# Patient Record
Sex: MALE | Race: WHITE | NOT HISPANIC OR LATINO | Employment: FULL TIME | URBAN - METROPOLITAN AREA
[De-identification: names, ages, dates, MRNs, and addresses within clinical notes are randomized per-mention and may not be internally consistent; named-entity substitution may affect disease eponyms.]

---

## 2017-01-06 ENCOUNTER — LAB VISIT (OUTPATIENT)
Dept: LAB | Facility: HOSPITAL | Age: 67
End: 2017-01-06
Attending: INTERNAL MEDICINE
Payer: COMMERCIAL

## 2017-01-06 ENCOUNTER — HOSPITAL ENCOUNTER (OUTPATIENT)
Dept: RADIOLOGY | Facility: HOSPITAL | Age: 67
Discharge: HOME | End: 2017-01-06
Attending: NURSE PRACTITIONER
Payer: COMMERCIAL

## 2017-01-06 DIAGNOSIS — C78.01 SECONDARY ADENOCARCINOMA OF LUNG, RIGHT: ICD-10-CM

## 2017-01-06 DIAGNOSIS — C20 RECTAL CANCER: ICD-10-CM

## 2017-01-06 LAB
ALBUMIN SERPL BCP-MCNC: 3.4 G/DL
ALP SERPL-CCNC: 235 U/L
ALT SERPL W/O P-5'-P-CCNC: 15 U/L
ANION GAP SERPL CALC-SCNC: 10 MMOL/L
AST SERPL-CCNC: 25 U/L
BILIRUB SERPL-MCNC: 0.5 MG/DL
BUN SERPL-MCNC: 10 MG/DL
CALCIUM SERPL-MCNC: 9.1 MG/DL
CHLORIDE SERPL-SCNC: 106 MMOL/L
CO2 SERPL-SCNC: 25 MMOL/L
CREAT SERPL-MCNC: 1.1 MG/DL
ERYTHROCYTE [DISTWIDTH] IN BLOOD BY AUTOMATED COUNT: 18.3 %
EST. GFR  (AFRICAN AMERICAN): >60 ML/MIN/1.73 M^2
EST. GFR  (NON AFRICAN AMERICAN): >60 ML/MIN/1.73 M^2
GLUCOSE SERPL-MCNC: 160 MG/DL
HCT VFR BLD AUTO: 35 %
HGB BLD-MCNC: 11 G/DL
MCH RBC QN AUTO: 29.7 PG
MCHC RBC AUTO-ENTMCNC: 31.4 %
MCV RBC AUTO: 95 FL
NEUTROPHILS # BLD AUTO: 13.8 K/UL
PLATELET # BLD AUTO: 122 K/UL
PMV BLD AUTO: 9.8 FL
POTASSIUM SERPL-SCNC: 3.8 MMOL/L
PROT SERPL-MCNC: 6.7 G/DL
RBC # BLD AUTO: 3.7 M/UL
SODIUM SERPL-SCNC: 141 MMOL/L
WBC # BLD AUTO: 18.14 K/UL

## 2017-01-06 PROCEDURE — 25500020 PHARM REV CODE 255: Performed by: NURSE PRACTITIONER

## 2017-01-06 PROCEDURE — 74177 CT ABD & PELVIS W/CONTRAST: CPT | Mod: 26,,, | Performed by: RADIOLOGY

## 2017-01-06 PROCEDURE — 71260 CT THORAX DX C+: CPT | Mod: 26,,, | Performed by: RADIOLOGY

## 2017-01-06 PROCEDURE — 71260 CT THORAX DX C+: CPT | Mod: TC

## 2017-01-06 PROCEDURE — 74177 CT ABD & PELVIS W/CONTRAST: CPT | Mod: TC

## 2017-01-06 PROCEDURE — 80053 COMPREHEN METABOLIC PANEL: CPT

## 2017-01-06 PROCEDURE — 85027 COMPLETE CBC AUTOMATED: CPT

## 2017-01-06 PROCEDURE — 36415 COLL VENOUS BLD VENIPUNCTURE: CPT

## 2017-01-06 RX ADMIN — IOHEXOL 15 ML: 350 INJECTION, SOLUTION INTRAVENOUS at 03:01

## 2017-01-06 RX ADMIN — IOHEXOL 100 ML: 350 INJECTION, SOLUTION INTRAVENOUS at 05:01

## 2017-01-06 RX ADMIN — IOHEXOL 15 ML: 350 INJECTION, SOLUTION INTRAVENOUS at 02:01

## 2017-01-09 ENCOUNTER — OFFICE VISIT (OUTPATIENT)
Dept: HEMATOLOGY/ONCOLOGY | Facility: CLINIC | Age: 67
End: 2017-01-09
Payer: COMMERCIAL

## 2017-01-09 VITALS
OXYGEN SATURATION: 96 % | HEIGHT: 66 IN | HEART RATE: 82 BPM | BODY MASS INDEX: 34.33 KG/M2 | DIASTOLIC BLOOD PRESSURE: 73 MMHG | WEIGHT: 213.63 LBS | RESPIRATION RATE: 19 BRPM | SYSTOLIC BLOOD PRESSURE: 111 MMHG | TEMPERATURE: 98 F

## 2017-01-09 DIAGNOSIS — Z51.12 ENCOUNTER FOR ANTINEOPLASTIC CHEMOTHERAPY AND IMMUNOTHERAPY: ICD-10-CM

## 2017-01-09 DIAGNOSIS — C78.01 SECONDARY ADENOCARCINOMA OF LUNG, RIGHT: ICD-10-CM

## 2017-01-09 DIAGNOSIS — T45.1X5A CHEMOTHERAPY INDUCED NEUTROPENIA: ICD-10-CM

## 2017-01-09 DIAGNOSIS — D70.1 CHEMOTHERAPY INDUCED NEUTROPENIA: ICD-10-CM

## 2017-01-09 DIAGNOSIS — E11.9 TYPE 2 DIABETES MELLITUS WITHOUT COMPLICATION, WITHOUT LONG-TERM CURRENT USE OF INSULIN: ICD-10-CM

## 2017-01-09 DIAGNOSIS — Z51.11 ENCOUNTER FOR ANTINEOPLASTIC CHEMOTHERAPY AND IMMUNOTHERAPY: ICD-10-CM

## 2017-01-09 DIAGNOSIS — C20 RECTAL CANCER: Primary | ICD-10-CM

## 2017-01-09 PROCEDURE — 99999 PR PBB SHADOW E&M-EST. PATIENT-LVL IV: CPT | Mod: PBBFAC,,, | Performed by: INTERNAL MEDICINE

## 2017-01-09 PROCEDURE — 3044F HG A1C LEVEL LT 7.0%: CPT | Mod: S$GLB,,, | Performed by: INTERNAL MEDICINE

## 2017-01-09 PROCEDURE — 4010F ACE/ARB THERAPY RXD/TAKEN: CPT | Mod: S$GLB,,, | Performed by: INTERNAL MEDICINE

## 2017-01-09 PROCEDURE — 1160F RVW MEDS BY RX/DR IN RCRD: CPT | Mod: S$GLB,,, | Performed by: INTERNAL MEDICINE

## 2017-01-09 PROCEDURE — 2022F DILAT RTA XM EVC RTNOPTHY: CPT | Mod: S$GLB,,, | Performed by: INTERNAL MEDICINE

## 2017-01-09 PROCEDURE — 1159F MED LIST DOCD IN RCRD: CPT | Mod: S$GLB,,, | Performed by: INTERNAL MEDICINE

## 2017-01-09 PROCEDURE — 99215 OFFICE O/P EST HI 40 MIN: CPT | Mod: S$GLB,,, | Performed by: INTERNAL MEDICINE

## 2017-01-09 PROCEDURE — 1125F AMNT PAIN NOTED PAIN PRSNT: CPT | Mod: S$GLB,,, | Performed by: INTERNAL MEDICINE

## 2017-01-09 PROCEDURE — 3074F SYST BP LT 130 MM HG: CPT | Mod: S$GLB,,, | Performed by: INTERNAL MEDICINE

## 2017-01-09 PROCEDURE — 1157F ADVNC CARE PLAN IN RCRD: CPT | Mod: S$GLB,,, | Performed by: INTERNAL MEDICINE

## 2017-01-09 PROCEDURE — 3078F DIAST BP <80 MM HG: CPT | Mod: S$GLB,,, | Performed by: INTERNAL MEDICINE

## 2017-01-09 RX ORDER — FLUOROURACIL 50 MG/ML
400 INJECTION, SOLUTION INTRAVENOUS
Status: CANCELLED | OUTPATIENT
Start: 2017-01-11

## 2017-01-09 RX ORDER — SODIUM CHLORIDE 0.9 % (FLUSH) 0.9 %
10 SYRINGE (ML) INJECTION
Status: CANCELLED | OUTPATIENT
Start: 2017-01-13

## 2017-01-09 RX ORDER — ATROPINE SULFATE 0.4 MG/ML
0.4 INJECTION, SOLUTION ENDOTRACHEAL; INTRAMEDULLARY; INTRAMUSCULAR; INTRAVENOUS; SUBCUTANEOUS DAILY PRN
Status: CANCELLED | OUTPATIENT
Start: 2017-01-11

## 2017-01-09 RX ORDER — ACETAMINOPHEN 500 MG
1 TABLET ORAL DAILY
COMMUNITY

## 2017-01-09 RX ORDER — HEPARIN 100 UNIT/ML
500 SYRINGE INTRAVENOUS
Status: CANCELLED | OUTPATIENT
Start: 2017-01-13

## 2017-01-09 NOTE — Clinical Note
Schedule CBC,CMP, urine protein/creatinine ratio and see me in 2 weeks and for FOLFIRI and Cyramza. DC pump on day 3. Neulasta on day 3  Schedule labs on a Monday, see me on a Tuesday PM and chemo on Wednesday

## 2017-01-09 NOTE — PROGRESS NOTES
"Subjective:       Patient ID: Karthik Medrano is a 66 y.o. male.    Chief Complaint: Rectal Cancer and 4/10 abdominal pain  Oncologic History:  66 year old male with history of rectal cancer diagnosed 1/11/10 on colonoscopy (moderately differentiated adenocarcinoma) and based on imaging, was T3NxM0. Patient underwent Neoadjuvant chemort with infusional 5FU. This was completed 3/19/10 with a total of 50.4Gy given to the pelvis. He went to surgery 5/13/10. T3N0M0. Post operative he received no adjuvant treatment and has been followed regularly. He went to his PCP 6/24/13 who ordered a CXR which found pulmonary nodules. On 6/27/13, CT chest confirmed these findings. CEA drawn when initially diagnosed was 2.6ng/ml and 6/27/13 was 30.8 ng/ml. . Pulmonary biopsy was performed 7/18/13 and this confirmed metastatic adenocarcinoma. 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 Avastin was added for second cycle.    Patient completed 12 cycles of FOLFOX avastin. He did not receive Avastin for cycle 3, 4, or 5 (due to uncontrolled blood pressure).    He was on maintenance infusional 5FU and Avastin. Repeat scans 4/14/14 showed numerous bilateral pulmonary soft tissue nodules, the majority of which demonstrate interval enlargement when compared to the prior exam. CEA up 18.2 from 13.    He was restarted on FOLFIRI and AVastin and received 6 months of chemo and then was switched to maintenance chemotherapy with 5FU and Avastin. His CT scans from early May 2015 revealed progression in lung lesions.  He is now on FOLFIRI and Cyramza.  CT from 4/19/16 reveals "Multiple stable metastatic pulmonary nodules in this patient with history of rectal cancer.  Stable postoperative changes are present from prior partial colectomy.Interval increase in right-sided hydronephrosis despite appropriate positioning of a double-J ureteral sent.  There is associated enlargement and decreased perfusion to the right kidney " "consistent with obstruction. These findings are concerning for a nonfunctioning ureteral stent. Cholelithiasis."  CT from 6/13/16 reveal stable disease and hydronephrosis has resolved.  CT from 8/5/16 reveals "Stable pulmonary metastatic disease.Increased prominence of periureteral fat stranding/inflammatory change. Stable mild right hydronephrosis."                 Interval History: Mr. Medrano returns for follow up and for FOLFIRI and Cyramza.    HPIHis Ct scans reveal stable disease.    He denies any nausea, vomiting, diarrhea, constipation, abdominal pain, weight loss or loss of appetite, chest pain, shortness of breath, leg swelling, fatigue, pain, headache, dizziness, or mood changes. His ECOG PS is zero. He is accompanied by his wife.        Review of Systems   Constitutional: Negative for appetite change, fatigue and unexpected weight change.   HENT: Negative for mouth sores.    Eyes: Negative for visual disturbance.   Respiratory: Negative for cough and shortness of breath.    Cardiovascular: Negative for chest pain.   Gastrointestinal: Negative for abdominal pain and diarrhea.   Genitourinary: Negative for frequency.   Musculoskeletal: Negative for back pain.   Skin: Negative for rash.   Neurological: Negative for headaches.   Hematological: Negative for adenopathy.   Psychiatric/Behavioral: The patient is not nervous/anxious.    All other systems reviewed and are negative.      PMFSH: all information reviewed and updated as relevant to today's visit  Objective:      Physical Exam   Constitutional: He is oriented to person, place, and time. He appears well-developed and well-nourished.   HENT:   Mouth/Throat: No oropharyngeal exudate.   Cardiovascular: Normal rate and normal heart sounds.    Pulmonary/Chest: Effort normal and breath sounds normal. He has no wheezes.   Abdominal: Soft. Bowel sounds are normal. There is no tenderness.   Musculoskeletal: He exhibits no edema or tenderness.   Lymphadenopathy:    "  He has no cervical adenopathy.   Neurological: He is alert and oriented to person, place, and time. Coordination normal.   Skin: Skin is warm and dry. No rash noted.   Psychiatric: He has a normal mood and affect. Judgment and thought content normal.   Vitals reviewed.      LABS:  WBC   Date Value Ref Range Status   01/06/2017 18.14 (H) 3.90 - 12.70 K/uL Final     Hemoglobin   Date Value Ref Range Status   01/06/2017 11.0 (L) 14.0 - 18.0 g/dL Final     Hematocrit   Date Value Ref Range Status   01/06/2017 35.0 (L) 40.0 - 54.0 % Final     Platelets   Date Value Ref Range Status   01/06/2017 122 (L) 150 - 350 K/uL Final     Gran #   Date Value Ref Range Status   01/06/2017 13.8 (H) 1.8 - 7.7 K/uL Final     Comment:     The ANC is based on a white cell differential from an   automated cell counter. It has not been microscopically   reviewed for the presence of abnormal cells. Clinical   correlation is required.       Gran%   Date Value Ref Range Status   03/06/2016 66.8 38.0 - 73.0 % Final       Chemistry        Component Value Date/Time     01/06/2017 1414    K 3.8 01/06/2017 1414     01/06/2017 1414    CO2 25 01/06/2017 1414    BUN 10 01/06/2017 1414    CREATININE 1.1 01/06/2017 1414     (H) 01/06/2017 1414        Component Value Date/Time    CALCIUM 9.1 01/06/2017 1414    ALKPHOS 235 (H) 01/06/2017 1414    AST 25 01/06/2017 1414    ALT 15 01/06/2017 1414    BILITOT 0.5 01/06/2017 1414          Assessment:       1. Rectal cancer    2. Secondary adenocarcinoma of lung, right    3. Encounter for antineoplastic chemotherapy and immunotherapy    4. Chemotherapy induced neutropenia    5. Type 2 diabetes mellitus without complication, without long-term current use of insulin        Plan:        1,2,3,4. He is doing well. His CT scans reveal stable disease. He will proceed with FOLFIRI and Cyramza and will return in 2 weeks for next cycle. Neulasta on day 3  5. Stable on meds.    Above care plan was  discussed with patient and accompanying wife and all questions were addressed to their satisfaction

## 2017-01-11 ENCOUNTER — INFUSION (OUTPATIENT)
Dept: INFUSION THERAPY | Facility: HOSPITAL | Age: 67
End: 2017-01-11
Attending: INTERNAL MEDICINE
Payer: COMMERCIAL

## 2017-01-11 VITALS
SYSTOLIC BLOOD PRESSURE: 130 MMHG | TEMPERATURE: 98 F | HEART RATE: 68 BPM | RESPIRATION RATE: 18 BRPM | DIASTOLIC BLOOD PRESSURE: 62 MMHG

## 2017-01-11 DIAGNOSIS — D70.1 CHEMOTHERAPY INDUCED NEUTROPENIA: Primary | ICD-10-CM

## 2017-01-11 DIAGNOSIS — Z51.12 ENCOUNTER FOR ANTINEOPLASTIC CHEMOTHERAPY AND IMMUNOTHERAPY: ICD-10-CM

## 2017-01-11 DIAGNOSIS — T45.1X5A CHEMOTHERAPY INDUCED NEUTROPENIA: Primary | ICD-10-CM

## 2017-01-11 DIAGNOSIS — Z51.11 ENCOUNTER FOR ANTINEOPLASTIC CHEMOTHERAPY AND IMMUNOTHERAPY: ICD-10-CM

## 2017-01-11 PROCEDURE — 96375 TX/PRO/DX INJ NEW DRUG ADDON: CPT

## 2017-01-11 PROCEDURE — 96416 CHEMO PROLONG INFUSE W/PUMP: CPT

## 2017-01-11 PROCEDURE — 96415 CHEMO IV INFUSION ADDL HR: CPT

## 2017-01-11 PROCEDURE — 96413 CHEMO IV INFUSION 1 HR: CPT

## 2017-01-11 PROCEDURE — 96411 CHEMO IV PUSH ADDL DRUG: CPT

## 2017-01-11 PROCEDURE — 96417 CHEMO IV INFUS EACH ADDL SEQ: CPT

## 2017-01-11 PROCEDURE — 96368 THER/DIAG CONCURRENT INF: CPT

## 2017-01-11 PROCEDURE — 25000003 PHARM REV CODE 250: Performed by: INTERNAL MEDICINE

## 2017-01-11 PROCEDURE — 96367 TX/PROPH/DG ADDL SEQ IV INF: CPT

## 2017-01-11 PROCEDURE — 63600175 PHARM REV CODE 636 W HCPCS: Performed by: INTERNAL MEDICINE

## 2017-01-11 RX ORDER — ATROPINE SULFATE 0.4 MG/ML
0.4 INJECTION, SOLUTION ENDOTRACHEAL; INTRAMEDULLARY; INTRAMUSCULAR; INTRAVENOUS; SUBCUTANEOUS DAILY PRN
Status: DISCONTINUED | OUTPATIENT
Start: 2017-01-11 | End: 2017-01-11 | Stop reason: HOSPADM

## 2017-01-11 RX ORDER — FLUOROURACIL 50 MG/ML
400 INJECTION, SOLUTION INTRAVENOUS
Status: COMPLETED | OUTPATIENT
Start: 2017-01-11 | End: 2017-01-11

## 2017-01-11 RX ADMIN — SODIUM CHLORIDE: 9 INJECTION, SOLUTION INTRAVENOUS at 04:01

## 2017-01-11 RX ADMIN — SODIUM CHLORIDE 775 MG: 9 INJECTION, SOLUTION INTRAVENOUS at 03:01

## 2017-01-11 RX ADMIN — SODIUM CHLORIDE: 9 INJECTION, SOLUTION INTRAVENOUS at 02:01

## 2017-01-11 RX ADMIN — FLUOROURACIL 5090 MG: 50 INJECTION, SOLUTION INTRAVENOUS at 06:01

## 2017-01-11 RX ADMIN — FLUOROURACIL 850 MG: 50 INJECTION, SOLUTION INTRAVENOUS at 06:01

## 2017-01-11 RX ADMIN — LEUCOVORIN CALCIUM 42.4 MG: 350 INJECTION, POWDER, LYOPHILIZED, FOR SOLUTION INTRAMUSCULAR; INTRAVENOUS at 04:01

## 2017-01-11 RX ADMIN — DEXTROSE 382 MG: 5 SOLUTION INTRAVENOUS at 04:01

## 2017-01-11 RX ADMIN — ATROPINE SULFATE 0.4 MG: 0.4 INJECTION, SOLUTION INTRAMUSCULAR; INTRAVENOUS; SUBCUTANEOUS at 04:01

## 2017-01-11 RX ADMIN — DEXAMETHASONE SODIUM PHOSPHATE 0.25 MG: 4 INJECTION, SOLUTION INTRAMUSCULAR; INTRAVENOUS at 03:01

## 2017-01-12 NOTE — PLAN OF CARE
Problem: Patient Care Overview  Goal: Plan of Care Review  Outcome: Ongoing (interventions implemented as appropriate)  Patient tolerated treatment and connected to 5fu home chemo infusion pump. . Patient instructed to call MD with any problems and to return to clinic Friday at 1600 for pump D/C . AVS given and Patient discharged home.

## 2017-01-13 ENCOUNTER — INFUSION (OUTPATIENT)
Dept: INFUSION THERAPY | Facility: HOSPITAL | Age: 67
End: 2017-01-13
Attending: INTERNAL MEDICINE
Payer: COMMERCIAL

## 2017-01-13 DIAGNOSIS — Z51.12 ENCOUNTER FOR ANTINEOPLASTIC CHEMOTHERAPY AND IMMUNOTHERAPY: ICD-10-CM

## 2017-01-13 DIAGNOSIS — Z51.11 ENCOUNTER FOR ANTINEOPLASTIC CHEMOTHERAPY AND IMMUNOTHERAPY: ICD-10-CM

## 2017-01-13 DIAGNOSIS — D70.1 CHEMOTHERAPY INDUCED NEUTROPENIA: Primary | ICD-10-CM

## 2017-01-13 DIAGNOSIS — T45.1X5A CHEMOTHERAPY INDUCED NEUTROPENIA: Primary | ICD-10-CM

## 2017-01-13 PROCEDURE — 25000003 PHARM REV CODE 250: Performed by: INTERNAL MEDICINE

## 2017-01-13 PROCEDURE — 96377 APPLICATON ON-BODY INJECTOR: CPT

## 2017-01-13 PROCEDURE — 96360 HYDRATION IV INFUSION INIT: CPT

## 2017-01-13 PROCEDURE — 63600175 PHARM REV CODE 636 W HCPCS: Performed by: INTERNAL MEDICINE

## 2017-01-13 RX ORDER — SODIUM CHLORIDE 0.9 % (FLUSH) 0.9 %
10 SYRINGE (ML) INJECTION
Status: DISCONTINUED | OUTPATIENT
Start: 2017-01-13 | End: 2017-01-13 | Stop reason: HOSPADM

## 2017-01-13 RX ORDER — HEPARIN 100 UNIT/ML
500 SYRINGE INTRAVENOUS
Status: DISCONTINUED | OUTPATIENT
Start: 2017-01-13 | End: 2017-01-13 | Stop reason: HOSPADM

## 2017-01-13 RX ADMIN — PEGFILGRASTIM 6 MG: KIT SUBCUTANEOUS at 04:01

## 2017-01-13 RX ADMIN — HEPARIN 500 UNITS: 100 SYRINGE at 05:01

## 2017-01-13 RX ADMIN — SODIUM CHLORIDE 1000 ML: 0.9 INJECTION, SOLUTION INTRAVENOUS at 04:01

## 2017-01-13 RX ADMIN — SODIUM CHLORIDE, PRESERVATIVE FREE 10 ML: 5 INJECTION INTRAVENOUS at 05:01

## 2017-01-13 NOTE — PLAN OF CARE
Problem: Patient Care Overview  Goal: Plan of Care Review  Outcome: Ongoing (interventions implemented as appropriate)  Pt Here for D/c pump, V/S stable, pump infusion completed and disconnected from Patient. 1 liter Ns infused, neulasta obi in place. Line flushed per protocol, pt instructed to call MD with any issues or concerns that may arise.

## 2017-01-23 ENCOUNTER — LAB VISIT (OUTPATIENT)
Dept: LAB | Facility: HOSPITAL | Age: 67
End: 2017-01-23
Payer: COMMERCIAL

## 2017-01-23 DIAGNOSIS — C20 RECTAL CANCER: ICD-10-CM

## 2017-01-23 LAB
ALBUMIN SERPL BCP-MCNC: 3.2 G/DL
ALP SERPL-CCNC: 210 U/L
ALT SERPL W/O P-5'-P-CCNC: 18 U/L
ANION GAP SERPL CALC-SCNC: 7 MMOL/L
AST SERPL-CCNC: 31 U/L
BILIRUB SERPL-MCNC: 0.4 MG/DL
BUN SERPL-MCNC: 12 MG/DL
CALCIUM SERPL-MCNC: 9 MG/DL
CHLORIDE SERPL-SCNC: 106 MMOL/L
CO2 SERPL-SCNC: 26 MMOL/L
CREAT SERPL-MCNC: 1 MG/DL
ERYTHROCYTE [DISTWIDTH] IN BLOOD BY AUTOMATED COUNT: 18.6 %
EST. GFR  (AFRICAN AMERICAN): >60 ML/MIN/1.73 M^2
EST. GFR  (NON AFRICAN AMERICAN): >60 ML/MIN/1.73 M^2
GLUCOSE SERPL-MCNC: 119 MG/DL
HCT VFR BLD AUTO: 36.2 %
HGB BLD-MCNC: 11.4 G/DL
MCH RBC QN AUTO: 29.8 PG
MCHC RBC AUTO-ENTMCNC: 31.5 %
MCV RBC AUTO: 95 FL
NEUTROPHILS # BLD AUTO: 8.6 K/UL
PLATELET # BLD AUTO: 103 K/UL
PMV BLD AUTO: 10 FL
POTASSIUM SERPL-SCNC: 4.2 MMOL/L
PROT SERPL-MCNC: 6.6 G/DL
RBC # BLD AUTO: 3.83 M/UL
SODIUM SERPL-SCNC: 139 MMOL/L
WBC # BLD AUTO: 10.95 K/UL

## 2017-01-23 PROCEDURE — 36415 COLL VENOUS BLD VENIPUNCTURE: CPT

## 2017-01-23 PROCEDURE — 80053 COMPREHEN METABOLIC PANEL: CPT

## 2017-01-23 PROCEDURE — 85027 COMPLETE CBC AUTOMATED: CPT

## 2017-01-24 ENCOUNTER — OFFICE VISIT (OUTPATIENT)
Dept: HEMATOLOGY/ONCOLOGY | Facility: CLINIC | Age: 67
End: 2017-01-24
Payer: COMMERCIAL

## 2017-01-24 VITALS
RESPIRATION RATE: 21 BRPM | DIASTOLIC BLOOD PRESSURE: 77 MMHG | OXYGEN SATURATION: 96 % | HEART RATE: 82 BPM | TEMPERATURE: 99 F | HEIGHT: 66 IN | BODY MASS INDEX: 34.75 KG/M2 | SYSTOLIC BLOOD PRESSURE: 137 MMHG | WEIGHT: 216.25 LBS

## 2017-01-24 DIAGNOSIS — T45.1X5A CHEMOTHERAPY INDUCED NEUTROPENIA: ICD-10-CM

## 2017-01-24 DIAGNOSIS — Z51.12 ENCOUNTER FOR ANTINEOPLASTIC CHEMOTHERAPY AND IMMUNOTHERAPY: ICD-10-CM

## 2017-01-24 DIAGNOSIS — E11.9 TYPE 2 DIABETES MELLITUS WITHOUT COMPLICATION, WITHOUT LONG-TERM CURRENT USE OF INSULIN: ICD-10-CM

## 2017-01-24 DIAGNOSIS — I10 ESSENTIAL HYPERTENSION: ICD-10-CM

## 2017-01-24 DIAGNOSIS — Z51.11 ENCOUNTER FOR ANTINEOPLASTIC CHEMOTHERAPY AND IMMUNOTHERAPY: ICD-10-CM

## 2017-01-24 DIAGNOSIS — C20 RECTAL CANCER: Primary | ICD-10-CM

## 2017-01-24 DIAGNOSIS — D70.1 CHEMOTHERAPY INDUCED NEUTROPENIA: ICD-10-CM

## 2017-01-24 DIAGNOSIS — C78.01 SECONDARY ADENOCARCINOMA OF LUNG, RIGHT: ICD-10-CM

## 2017-01-24 PROCEDURE — 99215 OFFICE O/P EST HI 40 MIN: CPT | Mod: S$GLB,,, | Performed by: INTERNAL MEDICINE

## 2017-01-24 PROCEDURE — 1126F AMNT PAIN NOTED NONE PRSNT: CPT | Mod: S$GLB,,, | Performed by: INTERNAL MEDICINE

## 2017-01-24 PROCEDURE — 1157F ADVNC CARE PLAN IN RCRD: CPT | Mod: S$GLB,,, | Performed by: INTERNAL MEDICINE

## 2017-01-24 PROCEDURE — 1160F RVW MEDS BY RX/DR IN RCRD: CPT | Mod: S$GLB,,, | Performed by: INTERNAL MEDICINE

## 2017-01-24 PROCEDURE — 4010F ACE/ARB THERAPY RXD/TAKEN: CPT | Mod: S$GLB,,, | Performed by: INTERNAL MEDICINE

## 2017-01-24 PROCEDURE — 1159F MED LIST DOCD IN RCRD: CPT | Mod: S$GLB,,, | Performed by: INTERNAL MEDICINE

## 2017-01-24 PROCEDURE — 99999 PR PBB SHADOW E&M-EST. PATIENT-LVL IV: CPT | Mod: PBBFAC,,, | Performed by: INTERNAL MEDICINE

## 2017-01-24 PROCEDURE — 3044F HG A1C LEVEL LT 7.0%: CPT | Mod: S$GLB,,, | Performed by: INTERNAL MEDICINE

## 2017-01-24 PROCEDURE — 2022F DILAT RTA XM EVC RTNOPTHY: CPT | Mod: S$GLB,,, | Performed by: INTERNAL MEDICINE

## 2017-01-24 PROCEDURE — 3078F DIAST BP <80 MM HG: CPT | Mod: S$GLB,,, | Performed by: INTERNAL MEDICINE

## 2017-01-24 PROCEDURE — 3075F SYST BP GE 130 - 139MM HG: CPT | Mod: S$GLB,,, | Performed by: INTERNAL MEDICINE

## 2017-01-24 RX ORDER — ATROPINE SULFATE 0.4 MG/ML
0.4 INJECTION, SOLUTION ENDOTRACHEAL; INTRAMEDULLARY; INTRAMUSCULAR; INTRAVENOUS; SUBCUTANEOUS DAILY PRN
Status: CANCELLED | OUTPATIENT
Start: 2017-01-27

## 2017-01-24 RX ORDER — FLUOROURACIL 50 MG/ML
400 INJECTION, SOLUTION INTRAVENOUS
Status: CANCELLED | OUTPATIENT
Start: 2017-01-27

## 2017-01-24 RX ORDER — SODIUM CHLORIDE 0.9 % (FLUSH) 0.9 %
10 SYRINGE (ML) INJECTION
Status: CANCELLED | OUTPATIENT
Start: 2017-01-29

## 2017-01-24 RX ORDER — HEPARIN 100 UNIT/ML
500 SYRINGE INTRAVENOUS
Status: CANCELLED | OUTPATIENT
Start: 2017-01-29

## 2017-01-24 NOTE — PROGRESS NOTES
"PATIENT: Karthik Medrano  MRN: [id number]  DATE: 1/24/2017    Subjective:     Chief complaint:  Chief Complaint   Patient presents with    Rectal Cancer    mouth sores--started last wednesday       Oncologic History:  66 year old male with history of rectal cancer diagnosed 1/11/10 on colonoscopy (moderately differentiated adenocarcinoma) and based on imaging, was T3NxM0. Patient underwent Neoadjuvant chemort with infusional 5FU. This was completed 3/19/10 with a total of 50.4Gy given to the pelvis. He went to surgery 5/13/10. T3N0M0. Post operative he received no adjuvant treatment and has been followed regularly. He went to his PCP 6/24/13 who ordered a CXR which found pulmonary nodules. On 6/27/13, CT chest confirmed these findings. CEA drawn when initially diagnosed was 2.6ng/ml and 6/27/13 was 30.8 ng/ml. . Pulmonary biopsy was performed 7/18/13 and this confirmed metastatic adenocarcinoma. 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 Avastin was added for second cycle.    Patient completed 12 cycles of FOLFOX avastin. He did not receive Avastin for cycle 3, 4, or 5 (due to uncontrolled blood pressure).    He was on maintenance infusional 5FU and Avastin. Repeat scans 4/14/14 showed numerous bilateral pulmonary soft tissue nodules, the majority of which demonstrate interval enlargement when compared to the prior exam. CEA up 18.2 from 13.    He was restarted on FOLFIRI and AVastin and received 6 months of chemo and then was switched to maintenance chemotherapy with 5FU and Avastin. His CT scans from early May 2015 revealed progression in lung lesions.  He is now on FOLFIRI and Cyramza.  CT from 4/19/16 reveals "Multiple stable metastatic pulmonary nodules in this patient with history of rectal cancer.  Stable postoperative changes are present from prior partial colectomy.Interval increase in right-sided hydronephrosis despite appropriate positioning of a double-J ureteral sent.  There " "is associated enlargement and decreased perfusion to the right kidney consistent with obstruction. These findings are concerning for a nonfunctioning ureteral stent. Cholelithiasis."  CT from 6/13/16 reveal stable disease and hydronephrosis has resolved.  CT from 8/5/16 reveals "Stable pulmonary metastatic disease.Increased prominence of periureteral fat stranding/inflammatory change. Stable mild right hydronephrosis."  CT from 1/6/17 "1. Stable appearance of pulmonary metastatic disease in this patient with history of colon cancer. Pulmonary index lesions are stable in size compared to prior examination. No evidence of new metastatic disease. 2. Right-sided double-J ureteral stent with stable appearing inflammatory change about the right renal collecting system and right ureter. 3. Additional stable incidental findings as discussed above. "      Interval History: Mr. Medrano returns for follow up and for FOLFIRI and Cyramza. He feels good today. He developed mouth sores after last chemo but all resolved. He denies any nausea, vomiting, diarrhea, constipation, abdominal pain, weight loss or loss of appetite, chest pain, shortness of breath, leg swelling, fatigue, pain, headache, dizziness, or mood changes.  He is accompanied by his wife.     ECOG Performance Status:   ECOG SCORE    0 - Fully active-able to carry on all pre-disease performance without restriction          PMFSH: all information reviewed and updated as relevant to today's visit    Review of Systems:   Review of Systems   Constitutional: Negative for activity change, appetite change, fatigue and fever.   HENT: Negative for mouth sores, nosebleeds and sore throat.    Eyes: Negative for visual disturbance.   Respiratory: Negative for cough and shortness of breath.    Cardiovascular: Negative for chest pain, palpitations and leg swelling.   Gastrointestinal: Negative for abdominal pain, constipation, diarrhea, nausea and vomiting.   Genitourinary: Negative " "for difficulty urinating and frequency.   Musculoskeletal: Negative for arthralgias and back pain.   Skin: Negative for rash.   Neurological: Negative for dizziness, numbness and headaches.   Hematological: Negative for adenopathy. Does not bruise/bleed easily.   Psychiatric/Behavioral: Negative for confusion and sleep disturbance. The patient is not nervous/anxious.    All other systems reviewed and are negative.        Objective:      Vitals:   Vitals:    01/24/17 1420   BP: 137/77   Pulse: 82   Resp: (!) 21   Temp: 98.7 °F (37.1 °C)   TempSrc: Oral   SpO2: 96%   Weight: 98.1 kg (216 lb 4.3 oz)   Height: 5' 6" (1.676 m)     BMI: Body mass index is 34.91 kg/(m^2).      Physical Exam:   Physical Exam   Constitutional: He is oriented to person, place, and time. He appears well-developed and well-nourished. No distress.   HENT:   Right Ear: External ear normal.   Left Ear: External ear normal.   Mouth/Throat: No oropharyngeal exudate.   Eyes: Conjunctivae and lids are normal. Pupils are equal, round, and reactive to light. No scleral icterus.   Neck: Trachea normal and normal range of motion. Neck supple. No thyromegaly present.   Cardiovascular: Normal rate, regular rhythm, normal heart sounds and normal pulses.    Pulmonary/Chest: Effort normal and breath sounds normal.   Abdominal: Soft. Normal appearance and bowel sounds are normal. He exhibits no distension and no mass. There is no hepatosplenomegaly or splenomegaly. There is no tenderness.   Musculoskeletal: Normal range of motion. He exhibits edema (trace ankle).   Lymphadenopathy:        Head (right side): No submental and no submandibular adenopathy present.        Head (left side): No submental and no submandibular adenopathy present.     He has no cervical adenopathy.     He has no axillary adenopathy.        Right: No supraclavicular adenopathy present.        Left: No supraclavicular adenopathy present.   Neurological: He is alert and oriented to person, " place, and time. He has normal reflexes. No sensory deficit.   Skin: Skin is warm, dry and intact. No bruising and no rash noted. Nails show no clubbing.   Psychiatric: He has a normal mood and affect. His speech is normal and behavior is normal. Cognition and memory are normal.   Vitals reviewed.        Laboratory Data:  WBC   Date Value Ref Range Status   01/23/2017 10.95 3.90 - 12.70 K/uL Final     Hemoglobin   Date Value Ref Range Status   01/23/2017 11.4 (L) 14.0 - 18.0 g/dL Final     Hematocrit   Date Value Ref Range Status   01/23/2017 36.2 (L) 40.0 - 54.0 % Final     Platelets   Date Value Ref Range Status   01/23/2017 103 (L) 150 - 350 K/uL Final     Gran #   Date Value Ref Range Status   01/23/2017 8.6 (H) 1.8 - 7.7 K/uL Final     Comment:     The ANC is based on a white cell differential from an   automated cell counter. It has not been microscopically   reviewed for the presence of abnormal cells. Clinical   correlation is required.       Gran%   Date Value Ref Range Status   03/06/2016 66.8 38.0 - 73.0 % Final       Chemistry        Component Value Date/Time     01/23/2017 1607    K 4.2 01/23/2017 1607     01/23/2017 1607    CO2 26 01/23/2017 1607    BUN 12 01/23/2017 1607    CREATININE 1.0 01/23/2017 1607     (H) 01/23/2017 1607        Component Value Date/Time    CALCIUM 9.0 01/23/2017 1607    ALKPHOS 210 (H) 01/23/2017 1607    AST 31 01/23/2017 1607    ALT 18 01/23/2017 1607    BILITOT 0.4 01/23/2017 1607              Assessment/Plan:     1. Rectal cancer    2. Secondary adenocarcinoma of lung, right    3. Encounter for antineoplastic chemotherapy and immunotherapy    4. Chemotherapy induced neutropenia    5. Type 2 diabetes mellitus without complication, without long-term current use of insulin    6. Essential hypertension        Plan:  1.2.3.4. Patient is clinically stable. He will proceed with FOLFIRI and Cyramza. RTC  in 2 weeks to see Dr. Abernathy with CBC, CMP urine  protein/creatinine ratio and for next cycle FOLFIRI and Cyramza. D/c pump day 3, Neulasta OBI on day 3. Schedule labs on a Monday, see MD on a Tuesday PM and chemo on Wednesday  5. Stable on meds.  6. Stable, continue meds.     Med and Orders:  Orders Placed This Encounter    CBC Oncology    Comprehensive metabolic panel    Protein / creatinine ratio, urine       Follow Up:  Return in about 2 weeks (around 2/7/2017).      More than 25 mins were spent during this encounter, greater than 50% was spent in direct counseling and/or coordination of care.   Patient was also seen and examined by Dr. Abernathy who agrees with above plan. Patient is in agreement with the proposed treatment plan. All questions were answered to the patient's satisfaction. Pt knows to call clinic if anything is needed before the next clinic visit.    PRADEEP Dwyer  Hematology and Medical Oncology    STAFF NOTE:  I have reviewed the notes, assessments, and/or procedures performed by the nurse practitioner, as above.  I have personally interviewed the patient at the beside, and rounded with the nurse practitioner. I formulated the plan of care.  I concur with her documentation of Karthik Medrano.

## 2017-01-24 NOTE — Clinical Note
RTC  in 2 weeks to see Dr. Abernathy with CBC, CMP urine protein/creatinine ratio and for next cycle FOLFIRI and Cyramza. D/c pump day 3, Neulasta OBI on day 3. Schedule labs on a Monday, see MD on a Tuesday PM and chemo on Wednesday

## 2017-01-25 ENCOUNTER — INFUSION (OUTPATIENT)
Dept: INFUSION THERAPY | Facility: HOSPITAL | Age: 67
End: 2017-01-25
Attending: INTERNAL MEDICINE
Payer: COMMERCIAL

## 2017-01-25 DIAGNOSIS — Z51.12 ENCOUNTER FOR ANTINEOPLASTIC CHEMOTHERAPY AND IMMUNOTHERAPY: ICD-10-CM

## 2017-01-25 DIAGNOSIS — Z51.11 ENCOUNTER FOR ANTINEOPLASTIC CHEMOTHERAPY AND IMMUNOTHERAPY: ICD-10-CM

## 2017-01-25 DIAGNOSIS — D70.1 CHEMOTHERAPY INDUCED NEUTROPENIA: Primary | ICD-10-CM

## 2017-01-25 DIAGNOSIS — T45.1X5A CHEMOTHERAPY INDUCED NEUTROPENIA: Primary | ICD-10-CM

## 2017-01-25 PROCEDURE — 96417 CHEMO IV INFUS EACH ADDL SEQ: CPT

## 2017-01-25 PROCEDURE — 96413 CHEMO IV INFUSION 1 HR: CPT

## 2017-01-25 PROCEDURE — 96411 CHEMO IV PUSH ADDL DRUG: CPT

## 2017-01-25 PROCEDURE — 25000003 PHARM REV CODE 250: Performed by: INTERNAL MEDICINE

## 2017-01-25 PROCEDURE — 96415 CHEMO IV INFUSION ADDL HR: CPT

## 2017-01-25 PROCEDURE — 63600175 PHARM REV CODE 636 W HCPCS: Performed by: INTERNAL MEDICINE

## 2017-01-25 PROCEDURE — 96375 TX/PRO/DX INJ NEW DRUG ADDON: CPT

## 2017-01-25 PROCEDURE — 96368 THER/DIAG CONCURRENT INF: CPT

## 2017-01-25 PROCEDURE — 96416 CHEMO PROLONG INFUSE W/PUMP: CPT

## 2017-01-25 PROCEDURE — 96367 TX/PROPH/DG ADDL SEQ IV INF: CPT

## 2017-01-25 RX ORDER — FLUOROURACIL 50 MG/ML
400 INJECTION, SOLUTION INTRAVENOUS
Status: COMPLETED | OUTPATIENT
Start: 2017-01-25 | End: 2017-01-25

## 2017-01-25 RX ORDER — ATROPINE SULFATE 0.4 MG/ML
0.4 INJECTION, SOLUTION ENDOTRACHEAL; INTRAMEDULLARY; INTRAMUSCULAR; INTRAVENOUS; SUBCUTANEOUS DAILY PRN
Status: DISCONTINUED | OUTPATIENT
Start: 2017-01-25 | End: 2017-01-25 | Stop reason: HOSPADM

## 2017-01-25 RX ADMIN — SODIUM CHLORIDE: 0.9 INJECTION, SOLUTION INTRAVENOUS at 02:01

## 2017-01-25 RX ADMIN — FLUOROURACIL 5090 MG: 50 INJECTION, SOLUTION INTRAVENOUS at 06:01

## 2017-01-25 RX ADMIN — PALONOSETRON HYDROCHLORIDE 0.25 MG: 0.25 INJECTION INTRAVENOUS at 02:01

## 2017-01-25 RX ADMIN — SODIUM CHLORIDE: 9 INJECTION, SOLUTION INTRAVENOUS at 02:01

## 2017-01-25 RX ADMIN — LEUCOVORIN CALCIUM 42.4 MG: 350 INJECTION, POWDER, LYOPHILIZED, FOR SOLUTION INTRAMUSCULAR; INTRAVENOUS at 04:01

## 2017-01-25 RX ADMIN — ATROPINE SULFATE 0.4 MG: 0.4 INJECTION, SOLUTION INTRAMUSCULAR; INTRAVENOUS; SUBCUTANEOUS at 04:01

## 2017-01-25 RX ADMIN — DEXTROSE 382 MG: 5 SOLUTION INTRAVENOUS at 04:01

## 2017-01-25 RX ADMIN — FLUOROURACIL 850 MG: 50 INJECTION, SOLUTION INTRAVENOUS at 06:01

## 2017-01-25 RX ADMIN — SODIUM CHLORIDE 775 MG: 9 INJECTION, SOLUTION INTRAVENOUS at 03:01

## 2017-01-25 NOTE — MR AVS SNAPSHOT
Patient Information     Patient Name Sex Karthik Lopez Male 1950      Visit Information        Provider Department Dept Phone Center    2017 2:30 PM [facility], CHEMO [facility] Chemotherapy Infusion [phone] Ru Suárez      Patient Instructions     None      Your Current Medications Are     amlodipine (NORVASC) 5 MG tablet    ascorbic acid (VITAMIN C) 1000 MG tablet    atorvastatin (LIPITOR) 10 MG tablet    cholecalciferol, vitamin D3, (VITAMIN D3) 2,000 unit Cap    co-enzyme Q-10 30 mg capsule    diphenoxylate-atropine 2.5-0.025 mg (LOMOTIL) 2.5-0.025 mg per tablet    ferrous gluconate (FERGON) 324 MG tablet    furosemide (LASIX) 20 MG tablet    gabapentin (NEURONTIN) 300 MG capsule    GARLIC EXTRACT ORAL    glipiZIDE (GLUCOTROL) 5 MG tablet    lisinopril (PRINIVIL,ZESTRIL) 40 MG tablet    metoprolol succinate (TOPROL-XL) 100 MG 24 hr tablet    multivitamin with iron-mineral TbSR    NYSTATIN (DUKE'S SOLUTION)    ondansetron (ZOFRAN) 8 MG tablet    oxycodone-acetaminophen (PERCOCET) 5-325 mg per tablet    paregoric 2 mg/5 mL Liqd    tamsulosin (FLOMAX) 0.4 mg Cp24      Facility-Administered Medications     atropine injection 0.4 mg    fluorouracil 2,400 mg/m2 = 5,090 mg in sodium chloride 0.9% 240 mL chemo infusion    fluorouracil injection 850 mg    irinotecan (CAMPTOSAR) 180 mg/m2 = 382 mg in dextrose 5 % 500 mL chemo infusion    leucovorin calcium 20 mg/m2 = 42.4 mg in dextrose 5 % 100 mL infusion    palonosetron 0.25mg/dexamethasone 20mg IVPB    ramucirumab (CYRAMZA) 775 mg in sodium chloride 0.9% 250 mL chemo infusion    sodium chloride 0.9% 100 mL flush bag    sodium chloride 0.9% 250 mL flush bag      Appointments for Next Year     2017  5:00 PM INFUSION 060 MIN (60 min.) [facility], CHEMO    Arrive at check-in approximately 15 minutes before your scheduled appointment time. Bring all outside medical records and imaging, along with a list of your current medications  and insurance card.    [facility], 5th Floor      Allergies     No Known Allergies      Medications You Received from 01/24/2017 1445 to 01/25/2017 1445        Date/Time Order Dose Route Action     01/25/2017 1433 palonosetron 0.25mg/dexamethasone 20mg IVPB 0.25 mg Intravenous New Bag     01/25/2017 1430 sodium chloride 0.9% 100 mL flush bag   Intravenous New Bag     01/25/2017 1430 sodium chloride 0.9% 250 mL flush bag   Intravenous New Bag      Current Discharge Medication List     Cannot display discharge medications since this is not an admission.

## 2017-01-26 NOTE — PLAN OF CARE
Problem: Patient Care Overview  Goal: Plan of Care Review  Outcome: Ongoing (interventions implemented as appropriate)  Pt. Tolerated treatment without any complications. V/S stable. Placed on ambulatory CAD pump. Port site infusing without difficulty and secured. Pt. Reminded check pump at least twice a day to make sure it was running properly. Infusion should complete Friday around 4 pm. No questions or concerns at this time.

## 2017-01-27 ENCOUNTER — INFUSION (OUTPATIENT)
Dept: INFUSION THERAPY | Facility: HOSPITAL | Age: 67
End: 2017-01-27
Attending: INTERNAL MEDICINE
Payer: COMMERCIAL

## 2017-01-27 VITALS
SYSTOLIC BLOOD PRESSURE: 139 MMHG | TEMPERATURE: 98 F | DIASTOLIC BLOOD PRESSURE: 66 MMHG | RESPIRATION RATE: 20 BRPM | HEART RATE: 68 BPM

## 2017-01-27 DIAGNOSIS — D70.1 CHEMOTHERAPY INDUCED NEUTROPENIA: Primary | ICD-10-CM

## 2017-01-27 DIAGNOSIS — Z51.12 ENCOUNTER FOR ANTINEOPLASTIC CHEMOTHERAPY AND IMMUNOTHERAPY: ICD-10-CM

## 2017-01-27 DIAGNOSIS — T45.1X5A CHEMOTHERAPY INDUCED NEUTROPENIA: Primary | ICD-10-CM

## 2017-01-27 DIAGNOSIS — Z51.11 ENCOUNTER FOR ANTINEOPLASTIC CHEMOTHERAPY AND IMMUNOTHERAPY: ICD-10-CM

## 2017-01-27 PROCEDURE — 25000003 PHARM REV CODE 250: Performed by: INTERNAL MEDICINE

## 2017-01-27 PROCEDURE — 96372 THER/PROPH/DIAG INJ SC/IM: CPT

## 2017-01-27 PROCEDURE — 96360 HYDRATION IV INFUSION INIT: CPT

## 2017-01-27 PROCEDURE — 63600175 PHARM REV CODE 636 W HCPCS: Performed by: INTERNAL MEDICINE

## 2017-01-27 RX ORDER — HEPARIN 100 UNIT/ML
500 SYRINGE INTRAVENOUS
Status: DISCONTINUED | OUTPATIENT
Start: 2017-01-27 | End: 2017-01-27 | Stop reason: HOSPADM

## 2017-01-27 RX ORDER — SODIUM CHLORIDE 0.9 % (FLUSH) 0.9 %
10 SYRINGE (ML) INJECTION
Status: DISCONTINUED | OUTPATIENT
Start: 2017-01-27 | End: 2017-01-27 | Stop reason: HOSPADM

## 2017-01-27 RX ADMIN — HEPARIN 500 UNITS: 100 SYRINGE at 06:01

## 2017-01-27 RX ADMIN — SODIUM CHLORIDE, PRESERVATIVE FREE 10 ML: 5 INJECTION INTRAVENOUS at 06:01

## 2017-01-27 RX ADMIN — PEGFILGRASTIM 6 MG: KIT SUBCUTANEOUS at 05:01

## 2017-01-27 RX ADMIN — SODIUM CHLORIDE 1000 ML: 0.9 INJECTION, SOLUTION INTRAVENOUS at 05:01

## 2017-01-27 NOTE — MR AVS SNAPSHOT
Patient Information     Patient Name Sex Karthik Lopez Male 1950      Visit Information        Provider Department Dept Phone Center    2017 5:00 PM [facility], CHEMO [facility] Chemotherapy Infusion [phone] Ru Suárez      Patient Instructions     None      Your Current Medications Are     amlodipine (NORVASC) 5 MG tablet    ascorbic acid (VITAMIN C) 1000 MG tablet    atorvastatin (LIPITOR) 10 MG tablet    cholecalciferol, vitamin D3, (VITAMIN D3) 2,000 unit Cap    co-enzyme Q-10 30 mg capsule    diphenoxylate-atropine 2.5-0.025 mg (LOMOTIL) 2.5-0.025 mg per tablet    ferrous gluconate (FERGON) 324 MG tablet    furosemide (LASIX) 20 MG tablet    gabapentin (NEURONTIN) 300 MG capsule    GARLIC EXTRACT ORAL    glipiZIDE (GLUCOTROL) 5 MG tablet    lisinopril (PRINIVIL,ZESTRIL) 40 MG tablet    metoprolol succinate (TOPROL-XL) 100 MG 24 hr tablet    multivitamin with iron-mineral TbSR    NYSTATIN (DUKE'S SOLUTION)    ondansetron (ZOFRAN) 8 MG tablet    oxycodone-acetaminophen (PERCOCET) 5-325 mg per tablet    paregoric 2 mg/5 mL Liqd    tamsulosin (FLOMAX) 0.4 mg Cp24      Facility-Administered Medications     heparin, porcine (PF) 100 unit/mL injection flush 500 Units    pegfilgrastim (NEULASTA (ON BODY INJECTOR)) injection 6 mg    sodium chloride 0.9% bolus 1,000 mL    sodium chloride 0.9% flush 10 mL      Appointments for Next Year     2017  7:10 AM NON FASTING LAB (10 min.) [facility] LAB, [facility] CANCER BLDG    Arrive at check-in approximately 15 minutes before your scheduled appointment time. Bring all outside medical records and imaging, along with a list of your current medications and insurance card.    [facility] 3rd Floor    2017  7:20 AM URINE (15 min.) [facility] SPECIMEN, [facility] CANCER BLDG    Arrive at check-in approximately 15 minutes before your scheduled appointment time. Bring all outside medical records and imaging, along with a  list of your current medications and insurance card.    2/7/2017  2:15 PM ESTABLISHED PATIENT SHORT (15 min.) [place] - Hematology Oncology Lisa Abernathy MD    Arrive at check-in approximately 15 minutes before your scheduled appointment time. Bring all outside medical records and imaging, along with a list of your current medications and insurance card.    [facility] - 3rd Floor    2/8/2017  2:30 PM INFUSION 210 MIN (210 min.) [facility], CHEMO    Arrive at check-in approximately 15 minutes before your scheduled appointment time. Bring all outside medical records and imaging, along with a list of your current medications and insurance card.    [facility], 5th Floor    2/10/2017  5:30 PM INFUSION 060 MIN (60 min.) [facility], CHEMO    Arrive at check-in approximately 15 minutes before your scheduled appointment time. Bring all outside medical records and imaging, along with a list of your current medications and insurance card.    [facility], 5th Floor         Default Flowsheet Data (last 24 hours)      Amb Complex Vitals Gabriel        01/27/17 1708                Measurements    /66        Temp 98 °F (36.7 °C)        Pulse 68        Resp 20                Allergies     No Known Allergies      Medications You Received from 01/26/2017 1740 to 01/27/2017 1740        Date/Time Order Dose Route Action     01/27/2017 1703 pegfilgrastim (NEULASTA (ON BODY INJECTOR)) injection 6 mg 6 mg Subcutaneous Given     01/27/2017 1700 sodium chloride 0.9% bolus 1,000 mL 1,000 mL Intravenous New Bag      Current Discharge Medication List     Cannot display discharge medications since this is not an admission.

## 2017-01-27 NOTE — NURSING
Patient arrived for pump d/c. Tolerated 1 L of ivfs. OBI placed on right upper arm. Verbalized understanding to call MD for any questions/concerns. Port flushed, heparin locked, and deaccessed. Discharged home.

## 2017-02-01 ENCOUNTER — PATIENT MESSAGE (OUTPATIENT)
Dept: HEMATOLOGY/ONCOLOGY | Facility: CLINIC | Age: 67
End: 2017-02-01

## 2017-02-06 ENCOUNTER — LAB VISIT (OUTPATIENT)
Dept: LAB | Facility: HOSPITAL | Age: 67
End: 2017-02-06
Attending: INTERNAL MEDICINE
Payer: COMMERCIAL

## 2017-02-06 DIAGNOSIS — C20 RECTAL CANCER: ICD-10-CM

## 2017-02-06 LAB
ALBUMIN SERPL BCP-MCNC: 3.1 G/DL
ALP SERPL-CCNC: 191 U/L
ALT SERPL W/O P-5'-P-CCNC: 14 U/L
ANION GAP SERPL CALC-SCNC: 10 MMOL/L
AST SERPL-CCNC: 24 U/L
BILIRUB SERPL-MCNC: 0.4 MG/DL
BUN SERPL-MCNC: 14 MG/DL
CALCIUM SERPL-MCNC: 9 MG/DL
CHLORIDE SERPL-SCNC: 105 MMOL/L
CO2 SERPL-SCNC: 24 MMOL/L
CREAT SERPL-MCNC: 1.2 MG/DL
ERYTHROCYTE [DISTWIDTH] IN BLOOD BY AUTOMATED COUNT: 18.9 %
EST. GFR  (AFRICAN AMERICAN): >60 ML/MIN/1.73 M^2
EST. GFR  (NON AFRICAN AMERICAN): >60 ML/MIN/1.73 M^2
GLUCOSE SERPL-MCNC: 81 MG/DL
HCT VFR BLD AUTO: 37.1 %
HGB BLD-MCNC: 11.6 G/DL
MCH RBC QN AUTO: 29.6 PG
MCHC RBC AUTO-ENTMCNC: 31.3 %
MCV RBC AUTO: 95 FL
NEUTROPHILS # BLD AUTO: 8.8 K/UL
PLATELET # BLD AUTO: 116 K/UL
PMV BLD AUTO: 10.1 FL
POTASSIUM SERPL-SCNC: 4 MMOL/L
PROT SERPL-MCNC: 6.7 G/DL
RBC # BLD AUTO: 3.92 M/UL
SODIUM SERPL-SCNC: 139 MMOL/L
WBC # BLD AUTO: 11.36 K/UL

## 2017-02-06 PROCEDURE — 85027 COMPLETE CBC AUTOMATED: CPT

## 2017-02-06 PROCEDURE — 36415 COLL VENOUS BLD VENIPUNCTURE: CPT

## 2017-02-06 PROCEDURE — 80053 COMPREHEN METABOLIC PANEL: CPT

## 2017-02-07 ENCOUNTER — OFFICE VISIT (OUTPATIENT)
Dept: HEMATOLOGY/ONCOLOGY | Facility: CLINIC | Age: 67
End: 2017-02-07
Payer: COMMERCIAL

## 2017-02-07 VITALS
WEIGHT: 217.38 LBS | DIASTOLIC BLOOD PRESSURE: 78 MMHG | OXYGEN SATURATION: 98 % | RESPIRATION RATE: 19 BRPM | SYSTOLIC BLOOD PRESSURE: 140 MMHG | HEIGHT: 66 IN | HEART RATE: 75 BPM | TEMPERATURE: 98 F | BODY MASS INDEX: 34.93 KG/M2

## 2017-02-07 DIAGNOSIS — Z51.11 ENCOUNTER FOR ANTINEOPLASTIC CHEMOTHERAPY AND IMMUNOTHERAPY: ICD-10-CM

## 2017-02-07 DIAGNOSIS — D70.1 CHEMOTHERAPY INDUCED NEUTROPENIA: ICD-10-CM

## 2017-02-07 DIAGNOSIS — T45.1X5A CHEMOTHERAPY INDUCED NEUTROPENIA: ICD-10-CM

## 2017-02-07 DIAGNOSIS — I10 ESSENTIAL HYPERTENSION: ICD-10-CM

## 2017-02-07 DIAGNOSIS — C20 RECTAL CANCER: Primary | ICD-10-CM

## 2017-02-07 DIAGNOSIS — C78.01 SECONDARY ADENOCARCINOMA OF LUNG, RIGHT: ICD-10-CM

## 2017-02-07 DIAGNOSIS — Z51.12 ENCOUNTER FOR ANTINEOPLASTIC CHEMOTHERAPY AND IMMUNOTHERAPY: ICD-10-CM

## 2017-02-07 DIAGNOSIS — E11.9 TYPE 2 DIABETES MELLITUS WITHOUT COMPLICATION, WITHOUT LONG-TERM CURRENT USE OF INSULIN: ICD-10-CM

## 2017-02-07 PROCEDURE — 1157F ADVNC CARE PLAN IN RCRD: CPT | Mod: S$GLB,,, | Performed by: INTERNAL MEDICINE

## 2017-02-07 PROCEDURE — 3044F HG A1C LEVEL LT 7.0%: CPT | Mod: S$GLB,,, | Performed by: INTERNAL MEDICINE

## 2017-02-07 PROCEDURE — 3078F DIAST BP <80 MM HG: CPT | Mod: S$GLB,,, | Performed by: INTERNAL MEDICINE

## 2017-02-07 PROCEDURE — 1160F RVW MEDS BY RX/DR IN RCRD: CPT | Mod: S$GLB,,, | Performed by: INTERNAL MEDICINE

## 2017-02-07 PROCEDURE — 1159F MED LIST DOCD IN RCRD: CPT | Mod: S$GLB,,, | Performed by: INTERNAL MEDICINE

## 2017-02-07 PROCEDURE — 3077F SYST BP >= 140 MM HG: CPT | Mod: S$GLB,,, | Performed by: INTERNAL MEDICINE

## 2017-02-07 PROCEDURE — 1126F AMNT PAIN NOTED NONE PRSNT: CPT | Mod: S$GLB,,, | Performed by: INTERNAL MEDICINE

## 2017-02-07 PROCEDURE — 4010F ACE/ARB THERAPY RXD/TAKEN: CPT | Mod: S$GLB,,, | Performed by: INTERNAL MEDICINE

## 2017-02-07 PROCEDURE — 99999 PR PBB SHADOW E&M-EST. PATIENT-LVL IV: CPT | Mod: PBBFAC,,, | Performed by: INTERNAL MEDICINE

## 2017-02-07 PROCEDURE — 2022F DILAT RTA XM EVC RTNOPTHY: CPT | Mod: S$GLB,,, | Performed by: INTERNAL MEDICINE

## 2017-02-07 PROCEDURE — 99215 OFFICE O/P EST HI 40 MIN: CPT | Mod: S$GLB,,, | Performed by: INTERNAL MEDICINE

## 2017-02-07 RX ORDER — ATROPINE SULFATE 0.4 MG/ML
0.4 INJECTION, SOLUTION ENDOTRACHEAL; INTRAMEDULLARY; INTRAMUSCULAR; INTRAVENOUS; SUBCUTANEOUS DAILY PRN
Status: CANCELLED | OUTPATIENT
Start: 2017-02-15

## 2017-02-07 RX ORDER — FLUOROURACIL 50 MG/ML
400 INJECTION, SOLUTION INTRAVENOUS
Status: CANCELLED | OUTPATIENT
Start: 2017-02-15

## 2017-02-07 NOTE — Clinical Note
No chemo tomorrow. RTC 1 week (2/15/17) with urine protein/creatinine ratio and FOLFIRI and Cyramza.

## 2017-02-07 NOTE — PROGRESS NOTES
"PATIENT: Karthik Medrano  MRN: [id number]  DATE: 2/7/2017    Subjective:     Chief complaint:  Chief Complaint   Patient presents with    Rectal Cancer    4-5 BMs per day       Oncologic History:  66 year old male with history of rectal cancer diagnosed 1/11/10 on colonoscopy (moderately differentiated adenocarcinoma) and based on imaging, was T3NxM0. Patient underwent Neoadjuvant chemort with infusional 5FU. This was completed 3/19/10 with a total of 50.4Gy given to the pelvis. He went to surgery 5/13/10. T3N0M0. Post operative he received no adjuvant treatment and has been followed regularly. He went to his PCP 6/24/13 who ordered a CXR which found pulmonary nodules. On 6/27/13, CT chest confirmed these findings. CEA drawn when initially diagnosed was 2.6ng/ml and 6/27/13 was 30.8 ng/ml. . Pulmonary biopsy was performed 7/18/13 and this confirmed metastatic adenocarcinoma. 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 Avastin was added for second cycle.    Patient completed 12 cycles of FOLFOX avastin. He did not receive Avastin for cycle 3, 4, or 5 (due to uncontrolled blood pressure).    He was on maintenance infusional 5FU and Avastin. Repeat scans 4/14/14 showed numerous bilateral pulmonary soft tissue nodules, the majority of which demonstrate interval enlargement when compared to the prior exam. CEA up 18.2 from 13.    He was restarted on FOLFIRI and AVastin and received 6 months of chemo and then was switched to maintenance chemotherapy with 5FU and Avastin. His CT scans from early May 2015 revealed progression in lung lesions.  He is now on FOLFIRI and Cyramza.  CT from 4/19/16 reveals "Multiple stable metastatic pulmonary nodules in this patient with history of rectal cancer.  Stable postoperative changes are present from prior partial colectomy.Interval increase in right-sided hydronephrosis despite appropriate positioning of a double-J ureteral sent.  There is associated " "enlargement and decreased perfusion to the right kidney consistent with obstruction. These findings are concerning for a nonfunctioning ureteral stent. Cholelithiasis."  CT from 6/13/16 reveal stable disease and hydronephrosis has resolved.  CT from 8/5/16 reveals "Stable pulmonary metastatic disease.Increased prominence of periureteral fat stranding/inflammatory change. Stable mild right hydronephrosis."  CT from 1/6/17 "1. Stable appearance of pulmonary metastatic disease in this patient with history of colon cancer. Pulmonary index lesions are stable in size compared to prior examination. No evidence of new metastatic disease. 2. Right-sided double-J ureteral stent with stable appearing inflammatory change about the right renal collecting system and right ureter. 3. Additional stable incidental findings as discussed above. "      Interval History: Mr. Medrano returns for follow up and for FOLFIRI and Cyramza. He feels good today but continues to be weak.  He developed mouth sores after last chemo but all resolved. He denies any nausea, vomiting, diarrhea, constipation, abdominal pain, weight loss or loss of appetite, chest pain, shortness of breath, leg swelling, fatigue, pain, headache, dizziness, or mood changes. He is accompanied by his wife. He wants one week break from chemo because he has a project due.     ECOG Performance Status:   ECOG SCORE    0 - Fully active-able to carry on all pre-disease performance without restriction          PMFSH: all information reviewed and updated as relevant to today's visit    Review of Systems:   Review of Systems   Constitutional: Negative for activity change, appetite change, fatigue and fever.   HENT: Negative for mouth sores, nosebleeds and sore throat.    Eyes: Negative for visual disturbance.   Respiratory: Negative for cough and shortness of breath.    Cardiovascular: Negative for chest pain, palpitations and leg swelling.   Gastrointestinal: Negative for abdominal " "pain, constipation, diarrhea, nausea and vomiting.   Genitourinary: Negative for difficulty urinating and frequency.   Musculoskeletal: Negative for arthralgias and back pain.   Skin: Negative for rash.   Neurological: Negative for dizziness, numbness and headaches.   Hematological: Negative for adenopathy. Does not bruise/bleed easily.   Psychiatric/Behavioral: Negative for confusion and sleep disturbance. The patient is not nervous/anxious.    All other systems reviewed and are negative.        Objective:      Vitals:   Vitals:    02/07/17 1416   BP: (!) 140/78   Pulse: 75   Resp: 19   Temp: 98.2 °F (36.8 °C)   TempSrc: Oral   SpO2: 98%   Weight: 98.6 kg (217 lb 6 oz)   Height: 5' 6" (1.676 m)     BMI: Body mass index is 35.09 kg/(m^2).      Physical Exam:   Physical Exam   Constitutional: He is oriented to person, place, and time. He appears well-developed and well-nourished. No distress.   HENT:   Right Ear: External ear normal.   Left Ear: External ear normal.   Mouth/Throat: No oropharyngeal exudate.   Eyes: Conjunctivae and lids are normal. Pupils are equal, round, and reactive to light. No scleral icterus.   Neck: Trachea normal and normal range of motion. Neck supple. No thyromegaly present.   Cardiovascular: Normal rate, regular rhythm, normal heart sounds and normal pulses.    Trace ankle edema     Pulmonary/Chest: Effort normal and breath sounds normal.   Abdominal: Soft. Normal appearance and bowel sounds are normal. He exhibits no distension and no mass. There is no hepatosplenomegaly or splenomegaly. There is no tenderness.   Musculoskeletal: Normal range of motion.   Lymphadenopathy:        Head (right side): No submental and no submandibular adenopathy present.        Head (left side): No submental and no submandibular adenopathy present.     He has no cervical adenopathy.     He has no axillary adenopathy.        Right: No supraclavicular adenopathy present.        Left: No supraclavicular " adenopathy present.   Neurological: He is alert and oriented to person, place, and time. He has normal reflexes. No sensory deficit.   Skin: Skin is warm, dry and intact. No bruising and no rash noted. Nails show no clubbing.   Psychiatric: He has a normal mood and affect. His speech is normal and behavior is normal. Cognition and memory are normal.   Vitals reviewed.        Laboratory Data:  WBC   Date Value Ref Range Status   02/06/2017 11.36 3.90 - 12.70 K/uL Final     Hemoglobin   Date Value Ref Range Status   02/06/2017 11.6 (L) 14.0 - 18.0 g/dL Final     Hematocrit   Date Value Ref Range Status   02/06/2017 37.1 (L) 40.0 - 54.0 % Final     Platelets   Date Value Ref Range Status   02/06/2017 116 (L) 150 - 350 K/uL Final     Gran #   Date Value Ref Range Status   02/06/2017 8.8 (H) 1.8 - 7.7 K/uL Final     Comment:     The ANC is based on a white cell differential from an   automated cell counter. It has not been microscopically   reviewed for the presence of abnormal cells. Clinical   correlation is required.       Gran%   Date Value Ref Range Status   03/06/2016 66.8 38.0 - 73.0 % Final       Chemistry        Component Value Date/Time     02/06/2017 1621    K 4.0 02/06/2017 1621     02/06/2017 1621    CO2 24 02/06/2017 1621    BUN 14 02/06/2017 1621    CREATININE 1.2 02/06/2017 1621    GLU 81 02/06/2017 1621        Component Value Date/Time    CALCIUM 9.0 02/06/2017 1621    ALKPHOS 191 (H) 02/06/2017 1621    AST 24 02/06/2017 1621    ALT 14 02/06/2017 1621    BILITOT 0.4 02/06/2017 1621              Assessment/Plan:     1. Rectal cancer    2. Secondary adenocarcinoma of lung, right    3. Encounter for antineoplastic chemotherapy and immunotherapy    4. Chemotherapy induced neutropenia    5. Type 2 diabetes mellitus without complication, without long-term current use of insulin    6. Essential hypertension        Plan:    1.2.3.4. Patient is clinically stable. He is requesting chemo break for 1  week. RTC 1 week (2/15/17) with urine protein/creatinine ratio and FOLFIRI and Cyramza. RTC on 2/24/17 to see Dr. Abernathy with CBC, CMP urine protein/creatinine ratio. RTC on 3/1/17 for  FOLFIRI and Cyramza. D/c pump day 3, Neulasta OBI on day 3.  5. Stable on meds.  6. Stable, continue meds.     Med and Orders:  Orders Placed This Encounter    CBC Oncology    Comprehensive metabolic panel    Protein / creatinine ratio, urine         More than 25 mins were spent during this encounter, greater than 50% was spent in direct counseling and/or coordination of care.   Patient was also seen and examined by Dr. Abernathy who agrees with above plan. Patient is in agreement with the proposed treatment plan. All questions were answered to the patient's satisfaction. Pt knows to call clinic if anything is needed before the next clinic visit.    GAY Dwyer-SHANON  Hematology and Medical Oncology      STAFF NOTE:  I have reviewed the notes, assessments, and/or procedures performed by the nurse practitioner, as above.  I have personally interviewed the patient at the beside, and rounded with the nurse practitioner. I formulated the plan of care.  I concur with her documentation of Karthik Medrano.

## 2017-02-07 NOTE — Clinical Note
RTC on 2/24/17 to see Dr. Abernathy with CBC, CMP urine protein/creatinine ratio. RTC on 3/1/17 for  FOLFIRI and Cyramza. D/c pump day 3, Neulasta OBI on day 3.

## 2017-02-09 ENCOUNTER — PATIENT MESSAGE (OUTPATIENT)
Dept: HEMATOLOGY/ONCOLOGY | Facility: CLINIC | Age: 67
End: 2017-02-09

## 2017-02-13 ENCOUNTER — TELEPHONE (OUTPATIENT)
Dept: HEMATOLOGY/ONCOLOGY | Facility: CLINIC | Age: 67
End: 2017-02-13

## 2017-02-13 NOTE — TELEPHONE ENCOUNTER
----- Message from Alie Rod sent at 2/13/2017  4:43 PM CST -----  Contact: Jolene Medrano (Spouse)  Jolene Schreiber is requesting to speak with you in regards to dropping off a urine specimen for her     Pt contact number [phone]  Thanks

## 2017-02-14 ENCOUNTER — LAB VISIT (OUTPATIENT)
Dept: LAB | Facility: HOSPITAL | Age: 67
End: 2017-02-14
Payer: COMMERCIAL

## 2017-02-14 DIAGNOSIS — C20 RECTAL CANCER: ICD-10-CM

## 2017-02-14 LAB
CREAT UR-MCNC: 145 MG/DL
PROT UR-MCNC: 98 MG/DL
PROT/CREAT RATIO, UR: 0.68

## 2017-02-14 PROCEDURE — 84156 ASSAY OF PROTEIN URINE: CPT

## 2017-02-15 ENCOUNTER — INFUSION (OUTPATIENT)
Dept: INFUSION THERAPY | Facility: HOSPITAL | Age: 67
End: 2017-02-15
Attending: INTERNAL MEDICINE
Payer: COMMERCIAL

## 2017-02-15 VITALS
DIASTOLIC BLOOD PRESSURE: 70 MMHG | TEMPERATURE: 98 F | SYSTOLIC BLOOD PRESSURE: 142 MMHG | HEART RATE: 86 BPM | RESPIRATION RATE: 18 BRPM

## 2017-02-15 DIAGNOSIS — Z51.12 ENCOUNTER FOR ANTINEOPLASTIC CHEMOTHERAPY AND IMMUNOTHERAPY: ICD-10-CM

## 2017-02-15 DIAGNOSIS — T45.1X5A CHEMOTHERAPY INDUCED NEUTROPENIA: Primary | ICD-10-CM

## 2017-02-15 DIAGNOSIS — Z51.11 ENCOUNTER FOR ANTINEOPLASTIC CHEMOTHERAPY AND IMMUNOTHERAPY: ICD-10-CM

## 2017-02-15 DIAGNOSIS — D70.1 CHEMOTHERAPY INDUCED NEUTROPENIA: Primary | ICD-10-CM

## 2017-02-15 PROCEDURE — 96375 TX/PRO/DX INJ NEW DRUG ADDON: CPT

## 2017-02-15 PROCEDURE — 96367 TX/PROPH/DG ADDL SEQ IV INF: CPT

## 2017-02-15 PROCEDURE — 96415 CHEMO IV INFUSION ADDL HR: CPT

## 2017-02-15 PROCEDURE — 96417 CHEMO IV INFUS EACH ADDL SEQ: CPT

## 2017-02-15 PROCEDURE — 25000003 PHARM REV CODE 250: Performed by: INTERNAL MEDICINE

## 2017-02-15 PROCEDURE — 96411 CHEMO IV PUSH ADDL DRUG: CPT

## 2017-02-15 PROCEDURE — 63600175 PHARM REV CODE 636 W HCPCS: Performed by: INTERNAL MEDICINE

## 2017-02-15 PROCEDURE — 96416 CHEMO PROLONG INFUSE W/PUMP: CPT

## 2017-02-15 PROCEDURE — 96413 CHEMO IV INFUSION 1 HR: CPT

## 2017-02-15 RX ORDER — ATROPINE SULFATE 0.4 MG/ML
0.4 INJECTION, SOLUTION ENDOTRACHEAL; INTRAMEDULLARY; INTRAMUSCULAR; INTRAVENOUS; SUBCUTANEOUS DAILY PRN
Status: DISCONTINUED | OUTPATIENT
Start: 2017-02-15 | End: 2017-02-15 | Stop reason: HOSPADM

## 2017-02-15 RX ORDER — FLUOROURACIL 50 MG/ML
400 INJECTION, SOLUTION INTRAVENOUS
Status: COMPLETED | OUTPATIENT
Start: 2017-02-15 | End: 2017-02-15

## 2017-02-15 RX ADMIN — SODIUM CHLORIDE: 0.9 INJECTION, SOLUTION INTRAVENOUS at 02:02

## 2017-02-15 RX ADMIN — FLUOROURACIL 5090 MG: 50 INJECTION, SOLUTION INTRAVENOUS at 07:02

## 2017-02-15 RX ADMIN — LEUCOVORIN CALCIUM 42.4 MG: 350 INJECTION, POWDER, LYOPHILIZED, FOR SOLUTION INTRAMUSCULAR; INTRAVENOUS at 05:02

## 2017-02-15 RX ADMIN — ATROPINE SULFATE 0.4 MG: 0.4 INJECTION, SOLUTION INTRAMUSCULAR; INTRAVENOUS; SUBCUTANEOUS at 05:02

## 2017-02-15 RX ADMIN — DEXAMETHASONE SODIUM PHOSPHATE 0.25 MG: 4 INJECTION, SOLUTION INTRAMUSCULAR; INTRAVENOUS at 03:02

## 2017-02-15 RX ADMIN — IRINOTECAN HYDROCHLORIDE 382 MG: 20 INJECTION, SOLUTION INTRAVENOUS at 05:02

## 2017-02-15 RX ADMIN — SODIUM CHLORIDE 775 MG: 9 INJECTION, SOLUTION INTRAVENOUS at 04:02

## 2017-02-15 RX ADMIN — FLUOROURACIL 850 MG: 50 INJECTION, SOLUTION INTRAVENOUS at 07:02

## 2017-02-16 NOTE — PLAN OF CARE
Problem: Patient Care Overview  Goal: Plan of Care Review  Outcome: Outcome(s) achieved Date Met:  02/15/17  Pt. Tolerated treatment without any complications. V/S stable. Placed on ambulatory CAD pump. Port site infusing without difficulty and secured. Pt. Reminded check pump at least twice a day to make sure it was running properly. No questions or concerns at this time.

## 2017-02-17 ENCOUNTER — INFUSION (OUTPATIENT)
Dept: INFUSION THERAPY | Facility: HOSPITAL | Age: 67
End: 2017-02-17
Attending: INTERNAL MEDICINE
Payer: COMMERCIAL

## 2017-02-17 VITALS
DIASTOLIC BLOOD PRESSURE: 62 MMHG | SYSTOLIC BLOOD PRESSURE: 131 MMHG | HEART RATE: 70 BPM | RESPIRATION RATE: 18 BRPM | TEMPERATURE: 98 F

## 2017-02-17 DIAGNOSIS — T45.1X5A CHEMOTHERAPY INDUCED NEUTROPENIA: Primary | ICD-10-CM

## 2017-02-17 DIAGNOSIS — Z51.12 ENCOUNTER FOR ANTINEOPLASTIC CHEMOTHERAPY AND IMMUNOTHERAPY: ICD-10-CM

## 2017-02-17 DIAGNOSIS — D70.1 CHEMOTHERAPY INDUCED NEUTROPENIA: Primary | ICD-10-CM

## 2017-02-17 DIAGNOSIS — Z51.11 ENCOUNTER FOR ANTINEOPLASTIC CHEMOTHERAPY AND IMMUNOTHERAPY: ICD-10-CM

## 2017-02-17 PROCEDURE — 96377 APPLICATON ON-BODY INJECTOR: CPT

## 2017-02-17 PROCEDURE — 25000003 PHARM REV CODE 250: Performed by: INTERNAL MEDICINE

## 2017-02-17 PROCEDURE — 63600175 PHARM REV CODE 636 W HCPCS: Performed by: INTERNAL MEDICINE

## 2017-02-17 PROCEDURE — 96360 HYDRATION IV INFUSION INIT: CPT

## 2017-02-17 RX ORDER — SODIUM CHLORIDE 0.9 % (FLUSH) 0.9 %
10 SYRINGE (ML) INJECTION
Status: DISCONTINUED | OUTPATIENT
Start: 2017-02-17 | End: 2017-02-17 | Stop reason: HOSPADM

## 2017-02-17 RX ORDER — HEPARIN 100 UNIT/ML
500 SYRINGE INTRAVENOUS
Status: DISCONTINUED | OUTPATIENT
Start: 2017-02-17 | End: 2017-02-17 | Stop reason: HOSPADM

## 2017-02-17 RX ADMIN — HEPARIN 500 UNITS: 100 SYRINGE at 06:02

## 2017-02-17 RX ADMIN — SODIUM CHLORIDE 1000 ML: 0.9 INJECTION, SOLUTION INTRAVENOUS at 05:02

## 2017-02-17 RX ADMIN — PEGFILGRASTIM 6 MG: KIT SUBCUTANEOUS at 05:02

## 2017-02-17 RX ADMIN — SODIUM CHLORIDE, PRESERVATIVE FREE 10 ML: 5 INJECTION INTRAVENOUS at 06:02

## 2017-02-18 NOTE — PLAN OF CARE
Problem: Chemotherapy Effects (Adult)  Goal: Signs and Symptoms of Listed Potential Problems Will be Absent, Minimized or Managed (Chemotherapy Effects)  Signs and symptoms of listed potential problems will be absent, minimized or managed by discharge/transition of care (reference Chemotherapy Effects (Adult) CPG).   Patient tolerated treatment without complaints. Port flushed, heparin instilled and de-accessed. Encouraged fluid intake. AVS provided to patient, future appointments reviewed. Discharged without s/s of adverse reaction. Instructed to call provider with any questions or concerns.

## 2017-02-24 ENCOUNTER — LAB VISIT (OUTPATIENT)
Dept: LAB | Facility: HOSPITAL | Age: 67
End: 2017-02-24
Attending: INTERNAL MEDICINE
Payer: COMMERCIAL

## 2017-02-24 ENCOUNTER — OFFICE VISIT (OUTPATIENT)
Dept: HEMATOLOGY/ONCOLOGY | Facility: CLINIC | Age: 67
End: 2017-02-24
Payer: COMMERCIAL

## 2017-02-24 VITALS
HEIGHT: 66 IN | SYSTOLIC BLOOD PRESSURE: 118 MMHG | HEART RATE: 77 BPM | WEIGHT: 224.19 LBS | DIASTOLIC BLOOD PRESSURE: 68 MMHG | TEMPERATURE: 98 F | RESPIRATION RATE: 20 BRPM | OXYGEN SATURATION: 97 % | BODY MASS INDEX: 36.03 KG/M2

## 2017-02-24 DIAGNOSIS — C20 RECTAL CANCER: Primary | ICD-10-CM

## 2017-02-24 DIAGNOSIS — T45.1X5A CHEMOTHERAPY INDUCED NEUTROPENIA: ICD-10-CM

## 2017-02-24 DIAGNOSIS — C78.01 SECONDARY ADENOCARCINOMA OF LUNG, RIGHT: ICD-10-CM

## 2017-02-24 DIAGNOSIS — Z51.12 ENCOUNTER FOR ANTINEOPLASTIC CHEMOTHERAPY AND IMMUNOTHERAPY: ICD-10-CM

## 2017-02-24 DIAGNOSIS — Z51.11 ENCOUNTER FOR ANTINEOPLASTIC CHEMOTHERAPY AND IMMUNOTHERAPY: ICD-10-CM

## 2017-02-24 DIAGNOSIS — C20 RECTAL CANCER: ICD-10-CM

## 2017-02-24 DIAGNOSIS — D70.1 CHEMOTHERAPY INDUCED NEUTROPENIA: ICD-10-CM

## 2017-02-24 LAB
ALBUMIN SERPL BCP-MCNC: 3.2 G/DL
ALP SERPL-CCNC: 263 U/L
ALT SERPL W/O P-5'-P-CCNC: 16 U/L
ANION GAP SERPL CALC-SCNC: 12 MMOL/L
AST SERPL-CCNC: 24 U/L
BILIRUB SERPL-MCNC: 0.4 MG/DL
BUN SERPL-MCNC: 12 MG/DL
CALCIUM SERPL-MCNC: 9 MG/DL
CHLORIDE SERPL-SCNC: 106 MMOL/L
CO2 SERPL-SCNC: 22 MMOL/L
CREAT SERPL-MCNC: 0.9 MG/DL
ERYTHROCYTE [DISTWIDTH] IN BLOOD BY AUTOMATED COUNT: 18.7 %
EST. GFR  (AFRICAN AMERICAN): >60 ML/MIN/1.73 M^2
EST. GFR  (NON AFRICAN AMERICAN): >60 ML/MIN/1.73 M^2
GLUCOSE SERPL-MCNC: 105 MG/DL
HCT VFR BLD AUTO: 34.1 %
HGB BLD-MCNC: 11 G/DL
MCH RBC QN AUTO: 29.6 PG
MCHC RBC AUTO-ENTMCNC: 32.3 %
MCV RBC AUTO: 92 FL
NEUTROPHILS # BLD AUTO: 14.7 K/UL
PLATELET # BLD AUTO: 108 K/UL
PMV BLD AUTO: 9.6 FL
POTASSIUM SERPL-SCNC: 3.8 MMOL/L
PROT SERPL-MCNC: 6.5 G/DL
RBC # BLD AUTO: 3.71 M/UL
SODIUM SERPL-SCNC: 140 MMOL/L
WBC # BLD AUTO: 18.49 K/UL

## 2017-02-24 PROCEDURE — 85027 COMPLETE CBC AUTOMATED: CPT

## 2017-02-24 PROCEDURE — 36415 COLL VENOUS BLD VENIPUNCTURE: CPT

## 2017-02-24 PROCEDURE — 1159F MED LIST DOCD IN RCRD: CPT | Mod: S$GLB,,, | Performed by: INTERNAL MEDICINE

## 2017-02-24 PROCEDURE — 1160F RVW MEDS BY RX/DR IN RCRD: CPT | Mod: S$GLB,,, | Performed by: INTERNAL MEDICINE

## 2017-02-24 PROCEDURE — 3078F DIAST BP <80 MM HG: CPT | Mod: S$GLB,,, | Performed by: INTERNAL MEDICINE

## 2017-02-24 PROCEDURE — 3074F SYST BP LT 130 MM HG: CPT | Mod: S$GLB,,, | Performed by: INTERNAL MEDICINE

## 2017-02-24 PROCEDURE — 99999 PR PBB SHADOW E&M-EST. PATIENT-LVL V: CPT | Mod: PBBFAC,,, | Performed by: INTERNAL MEDICINE

## 2017-02-24 PROCEDURE — 99215 OFFICE O/P EST HI 40 MIN: CPT | Mod: S$GLB,,, | Performed by: INTERNAL MEDICINE

## 2017-02-24 PROCEDURE — 1157F ADVNC CARE PLAN IN RCRD: CPT | Mod: S$GLB,,, | Performed by: INTERNAL MEDICINE

## 2017-02-24 PROCEDURE — 1126F AMNT PAIN NOTED NONE PRSNT: CPT | Mod: S$GLB,,, | Performed by: INTERNAL MEDICINE

## 2017-02-24 PROCEDURE — 80053 COMPREHEN METABOLIC PANEL: CPT

## 2017-02-24 RX ORDER — FLUOROURACIL 50 MG/ML
400 INJECTION, SOLUTION INTRAVENOUS
Status: CANCELLED | OUTPATIENT
Start: 2017-03-08

## 2017-02-24 RX ORDER — HEPARIN 100 UNIT/ML
500 SYRINGE INTRAVENOUS
Status: CANCELLED | OUTPATIENT
Start: 2017-03-10

## 2017-02-24 RX ORDER — SODIUM CHLORIDE 0.9 % (FLUSH) 0.9 %
10 SYRINGE (ML) INJECTION
Status: CANCELLED | OUTPATIENT
Start: 2017-03-10

## 2017-02-24 RX ORDER — ATROPINE SULFATE 0.4 MG/ML
0.4 INJECTION, SOLUTION ENDOTRACHEAL; INTRAMEDULLARY; INTRAMUSCULAR; INTRAVENOUS; SUBCUTANEOUS DAILY PRN
Status: CANCELLED | OUTPATIENT
Start: 2017-03-08

## 2017-02-24 NOTE — PROGRESS NOTES
"Subjective:       Patient ID: Karthik Medrano is a 66 y.o. male.    Chief Complaint: Rectal Cancer and 8-9 BMs /day  Oncologic History:  66 year old male with history of rectal cancer diagnosed 1/11/10 on colonoscopy (moderately differentiated adenocarcinoma) and based on imaging, was T3NxM0. Patient underwent Neoadjuvant chemort with infusional 5FU. This was completed 3/19/10 with a total of 50.4Gy given to the pelvis. He went to surgery 5/13/10. T3N0M0. Post operative he received no adjuvant treatment and has been followed regularly. He went to his PCP 6/24/13 who ordered a CXR which found pulmonary nodules. On 6/27/13, CT chest confirmed these findings. CEA drawn when initially diagnosed was 2.6ng/ml and 6/27/13 was 30.8 ng/ml. . Pulmonary biopsy was performed 7/18/13 and this confirmed metastatic adenocarcinoma. 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 Avastin was added for second cycle.    Patient completed 12 cycles of FOLFOX avastin. He did not receive Avastin for cycle 3, 4, or 5 (due to uncontrolled blood pressure).    He was on maintenance infusional 5FU and Avastin. Repeat scans 4/14/14 showed numerous bilateral pulmonary soft tissue nodules, the majority of which demonstrate interval enlargement when compared to the prior exam. CEA up 18.2 from 13.    He was restarted on FOLFIRI and AVastin and received 6 months of chemo and then was switched to maintenance chemotherapy with 5FU and Avastin. His CT scans from early May 2015 revealed progression in lung lesions.  He is now on FOLFIRI and Cyramza.  CT from 4/19/16 reveals "Multiple stable metastatic pulmonary nodules in this patient with history of rectal cancer.  Stable postoperative changes are present from prior partial colectomy.Interval increase in right-sided hydronephrosis despite appropriate positioning of a double-J ureteral sent.  There is associated enlargement and decreased perfusion to the right kidney " "consistent with obstruction. These findings are concerning for a nonfunctioning ureteral stent. Cholelithiasis."  CT from 6/13/16 reveal stable disease and hydronephrosis has resolved.  CT from 8/5/16 reveals "Stable pulmonary metastatic disease.Increased prominence of periureteral fat stranding/inflammatory change. Stable mild right hydronephrosis."  CT from 1/6/17 "1. Stable appearance of pulmonary metastatic disease in this patient with history of colon cancer. Pulmonary index lesions are stable in size compared to prior examination. No evidence of new metastatic disease. 2. Right-sided double-J ureteral stent with stable appearing inflammatory change about the right renal collecting system and right ureter. 3. Additional stable incidental findings as discussed above. "      HPI Mr. Medrano returns for follow up and for FOLFIRI and Cyramza.  He notes increased frequency 4-5/day.  Appetite is good. He has gained 7 lbs  He denies any nausea, vomiting, diarrhea, constipation, abdominal pain, weight loss or loss of appetite, chest pain, shortness of breath, leg swelling, fatigue, pain, headache, dizziness, or mood changes. His ECOG PS is zero. He is accompanied by his wife.          Review of Systems   Constitutional: Negative for appetite change, fatigue and unexpected weight change.   HENT: Negative for mouth sores.    Eyes: Negative for visual disturbance.   Respiratory: Negative for cough and shortness of breath.    Cardiovascular: Negative for chest pain.   Gastrointestinal: Negative for abdominal pain and diarrhea.   Genitourinary: Negative for frequency.   Musculoskeletal: Negative for back pain.   Skin: Negative for rash.   Neurological: Negative for headaches.   Hematological: Negative for adenopathy.   Psychiatric/Behavioral: The patient is not nervous/anxious.    All other systems reviewed and are negative.      Objective:      Physical Exam   Constitutional: He is oriented to person, place, and time. He " appears well-developed and well-nourished.   HENT:   Mouth/Throat: No oropharyngeal exudate.   Cardiovascular: Normal rate and normal heart sounds.    Pulmonary/Chest: Effort normal and breath sounds normal. He has no wheezes.   Abdominal: Soft. Bowel sounds are normal. There is no tenderness.   Musculoskeletal: He exhibits no edema or tenderness.   Lymphadenopathy:     He has no cervical adenopathy.   Neurological: He is alert and oriented to person, place, and time. Coordination normal.   Skin: Skin is warm and dry. No rash noted.   Psychiatric: He has a normal mood and affect. Judgment and thought content normal.   Vitals reviewed.      LABS:  WBC   Date Value Ref Range Status   02/06/2017 11.36 3.90 - 12.70 K/uL Final     Hemoglobin   Date Value Ref Range Status   02/06/2017 11.6 (L) 14.0 - 18.0 g/dL Final     Hematocrit   Date Value Ref Range Status   02/06/2017 37.1 (L) 40.0 - 54.0 % Final     Platelets   Date Value Ref Range Status   02/06/2017 116 (L) 150 - 350 K/uL Final     Gran #   Date Value Ref Range Status   02/06/2017 8.8 (H) 1.8 - 7.7 K/uL Final     Comment:     The ANC is based on a white cell differential from an   automated cell counter. It has not been microscopically   reviewed for the presence of abnormal cells. Clinical   correlation is required.         Chemistry        Component Value Date/Time     02/06/2017 1621    K 4.0 02/06/2017 1621     02/06/2017 1621    CO2 24 02/06/2017 1621    BUN 14 02/06/2017 1621    CREATININE 1.2 02/06/2017 1621    GLU 81 02/06/2017 1621        Component Value Date/Time    CALCIUM 9.0 02/06/2017 1621    ALKPHOS 191 (H) 02/06/2017 1621    AST 24 02/06/2017 1621    ALT 14 02/06/2017 1621    BILITOT 0.4 02/06/2017 1621          Assessment:       1. Rectal cancer    2. Secondary adenocarcinoma of lung, right    3. Encounter for antineoplastic chemotherapy and immunotherapy    4. Chemotherapy induced neutropenia        Plan:        1,2,3,4. He is doing  well clinically and will proceed with chemo FOLFIRI and Cyramza next week, pending normal CMP.    He will return in 2 weeks for next cycle of chemo with restaging CT scans prior    Neulasta on day 3.    Above care plan was discussed with patient and accompanying wife and all questions were addressed to their satisfaction

## 2017-02-24 NOTE — Clinical Note
Schedule CBC, CMP, CEA, Urine protein/creatinine ratio, CT chest, abdomen/pelvis and see me in 2 weeks and for FOLFIRI and Cyramza. DC pump on day 3. Neulasta on day 3. Labs and CT scans on 3/10/17 late afternoon Schedule to see me late afternoon (around 3 PM) on 3/14/17 Chemo in the afternoon around 2:30 on the 3/15/17

## 2017-03-01 ENCOUNTER — INFUSION (OUTPATIENT)
Dept: INFUSION THERAPY | Facility: HOSPITAL | Age: 67
End: 2017-03-01
Attending: INTERNAL MEDICINE
Payer: COMMERCIAL

## 2017-03-01 VITALS
HEART RATE: 79 BPM | SYSTOLIC BLOOD PRESSURE: 163 MMHG | TEMPERATURE: 99 F | RESPIRATION RATE: 18 BRPM | DIASTOLIC BLOOD PRESSURE: 79 MMHG

## 2017-03-01 DIAGNOSIS — T45.1X5A CHEMOTHERAPY INDUCED NEUTROPENIA: Primary | ICD-10-CM

## 2017-03-01 DIAGNOSIS — Z51.12 ENCOUNTER FOR ANTINEOPLASTIC CHEMOTHERAPY AND IMMUNOTHERAPY: ICD-10-CM

## 2017-03-01 DIAGNOSIS — Z51.11 ENCOUNTER FOR ANTINEOPLASTIC CHEMOTHERAPY AND IMMUNOTHERAPY: ICD-10-CM

## 2017-03-01 DIAGNOSIS — D70.1 CHEMOTHERAPY INDUCED NEUTROPENIA: Primary | ICD-10-CM

## 2017-03-01 PROCEDURE — 96417 CHEMO IV INFUS EACH ADDL SEQ: CPT

## 2017-03-01 PROCEDURE — 96413 CHEMO IV INFUSION 1 HR: CPT

## 2017-03-01 PROCEDURE — 96375 TX/PRO/DX INJ NEW DRUG ADDON: CPT

## 2017-03-01 PROCEDURE — 96367 TX/PROPH/DG ADDL SEQ IV INF: CPT

## 2017-03-01 PROCEDURE — 96368 THER/DIAG CONCURRENT INF: CPT

## 2017-03-01 PROCEDURE — 25000003 PHARM REV CODE 250: Performed by: INTERNAL MEDICINE

## 2017-03-01 PROCEDURE — 96411 CHEMO IV PUSH ADDL DRUG: CPT

## 2017-03-01 PROCEDURE — 63600175 PHARM REV CODE 636 W HCPCS: Performed by: INTERNAL MEDICINE

## 2017-03-01 PROCEDURE — 96416 CHEMO PROLONG INFUSE W/PUMP: CPT

## 2017-03-01 PROCEDURE — 96415 CHEMO IV INFUSION ADDL HR: CPT

## 2017-03-01 RX ORDER — ATROPINE SULFATE 0.4 MG/ML
0.4 INJECTION, SOLUTION ENDOTRACHEAL; INTRAMEDULLARY; INTRAMUSCULAR; INTRAVENOUS; SUBCUTANEOUS DAILY PRN
Status: DISCONTINUED | OUTPATIENT
Start: 2017-03-01 | End: 2017-03-01 | Stop reason: HOSPADM

## 2017-03-01 RX ORDER — FLUOROURACIL 50 MG/ML
400 INJECTION, SOLUTION INTRAVENOUS
Status: COMPLETED | OUTPATIENT
Start: 2017-03-01 | End: 2017-03-01

## 2017-03-01 RX ADMIN — ATROPINE SULFATE 0.4 MG: 0.4 INJECTION, SOLUTION INTRAMUSCULAR; INTRAVENOUS; SUBCUTANEOUS at 04:03

## 2017-03-01 RX ADMIN — SODIUM CHLORIDE: 0.9 INJECTION, SOLUTION INTRAVENOUS at 03:03

## 2017-03-01 RX ADMIN — SODIUM CHLORIDE 814 MG: 9 INJECTION, SOLUTION INTRAVENOUS at 03:03

## 2017-03-01 RX ADMIN — FLUOROURACIL 870 MG: 50 INJECTION, SOLUTION INTRAVENOUS at 06:03

## 2017-03-01 RX ADMIN — DEXTROSE 392 MG: 5 SOLUTION INTRAVENOUS at 04:03

## 2017-03-01 RX ADMIN — LEUCOVORIN CALCIUM 43.6 MG: 350 INJECTION, POWDER, LYOPHILIZED, FOR SOLUTION INTRAMUSCULAR; INTRAVENOUS at 04:03

## 2017-03-01 RX ADMIN — FLUOROURACIL 5230 MG: 50 INJECTION, SOLUTION INTRAVENOUS at 06:03

## 2017-03-01 RX ADMIN — PALONOSETRON HYDROCHLORIDE 0.25 MG: 0.25 INJECTION INTRAVENOUS at 03:03

## 2017-03-01 RX ADMIN — SODIUM CHLORIDE: 9 INJECTION, SOLUTION INTRAVENOUS at 04:03

## 2017-03-01 NOTE — PLAN OF CARE
Problem: Patient Care Overview (Adult)  Goal: Individualization & Mutuality  Outcome: Ongoing (interventions implemented as appropriate)  1848-Labs , hx, and medications reviewed, patient seen by MD previously and orders signed and ready. Assessment completed. Discussed plan of care with patient. Patient in agreement. Chair reclined and warm blanket and snack offered.

## 2017-03-02 NOTE — PLAN OF CARE
Problem: Patient Care Overview (Adult)  Goal: Plan of Care Review  Outcome: Ongoing (interventions implemented as appropriate)  1835- Portable pump infusing on discharge all clamps open and connections secured. No kinks in tubing noted. Settings double checked by ISAÍAS Mark RN. Educated patient to be sure pump should say run and volume infused number should increase daily as reservoir volume decreases. Instructed to call with any issues with pump. Phone numbers given. AVS given.  Instructed to call provider for any questions or concerns. Patient to return at 430 on Friday.

## 2017-03-03 ENCOUNTER — TELEPHONE (OUTPATIENT)
Dept: INFUSION THERAPY | Facility: HOSPITAL | Age: 67
End: 2017-03-03

## 2017-03-03 ENCOUNTER — INFUSION (OUTPATIENT)
Dept: INFUSION THERAPY | Facility: HOSPITAL | Age: 67
End: 2017-03-03
Attending: INTERNAL MEDICINE
Payer: COMMERCIAL

## 2017-03-03 VITALS
RESPIRATION RATE: 18 BRPM | SYSTOLIC BLOOD PRESSURE: 149 MMHG | TEMPERATURE: 98 F | DIASTOLIC BLOOD PRESSURE: 77 MMHG | HEART RATE: 79 BPM

## 2017-03-03 DIAGNOSIS — T45.1X5A CHEMOTHERAPY INDUCED NEUTROPENIA: Primary | ICD-10-CM

## 2017-03-03 DIAGNOSIS — D70.1 CHEMOTHERAPY INDUCED NEUTROPENIA: Primary | ICD-10-CM

## 2017-03-03 DIAGNOSIS — Z51.11 ENCOUNTER FOR ANTINEOPLASTIC CHEMOTHERAPY AND IMMUNOTHERAPY: ICD-10-CM

## 2017-03-03 DIAGNOSIS — Z51.12 ENCOUNTER FOR ANTINEOPLASTIC CHEMOTHERAPY AND IMMUNOTHERAPY: ICD-10-CM

## 2017-03-03 PROCEDURE — 96360 HYDRATION IV INFUSION INIT: CPT

## 2017-03-03 PROCEDURE — 96377 APPLICATON ON-BODY INJECTOR: CPT

## 2017-03-03 PROCEDURE — 25000003 PHARM REV CODE 250: Performed by: INTERNAL MEDICINE

## 2017-03-03 PROCEDURE — 63600175 PHARM REV CODE 636 W HCPCS: Performed by: INTERNAL MEDICINE

## 2017-03-03 RX ORDER — SODIUM CHLORIDE 0.9 % (FLUSH) 0.9 %
10 SYRINGE (ML) INJECTION
Status: DISCONTINUED | OUTPATIENT
Start: 2017-03-03 | End: 2017-03-03 | Stop reason: HOSPADM

## 2017-03-03 RX ORDER — HEPARIN 100 UNIT/ML
500 SYRINGE INTRAVENOUS
Status: DISCONTINUED | OUTPATIENT
Start: 2017-03-03 | End: 2017-03-03 | Stop reason: HOSPADM

## 2017-03-03 RX ADMIN — PEGFILGRASTIM 6 MG: KIT SUBCUTANEOUS at 05:03

## 2017-03-03 RX ADMIN — SODIUM CHLORIDE, PRESERVATIVE FREE 10 ML: 5 INJECTION INTRAVENOUS at 06:03

## 2017-03-03 RX ADMIN — SODIUM CHLORIDE, PRESERVATIVE FREE 500 UNITS: 5 INJECTION INTRAVENOUS at 06:03

## 2017-03-03 RX ADMIN — SODIUM CHLORIDE 1000 ML: 0.9 INJECTION, SOLUTION INTRAVENOUS at 05:03

## 2017-03-03 NOTE — MR AVS SNAPSHOT
Patient Information     Patient Name Sex Karthik Lopez Male 1950      Visit Information        Provider Department Dept Phone Center    3/3/2017 5:00 PM NOM, CHEMO [facility] Chemotherapy Infusion [phone] Ru Suárez      Patient Instructions     None      Your Current Medications Are     amlodipine (NORVASC) 5 MG tablet    ascorbic acid (VITAMIN C) 1000 MG tablet    atorvastatin (LIPITOR) 10 MG tablet    cholecalciferol, vitamin D3, (VITAMIN D3) 2,000 unit Cap    co-enzyme Q-10 30 mg capsule    diphenoxylate-atropine 2.5-0.025 mg (LOMOTIL) 2.5-0.025 mg per tablet    ferrous gluconate (FERGON) 324 MG tablet    furosemide (LASIX) 20 MG tablet    gabapentin (NEURONTIN) 300 MG capsule    GARLIC EXTRACT ORAL    glipiZIDE (GLUCOTROL) 5 MG tablet    lisinopril (PRINIVIL,ZESTRIL) 40 MG tablet    metoprolol succinate (TOPROL-XL) 100 MG 24 hr tablet    multivitamin with iron-mineral TbSR    NYSTATIN (DUKE'S SOLUTION)    ondansetron (ZOFRAN) 8 MG tablet    oxycodone-acetaminophen (PERCOCET) 5-325 mg per tablet    paregoric 2 mg/5 mL Liqd    tamsulosin (FLOMAX) 0.4 mg Cp24      Facility-Administered Medications     heparin, porcine (PF) 100 unit/mL injection flush 500 Units    pegfilgrastim (NEULASTA (ON BODY INJECTOR)) injection 6 mg    sodium chloride 0.9% bolus 1,000 mL    sodium chloride 0.9% flush 10 mL      Appointments for Next Year     3/10/2017  2:00 PM NON FASTING LAB (5 min.) [facility]    Arrive at check-in approximately 15 minutes before your scheduled appointment time. Bring all outside medical records and imaging, along with a list of your current medications and insurance card.    2nd Floor    3/10/2017  4:30 PM CT ABD PEL W CONTRAST (15 min.) [facility] CT1 64- LIMIT 450 LBS    You must  fast four (4) hours prior to your appointment. Arrive 2 hours early for your appointment to drink the exam prep.    2nd floor     3/14/2017  3:15 PM ESTABLISHED PATIENT SHORT (15 min.) [facility] Hematology Oncology Lisa Abernathy MD    Arrive at check-in approximately 15 minutes before your scheduled appointment time. Bring all outside medical records and imaging, along with a list of your current medications and insurance card.    [facility] - 3rd Floor    3/15/2017  2:30 PM INFUSION 210 MIN (210 min.) [facility], CHEMO    Arrive at check-in approximately 15 minutes before your scheduled appointment time. Bring all outside medical records and imaging, along with a list of your current medications and insurance card.    [facility], 5th Floor    3/17/2017  5:30 PM INFUSION 060 MIN (60 min.) [facility], CHEMO    Arrive at check-in approximately 15 minutes before your scheduled appointment time. Bring all outside medical records and imaging, along with a list of your current medications and insurance card.    [facility], 5th Floor         Default Flowsheet Data (last 24 hours)      Amb Complex Vitals Gabriel        03/03/17 1733                Measurements    BP (!)  149/77        Temp 98.1 °F (36.7 °C)        Pulse 79        Resp 18                Allergies     No Known Allergies      Medications You Received from 03/02/2017 1833 to 03/03/2017 1833        Date/Time Order Dose Route Action     03/03/2017 1830 heparin, porcine (PF) 100 unit/mL injection flush 500 Units 500 Units Intravenous Given     03/03/2017 1734 pegfilgrastim (NEULASTA (ON BODY INJECTOR)) injection 6 mg 6 mg Subcutaneous Given     03/03/2017 1720 sodium chloride 0.9% bolus 1,000 mL 1,000 mL Intravenous New Bag     03/03/2017 1830 sodium chloride 0.9% flush 10 mL 10 mL Intravenous Given      Current Discharge Medication List     Cannot display discharge medications since this is not an admission.

## 2017-03-04 NOTE — PLAN OF CARE
Problem: Patient Care Overview (Adult)  Goal: Individualization & Mutuality  Outcome: Ongoing (interventions implemented as appropriate)  1720- Patient returns for pump dc. Labs , hx, and medications reviewed. Assessment completed and no new issues identified. Discussed plan of care with patient. Patient in agreement. Chair reclined and warm blanket and snack offered.

## 2017-03-04 NOTE — PLAN OF CARE
Problem: Patient Care Overview (Adult)  Goal: Plan of Care Review  Outcome: Ongoing (interventions implemented as appropriate)  1831-Patient tolerated treatment well. Discharged without complaints or S/S of adverse event. AVS given.  Instructed to call provider for any questions or concerns. Patient verbalized understanding and use of neulasta on pro device applied to back of R arm.

## 2017-03-10 ENCOUNTER — HOSPITAL ENCOUNTER (OUTPATIENT)
Dept: RADIOLOGY | Facility: HOSPITAL | Age: 67
Discharge: HOME | End: 2017-03-10
Attending: INTERNAL MEDICINE
Payer: COMMERCIAL

## 2017-03-10 ENCOUNTER — PATIENT MESSAGE (OUTPATIENT)
Dept: HEMATOLOGY/ONCOLOGY | Facility: CLINIC | Age: 67
End: 2017-03-10

## 2017-03-10 DIAGNOSIS — C20 RECTAL CANCER: ICD-10-CM

## 2017-03-10 PROCEDURE — 71260 CT THORAX DX C+: CPT | Mod: TC

## 2017-03-10 PROCEDURE — 71260 CT THORAX DX C+: CPT | Mod: 26,,, | Performed by: RADIOLOGY

## 2017-03-10 PROCEDURE — 74177 CT ABD & PELVIS W/CONTRAST: CPT | Mod: 26,,, | Performed by: RADIOLOGY

## 2017-03-10 PROCEDURE — 74177 CT ABD & PELVIS W/CONTRAST: CPT | Mod: TC

## 2017-03-10 PROCEDURE — 25500020 PHARM REV CODE 255: Performed by: INTERNAL MEDICINE

## 2017-03-10 RX ADMIN — IOHEXOL 15 ML: 350 INJECTION, SOLUTION INTRAVENOUS at 03:03

## 2017-03-10 RX ADMIN — IOHEXOL 100 ML: 350 INJECTION, SOLUTION INTRAVENOUS at 04:03

## 2017-03-10 RX ADMIN — IOHEXOL 15 ML: 350 INJECTION, SOLUTION INTRAVENOUS at 02:03

## 2017-03-14 ENCOUNTER — OFFICE VISIT (OUTPATIENT)
Dept: HEMATOLOGY/ONCOLOGY | Facility: CLINIC | Age: 67
End: 2017-03-14
Payer: COMMERCIAL

## 2017-03-14 VITALS
SYSTOLIC BLOOD PRESSURE: 122 MMHG | WEIGHT: 222.88 LBS | HEIGHT: 66 IN | OXYGEN SATURATION: 97 % | TEMPERATURE: 98 F | HEART RATE: 83 BPM | BODY MASS INDEX: 35.82 KG/M2 | RESPIRATION RATE: 19 BRPM | DIASTOLIC BLOOD PRESSURE: 71 MMHG

## 2017-03-14 DIAGNOSIS — T45.1X5A CHEMOTHERAPY-INDUCED THROMBOCYTOPENIA: ICD-10-CM

## 2017-03-14 DIAGNOSIS — C78.01 SECONDARY ADENOCARCINOMA OF LUNG, RIGHT: ICD-10-CM

## 2017-03-14 DIAGNOSIS — C20 RECTAL CANCER: Primary | ICD-10-CM

## 2017-03-14 DIAGNOSIS — T45.1X5A CHEMOTHERAPY INDUCED NEUTROPENIA: ICD-10-CM

## 2017-03-14 DIAGNOSIS — T45.1X5A ANTINEOPLASTIC CHEMOTHERAPY INDUCED ANEMIA: ICD-10-CM

## 2017-03-14 DIAGNOSIS — Z51.12 ENCOUNTER FOR ANTINEOPLASTIC CHEMOTHERAPY AND IMMUNOTHERAPY: ICD-10-CM

## 2017-03-14 DIAGNOSIS — D69.59 CHEMOTHERAPY-INDUCED THROMBOCYTOPENIA: ICD-10-CM

## 2017-03-14 DIAGNOSIS — D70.1 CHEMOTHERAPY INDUCED NEUTROPENIA: ICD-10-CM

## 2017-03-14 DIAGNOSIS — N13.30 HYDRONEPHROSIS, UNSPECIFIED HYDRONEPHROSIS TYPE: ICD-10-CM

## 2017-03-14 DIAGNOSIS — E11.9 TYPE 2 DIABETES MELLITUS WITHOUT COMPLICATION, WITHOUT LONG-TERM CURRENT USE OF INSULIN: ICD-10-CM

## 2017-03-14 DIAGNOSIS — D64.81 ANTINEOPLASTIC CHEMOTHERAPY INDUCED ANEMIA: ICD-10-CM

## 2017-03-14 DIAGNOSIS — Z51.11 ENCOUNTER FOR ANTINEOPLASTIC CHEMOTHERAPY AND IMMUNOTHERAPY: ICD-10-CM

## 2017-03-14 PROCEDURE — 3046F HEMOGLOBIN A1C LEVEL >9.0%: CPT | Mod: S$GLB,,, | Performed by: INTERNAL MEDICINE

## 2017-03-14 PROCEDURE — 2022F DILAT RTA XM EVC RTNOPTHY: CPT | Mod: S$GLB,,, | Performed by: INTERNAL MEDICINE

## 2017-03-14 PROCEDURE — 1157F ADVNC CARE PLAN IN RCRD: CPT | Mod: S$GLB,,, | Performed by: INTERNAL MEDICINE

## 2017-03-14 PROCEDURE — 99999 PR PBB SHADOW E&M-EST. PATIENT-LVL IV: CPT | Mod: PBBFAC,,, | Performed by: INTERNAL MEDICINE

## 2017-03-14 PROCEDURE — 99215 OFFICE O/P EST HI 40 MIN: CPT | Mod: S$GLB,,, | Performed by: INTERNAL MEDICINE

## 2017-03-14 PROCEDURE — 1160F RVW MEDS BY RX/DR IN RCRD: CPT | Mod: S$GLB,,, | Performed by: INTERNAL MEDICINE

## 2017-03-14 PROCEDURE — 3074F SYST BP LT 130 MM HG: CPT | Mod: S$GLB,,, | Performed by: INTERNAL MEDICINE

## 2017-03-14 PROCEDURE — 1125F AMNT PAIN NOTED PAIN PRSNT: CPT | Mod: S$GLB,,, | Performed by: INTERNAL MEDICINE

## 2017-03-14 PROCEDURE — 3078F DIAST BP <80 MM HG: CPT | Mod: S$GLB,,, | Performed by: INTERNAL MEDICINE

## 2017-03-14 PROCEDURE — 1159F MED LIST DOCD IN RCRD: CPT | Mod: S$GLB,,, | Performed by: INTERNAL MEDICINE

## 2017-03-14 PROCEDURE — 4010F ACE/ARB THERAPY RXD/TAKEN: CPT | Mod: S$GLB,,, | Performed by: INTERNAL MEDICINE

## 2017-03-14 RX ORDER — SODIUM CHLORIDE 0.9 % (FLUSH) 0.9 %
10 SYRINGE (ML) INJECTION
Status: CANCELLED | OUTPATIENT
Start: 2017-03-17

## 2017-03-14 RX ORDER — FLUOROURACIL 50 MG/ML
400 INJECTION, SOLUTION INTRAVENOUS
Status: CANCELLED | OUTPATIENT
Start: 2017-03-15

## 2017-03-14 RX ORDER — ATROPINE SULFATE 0.4 MG/ML
0.4 INJECTION, SOLUTION ENDOTRACHEAL; INTRAMEDULLARY; INTRAMUSCULAR; INTRAVENOUS; SUBCUTANEOUS DAILY PRN
Status: CANCELLED | OUTPATIENT
Start: 2017-03-15

## 2017-03-14 RX ORDER — HEPARIN 100 UNIT/ML
500 SYRINGE INTRAVENOUS
Status: CANCELLED | OUTPATIENT
Start: 2017-03-17

## 2017-03-14 NOTE — Clinical Note
Schedule CBC,CMP, urine protein/creatine ratio and see me in 2 weeks and for FOLFIRI and Cyramza. DC pump on day 3. neulasta on day 3

## 2017-03-14 NOTE — PROGRESS NOTES
"Subjective:       Patient ID: Karthik Medrano is a 66 y.o. male.    Chief Complaint: No chief complaint on file.  Oncologic History:  66 year old male with history of rectal cancer diagnosed 1/11/10 on colonoscopy (moderately differentiated adenocarcinoma) and based on imaging, was T3NxM0. Patient underwent Neoadjuvant chemort with infusional 5FU. This was completed 3/19/10 with a total of 50.4Gy given to the pelvis. He went to surgery 5/13/10. T3N0M0. Post operative he received no adjuvant treatment and has been followed regularly. He went to his PCP 6/24/13 who ordered a CXR which found pulmonary nodules. On 6/27/13, CT chest confirmed these findings. CEA drawn when initially diagnosed was 2.6ng/ml and 6/27/13 was 30.8 ng/ml. . Pulmonary biopsy was performed 7/18/13 and this confirmed metastatic adenocarcinoma. 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 Avastin was added for second cycle.    Patient completed 12 cycles of FOLFOX avastin. He did not receive Avastin for cycle 3, 4, or 5 (due to uncontrolled blood pressure).    He was on maintenance infusional 5FU and Avastin. Repeat scans 4/14/14 showed numerous bilateral pulmonary soft tissue nodules, the majority of which demonstrate interval enlargement when compared to the prior exam. CEA up 18.2 from 13.    He was restarted on FOLFIRI and AVastin and received 6 months of chemo and then was switched to maintenance chemotherapy with 5FU and Avastin. His CT scans from early May 2015 revealed progression in lung lesions.  He is now on FOLFIRI and Cyramza.  CT from 4/19/16 reveals "Multiple stable metastatic pulmonary nodules in this patient with history of rectal cancer.  Stable postoperative changes are present from prior partial colectomy.Interval increase in right-sided hydronephrosis despite appropriate positioning of a double-J ureteral sent.  There is associated enlargement and decreased perfusion to the right kidney consistent " "with obstruction. These findings are concerning for a nonfunctioning ureteral stent. Cholelithiasis."  CT from 6/13/16 reveal stable disease and hydronephrosis has resolved.  CT from 8/5/16 reveals "Stable pulmonary metastatic disease.Increased prominence of periureteral fat stranding/inflammatory change. Stable mild right hydronephrosis."  CT from 1/6/17 "1. Stable appearance of pulmonary metastatic disease in this patient with history of colon cancer. Pulmonary index lesions are stable in size compared to prior examination. No evidence of new metastatic disease. 2. Right-sided double-J ureteral stent with stable appearing inflammatory change about the right renal collecting system and right ureter. 3. Additional stable incidental findings as discussed above. "        HPI Mr. Medrano returns for follow up and for FOLFIRI and Cyramza.  His CT scans reveal "In this patient with a history of rectal cancer, index lesions in the right and left lung are stable in size as detailed above. No new pulmonary nodules or masses are identified. There is no evidence of abnormal soft tissue opacity in the rectosigmoid region to suggest residual/recurrent disease. Interval increase in the degree of right hydronephrosis with a stable double-J right ureteral stent in place. Interval development of perihepatic ascites. Stable fat and bowel containing ventral hernia"  Review of Systems   Constitutional: Positive for unexpected weight change. Negative for appetite change.   Eyes: Positive for visual disturbance.   Respiratory: Negative for cough and shortness of breath.    Cardiovascular: Negative for chest pain.   Gastrointestinal: Positive for diarrhea. Negative for abdominal pain.   Genitourinary: Negative for frequency.   Musculoskeletal: Positive for back pain.   Skin: Negative for rash.   Neurological: Negative for headaches.   Hematological: Negative for adenopathy.   Psychiatric/Behavioral: The patient is not nervous/anxious.      "   Objective:      Physical Exam   Constitutional: He is oriented to person, place, and time. He appears well-developed and well-nourished.   HENT:   Mouth/Throat: No oropharyngeal exudate.   Cardiovascular: Normal rate and normal heart sounds.    Pulmonary/Chest: Effort normal and breath sounds normal. He has no wheezes.   Abdominal: Soft. Bowel sounds are normal. There is no tenderness.   Musculoskeletal: He exhibits no edema or tenderness.   Lymphadenopathy:     He has no cervical adenopathy.   Neurological: He is alert and oriented to person, place, and time. Coordination normal.   Skin: Skin is warm and dry. No rash noted.   Psychiatric: He has a normal mood and affect. Judgment and thought content normal.   Vitals reviewed.        LABS:  WBC   Date Value Ref Range Status   03/10/2017 13.18 (H) 3.90 - 12.70 K/uL Final     Hemoglobin   Date Value Ref Range Status   03/10/2017 10.9 (L) 14.0 - 18.0 g/dL Final     Hematocrit   Date Value Ref Range Status   03/10/2017 34.3 (L) 40.0 - 54.0 % Final     Platelets   Date Value Ref Range Status   03/10/2017 94 (L) 150 - 350 K/uL Final     Gran #   Date Value Ref Range Status   03/10/2017 10.4 (H) 1.8 - 7.7 K/uL Final     Comment:     The ANC is based on a white cell differential from an   automated cell counter. It has not been microscopically   reviewed for the presence of abnormal cells. Clinical   correlation is required.         Chemistry        Component Value Date/Time     03/10/2017 1408    K 4.2 03/10/2017 1408     03/10/2017 1408    CO2 29 03/10/2017 1408    BUN 9 03/10/2017 1408    CREATININE 0.9 03/10/2017 1408     (H) 03/10/2017 1408        Component Value Date/Time    CALCIUM 8.9 03/10/2017 1408    ALKPHOS 250 (H) 03/10/2017 1408    AST 23 03/10/2017 1408    ALT 15 03/10/2017 1408    BILITOT 0.5 03/10/2017 1408          Assessment:       1. Rectal cancer    2. Secondary adenocarcinoma of lung, right    3. Encounter for antineoplastic  chemotherapy and immunotherapy    4. Chemotherapy induced neutropenia    5. Antineoplastic chemotherapy induced anemia    6. Chemotherapy-induced thrombocytopenia    7. Hydronephrosis, unspecified hydronephrosis type    8. Type 2 diabetes mellitus without complication, without long-term current use of insulin        Plan:        1,2,3,4,5,6. His CT scans are stable so he will proceed with FOLFIRI and Cyramza as scheduled and will return in 2 weeks for next cycle. Neulasta on day 3.  Platelets slightly low but adequate to proceed with chemo  7. He is seeing Dr. Johnson, Urology this week to discuss stent exchange.  8. Stable and he is compliant with meds.    Above care plan was discussed with patient and accompanying wife and all questions were addressed to their satisfaction

## 2017-03-15 ENCOUNTER — INFUSION (OUTPATIENT)
Dept: INFUSION THERAPY | Facility: HOSPITAL | Age: 67
End: 2017-03-15
Attending: INTERNAL MEDICINE
Payer: COMMERCIAL

## 2017-03-15 VITALS
DIASTOLIC BLOOD PRESSURE: 61 MMHG | RESPIRATION RATE: 19 BRPM | TEMPERATURE: 98 F | SYSTOLIC BLOOD PRESSURE: 135 MMHG | HEART RATE: 79 BPM

## 2017-03-15 DIAGNOSIS — Z51.12 ENCOUNTER FOR ANTINEOPLASTIC CHEMOTHERAPY AND IMMUNOTHERAPY: ICD-10-CM

## 2017-03-15 DIAGNOSIS — D70.1 CHEMOTHERAPY INDUCED NEUTROPENIA: Primary | ICD-10-CM

## 2017-03-15 DIAGNOSIS — T45.1X5A CHEMOTHERAPY INDUCED NEUTROPENIA: Primary | ICD-10-CM

## 2017-03-15 DIAGNOSIS — Z51.11 ENCOUNTER FOR ANTINEOPLASTIC CHEMOTHERAPY AND IMMUNOTHERAPY: ICD-10-CM

## 2017-03-15 PROCEDURE — 96417 CHEMO IV INFUS EACH ADDL SEQ: CPT

## 2017-03-15 PROCEDURE — 96368 THER/DIAG CONCURRENT INF: CPT

## 2017-03-15 PROCEDURE — 63600175 PHARM REV CODE 636 W HCPCS: Performed by: INTERNAL MEDICINE

## 2017-03-15 PROCEDURE — 25000003 PHARM REV CODE 250: Performed by: INTERNAL MEDICINE

## 2017-03-15 PROCEDURE — 96413 CHEMO IV INFUSION 1 HR: CPT

## 2017-03-15 PROCEDURE — 96375 TX/PRO/DX INJ NEW DRUG ADDON: CPT

## 2017-03-15 PROCEDURE — 96416 CHEMO PROLONG INFUSE W/PUMP: CPT

## 2017-03-15 PROCEDURE — 96367 TX/PROPH/DG ADDL SEQ IV INF: CPT

## 2017-03-15 PROCEDURE — 96411 CHEMO IV PUSH ADDL DRUG: CPT

## 2017-03-15 RX ORDER — FLUOROURACIL 50 MG/ML
400 INJECTION, SOLUTION INTRAVENOUS
Status: COMPLETED | OUTPATIENT
Start: 2017-03-15 | End: 2017-03-15

## 2017-03-15 RX ORDER — ATROPINE SULFATE 0.4 MG/ML
0.4 INJECTION, SOLUTION ENDOTRACHEAL; INTRAMEDULLARY; INTRAMUSCULAR; INTRAVENOUS; SUBCUTANEOUS DAILY PRN
Status: DISCONTINUED | OUTPATIENT
Start: 2017-03-15 | End: 2017-03-15 | Stop reason: HOSPADM

## 2017-03-15 RX ADMIN — FLUOROURACIL 5230 MG: 50 INJECTION, SOLUTION INTRAVENOUS at 06:03

## 2017-03-15 RX ADMIN — FLUOROURACIL 870 MG: 50 INJECTION, SOLUTION INTRAVENOUS at 06:03

## 2017-03-15 RX ADMIN — SODIUM CHLORIDE 814 MG: 9 INJECTION, SOLUTION INTRAVENOUS at 03:03

## 2017-03-15 RX ADMIN — LEUCOVORIN CALCIUM 43.6 MG: 350 INJECTION, POWDER, LYOPHILIZED, FOR SOLUTION INTRAMUSCULAR; INTRAVENOUS at 04:03

## 2017-03-15 RX ADMIN — ATROPINE SULFATE 0.4 MG: 0.4 INJECTION, SOLUTION INTRAMUSCULAR; INTRAVENOUS; SUBCUTANEOUS at 04:03

## 2017-03-15 RX ADMIN — SODIUM CHLORIDE: 9 INJECTION, SOLUTION INTRAVENOUS at 03:03

## 2017-03-15 RX ADMIN — DEXAMETHASONE SODIUM PHOSPHATE 0.25 MG: 4 INJECTION, SOLUTION INTRAMUSCULAR; INTRAVENOUS at 02:03

## 2017-03-15 RX ADMIN — DEXTROSE 392 MG: 5 SOLUTION INTRAVENOUS at 04:03

## 2017-03-15 RX ADMIN — SODIUM CHLORIDE: 0.9 INJECTION, SOLUTION INTRAVENOUS at 02:03

## 2017-03-15 NOTE — PLAN OF CARE
Problem: Patient Care Overview (Adult)  Goal: Plan of Care Review  Outcome: Ongoing (interventions implemented as appropriate)  Pt arrived, v/s stable, port accessed without difficulty, Pt voices, no nausea/ vomiting, neuropathy, diarrhea or shortness of breath. Premeds given as ordered, Cyramza/Folfiri infused without any adverse reactions, pt education on looking out for fevers 100.4 or greater or any signs of infection.pt tolerated treatment/procedure well, placed on chemo 5fu pump for 46 hours, will return for pump d/c. Instructed to call MD if problems occur.

## 2017-03-15 NOTE — PLAN OF CARE
Problem: Patient Care Overview (Adult)  Goal: Plan of Care Review  Outcome: Ongoing (interventions implemented as appropriate)  Pt. Tolerated treatment well. Discharged without complaints or signs of adverse effects. To return in 46 hrs for pump D/C.

## 2017-03-17 ENCOUNTER — INFUSION (OUTPATIENT)
Dept: INFUSION THERAPY | Facility: HOSPITAL | Age: 67
End: 2017-03-17
Attending: INTERNAL MEDICINE
Payer: COMMERCIAL

## 2017-03-17 ENCOUNTER — PATIENT MESSAGE (OUTPATIENT)
Dept: HEMATOLOGY/ONCOLOGY | Facility: CLINIC | Age: 67
End: 2017-03-17

## 2017-03-17 VITALS
HEART RATE: 72 BPM | RESPIRATION RATE: 20 BRPM | TEMPERATURE: 98 F | SYSTOLIC BLOOD PRESSURE: 133 MMHG | DIASTOLIC BLOOD PRESSURE: 64 MMHG

## 2017-03-17 DIAGNOSIS — Z51.12 ENCOUNTER FOR ANTINEOPLASTIC CHEMOTHERAPY AND IMMUNOTHERAPY: ICD-10-CM

## 2017-03-17 DIAGNOSIS — Z51.11 ENCOUNTER FOR ANTINEOPLASTIC CHEMOTHERAPY AND IMMUNOTHERAPY: ICD-10-CM

## 2017-03-17 DIAGNOSIS — D70.1 CHEMOTHERAPY INDUCED NEUTROPENIA: Primary | ICD-10-CM

## 2017-03-17 DIAGNOSIS — T45.1X5A CHEMOTHERAPY INDUCED NEUTROPENIA: Primary | ICD-10-CM

## 2017-03-17 PROCEDURE — 25000003 PHARM REV CODE 250: Performed by: INTERNAL MEDICINE

## 2017-03-17 PROCEDURE — 96377 APPLICATON ON-BODY INJECTOR: CPT

## 2017-03-17 PROCEDURE — 63600175 PHARM REV CODE 636 W HCPCS: Performed by: INTERNAL MEDICINE

## 2017-03-17 PROCEDURE — 96360 HYDRATION IV INFUSION INIT: CPT

## 2017-03-17 RX ORDER — SODIUM CHLORIDE 0.9 % (FLUSH) 0.9 %
10 SYRINGE (ML) INJECTION
Status: DISCONTINUED | OUTPATIENT
Start: 2017-03-17 | End: 2017-03-17 | Stop reason: HOSPADM

## 2017-03-17 RX ORDER — HEPARIN 100 UNIT/ML
500 SYRINGE INTRAVENOUS
Status: DISCONTINUED | OUTPATIENT
Start: 2017-03-17 | End: 2017-03-17 | Stop reason: HOSPADM

## 2017-03-17 RX ADMIN — PEGFILGRASTIM 6 MG: KIT SUBCUTANEOUS at 04:03

## 2017-03-17 RX ADMIN — SODIUM CHLORIDE, PRESERVATIVE FREE 10 ML: 5 INJECTION INTRAVENOUS at 05:03

## 2017-03-17 RX ADMIN — SODIUM CHLORIDE 1000 ML: 0.9 INJECTION, SOLUTION INTRAVENOUS at 04:03

## 2017-03-17 RX ADMIN — HEPARIN 500 UNITS: 100 SYRINGE at 05:03

## 2017-03-17 NOTE — MR AVS SNAPSHOT
Patient Information     Patient Name Sex Karthik Lopez Male 1950      Visit Information        Provider Department Dept Phone Center    3/17/2017 5:30 PM [facility], CHEMO [facility] Chemotherapy Infusion [phone] Ru Suárez      Patient Instructions     None      Your Current Medications Are     amlodipine (NORVASC) 5 MG tablet    ascorbic acid (VITAMIN C) 1000 MG tablet    atorvastatin (LIPITOR) 10 MG tablet    cholecalciferol, vitamin D3, (VITAMIN D3) 2,000 unit Cap    co-enzyme Q-10 30 mg capsule    diphenoxylate-atropine 2.5-0.025 mg (LOMOTIL) 2.5-0.025 mg per tablet    ferrous gluconate (FERGON) 324 MG tablet    furosemide (LASIX) 20 MG tablet    gabapentin (NEURONTIN) 300 MG capsule    GARLIC EXTRACT ORAL    glipiZIDE (GLUCOTROL) 5 MG tablet    lisinopril (PRINIVIL,ZESTRIL) 40 MG tablet    metoprolol succinate (TOPROL-XL) 100 MG 24 hr tablet    multivitamin with iron-mineral TbSR    NYSTATIN ([facility]KE'S SOLUTION)    ondansetron (ZOFRAN) 8 MG tablet    oxycodone-acetaminophen (PERCOCET) 5-325 mg per tablet    paregoric 2 mg/5 mL Liqd    tamsulosin (FLOMAX) 0.4 mg Cp24      Facility-Administered Medications     heparin, porcine (PF) 100 unit/mL injection flush 500 Units    pegfilgrastim (NEULASTA (ON BODY INJECTOR)) injection 6 mg    sodium chloride 0.9% bolus 1,000 mL    sodium chloride 0.9% flush 10 mL      Appointments for Next Year     3/27/2017  4:00 PM NON FASTING LAB (10 min.) [facility] LAB, [facility] CANCER BLDG    Arrive at check-in approximately 15 minutes before your scheduled appointment time. Bring all outside medical records and imaging, along with a list of your current medications and insurance card.    [facility] 3rd Floor    3/27/2017  4:10 PM URINE (15 min.) [facility] SPECIMEN, [facility] CANCER BLDG    Arrive at check-in approximately 15 minutes before your scheduled appointment time. Bring all outside medical records and imaging, along with a  list of your current medications and insurance card.    3/28/2017  2:30 PM ESTABLISHED PATIENT (30 min.) [facility] Hematology Oncology Nevaeh Stephens NP    Arrive at check-in approximately 15 minutes before your scheduled appointment time. Bring all outside medical records and imaging, along with a list of your current medications and insurance card.    [facility] - 3rd Floor    3/29/2017  2:30 PM INFUSION 210 MIN (210 min.) [facility], CHEMO    Arrive at check-in approximately 15 minutes before your scheduled appointment time. Bring all outside medical records and imaging, along with a list of your current medications and insurance card.    [facility], 5th Floor    3/31/2017  5:30 PM INFUSION 060 MIN (60 min.) [facility], CHEMO    Arrive at check-in approximately 15 minutes before your scheduled appointment time. Bring all outside medical records and imaging, along with a list of your current medications and insurance card.    [facility], 5th Floor         Default Flowsheet Data (last 24 hours)      Amb Complex Vitals Gabriel        03/17/17 1653                Measurements    /64        Temp 98.1 °F (36.7 °C)        Pulse 72        Resp 20        Pain Assessment    Pain Score Zero                Allergies     No Known Allergies      Medications You Received from 03/16/2017 1734 to 03/17/2017 1734        Date/Time Order Dose Route Action     03/17/2017 1658 pegfilgrastim (NEULASTA (ON BODY INJECTOR)) injection 6 mg 6 mg Subcutaneous Given     03/17/2017 1649 sodium chloride 0.9% bolus 1,000 mL 1,000 mL Intravenous New Bag      Current Discharge Medication List     Cannot display discharge medications since this is not an admission.

## 2017-03-17 NOTE — PLAN OF CARE
Problem: Patient Care Overview (Adult)  Goal: Plan of Care Review  Outcome: Ongoing (interventions implemented as appropriate)  Tolerated IVF's . Pump D/C'd.  Instructed to notify MD with any concerns or problems.  AVS given to pt.  Pt verbalized understanding.

## 2017-03-25 DIAGNOSIS — I10 ESSENTIAL HYPERTENSION: ICD-10-CM

## 2017-03-26 RX ORDER — AMLODIPINE BESYLATE 5 MG/1
TABLET ORAL
Qty: 180 TABLET | Refills: 0 | Status: SHIPPED | OUTPATIENT
Start: 2017-03-26 | End: 2017-03-27 | Stop reason: SDUPTHER

## 2017-03-27 ENCOUNTER — LAB VISIT (OUTPATIENT)
Dept: LAB | Facility: HOSPITAL | Age: 67
End: 2017-03-27
Attending: INTERNAL MEDICINE
Payer: COMMERCIAL

## 2017-03-27 DIAGNOSIS — I10 ESSENTIAL HYPERTENSION: ICD-10-CM

## 2017-03-27 DIAGNOSIS — C20 RECTAL CANCER: ICD-10-CM

## 2017-03-27 PROCEDURE — 82570 ASSAY OF URINE CREATININE: CPT

## 2017-03-27 RX ORDER — AMLODIPINE BESYLATE 5 MG/1
5 TABLET ORAL 2 TIMES DAILY
Qty: 180 TABLET | Refills: 0 | Status: SHIPPED | OUTPATIENT
Start: 2017-03-27 | End: 2017-06-28 | Stop reason: SDUPTHER

## 2017-03-28 ENCOUNTER — OFFICE VISIT (OUTPATIENT)
Dept: HEMATOLOGY/ONCOLOGY | Facility: CLINIC | Age: 67
End: 2017-03-28
Payer: COMMERCIAL

## 2017-03-28 VITALS
TEMPERATURE: 99 F | RESPIRATION RATE: 19 BRPM | SYSTOLIC BLOOD PRESSURE: 148 MMHG | HEART RATE: 78 BPM | WEIGHT: 217.38 LBS | BODY MASS INDEX: 34.93 KG/M2 | OXYGEN SATURATION: 98 % | DIASTOLIC BLOOD PRESSURE: 67 MMHG | HEIGHT: 66 IN

## 2017-03-28 DIAGNOSIS — N13.30 HYDRONEPHROSIS, UNSPECIFIED HYDRONEPHROSIS TYPE: ICD-10-CM

## 2017-03-28 DIAGNOSIS — R80.9 PROTEINURIA, UNSPECIFIED TYPE: ICD-10-CM

## 2017-03-28 DIAGNOSIS — E11.9 TYPE 2 DIABETES MELLITUS WITHOUT COMPLICATION, WITHOUT LONG-TERM CURRENT USE OF INSULIN: ICD-10-CM

## 2017-03-28 DIAGNOSIS — N40.0 PROSTATE HYPERPLASIA WITHOUT URINARY OBSTRUCTION: ICD-10-CM

## 2017-03-28 DIAGNOSIS — C20 RECTAL CANCER: Primary | ICD-10-CM

## 2017-03-28 DIAGNOSIS — Z51.11 ENCOUNTER FOR ANTINEOPLASTIC CHEMOTHERAPY: ICD-10-CM

## 2017-03-28 DIAGNOSIS — T45.1X5A CHEMOTHERAPY INDUCED NEUTROPENIA: ICD-10-CM

## 2017-03-28 DIAGNOSIS — T45.1X5A ANTINEOPLASTIC CHEMOTHERAPY INDUCED ANEMIA: ICD-10-CM

## 2017-03-28 DIAGNOSIS — C78.01 SECONDARY ADENOCARCINOMA OF LUNG, RIGHT: ICD-10-CM

## 2017-03-28 DIAGNOSIS — E55.9 VITAMIN D DEFICIENCY: ICD-10-CM

## 2017-03-28 DIAGNOSIS — D70.1 CHEMOTHERAPY INDUCED NEUTROPENIA: ICD-10-CM

## 2017-03-28 DIAGNOSIS — D64.81 ANTINEOPLASTIC CHEMOTHERAPY INDUCED ANEMIA: ICD-10-CM

## 2017-03-28 LAB
CREAT UR-MCNC: 157 MG/DL
PROT UR-MCNC: 104 MG/DL
PROT/CREAT RATIO, UR: 0.66

## 2017-03-28 PROCEDURE — 3077F SYST BP >= 140 MM HG: CPT | Mod: S$GLB,,, | Performed by: NURSE PRACTITIONER

## 2017-03-28 PROCEDURE — 99999 PR PBB SHADOW E&M-EST. PATIENT-LVL V: CPT | Mod: PBBFAC,,, | Performed by: NURSE PRACTITIONER

## 2017-03-28 PROCEDURE — 1126F AMNT PAIN NOTED NONE PRSNT: CPT | Mod: S$GLB,,, | Performed by: NURSE PRACTITIONER

## 2017-03-28 PROCEDURE — 3078F DIAST BP <80 MM HG: CPT | Mod: S$GLB,,, | Performed by: NURSE PRACTITIONER

## 2017-03-28 PROCEDURE — 99215 OFFICE O/P EST HI 40 MIN: CPT | Mod: S$GLB,,, | Performed by: NURSE PRACTITIONER

## 2017-03-28 PROCEDURE — 2022F DILAT RTA XM EVC RTNOPTHY: CPT | Mod: S$GLB,,, | Performed by: NURSE PRACTITIONER

## 2017-03-28 PROCEDURE — 1159F MED LIST DOCD IN RCRD: CPT | Mod: S$GLB,,, | Performed by: NURSE PRACTITIONER

## 2017-03-28 PROCEDURE — 4010F ACE/ARB THERAPY RXD/TAKEN: CPT | Mod: S$GLB,,, | Performed by: NURSE PRACTITIONER

## 2017-03-28 PROCEDURE — 1157F ADVNC CARE PLAN IN RCRD: CPT | Mod: S$GLB,,, | Performed by: NURSE PRACTITIONER

## 2017-03-28 PROCEDURE — 3046F HEMOGLOBIN A1C LEVEL >9.0%: CPT | Mod: S$GLB,,, | Performed by: NURSE PRACTITIONER

## 2017-03-28 PROCEDURE — 1160F RVW MEDS BY RX/DR IN RCRD: CPT | Mod: S$GLB,,, | Performed by: NURSE PRACTITIONER

## 2017-03-28 RX ORDER — HEPARIN 100 UNIT/ML
500 SYRINGE INTRAVENOUS
Status: CANCELLED | OUTPATIENT
Start: 2017-04-02

## 2017-03-28 RX ORDER — TAMSULOSIN HYDROCHLORIDE 0.4 MG/1
1 CAPSULE ORAL DAILY
Qty: 90 CAPSULE | Refills: 6 | Status: SHIPPED | OUTPATIENT
Start: 2017-03-28 | End: 2017-09-01 | Stop reason: SDUPTHER

## 2017-03-28 RX ORDER — SODIUM CHLORIDE 0.9 % (FLUSH) 0.9 %
10 SYRINGE (ML) INJECTION
Status: CANCELLED | OUTPATIENT
Start: 2017-04-02

## 2017-03-28 RX ORDER — ATROPINE SULFATE 0.4 MG/ML
0.4 INJECTION, SOLUTION ENDOTRACHEAL; INTRAMEDULLARY; INTRAMUSCULAR; INTRAVENOUS; SUBCUTANEOUS DAILY PRN
Status: CANCELLED | OUTPATIENT
Start: 2017-03-31

## 2017-03-28 RX ORDER — FLUOROURACIL 50 MG/ML
400 INJECTION, SOLUTION INTRAVENOUS
Status: CANCELLED | OUTPATIENT
Start: 2017-03-31

## 2017-03-28 NOTE — PROGRESS NOTES
"PATIENT: Karthik Medrano  MRN: [id number]  DATE: 3/28/2017    Subjective:     Chief complaint:  Chief Complaint   Patient presents with    Rectal Cancer    4-10 BMs per day       Oncologic History:  66 year old male with history of rectal cancer diagnosed 1/11/10 on colonoscopy (moderately differentiated adenocarcinoma) and based on imaging, was T3NxM0. Patient underwent Neoadjuvant chemort with infusional 5FU. This was completed 3/19/10 with a total of 50.4Gy given to the pelvis. He went to surgery 5/13/10. T3N0M0. Post operative he received no adjuvant treatment and has been followed regularly. He went to his PCP 6/24/13 who ordered a CXR which found pulmonary nodules. On 6/27/13, CT chest confirmed these findings. CEA drawn when initially diagnosed was 2.6ng/ml and 6/27/13 was 30.8 ng/ml. . Pulmonary biopsy was performed 7/18/13 and this confirmed metastatic adenocarcinoma. 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 Avastin was added for second cycle.    Patient completed 12 cycles of FOLFOX avastin. He did not receive Avastin for cycle 3, 4, or 5 (due to uncontrolled blood pressure).    He was on maintenance infusional 5FU and Avastin. Repeat scans 4/14/14 showed numerous bilateral pulmonary soft tissue nodules, the majority of which demonstrate interval enlargement when compared to the prior exam. CEA up 18.2 from 13.    He was restarted on FOLFIRI and AVastin and received 6 months of chemo and then was switched to maintenance chemotherapy with 5FU and Avastin. His CT scans from early May 2015 revealed progression in lung lesions.  He is now on FOLFIRI and Cyramza.  CT from 4/19/16 reveals "Multiple stable metastatic pulmonary nodules in this patient with history of rectal cancer.  Stable postoperative changes are present from prior partial colectomy.Interval increase in right-sided hydronephrosis despite appropriate positioning of a double-J ureteral sent.  There is associated " "enlargement and decreased perfusion to the right kidney consistent with obstruction. These findings are concerning for a nonfunctioning ureteral stent. Cholelithiasis."  CT from 6/13/16 reveal stable disease and hydronephrosis has resolved.  CT from 8/5/16 reveals "Stable pulmonary metastatic disease.Increased prominence of periureteral fat stranding/inflammatory change. Stable mild right hydronephrosis."  CT from 1/6/17 "1. Stable appearance of pulmonary metastatic disease in this patient with history of colon cancer. Pulmonary index lesions are stable in size compared to prior examination. No evidence of new metastatic disease. 2. Right-sided double-J ureteral stent with stable appearing inflammatory change about the right renal collecting system and right ureter. 3. Additional stable incidental findings as discussed above. "      CT scans reveal "In this patient with a history of rectal cancer, index lesions in the right and left lung are stable in size as detailed above. No new pulmonary nodules or masses are identified. There is no evidence of abnormal soft tissue opacity in the rectosigmoid region to suggest residual/recurrent disease. Interval increase in the degree of right hydronephrosis with a stable double-J right ureteral stent in place. Interval development of perihepatic ascites. Stable fat and bowel containing ventral hernia"    Interval History: Mr. Medrano returns for follow up  and for FOLFIRI and Cyramza. He continues to have diarrhea mainly after treatment. It is no worse than usual. He feels its related to the amount of vit d he is taking and plans to reduce dose. He has occasional ankle swelling. He is having his renal stent replaced next week on 4/5/17. He denies any nausea, vomiting,  constipation, abdominal pain, weight loss or loss of appetite, chest pain, shortness of breath,  fatigue, pain, headache, dizziness, or mood changes. He is accompanied by his wife. He needs a refill on [facility] " "solution and Flomax      ECOG Performance Status:   ECOG SCORE    0 - Fully active-able to carry on all pre-disease performance without restriction          PMFSH: all information reviewed and updated as relevant to today's visit    Review of Systems:   Review of Systems   Constitutional: Negative for activity change, appetite change, fatigue and fever.   HENT: Negative for mouth sores, nosebleeds and sore throat.    Eyes: Negative for visual disturbance.   Respiratory: Negative for cough and shortness of breath.    Cardiovascular: Positive for leg swelling (ankles). Negative for chest pain and palpitations.   Gastrointestinal: Positive for diarrhea. Negative for abdominal pain, constipation, nausea and vomiting.   Genitourinary: Negative for difficulty urinating and frequency.   Musculoskeletal: Negative for arthralgias and back pain.   Skin: Negative for rash.   Neurological: Negative for dizziness, numbness and headaches.   Hematological: Negative for adenopathy. Does not bruise/bleed easily.   Psychiatric/Behavioral: Negative for confusion and sleep disturbance. The patient is not nervous/anxious.    All other systems reviewed and are negative.        Objective:      Vitals:   Vitals:    03/28/17 1432   BP: (!) 148/67   Pulse: 78   Resp: 19   Temp: 99 °F (37.2 °C)   TempSrc: Oral   SpO2: 98%   Weight: 98.6 kg (217 lb 6 oz)   Height: 5' 6" (1.676 m)     BMI: Body mass index is 35.09 kg/(m^2).      Physical Exam:   Physical Exam   Constitutional: He is oriented to person, place, and time. He appears well-developed and well-nourished. No distress.   HENT:   Right Ear: External ear normal.   Left Ear: External ear normal.   Mouth/Throat: Oropharynx is clear and moist. No oropharyngeal exudate.   Eyes: Conjunctivae and lids are normal. Pupils are equal, round, and reactive to light. No scleral icterus.   Neck: Trachea normal and normal range of motion. Neck supple. No thyromegaly present.   Cardiovascular: Normal " rate, regular rhythm, normal heart sounds and normal pulses.    Pulmonary/Chest: Effort normal and breath sounds normal.   Abdominal: Soft. Normal appearance and bowel sounds are normal. He exhibits no distension and no mass. There is no hepatosplenomegaly or splenomegaly. There is no tenderness.   Musculoskeletal: Normal range of motion. He exhibits edema (trace of edema to bilateral ankles).   Lymphadenopathy:        Head (right side): No submental and no submandibular adenopathy present.        Head (left side): No submental and no submandibular adenopathy present.     He has no cervical adenopathy.     He has no axillary adenopathy.        Right: No supraclavicular adenopathy present.        Left: No supraclavicular adenopathy present.   Neurological: He is alert and oriented to person, place, and time. He has normal reflexes. No sensory deficit.   Skin: Skin is warm, dry and intact. No bruising and no rash noted. Nails show no clubbing.   Psychiatric: He has a normal mood and affect. His speech is normal and behavior is normal. Cognition and memory are normal.   Vitals reviewed.        Laboratory Data:  WBC   Date Value Ref Range Status   03/27/2017 12.07 3.90 - 12.70 K/uL Final     Hemoglobin   Date Value Ref Range Status   03/27/2017 11.8 (L) 14.0 - 18.0 g/dL Final     Hematocrit   Date Value Ref Range Status   03/27/2017 38.0 (L) 40.0 - 54.0 % Final     Platelets   Date Value Ref Range Status   03/27/2017 114 (L) 150 - 350 K/uL Final     Gran #   Date Value Ref Range Status   03/27/2017 9.2 (H) 1.8 - 7.7 K/uL Final     Comment:     The ANC is based on a white cell differential from an   automated cell counter. It has not been microscopically   reviewed for the presence of abnormal cells. Clinical   correlation is required.         Chemistry        Component Value Date/Time     03/27/2017 1635    K 4.0 03/27/2017 1635     03/27/2017 1635    CO2 27 03/27/2017 1635    BUN 11 03/27/2017 1635     CREATININE 1.0 03/27/2017 1635    GLU 94 03/27/2017 1635        Component Value Date/Time    CALCIUM 9.1 03/27/2017 1635    ALKPHOS 189 (H) 03/27/2017 1635    AST 23 03/27/2017 1635    ALT 18 03/27/2017 1635    BILITOT 0.5 03/27/2017 1635              Assessment/Plan:     1. Rectal cancer    2. Secondary adenocarcinoma of lung, right    3. Encounter for antineoplastic chemotherapy    4. Chemotherapy induced neutropenia    5. Antineoplastic chemotherapy induced anemia    6. Proteinuria, unspecified type    7. Prostate hyperplasia without urinary obstruction    8. Hydronephrosis, unspecified hydronephrosis type    9. Type 2 diabetes mellitus without complication, without long-term current use of insulin    10. Vitamin D deficiency        Plan:    1,2,3,4,5,6. Labs reviewed. Urine protein creatinine ratio 0.66.  Plan to proceed with FOLFIRI and Cyramza as scheduled and will RTC to see Dr. Abernathy in 2 weeks for CBC, CMP,  urine protein/creatinine ratio, vitamin D and next cycle of FOLFIRI and Cyramza. D/c pump/IVF day 3, Neulasta on day 3.  Platelets slightly low but adequate to proceed with chemo  7. Refill Flomax  8. Keep f/u with  Dr. Johnson, Urology. Patient scheduled for renal stent exchange next week on 4/5/17.  9.Stable and he is compliant with meds.   10. Check vit d level. Patient on oral supplement    Med and Orders:  Orders Placed This Encounter    CBC Oncology    Comprehensive metabolic panel    Protein / creatinine ratio, urine    Vitamin D    NYSTATIN (DUKE'S SOLUTION)    tamsulosin (FLOMAX) 0.4 mg Cp24       Follow Up:  Return in about 2 weeks (around 4/11/2017).        More than 25 mins were spent during this encounter, greater than 50% was spent in direct counseling and/or coordination of care.   Discussed case with Dr. Abernathy who agrees with above plan. Patient is in agreement with the proposed treatment plan. All questions were answered to the patient's satisfaction. Pt knows to call clinic if  anything is needed before the next clinic visit.      PRADEEP Dwyer  Hematology and Medical Oncology

## 2017-03-28 NOTE — Clinical Note
RTC to see Dr. Abernathy in 2 weeks for CBC, CMP,  urine protein/creatinine ratio, and next cycle of FOLFIRI and Cyramza. D/c pump/IVF day 3, Neulasta on day 3.

## 2017-03-28 NOTE — LETTER
March 28, 2017      Lisa Abernathy MD  [address]  [facility] [address]           [facility] Hematology Oncology  [address]  [facility] [address]  Phone: [phone]          Patient: Karthik Medrano   MR Number: [id number]   YOB: 1950   Date of Visit: 3/28/2017       Dear Dr. Lisa Abernathy:    Thank you for referring Karthik Medrano to me for evaluation. Attached you will find relevant portions of my assessment and plan of care.    If you have questions, please do not hesitate to call me. I look forward to following Karthik Medrano along with you.    Sincerely,    Nevaeh Dc, NP    Enclosure  CC:  No Recipients    If you would like to receive this communication electronically, please contact [email or web address] or [phone] to request more information on [employer] access.    For providers and/or their staff who would like to refer a patient to [facility], please contact us through our one-stop-shop provider referral line, [facility], at [phone].    If you feel you have received this communication in error or would no longer like to receive these types of communications, please e-mail [email or web address]

## 2017-03-29 ENCOUNTER — INFUSION (OUTPATIENT)
Dept: INFUSION THERAPY | Facility: HOSPITAL | Age: 67
End: 2017-03-29
Attending: INTERNAL MEDICINE
Payer: COMMERCIAL

## 2017-03-29 ENCOUNTER — PATIENT MESSAGE (OUTPATIENT)
Dept: HEMATOLOGY/ONCOLOGY | Facility: CLINIC | Age: 67
End: 2017-03-29

## 2017-03-29 VITALS
RESPIRATION RATE: 16 BRPM | DIASTOLIC BLOOD PRESSURE: 60 MMHG | SYSTOLIC BLOOD PRESSURE: 118 MMHG | HEART RATE: 71 BPM | TEMPERATURE: 98 F

## 2017-03-29 DIAGNOSIS — Z51.12 ENCOUNTER FOR ANTINEOPLASTIC CHEMOTHERAPY AND IMMUNOTHERAPY: ICD-10-CM

## 2017-03-29 DIAGNOSIS — Z51.11 ENCOUNTER FOR ANTINEOPLASTIC CHEMOTHERAPY AND IMMUNOTHERAPY: ICD-10-CM

## 2017-03-29 DIAGNOSIS — D70.1 CHEMOTHERAPY INDUCED NEUTROPENIA: Primary | ICD-10-CM

## 2017-03-29 DIAGNOSIS — T45.1X5A CHEMOTHERAPY INDUCED NEUTROPENIA: Primary | ICD-10-CM

## 2017-03-29 PROCEDURE — 25000003 PHARM REV CODE 250: Performed by: INTERNAL MEDICINE

## 2017-03-29 PROCEDURE — 96415 CHEMO IV INFUSION ADDL HR: CPT

## 2017-03-29 PROCEDURE — 63600175 PHARM REV CODE 636 W HCPCS: Performed by: INTERNAL MEDICINE

## 2017-03-29 PROCEDURE — 96417 CHEMO IV INFUS EACH ADDL SEQ: CPT

## 2017-03-29 PROCEDURE — 96413 CHEMO IV INFUSION 1 HR: CPT

## 2017-03-29 PROCEDURE — 96375 TX/PRO/DX INJ NEW DRUG ADDON: CPT

## 2017-03-29 PROCEDURE — 96367 TX/PROPH/DG ADDL SEQ IV INF: CPT

## 2017-03-29 PROCEDURE — 96416 CHEMO PROLONG INFUSE W/PUMP: CPT

## 2017-03-29 PROCEDURE — 96368 THER/DIAG CONCURRENT INF: CPT

## 2017-03-29 RX ORDER — ATROPINE SULFATE 0.4 MG/ML
0.4 INJECTION, SOLUTION ENDOTRACHEAL; INTRAMEDULLARY; INTRAMUSCULAR; INTRAVENOUS; SUBCUTANEOUS DAILY PRN
Status: DISCONTINUED | OUTPATIENT
Start: 2017-03-29 | End: 2017-03-29 | Stop reason: HOSPADM

## 2017-03-29 RX ORDER — FLUOROURACIL 50 MG/ML
400 INJECTION, SOLUTION INTRAVENOUS
Status: COMPLETED | OUTPATIENT
Start: 2017-03-29 | End: 2017-03-29

## 2017-03-29 RX ADMIN — SODIUM CHLORIDE: 0.9 INJECTION, SOLUTION INTRAVENOUS at 03:03

## 2017-03-29 RX ADMIN — FLUOROURACIL 870 MG: 50 INJECTION, SOLUTION INTRAVENOUS at 06:03

## 2017-03-29 RX ADMIN — SODIUM CHLORIDE 814 MG: 9 INJECTION, SOLUTION INTRAVENOUS at 04:03

## 2017-03-29 RX ADMIN — ATROPINE SULFATE 0.4 MG: 0.4 INJECTION, SOLUTION INTRAMUSCULAR; INTRAVENOUS; SUBCUTANEOUS at 05:03

## 2017-03-29 RX ADMIN — SODIUM CHLORIDE: 9 INJECTION, SOLUTION INTRAVENOUS at 03:03

## 2017-03-29 RX ADMIN — IRINOTECAN HYDROCHLORIDE 392 MG: 100 INJECTION, SOLUTION INTRAVENOUS at 05:03

## 2017-03-29 RX ADMIN — FLUOROURACIL 5230 MG: 50 INJECTION, SOLUTION INTRAVENOUS at 06:03

## 2017-03-29 RX ADMIN — DEXAMETHASONE SODIUM PHOSPHATE 0.25 MG: 4 INJECTION, SOLUTION INTRAMUSCULAR; INTRAVENOUS at 03:03

## 2017-03-29 RX ADMIN — LEUCOVORIN CALCIUM 45 MG: 350 INJECTION, POWDER, LYOPHILIZED, FOR SOLUTION INTRAMUSCULAR; INTRAVENOUS at 05:03

## 2017-03-30 ENCOUNTER — PATIENT MESSAGE (OUTPATIENT)
Dept: HEMATOLOGY/ONCOLOGY | Facility: CLINIC | Age: 67
End: 2017-03-30

## 2017-03-30 NOTE — PLAN OF CARE
Problem: Patient Care Overview (Adult)  Goal: Plan of Care Review  Outcome: Ongoing (interventions implemented as appropriate)  Patient received folfiri and cyramza  without any issues. Notified to call MD with any further concerns .  No apparent distress noted.

## 2017-03-31 ENCOUNTER — INFUSION (OUTPATIENT)
Dept: INFUSION THERAPY | Facility: HOSPITAL | Age: 67
End: 2017-03-31
Attending: INTERNAL MEDICINE
Payer: COMMERCIAL

## 2017-03-31 VITALS
TEMPERATURE: 98 F | HEART RATE: 80 BPM | DIASTOLIC BLOOD PRESSURE: 67 MMHG | SYSTOLIC BLOOD PRESSURE: 150 MMHG | RESPIRATION RATE: 18 BRPM

## 2017-03-31 DIAGNOSIS — D70.1 CHEMOTHERAPY INDUCED NEUTROPENIA: Primary | ICD-10-CM

## 2017-03-31 DIAGNOSIS — Z51.12 ENCOUNTER FOR ANTINEOPLASTIC CHEMOTHERAPY AND IMMUNOTHERAPY: ICD-10-CM

## 2017-03-31 DIAGNOSIS — T45.1X5A CHEMOTHERAPY INDUCED NEUTROPENIA: Primary | ICD-10-CM

## 2017-03-31 DIAGNOSIS — Z51.11 ENCOUNTER FOR ANTINEOPLASTIC CHEMOTHERAPY AND IMMUNOTHERAPY: ICD-10-CM

## 2017-03-31 PROCEDURE — 25000003 PHARM REV CODE 250: Performed by: INTERNAL MEDICINE

## 2017-03-31 PROCEDURE — 63600175 PHARM REV CODE 636 W HCPCS: Performed by: INTERNAL MEDICINE

## 2017-03-31 PROCEDURE — 96377 APPLICATON ON-BODY INJECTOR: CPT

## 2017-03-31 PROCEDURE — 96360 HYDRATION IV INFUSION INIT: CPT

## 2017-03-31 RX ORDER — SODIUM CHLORIDE 0.9 % (FLUSH) 0.9 %
10 SYRINGE (ML) INJECTION
Status: DISCONTINUED | OUTPATIENT
Start: 2017-03-31 | End: 2017-03-31 | Stop reason: HOSPADM

## 2017-03-31 RX ORDER — HEPARIN 100 UNIT/ML
500 SYRINGE INTRAVENOUS
Status: DISCONTINUED | OUTPATIENT
Start: 2017-03-31 | End: 2017-03-31 | Stop reason: HOSPADM

## 2017-03-31 RX ADMIN — SODIUM CHLORIDE, PRESERVATIVE FREE 500 UNITS: 5 INJECTION INTRAVENOUS at 06:03

## 2017-03-31 RX ADMIN — SODIUM CHLORIDE, PRESERVATIVE FREE 10 ML: 5 INJECTION INTRAVENOUS at 06:03

## 2017-03-31 RX ADMIN — SODIUM CHLORIDE 1000 ML: 0.9 INJECTION, SOLUTION INTRAVENOUS at 05:03

## 2017-03-31 RX ADMIN — PEGFILGRASTIM 6 MG: KIT SUBCUTANEOUS at 05:03

## 2017-03-31 NOTE — MR AVS SNAPSHOT
Patient Information     Patient Name Sex Karthik Lopez Male 1950      Visit Information        Provider Department Dept Phone Center    3/31/2017 5:30 PM [facility], CHEMO [facility] Chemotherapy Infusion [phone] Ru Suárez      Patient Instructions     None      Your Current Medications Are     amlodipine (NORVASC) 5 MG tablet    ascorbic acid (VITAMIN C) 1000 MG tablet    atorvastatin (LIPITOR) 10 MG tablet    cholecalciferol, vitamin D3, (VITAMIN D3) 2,000 unit Cap    co-enzyme Q-10 30 mg capsule    diphenoxylate-atropine 2.5-0.025 mg (LOMOTIL) 2.5-0.025 mg per tablet    ferrous gluconate (FERGON) 324 MG tablet    furosemide (LASIX) 20 MG tablet    gabapentin (NEURONTIN) 300 MG capsule    GARLIC EXTRACT ORAL    glipiZIDE (GLUCOTROL) 5 MG tablet    lisinopril (PRINIVIL,ZESTRIL) 40 MG tablet    metoprolol succinate (TOPROL-XL) 100 MG 24 hr tablet    multivitamin with iron-mineral TbSR    NYSTATIN (DUKE'S SOLUTION)    ondansetron (ZOFRAN) 8 MG tablet    oxycodone-acetaminophen (PERCOCET) 5-325 mg per tablet    paregoric 2 mg/5 mL Liqd    tamsulosin (FLOMAX) 0.4 mg Cp24      Facility-Administered Medications     heparin, porcine (PF) 100 unit/mL injection flush 500 Units    pegfilgrastim (NEULASTA (ON BODY INJECTOR)) injection 6 mg    sodium chloride 0.9% bolus 1,000 mL    sodium chloride 0.9% flush 10 mL      Appointments for Next Year     2017  4:00 PM NON FASTING LAB (10 min.) [facility] LAB, [facility] CANCER BLDG    Arrive at check-in approximately 15 minutes before your scheduled appointment time. Bring all outside medical records and imaging, along with a list of your current medications and insurance card.    [facility] 3rd Floor    2017  4:10 PM URINE (15 min.) [facility] SPECIMEN, [facility] CANCER BLDG    Arrive at check-in approximately 15 minutes before your scheduled appointment time. Bring all outside medical records and imaging, along with a  list of your current medications and insurance card.    4/10/2017  2:30 PM ESTABLISHED PATIENT (30 min.) [facility] Hematology Oncology Nevaeh Dc NP    Arrive at check-in approximately 15 minutes before your scheduled appointment time. Bring all outside medical records and imaging, along with a list of your current medications and insurance card.    [facility] - 3rd Floor    4/11/2017  2:30 PM INFUSION 210 MIN (210 min.) [facility], CHEMO    Arrive at check-in approximately 15 minutes before your scheduled appointment time. Bring all outside medical records and imaging, along with a list of your current medications and insurance card.    [facility], 5th Floor    4/13/2017  5:30 PM INFUSION 060 MIN (60 min.) [facility], CHEMO    Arrive at check-in approximately 15 minutes before your scheduled appointment time. Bring all outside medical records and imaging, along with a list of your current medications and insurance card.    [facility], 5th Floor         Default Flowsheet Data (last 24 hours)      Amb Complex Vitals Gabriel        03/31/17 1700                Measurements    BP (!)  150/67        Temp 98 °F (36.7 °C)        Pulse 80        Resp 18        Pain Assessment    Pain Score Zero                Allergies     No Known Allergies      Medications You Received from 03/30/2017 1800 to 03/31/2017 1800        Date/Time Order Dose Route Action     03/31/2017 1721 pegfilgrastim (NEULASTA (ON BODY INJECTOR)) injection 6 mg 6 mg Subcutaneous Given     03/31/2017 1715 sodium chloride 0.9% bolus 1,000 mL 1,000 mL Intravenous New Bag      Current Discharge Medication List     Cannot display discharge medications since this is not an admission.

## 2017-03-31 NOTE — PLAN OF CARE
Problem: Patient Care Overview (Adult)  Goal: Plan of Care Review  Outcome: Ongoing (interventions implemented as appropriate)  Patient tolerated 1L IVF without complaints. Port flushed, heparin instilled and de-accessed. OBI applied to RUE, per patient request. Encouraged fluid intake. AVS provided to patient, future appointments reviewed. Discharged without s/s of adverse reaction. Instructed to call provider with any questions or concerns.

## 2017-04-07 ENCOUNTER — LAB VISIT (OUTPATIENT)
Dept: LAB | Facility: HOSPITAL | Age: 67
End: 2017-04-07
Attending: INTERNAL MEDICINE
Payer: COMMERCIAL

## 2017-04-07 DIAGNOSIS — C20 RECTAL CANCER: ICD-10-CM

## 2017-04-07 DIAGNOSIS — E55.9 VITAMIN D DEFICIENCY: ICD-10-CM

## 2017-04-07 LAB
25(OH)D3+25(OH)D2 SERPL-MCNC: 30 NG/ML
ALBUMIN SERPL BCP-MCNC: 3.3 G/DL
ALP SERPL-CCNC: 225 U/L
ALT SERPL W/O P-5'-P-CCNC: 13 U/L
ANION GAP SERPL CALC-SCNC: 12 MMOL/L
AST SERPL-CCNC: 23 U/L
BILIRUB SERPL-MCNC: 0.4 MG/DL
BUN SERPL-MCNC: 10 MG/DL
CALCIUM SERPL-MCNC: 8.9 MG/DL
CHLORIDE SERPL-SCNC: 103 MMOL/L
CO2 SERPL-SCNC: 26 MMOL/L
CREAT SERPL-MCNC: 1 MG/DL
CREAT UR-MCNC: 198 MG/DL
ERYTHROCYTE [DISTWIDTH] IN BLOOD BY AUTOMATED COUNT: 19 %
EST. GFR  (AFRICAN AMERICAN): >60 ML/MIN/1.73 M^2
EST. GFR  (NON AFRICAN AMERICAN): >60 ML/MIN/1.73 M^2
GLUCOSE SERPL-MCNC: 77 MG/DL
HCT VFR BLD AUTO: 36.6 %
HGB BLD-MCNC: 12.1 G/DL
MCH RBC QN AUTO: 30 PG
MCHC RBC AUTO-ENTMCNC: 33.1 %
MCV RBC AUTO: 91 FL
NEUTROPHILS # BLD AUTO: 11.7 K/UL
PLATELET # BLD AUTO: 113 K/UL
PMV BLD AUTO: 9.9 FL
POTASSIUM SERPL-SCNC: 3.6 MMOL/L
PROT SERPL-MCNC: 6.8 G/DL
PROT UR-MCNC: 299 MG/DL
PROT/CREAT RATIO, UR: 1.51
RBC # BLD AUTO: 4.03 M/UL
SODIUM SERPL-SCNC: 141 MMOL/L
WBC # BLD AUTO: 15.35 K/UL

## 2017-04-07 PROCEDURE — 82570 ASSAY OF URINE CREATININE: CPT

## 2017-04-07 PROCEDURE — 80053 COMPREHEN METABOLIC PANEL: CPT

## 2017-04-07 PROCEDURE — 85027 COMPLETE CBC AUTOMATED: CPT

## 2017-04-07 PROCEDURE — 82306 VITAMIN D 25 HYDROXY: CPT

## 2017-04-07 PROCEDURE — 36415 COLL VENOUS BLD VENIPUNCTURE: CPT

## 2017-04-10 ENCOUNTER — OFFICE VISIT (OUTPATIENT)
Dept: HEMATOLOGY/ONCOLOGY | Facility: CLINIC | Age: 67
End: 2017-04-10
Payer: COMMERCIAL

## 2017-04-10 VITALS
HEART RATE: 89 BPM | SYSTOLIC BLOOD PRESSURE: 132 MMHG | WEIGHT: 218.25 LBS | TEMPERATURE: 98 F | RESPIRATION RATE: 19 BRPM | BODY MASS INDEX: 35.08 KG/M2 | OXYGEN SATURATION: 97 % | DIASTOLIC BLOOD PRESSURE: 70 MMHG | HEIGHT: 66 IN

## 2017-04-10 DIAGNOSIS — C78.01 SECONDARY ADENOCARCINOMA OF LUNG, RIGHT: ICD-10-CM

## 2017-04-10 DIAGNOSIS — D70.1 CHEMOTHERAPY INDUCED NEUTROPENIA: ICD-10-CM

## 2017-04-10 DIAGNOSIS — E55.9 VITAMIN D DEFICIENCY: ICD-10-CM

## 2017-04-10 DIAGNOSIS — T45.1X5A CHEMOTHERAPY INDUCED NEUTROPENIA: ICD-10-CM

## 2017-04-10 DIAGNOSIS — E11.9 TYPE 2 DIABETES MELLITUS WITHOUT COMPLICATION, WITHOUT LONG-TERM CURRENT USE OF INSULIN: ICD-10-CM

## 2017-04-10 DIAGNOSIS — N13.30 HYDRONEPHROSIS, UNSPECIFIED HYDRONEPHROSIS TYPE: ICD-10-CM

## 2017-04-10 DIAGNOSIS — Z51.11 ENCOUNTER FOR ANTINEOPLASTIC CHEMOTHERAPY: ICD-10-CM

## 2017-04-10 DIAGNOSIS — R80.9 PROTEINURIA, UNSPECIFIED TYPE: ICD-10-CM

## 2017-04-10 DIAGNOSIS — C20 RECTAL CANCER: Primary | ICD-10-CM

## 2017-04-10 DIAGNOSIS — T45.1X5A CHEMOTHERAPY INDUCED DIARRHEA: ICD-10-CM

## 2017-04-10 DIAGNOSIS — K52.1 CHEMOTHERAPY INDUCED DIARRHEA: ICD-10-CM

## 2017-04-10 PROCEDURE — 1160F RVW MEDS BY RX/DR IN RCRD: CPT | Mod: S$GLB,,, | Performed by: NURSE PRACTITIONER

## 2017-04-10 PROCEDURE — 3046F HEMOGLOBIN A1C LEVEL >9.0%: CPT | Mod: S$GLB,,, | Performed by: NURSE PRACTITIONER

## 2017-04-10 PROCEDURE — 1157F ADVNC CARE PLAN IN RCRD: CPT | Mod: S$GLB,,, | Performed by: NURSE PRACTITIONER

## 2017-04-10 PROCEDURE — 99215 OFFICE O/P EST HI 40 MIN: CPT | Mod: S$GLB,,, | Performed by: NURSE PRACTITIONER

## 2017-04-10 PROCEDURE — 3075F SYST BP GE 130 - 139MM HG: CPT | Mod: S$GLB,,, | Performed by: NURSE PRACTITIONER

## 2017-04-10 PROCEDURE — 1159F MED LIST DOCD IN RCRD: CPT | Mod: S$GLB,,, | Performed by: NURSE PRACTITIONER

## 2017-04-10 PROCEDURE — 3078F DIAST BP <80 MM HG: CPT | Mod: S$GLB,,, | Performed by: NURSE PRACTITIONER

## 2017-04-10 PROCEDURE — 4010F ACE/ARB THERAPY RXD/TAKEN: CPT | Mod: S$GLB,,, | Performed by: NURSE PRACTITIONER

## 2017-04-10 PROCEDURE — 99999 PR PBB SHADOW E&M-EST. PATIENT-LVL V: CPT | Mod: PBBFAC,,, | Performed by: NURSE PRACTITIONER

## 2017-04-10 PROCEDURE — 1126F AMNT PAIN NOTED NONE PRSNT: CPT | Mod: S$GLB,,, | Performed by: NURSE PRACTITIONER

## 2017-04-10 RX ORDER — ATROPINE SULFATE 0.4 MG/ML
0.4 INJECTION, SOLUTION ENDOTRACHEAL; INTRAMEDULLARY; INTRAMUSCULAR; INTRAVENOUS; SUBCUTANEOUS DAILY PRN
Status: CANCELLED | OUTPATIENT
Start: 2017-04-17

## 2017-04-10 RX ORDER — HEPARIN 100 UNIT/ML
500 SYRINGE INTRAVENOUS
Status: CANCELLED | OUTPATIENT
Start: 2017-04-19

## 2017-04-10 RX ORDER — FLUOROURACIL 50 MG/ML
400 INJECTION, SOLUTION INTRAVENOUS
Status: CANCELLED | OUTPATIENT
Start: 2017-04-17

## 2017-04-10 RX ORDER — SODIUM CHLORIDE 0.9 % (FLUSH) 0.9 %
10 SYRINGE (ML) INJECTION
Status: CANCELLED | OUTPATIENT
Start: 2017-04-19

## 2017-04-10 NOTE — PROGRESS NOTES
"PATIENT: Karthik Medrano  MRN: [id number]  DATE: 4/10/2017    Subjective:     Chief complaint:  Chief Complaint   Patient presents with    Rectal Cancer    approx 1-2 loose stools per day       Oncologic History:  66 year old male with history of rectal cancer diagnosed 1/11/10 on colonoscopy (moderately differentiated adenocarcinoma) and based on imaging, was T3NxM0. Patient underwent Neoadjuvant chemort with infusional 5FU. This was completed 3/19/10 with a total of 50.4Gy given to the pelvis. He went to surgery 5/13/10. T3N0M0. Post operative he received no adjuvant treatment and has been followed regularly. He went to his PCP 6/24/13 who ordered a CXR which found pulmonary nodules. On 6/27/13, CT chest confirmed these findings. CEA drawn when initially diagnosed was 2.6ng/ml and 6/27/13 was 30.8 ng/ml. . Pulmonary biopsy was performed 7/18/13 and this confirmed metastatic adenocarcinoma. 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 Avastin was added for second cycle.    Patient completed 12 cycles of FOLFOX avastin. He did not receive Avastin for cycle 3, 4, or 5 (due to uncontrolled blood pressure).    He was on maintenance infusional 5FU and Avastin. Repeat scans 4/14/14 showed numerous bilateral pulmonary soft tissue nodules, the majority of which demonstrate interval enlargement when compared to the prior exam. CEA up 18.2 from 13.    He was restarted on FOLFIRI and AVastin and received 6 months of chemo and then was switched to maintenance chemotherapy with 5FU and Avastin. His CT scans from early May 2015 revealed progression in lung lesions.  He is now on FOLFIRI and Cyramza.  CT from 4/19/16 reveals "Multiple stable metastatic pulmonary nodules in this patient with history of rectal cancer.  Stable postoperative changes are present from prior partial colectomy.Interval increase in right-sided hydronephrosis despite appropriate positioning of a double-J ureteral sent.  There is " "associated enlargement and decreased perfusion to the right kidney consistent with obstruction. These findings are concerning for a nonfunctioning ureteral stent. Cholelithiasis."  CT from 6/13/16 reveal stable disease and hydronephrosis has resolved.  CT from 8/5/16 reveals "Stable pulmonary metastatic disease.Increased prominence of periureteral fat stranding/inflammatory change. Stable mild right hydronephrosis."  CT from 1/6/17 "1. Stable appearance of pulmonary metastatic disease in this patient with history of colon cancer. Pulmonary index lesions are stable in size compared to prior examination. No evidence of new metastatic disease. 2. Right-sided double-J ureteral stent with stable appearing inflammatory change about the right renal collecting system and right ureter. 3. Additional stable incidental findings as discussed above. "      CT scans reveal "In this patient with a history of rectal cancer, index lesions in the right and left lung are stable in size as detailed above. No new pulmonary nodules or masses are identified. There is no evidence of abnormal soft tissue opacity in the rectosigmoid region to suggest residual/recurrent disease. Interval increase in the degree of right hydronephrosis with a stable double-J right ureteral stent in place. Interval development of perihepatic ascites. Stable fat and bowel containing ventral hernia"     Interval History: Mr. Medrano returns for follow up and for FOLFIRI and Cyramza.  He continues to have diarrhea mainly after treatment. It is no worse than usual. He reports 1-2 loose stools a day and he takes lomotil.  He has occasional ankle swelling. He had his renal stent replaced on 4/5/17. He is urinating without difficulty. He denies any nausea, vomiting, constipation, abdominal pain, weight loss or loss of appetite, chest pain, shortness of breath, fatigue, pain, headache, dizziness, or mood changes. He is accompanied by his wife.         ECOG Performance " "Status:   ECOG SCORE    0 - Fully active-able to carry on all pre-disease performance without restriction          PMFSH: all information reviewed and updated as relevant to today's visit    Review of Systems:   Review of Systems   Constitutional: Negative for activity change, appetite change, fatigue and fever.   HENT: Negative for mouth sores, nosebleeds and sore throat.    Eyes: Negative for visual disturbance.   Respiratory: Negative for cough and shortness of breath.    Cardiovascular: Negative for chest pain, palpitations and leg swelling.   Gastrointestinal: Positive for diarrhea. Negative for abdominal pain, constipation, nausea and vomiting.   Genitourinary: Negative for difficulty urinating and frequency.   Musculoskeletal: Negative for arthralgias and back pain.   Skin: Negative for rash.   Neurological: Negative for dizziness, numbness and headaches.   Hematological: Negative for adenopathy. Does not bruise/bleed easily.   Psychiatric/Behavioral: Negative for confusion and sleep disturbance. The patient is not nervous/anxious.    All other systems reviewed and are negative.        Objective:      Vitals:   Vitals:    04/10/17 1433   BP: 132/70   Pulse: 89   Resp: 19   Temp: 98.1 °F (36.7 °C)   TempSrc: Oral   SpO2: 97%   Weight: 99 kg (218 lb 4.1 oz)   Height: 5' 6" (1.676 m)     BMI: Body mass index is 35.23 kg/(m^2).      Physical Exam:   Physical Exam   Constitutional: He is oriented to person, place, and time. He appears well-developed and well-nourished. No distress.   HENT:   Right Ear: External ear normal.   Left Ear: External ear normal.   Mouth/Throat: No oropharyngeal exudate.   Eyes: Conjunctivae and lids are normal. Pupils are equal, round, and reactive to light. No scleral icterus.   Neck: Trachea normal and normal range of motion. Neck supple. No thyromegaly present.   Cardiovascular: Normal rate, regular rhythm, normal heart sounds and normal pulses.    Pulmonary/Chest: Effort normal and " breath sounds normal.   Abdominal: Soft. Normal appearance and bowel sounds are normal. He exhibits no distension and no mass. There is no hepatosplenomegaly or splenomegaly. There is no tenderness.   Musculoskeletal: Normal range of motion. He exhibits edema (trace ankle edema).   Lymphadenopathy:        Head (right side): No submental and no submandibular adenopathy present.        Head (left side): No submental and no submandibular adenopathy present.     He has no cervical adenopathy.     He has no axillary adenopathy.        Right: No supraclavicular adenopathy present.        Left: No supraclavicular adenopathy present.   Neurological: He is alert and oriented to person, place, and time. He has normal reflexes. No sensory deficit.   Skin: Skin is warm, dry and intact. No bruising and no rash noted. Nails show no clubbing.   Psychiatric: He has a normal mood and affect. His speech is normal and behavior is normal. Cognition and memory are normal.   Vitals reviewed.        Laboratory Data:  WBC   Date Value Ref Range Status   04/07/2017 15.35 (H) 3.90 - 12.70 K/uL Final     Hemoglobin   Date Value Ref Range Status   04/07/2017 12.1 (L) 14.0 - 18.0 g/dL Final     Hematocrit   Date Value Ref Range Status   04/07/2017 36.6 (L) 40.0 - 54.0 % Final     Platelets   Date Value Ref Range Status   04/07/2017 113 (L) 150 - 350 K/uL Final     Gran #   Date Value Ref Range Status   04/07/2017 11.7 (H) 1.8 - 7.7 K/uL Final     Comment:     The ANC is based on a white cell differential from an   automated cell counter. It has not been microscopically   reviewed for the presence of abnormal cells. Clinical   correlation is required.         Chemistry        Component Value Date/Time     04/07/2017 1620    K 3.6 04/07/2017 1620     04/07/2017 1620    CO2 26 04/07/2017 1620    BUN 10 04/07/2017 1620    CREATININE 1.0 04/07/2017 1620    GLU 77 04/07/2017 1620        Component Value Date/Time    CALCIUM 8.9 04/07/2017  1620    ALKPHOS 225 (H) 04/07/2017 1620    AST 23 04/07/2017 1620    ALT 13 04/07/2017 1620    BILITOT 0.4 04/07/2017 1620        Vit D 30  Urine protein/creatinine ratio 1.51      Assessment/Plan:     1. Rectal cancer    2. Secondary adenocarcinoma of lung, right    3. Encounter for antineoplastic chemotherapy    4. Chemotherapy induced neutropenia    5. Proteinuria, unspecified type    6. Hydronephrosis, unspecified hydronephrosis type    7. Type 2 diabetes mellitus without complication, without long-term current use of insulin    8. Chemotherapy induced diarrhea    9. Vitamin D deficiency        Plan:    1,2,3,4,5,. Labs reviewed. Urine protein creatinine ratio 1.51. Increase hydration.  Platelets slightly low but adequate to proceed with chemo.   Plan to proceed with FOLFIRI and Cyramza as scheduled. Patient requesting next chemo to be in 3 weeks due to spouse being out of town. RTC to do labs on 5/1/17 - CBC, CMP, urine protein/creatinine ratio,  see Dr. Abernathy on 5/2/17,  and next cycle of chemo on 5/3/17 with  FOLFIRI and Cyramza. D/c pump/IVF day 3, Neulasta OBI on day 3.  6. Keep f/u with  Dr. Johnson, Urology. Patient underwent renal stent exchange  on 4/5/17.  7. Stable and he is compliant with meds.   8. Stable, continue Lomotil prn  9. Vit D level normal today. Continue oral supplement     Med and Orders:  Orders Placed This Encounter    CBC Oncology    Comprehensive metabolic panel    Protein / creatinine ratio, urine       Follow Up:  Return in about 3 weeks (around 5/2/2017).      More than 30 mins were spent during this encounter, greater than 50% was spent in direct counseling and/or coordination of care.   Patient was also seen and examined by Dr. Abernathy who agrees with above plan. Patient is in agreement with the proposed treatment plan. All questions were answered to the patient's satisfaction. Pt knows to call clinic if anything is needed before the next clinic visit.      Nevaeh Dc,  FNP-C  Hematology and Medical Oncology

## 2017-04-10 NOTE — LETTER
April 10, 2017      Lisa Abernathy MD  [address]  [facility] [address]           [facility] Hematology Oncology  [address]  [facility] [address]  Phone: [phone]          Patient: Karthik Medrano   MR Number: [id number]   YOB: 1950   Date of Visit: 4/10/2017       Dear Dr. Lisa Abernathy:    Thank you for referring Karthik Medrano to me for evaluation. Attached you will find relevant portions of my assessment and plan of care.    If you have questions, please do not hesitate to call me. I look forward to following Karthik Medrano along with you.    Sincerely,    Nevaeh Dc, NP    Enclosure  CC:  No Recipients    If you would like to receive this communication electronically, please contact [email or web address] or [phone] to request more information on [facility] [employer] access.    For providers and/or their staff who would like to refer a patient to [facility], please contact us through our one-stop-shop provider referral line, [facility], at [phone].    If you feel you have received this communication in error or would no longer like to receive these types of communications, please e-mail [email or web address]

## 2017-04-11 ENCOUNTER — INFUSION (OUTPATIENT)
Dept: INFUSION THERAPY | Facility: HOSPITAL | Age: 67
End: 2017-04-11
Attending: INTERNAL MEDICINE
Payer: COMMERCIAL

## 2017-04-11 VITALS
RESPIRATION RATE: 18 BRPM | DIASTOLIC BLOOD PRESSURE: 58 MMHG | TEMPERATURE: 99 F | SYSTOLIC BLOOD PRESSURE: 122 MMHG | HEART RATE: 87 BPM

## 2017-04-11 DIAGNOSIS — T45.1X5A CHEMOTHERAPY INDUCED NEUTROPENIA: Primary | ICD-10-CM

## 2017-04-11 DIAGNOSIS — Z51.11 ENCOUNTER FOR ANTINEOPLASTIC CHEMOTHERAPY AND IMMUNOTHERAPY: ICD-10-CM

## 2017-04-11 DIAGNOSIS — D70.1 CHEMOTHERAPY INDUCED NEUTROPENIA: Primary | ICD-10-CM

## 2017-04-11 DIAGNOSIS — Z51.12 ENCOUNTER FOR ANTINEOPLASTIC CHEMOTHERAPY AND IMMUNOTHERAPY: ICD-10-CM

## 2017-04-11 PROCEDURE — 96415 CHEMO IV INFUSION ADDL HR: CPT

## 2017-04-11 PROCEDURE — 63600175 PHARM REV CODE 636 W HCPCS: Performed by: INTERNAL MEDICINE

## 2017-04-11 PROCEDURE — 96411 CHEMO IV PUSH ADDL DRUG: CPT

## 2017-04-11 PROCEDURE — 96417 CHEMO IV INFUS EACH ADDL SEQ: CPT

## 2017-04-11 PROCEDURE — 96413 CHEMO IV INFUSION 1 HR: CPT

## 2017-04-11 PROCEDURE — 96368 THER/DIAG CONCURRENT INF: CPT

## 2017-04-11 PROCEDURE — 96367 TX/PROPH/DG ADDL SEQ IV INF: CPT

## 2017-04-11 PROCEDURE — 96416 CHEMO PROLONG INFUSE W/PUMP: CPT

## 2017-04-11 PROCEDURE — 25000003 PHARM REV CODE 250: Performed by: INTERNAL MEDICINE

## 2017-04-11 RX ORDER — ATROPINE SULFATE 0.4 MG/ML
0.4 INJECTION, SOLUTION ENDOTRACHEAL; INTRAMEDULLARY; INTRAMUSCULAR; INTRAVENOUS; SUBCUTANEOUS DAILY PRN
Status: DISCONTINUED | OUTPATIENT
Start: 2017-04-11 | End: 2017-04-11 | Stop reason: HOSPADM

## 2017-04-11 RX ORDER — FLUOROURACIL 50 MG/ML
400 INJECTION, SOLUTION INTRAVENOUS
Status: COMPLETED | OUTPATIENT
Start: 2017-04-11 | End: 2017-04-11

## 2017-04-11 RX ADMIN — SODIUM CHLORIDE 814 MG: 9 INJECTION, SOLUTION INTRAVENOUS at 03:04

## 2017-04-11 RX ADMIN — SODIUM CHLORIDE: 9 INJECTION, SOLUTION INTRAVENOUS at 02:04

## 2017-04-11 RX ADMIN — FLUOROURACIL 870 MG: 50 INJECTION, SOLUTION INTRAVENOUS at 06:04

## 2017-04-11 RX ADMIN — SODIUM CHLORIDE: 0.9 INJECTION, SOLUTION INTRAVENOUS at 02:04

## 2017-04-11 RX ADMIN — ATROPINE SULFATE 0.4 MG: 0.4 INJECTION, SOLUTION INTRAMUSCULAR; INTRAVENOUS; SUBCUTANEOUS at 04:04

## 2017-04-11 RX ADMIN — SODIUM CHLORIDE 392 MG: 0.9 INJECTION, SOLUTION INTRAVENOUS at 04:04

## 2017-04-11 RX ADMIN — DEXAMETHASONE SODIUM PHOSPHATE 0.25 MG: 4 INJECTION, SOLUTION INTRAMUSCULAR; INTRAVENOUS at 02:04

## 2017-04-11 RX ADMIN — LEUCOVORIN CALCIUM 45 MG: 350 INJECTION, POWDER, LYOPHILIZED, FOR SOLUTION INTRAMUSCULAR; INTRAVENOUS at 04:04

## 2017-04-11 RX ADMIN — FLUOROURACIL 5230 MG: 50 INJECTION, SOLUTION INTRAVENOUS at 06:04

## 2017-04-11 NOTE — PLAN OF CARE
Problem: Patient Care Overview (Adult)  Goal: Individualization & Mutuality  Outcome: Ongoing (interventions implemented as appropriate)  1510 --  Patient's labs, history, allergies, and medication reviewed.  Patient to receive Cyramza/FOLFIRI.  Discussed plan of care with patient.  Patient in agreement.  PAC accessed.  Good blood return noted.  Will monitor.

## 2017-04-11 NOTE — PLAN OF CARE
Problem: Patient Care Overview (Adult)  Goal: Plan of Care Review  Outcome: Ongoing (interventions implemented as appropriate)  1652 -- Patient tolerated treatment well.  Discharged without S/S of adverse effects.  AVS given.  Patient instructed to call provider with any questions or concerns.  CADD pump infusing.  All clamps open and connections secured. No kinks in tubing noted.  Instructed patient to monitor pump to make sure it says RUN.  Important phone numbers given regarding pump.  RTC on Thursday for pump dc.

## 2017-04-11 NOTE — MR AVS SNAPSHOT
Patient Information     Patient Name Sex Karthik Lopez Male 1950      Visit Information        Provider Department Dept Phone Center    2017 2:30 PM [facility], [facility] Chemotherapy Infusion [phone] Ru Suárez      Patient Instructions     None      Your Current Medications Are     amlodipine (NORVASC) 5 MG tablet    ascorbic acid (VITAMIN C) 1000 MG tablet    atorvastatin (LIPITOR) 10 MG tablet    cholecalciferol, vitamin D3, (VITAMIN D3) 2,000 unit Cap    co-enzyme Q-10 30 mg capsule    diphenoxylate-atropine 2.5-0.025 mg (LOMOTIL) 2.5-0.025 mg per tablet    ferrous gluconate (FERGON) 324 MG tablet    furosemide (LASIX) 20 MG tablet    gabapentin (NEURONTIN) 300 MG capsule    GARLIC EXTRACT ORAL    glipiZIDE (GLUCOTROL) 5 MG tablet    lisinopril (PRINIVIL,ZESTRIL) 40 MG tablet    metoprolol succinate (TOPROL-XL) 100 MG 24 hr tablet    multivitamin with iron-mineral TbSR    NYSTATIN (DUKE'S SOLUTION)    ondansetron (ZOFRAN) 8 MG tablet    oxycodone-acetaminophen (PERCOCET) 5-325 mg per tablet    paregoric 2 mg/5 mL Liqd    tamsulosin (FLOMAX) 0.4 mg Cp24      Facility-Administered Medications     atropine injection 0.4 mg    fluorouracil 2,400 mg/m2 = 5,230 mg in sodium chloride 0.9% 240 mL chemo infusion    fluorouracil injection 870 mg    irinotecan (CAMPTOSAR) 180 mg/m2 = 392 mg in sodium chloride 0.9% 500 mL chemo infusion    leucovorin calcium 20 mg/m2 = 45 mg in dextrose 5 % 100 mL infusion    palonosetron 0.25mg/dexamethasone 20mg IVPB    ramucirumab (CYRAMZA) 814 mg in sodium chloride 0.9% 250 mL chemo infusion    sodium chloride 0.9% 100 mL flush bag    sodium chloride 0.9% 250 mL flush bag    irinotecan (CAMPTOSAR) 180 mg/m2 = 392 mg in dextrose 5 % 500 mL chemo infusion (Discontinued)      Appointments for Next Year     2017  5:30 PM INFUSION 060 MIN (60 min.) [facility], CHEMO    Arrive at check-in approximately 15 minutes before your scheduled  appointment time. Bring all outside medical records and imaging, along with a list of your current medications and insurance card.    [facility], 5th Floor    5/1/2017  4:00 PM NON FASTING LAB (10 min.) [facility] LAB, [facility] CANCER DG    Arrive at check-in approximately 15 minutes before your scheduled appointment time. Bring all outside medical records and imaging, along with a list of your current medications and insurance card.    [address]    5/1/2017  4:10 PM URINE (15 min.) [facility] SPECIMEN, [facility] CANCER BLDG    Arrive at check-in approximately 15 minutes before your scheduled appointment time. Bring all outside medical records and imaging, along with a list of your current medications and insurance card.    5/2/2017  3:30 PM ESTABLISHED PATIENT (30 min.) [facility] Hematology Oncology Lisa Abernathy MD    Arrive at check-in approximately 15 minutes before your scheduled appointment time. Bring all outside medical records and imaging, along with a list of your current medications and insurance card.    [facility] - [address] [place] [address]    5/3/2017  2:30 PM INFUSION 210 MIN (210 min.) [facility], CHEMO    Arrive at check-in approximately 15 minutes before your scheduled appointment time. Bring all outside medical records and imaging, along with a list of your current medications and insurance card.    [facility], [address] [place] [address]    5/5/2017  5:00 PM INFUSION 060 MIN (60 min.) [facility], CHEMO    Arrive at check-in approximately 15 minutes before your scheduled appointment time. Bring all outside medical records and imaging, along with a list of your current medications and insurance card.    [facility], 5th [facility]         Default Flowsheet Data (last 24 hours)      Amb Complex Vitals Gabriel        04/11/17 1447                Measurements    BP (!)  122/58        Temp 98.9 °F  (37.2 °C)        Pulse 87        Resp 18        Pain Assessment    Pain Score Zero                Allergies     No Known Allergies      Medications You Received from 04/10/2017 1808 to 04/11/2017 1808        Date/Time Order Dose Route Action     04/11/2017 1637 atropine injection 0.4 mg 0.4 mg Intravenous Given     04/11/2017 1643 irinotecan (CAMPTOSAR) 180 mg/m2 = 392 mg in sodium chloride 0.9% 500 mL chemo infusion 392 mg Intravenous New Bag     04/11/2017 1643 leucovorin calcium 20 mg/m2 = 45 mg in dextrose 5 % 100 mL infusion 45 mg Intravenous New Bag     04/11/2017 1458 palonosetron 0.25mg/dexamethasone 20mg IVPB 0.25 mg Intravenous New Bag     04/11/2017 1532 ramucirumab (CYRAMZA) 814 mg in sodium chloride 0.9% 250 mL chemo infusion 814 mg Intravenous New Bag     04/11/2017 1458 sodium chloride 0.9% 250 mL flush bag   Intravenous New Bag      Current Discharge Medication List     Cannot display discharge medications since this is not an admission.

## 2017-04-13 ENCOUNTER — INFUSION (OUTPATIENT)
Dept: INFUSION THERAPY | Facility: HOSPITAL | Age: 67
End: 2017-04-13
Attending: INTERNAL MEDICINE
Payer: COMMERCIAL

## 2017-04-13 DIAGNOSIS — Z51.12 ENCOUNTER FOR ANTINEOPLASTIC CHEMOTHERAPY AND IMMUNOTHERAPY: ICD-10-CM

## 2017-04-13 DIAGNOSIS — D70.1 CHEMOTHERAPY INDUCED NEUTROPENIA: Primary | ICD-10-CM

## 2017-04-13 DIAGNOSIS — Z51.11 ENCOUNTER FOR ANTINEOPLASTIC CHEMOTHERAPY AND IMMUNOTHERAPY: ICD-10-CM

## 2017-04-13 DIAGNOSIS — T45.1X5A CHEMOTHERAPY INDUCED NEUTROPENIA: Primary | ICD-10-CM

## 2017-04-13 PROCEDURE — 96360 HYDRATION IV INFUSION INIT: CPT

## 2017-04-13 PROCEDURE — 63600175 PHARM REV CODE 636 W HCPCS: Performed by: INTERNAL MEDICINE

## 2017-04-13 PROCEDURE — 96377 APPLICATON ON-BODY INJECTOR: CPT

## 2017-04-13 PROCEDURE — 25000003 PHARM REV CODE 250: Performed by: INTERNAL MEDICINE

## 2017-04-13 RX ORDER — SODIUM CHLORIDE 0.9 % (FLUSH) 0.9 %
10 SYRINGE (ML) INJECTION
Status: DISCONTINUED | OUTPATIENT
Start: 2017-04-13 | End: 2017-04-13 | Stop reason: HOSPADM

## 2017-04-13 RX ORDER — HEPARIN 100 UNIT/ML
500 SYRINGE INTRAVENOUS
Status: DISCONTINUED | OUTPATIENT
Start: 2017-04-13 | End: 2017-04-13 | Stop reason: HOSPADM

## 2017-04-13 RX ADMIN — HEPARIN 500 UNITS: 100 SYRINGE at 05:04

## 2017-04-13 RX ADMIN — SODIUM CHLORIDE 1000 ML: 0.9 INJECTION, SOLUTION INTRAVENOUS at 04:04

## 2017-04-13 RX ADMIN — PEGFILGRASTIM 6 MG: KIT SUBCUTANEOUS at 05:04

## 2017-04-13 RX ADMIN — SODIUM CHLORIDE, PRESERVATIVE FREE 10 ML: 5 INJECTION INTRAVENOUS at 05:04

## 2017-04-13 NOTE — PATIENT INSTRUCTIONS
"  How to Control Nausea and Vomiting     Taken before meals, medicines can help ease nausea.    Nausea is feeling that you need to throw up. Throwing up occurs when your body forces food that is in your stomach out through your mouth. Nausea and vomiting are symptoms that are caused by many things. They can happen when a condition or disease, medicine, medical treatment, or a poisonous substance affects the area in your brain that controls vomiting. Some conditions or diseases can cause nausea, abdominal pain or cramps, and vomiting. The symptoms can be mild and go away by themselves. Other symptoms can be serious. You will need to see your healthcare provider for these.  Nausea and vomiting are common. They can be caused by many things. These include:  · "Stomach flu" (gastroenteritis)  · Food poisoning  · Stomach pain (gastritis)  · Blockages  They can also be caused by a head injury, an infection in the brain or inside the ear, or migraines. Other common causes of nausea and vomiting include:  · Brain tumor  · Brain bruise  · Motion sickness  · Drugs. These include alcohol, pain medicines such as morphine, and cancer medicines.  · Toxins. These are poisonous things like plants or liquids that are swallowed by accident.  · Advanced types of cancer  · Movement problems (psychogenic problems)  · Extra pressure in the fluid that surrounds the brain and spinal cord (elevated intracranial pressure)     Nausea and vomiting are also common side effects of chemotherapy and radiation therapy. Side effects happen when treatment changes some normal cells as well as cancer cells. In this case, the cells lining your stomach and the part of your brain that controls vomiting are affected. Other more serious causes of vomiting may be hard to find early in the illness.     When to seek medical advice  Call your healthcare provider right away if you have the following:  · Nausea or vomiting that lasts 24 hours or more  · Trouble " keeping fluids down   Medicines can help  Nausea or vomiting can often be prevented or controlled with medicines (antiemetics). Your doctor may give you antiemetics before or after treatment if you are getting chemotherapy or other medical treatments that cause nausea or vomiting.  Eating tips  · If you have medicines to control nausea, take them before meals as directed.  · Avoid fatty or greasy foods while nauseated.  · Eat small meals slowly throughout the day.  · Ask someone to sit with you while you eat to keep you from thinking about feeling nauseated.  · Eat foods at room temperature or colder to avoid strong smells.  · Eat dry foods, such as toast, crackers, or pretzels. Also eat cool, light foods, such as applesauce, and bland foods, such as oatmeal or skinned chicken.   Other ways to feel better  · Get a little fresh air. Take a short walk.  · Talk to a friend, listen to music, or watch TV.  · Take a few deep, slow breaths.  · Eat by candlelight or in surroundings that you find relaxing.  · Use a technique, such as guided imagery, to help you relax. Imagine yourself in a beautiful, restful scene. Or daydream about the place youd most like to be.  Date Last Reviewed: 1/6/2016  © 7244-4958 [employer] [address] [place] [address], [place] [address]. All rights reserved. This information is not intended as a substitute for professional medical care. Always follow your healthcare professional's instructions.

## 2017-04-19 DIAGNOSIS — I10 ESSENTIAL HYPERTENSION: ICD-10-CM

## 2017-04-19 RX ORDER — LISINOPRIL 40 MG/1
TABLET ORAL
Qty: 180 TABLET | Refills: 0 | Status: SHIPPED | OUTPATIENT
Start: 2017-04-19 | End: 2017-06-13 | Stop reason: SDUPTHER

## 2017-05-01 ENCOUNTER — LAB VISIT (OUTPATIENT)
Dept: LAB | Facility: HOSPITAL | Age: 67
End: 2017-05-01
Attending: INTERNAL MEDICINE
Payer: COMMERCIAL

## 2017-05-01 DIAGNOSIS — C20 RECTAL CANCER: ICD-10-CM

## 2017-05-01 LAB
ALBUMIN SERPL BCP-MCNC: 3 G/DL
ALP SERPL-CCNC: 195 U/L
ALT SERPL W/O P-5'-P-CCNC: 21 U/L
ANION GAP SERPL CALC-SCNC: 7 MMOL/L
AST SERPL-CCNC: 28 U/L
BILIRUB SERPL-MCNC: 0.4 MG/DL
BUN SERPL-MCNC: 19 MG/DL
CALCIUM SERPL-MCNC: 9 MG/DL
CHLORIDE SERPL-SCNC: 108 MMOL/L
CO2 SERPL-SCNC: 25 MMOL/L
CREAT SERPL-MCNC: 1.2 MG/DL
CREAT UR-MCNC: 120 MG/DL
ERYTHROCYTE [DISTWIDTH] IN BLOOD BY AUTOMATED COUNT: 19 %
EST. GFR  (AFRICAN AMERICAN): >60 ML/MIN/1.73 M^2
EST. GFR  (NON AFRICAN AMERICAN): >60 ML/MIN/1.73 M^2
GLUCOSE SERPL-MCNC: 106 MG/DL
HCT VFR BLD AUTO: 38.3 %
HGB BLD-MCNC: 11.9 G/DL
MCH RBC QN AUTO: 28.9 PG
MCHC RBC AUTO-ENTMCNC: 31.1 %
MCV RBC AUTO: 93 FL
NEUTROPHILS # BLD AUTO: 7.5 K/UL
PLATELET # BLD AUTO: 122 K/UL
PMV BLD AUTO: 9.3 FL
POTASSIUM SERPL-SCNC: 3.8 MMOL/L
PROT SERPL-MCNC: 6.5 G/DL
PROT UR-MCNC: 746 MG/DL
PROT/CREAT RATIO, UR: 6.22
RBC # BLD AUTO: 4.12 M/UL
SODIUM SERPL-SCNC: 140 MMOL/L
WBC # BLD AUTO: 10.24 K/UL

## 2017-05-01 PROCEDURE — 36415 COLL VENOUS BLD VENIPUNCTURE: CPT

## 2017-05-01 PROCEDURE — 85027 COMPLETE CBC AUTOMATED: CPT

## 2017-05-01 PROCEDURE — 84156 ASSAY OF PROTEIN URINE: CPT

## 2017-05-01 PROCEDURE — 80053 COMPREHEN METABOLIC PANEL: CPT

## 2017-05-02 ENCOUNTER — OFFICE VISIT (OUTPATIENT)
Dept: HEMATOLOGY/ONCOLOGY | Facility: CLINIC | Age: 67
End: 2017-05-02
Payer: COMMERCIAL

## 2017-05-02 VITALS
TEMPERATURE: 99 F | DIASTOLIC BLOOD PRESSURE: 58 MMHG | HEIGHT: 66 IN | WEIGHT: 219.38 LBS | BODY MASS INDEX: 35.26 KG/M2 | RESPIRATION RATE: 19 BRPM | HEART RATE: 74 BPM | SYSTOLIC BLOOD PRESSURE: 119 MMHG | OXYGEN SATURATION: 97 %

## 2017-05-02 DIAGNOSIS — E11.9 TYPE 2 DIABETES MELLITUS WITHOUT COMPLICATION, WITHOUT LONG-TERM CURRENT USE OF INSULIN: ICD-10-CM

## 2017-05-02 DIAGNOSIS — C78.01 SECONDARY ADENOCARCINOMA OF LUNG, RIGHT: ICD-10-CM

## 2017-05-02 DIAGNOSIS — Z51.11 ENCOUNTER FOR ANTINEOPLASTIC CHEMOTHERAPY: ICD-10-CM

## 2017-05-02 DIAGNOSIS — C20 RECTAL CANCER: Primary | ICD-10-CM

## 2017-05-02 DIAGNOSIS — D70.1 CHEMOTHERAPY INDUCED NEUTROPENIA: ICD-10-CM

## 2017-05-02 DIAGNOSIS — T45.1X5A CHEMOTHERAPY INDUCED NEUTROPENIA: ICD-10-CM

## 2017-05-02 PROCEDURE — 1126F AMNT PAIN NOTED NONE PRSNT: CPT | Mod: S$GLB,,, | Performed by: INTERNAL MEDICINE

## 2017-05-02 PROCEDURE — 99999 PR PBB SHADOW E&M-EST. PATIENT-LVL V: CPT | Mod: PBBFAC,,, | Performed by: INTERNAL MEDICINE

## 2017-05-02 PROCEDURE — 99215 OFFICE O/P EST HI 40 MIN: CPT | Mod: S$GLB,,, | Performed by: INTERNAL MEDICINE

## 2017-05-02 PROCEDURE — 1159F MED LIST DOCD IN RCRD: CPT | Mod: S$GLB,,, | Performed by: INTERNAL MEDICINE

## 2017-05-02 PROCEDURE — 3078F DIAST BP <80 MM HG: CPT | Mod: S$GLB,,, | Performed by: INTERNAL MEDICINE

## 2017-05-02 PROCEDURE — 3074F SYST BP LT 130 MM HG: CPT | Mod: S$GLB,,, | Performed by: INTERNAL MEDICINE

## 2017-05-02 PROCEDURE — 1160F RVW MEDS BY RX/DR IN RCRD: CPT | Mod: S$GLB,,, | Performed by: INTERNAL MEDICINE

## 2017-05-02 PROCEDURE — 4010F ACE/ARB THERAPY RXD/TAKEN: CPT | Mod: S$GLB,,, | Performed by: INTERNAL MEDICINE

## 2017-05-02 NOTE — PROGRESS NOTES
"Subjective:       Patient ID: Karthik Medrano is a 66 y.o. male.    Chief Complaint: Rectal Cancer and Nausea  Oncologic History:  66 year old male with history of rectal cancer diagnosed 1/11/10 on colonoscopy (moderately differentiated adenocarcinoma) and based on imaging, was T3NxM0. Patient underwent Neoadjuvant chemort with infusional 5FU. This was completed 3/19/10 with a total of 50.4Gy given to the pelvis. He went to surgery 5/13/10. T3N0M0. Post operative he received no adjuvant treatment and has been followed regularly. He went to his PCP 6/24/13 who ordered a CXR which found pulmonary nodules. On 6/27/13, CT chest confirmed these findings. CEA drawn when initially diagnosed was 2.6ng/ml and 6/27/13 was 30.8 ng/ml. . Pulmonary biopsy was performed 7/18/13 and this confirmed metastatic adenocarcinoma. 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 Avastin was added for second cycle.    Patient completed 12 cycles of FOLFOX avastin. He did not receive Avastin for cycle 3, 4, or 5 (due to uncontrolled blood pressure).    He was on maintenance infusional 5FU and Avastin. Repeat scans 4/14/14 showed numerous bilateral pulmonary soft tissue nodules, the majority of which demonstrate interval enlargement when compared to the prior exam. CEA up 18.2 from 13.    He was restarted on FOLFIRI and AVastin and received 6 months of chemo and then was switched to maintenance chemotherapy with 5FU and Avastin. His CT scans from early May 2015 revealed progression in lung lesions.  He is now on FOLFIRI and Cyramza.  CT from 4/19/16 reveals "Multiple stable metastatic pulmonary nodules in this patient with history of rectal cancer.  Stable postoperative changes are present from prior partial colectomy.Interval increase in right-sided hydronephrosis despite appropriate positioning of a double-J ureteral sent.  There is associated enlargement and decreased perfusion to the right kidney consistent " "with obstruction. These findings are concerning for a nonfunctioning ureteral stent. Cholelithiasis."  CT from 6/13/16 reveal stable disease and hydronephrosis has resolved.  CT from 8/5/16 reveals "Stable pulmonary metastatic disease.Increased prominence of periureteral fat stranding/inflammatory change. Stable mild right hydronephrosis."  CT from 1/6/17 "1. Stable appearance of pulmonary metastatic disease in this patient with history of colon cancer. Pulmonary index lesions are stable in size compared to prior examination. No evidence of new metastatic disease. 2. Right-sided double-J ureteral stent with stable appearing inflammatory change about the right renal collecting system and right ureter. 3. Additional stable incidental findings as discussed above. "      CT scans reveal "In this patient with a history of rectal cancer, index lesions in the right and left lung are stable in size as detailed above. No new pulmonary nodules or masses are identified. There is no evidence of abnormal soft tissue opacity in the rectosigmoid region to suggest residual/recurrent disease. Interval increase in the degree of right hydronephrosis with a stable double-J right ureteral stent in place. Interval development of perihepatic ascites. Stable fat and bowel containing ventral hernia"        HPI Mr. Medrano returns for follow up and for FOLFIRI and Cyramza.  He feels well and denies any new complaints.    Review of Systems   Constitutional: Negative for appetite change, fatigue and unexpected weight change.   HENT: Negative for mouth sores.    Eyes: Negative for visual disturbance.   Respiratory: Negative for cough and shortness of breath.    Cardiovascular: Negative for chest pain.   Gastrointestinal: Negative for abdominal pain and diarrhea.   Genitourinary: Negative for frequency.   Musculoskeletal: Negative for back pain.   Skin: Negative for rash.   Neurological: Negative for headaches.   Hematological: Negative for " adenopathy.   Psychiatric/Behavioral: The patient is not nervous/anxious.    All other systems reviewed and are negative.      PMFSH: all information reviewed and updated as relevant to today's visit  Objective:      Physical Exam   Constitutional: He is oriented to person, place, and time. He appears well-developed and well-nourished.   HENT:   Mouth/Throat: No oropharyngeal exudate.   Cardiovascular: Normal rate and normal heart sounds.    Pulmonary/Chest: Effort normal and breath sounds normal. He has no wheezes.   Abdominal: Soft. Bowel sounds are normal. There is no tenderness.   Musculoskeletal: He exhibits no edema or tenderness.   Lymphadenopathy:     He has no cervical adenopathy.   Neurological: He is alert and oriented to person, place, and time. Coordination normal.   Skin: Skin is warm and dry. No rash noted.   Psychiatric: He has a normal mood and affect. Judgment and thought content normal.   Vitals reviewed.        LABS:  WBC   Date Value Ref Range Status   05/01/2017 10.24 3.90 - 12.70 K/uL Final     Hemoglobin   Date Value Ref Range Status   05/01/2017 11.9 (L) 14.0 - 18.0 g/dL Final     Hematocrit   Date Value Ref Range Status   05/01/2017 38.3 (L) 40.0 - 54.0 % Final     Platelets   Date Value Ref Range Status   05/01/2017 122 (L) 150 - 350 K/uL Final     Gran #   Date Value Ref Range Status   05/01/2017 7.5 1.8 - 7.7 K/uL Final     Comment:     The ANC is based on a white cell differential from an   automated cell counter. It has not been microscopically   reviewed for the presence of abnormal cells. Clinical   correlation is required.         Chemistry        Component Value Date/Time     05/01/2017 1640    K 3.8 05/01/2017 1640     05/01/2017 1640    CO2 25 05/01/2017 1640    BUN 19 05/01/2017 1640    CREATININE 1.2 05/01/2017 1640     05/01/2017 1640        Component Value Date/Time    CALCIUM 9.0 05/01/2017 1640    ALKPHOS 195 (H) 05/01/2017 1640    AST 28 05/01/2017 1640     ALT 21 05/01/2017 1640    BILITOT 0.4 05/01/2017 1640        Urine protein/creatinine ratio: 6  Assessment:       1. Rectal cancer    2. Secondary adenocarcinoma of lung, right    3. Encounter for antineoplastic chemotherapy    4. Chemotherapy induced neutropenia    5. Type 2 diabetes mellitus without complication, without long-term current use of insulin        Plan:        1,2,3,4. Repeat urine protein/creatine ratio in AM, if less than 3 then will proceed with FOLFIRI and Cyramza tomorrow. He will return in 2 weeks for next cycle of chemo with restaging scans. Also will receive neulasta   5. Stable on Glipizide.    Above care plan was discussed with patient and accompanying wife and all questions were addressed to their satisfaction        ADDENDUM:  Repeat urine protein/creatine ratio is 0.31 so he will proceed with FOLFIRI and Cyramza today

## 2017-05-02 NOTE — Clinical Note
Schedule CBC,CMp, urine protein/creatinine ratio and CEA, CT chest, abdomen/pelvis and see me in 2 weeks and for FOLFIRI and Cyramza. DC pump on day 3. Neulasta on day 3. Normal saline 1 liter on day 3

## 2017-05-03 ENCOUNTER — LAB VISIT (OUTPATIENT)
Dept: LAB | Facility: HOSPITAL | Age: 67
End: 2017-05-03
Attending: INTERNAL MEDICINE
Payer: COMMERCIAL

## 2017-05-03 ENCOUNTER — INFUSION (OUTPATIENT)
Dept: INFUSION THERAPY | Facility: HOSPITAL | Age: 67
End: 2017-05-03
Attending: INTERNAL MEDICINE
Payer: COMMERCIAL

## 2017-05-03 VITALS
RESPIRATION RATE: 18 BRPM | TEMPERATURE: 99 F | HEART RATE: 75 BPM | DIASTOLIC BLOOD PRESSURE: 68 MMHG | SYSTOLIC BLOOD PRESSURE: 142 MMHG

## 2017-05-03 DIAGNOSIS — T45.1X5A CHEMOTHERAPY INDUCED NEUTROPENIA: Primary | ICD-10-CM

## 2017-05-03 DIAGNOSIS — Z51.11 ENCOUNTER FOR ANTINEOPLASTIC CHEMOTHERAPY AND IMMUNOTHERAPY: ICD-10-CM

## 2017-05-03 DIAGNOSIS — D70.1 CHEMOTHERAPY INDUCED NEUTROPENIA: Primary | ICD-10-CM

## 2017-05-03 DIAGNOSIS — C20 RECTAL CANCER: ICD-10-CM

## 2017-05-03 DIAGNOSIS — Z51.12 ENCOUNTER FOR ANTINEOPLASTIC CHEMOTHERAPY AND IMMUNOTHERAPY: ICD-10-CM

## 2017-05-03 LAB
CREAT UR-MCNC: 189 MG/DL
PROT UR-MCNC: 58 MG/DL
PROT/CREAT RATIO, UR: 0.31

## 2017-05-03 PROCEDURE — 96415 CHEMO IV INFUSION ADDL HR: CPT

## 2017-05-03 PROCEDURE — 96416 CHEMO PROLONG INFUSE W/PUMP: CPT

## 2017-05-03 PROCEDURE — 96411 CHEMO IV PUSH ADDL DRUG: CPT

## 2017-05-03 PROCEDURE — 63600175 PHARM REV CODE 636 W HCPCS: Performed by: INTERNAL MEDICINE

## 2017-05-03 PROCEDURE — 25000003 PHARM REV CODE 250: Performed by: INTERNAL MEDICINE

## 2017-05-03 PROCEDURE — 96375 TX/PRO/DX INJ NEW DRUG ADDON: CPT

## 2017-05-03 PROCEDURE — 96413 CHEMO IV INFUSION 1 HR: CPT

## 2017-05-03 PROCEDURE — 96367 TX/PROPH/DG ADDL SEQ IV INF: CPT

## 2017-05-03 PROCEDURE — 84156 ASSAY OF PROTEIN URINE: CPT

## 2017-05-03 PROCEDURE — 96368 THER/DIAG CONCURRENT INF: CPT

## 2017-05-03 PROCEDURE — 96417 CHEMO IV INFUS EACH ADDL SEQ: CPT

## 2017-05-03 RX ORDER — ATROPINE SULFATE 0.4 MG/ML
0.4 INJECTION, SOLUTION ENDOTRACHEAL; INTRAMEDULLARY; INTRAMUSCULAR; INTRAVENOUS; SUBCUTANEOUS DAILY PRN
Status: DISCONTINUED | OUTPATIENT
Start: 2017-05-03 | End: 2017-05-03 | Stop reason: HOSPADM

## 2017-05-03 RX ORDER — FLUOROURACIL 50 MG/ML
400 INJECTION, SOLUTION INTRAVENOUS
Status: CANCELLED | OUTPATIENT
Start: 2017-05-03

## 2017-05-03 RX ORDER — SODIUM CHLORIDE 0.9 % (FLUSH) 0.9 %
10 SYRINGE (ML) INJECTION
Status: CANCELLED | OUTPATIENT
Start: 2017-05-05

## 2017-05-03 RX ORDER — FLUOROURACIL 50 MG/ML
400 INJECTION, SOLUTION INTRAVENOUS
Status: COMPLETED | OUTPATIENT
Start: 2017-05-03 | End: 2017-05-03

## 2017-05-03 RX ORDER — ATROPINE SULFATE 0.4 MG/ML
0.4 INJECTION, SOLUTION ENDOTRACHEAL; INTRAMEDULLARY; INTRAMUSCULAR; INTRAVENOUS; SUBCUTANEOUS DAILY PRN
Status: CANCELLED | OUTPATIENT
Start: 2017-05-03

## 2017-05-03 RX ORDER — HEPARIN 100 UNIT/ML
500 SYRINGE INTRAVENOUS
Status: CANCELLED | OUTPATIENT
Start: 2017-05-05

## 2017-05-03 RX ADMIN — FLUOROURACIL 870 MG: 50 INJECTION, SOLUTION INTRAVENOUS at 06:05

## 2017-05-03 RX ADMIN — SODIUM CHLORIDE: 0.9 INJECTION, SOLUTION INTRAVENOUS at 04:05

## 2017-05-03 RX ADMIN — FLUOROURACIL 5230 MG: 50 INJECTION, SOLUTION INTRAVENOUS at 06:05

## 2017-05-03 RX ADMIN — LEUCOVORIN CALCIUM 45 MG: 350 INJECTION, POWDER, LYOPHILIZED, FOR SOLUTION INTRAMUSCULAR; INTRAVENOUS at 04:05

## 2017-05-03 RX ADMIN — DEXAMETHASONE SODIUM PHOSPHATE 0.25 MG: 4 INJECTION, SOLUTION INTRAMUSCULAR; INTRAVENOUS at 03:05

## 2017-05-03 RX ADMIN — ATROPINE SULFATE 0.4 MG: 0.4 INJECTION, SOLUTION INTRAMUSCULAR; INTRAVENOUS; SUBCUTANEOUS at 04:05

## 2017-05-03 RX ADMIN — SODIUM CHLORIDE 392 MG: 0.9 INJECTION, SOLUTION INTRAVENOUS at 04:05

## 2017-05-03 RX ADMIN — SODIUM CHLORIDE 814 MG: 9 INJECTION, SOLUTION INTRAVENOUS at 03:05

## 2017-05-03 RX ADMIN — SODIUM CHLORIDE: 9 INJECTION, SOLUTION INTRAVENOUS at 03:05

## 2017-05-03 NOTE — PLAN OF CARE
Problem: Patient Care Overview (Adult)  Goal: Individualization & Mutuality  Outcome: Ongoing (interventions implemented as appropriate)  1520 --  Patient's labs, history, allergies, and medication reviewed.  Patient to receive Cryamza/Folfiri.  Discussed plan of care with patient.  Patient in agreement.

## 2017-05-03 NOTE — PLAN OF CARE
Problem: Patient Care Overview (Adult)  Goal: Plan of Care Review  Outcome: Ongoing (interventions implemented as appropriate)  Pt. Tolerated treatment without any complications. V/S stable. Placed on ambulatory CAD pump. Port site infusing without difficulty and secured. Pt. Reminded check pump at least twice a day to make sure it was running properly. No questions or concerns at this time.

## 2017-05-03 NOTE — MR AVS SNAPSHOT
Patient Information     Patient Name Sex Karthik Lopez Male 1950      Visit Information        Provider Department Dept Phone Center    5/3/2017 2:30 PM [facility], CHEMO [facility] [phone] Ru Suárez      Patient Instructions     None      Your Current Medications Are     amlodipine (NORVASC) 5 MG tablet    ascorbic acid (VITAMIN C) 1000 MG tablet    atorvastatin (LIPITOR) 10 MG tablet    cholecalciferol, vitamin D3, (VITAMIN D3) 2,000 unit Cap    co-enzyme Q-10 30 mg capsule    diphenoxylate-atropine 2.5-0.025 mg (LOMOTIL) 2.5-0.025 mg per tablet    ferrous gluconate (FERGON) 324 MG tablet    furosemide (LASIX) 20 MG tablet    gabapentin (NEURONTIN) 300 MG capsule    GARLIC EXTRACT ORAL    glipiZIDE (GLUCOTROL) 5 MG tablet    lisinopril (PRINIVIL,ZESTRIL) 40 MG tablet    lisinopril (PRINIVIL,ZESTRIL) 40 MG tablet    metoprolol succinate (TOPROL-XL) 100 MG 24 hr tablet    multivitamin with iron-mineral TbSR    NYSTATIN (DUKE'S SOLUTION)    ondansetron (ZOFRAN) 8 MG tablet    oxycodone-acetaminophen (PERCOCET) 5-325 mg per tablet    paregoric 2 mg/5 mL Liqd    tamsulosin (FLOMAX) 0.4 mg Cp24      Facility-Administered Medications     atropine injection 0.4 mg    fluorouracil 2,400 mg/m2 = 5,230 mg in sodium chloride 0.9% 240 mL chemo infusion    fluorouracil injection 870 mg    irinotecan (CAMPTOSAR) 180 mg/m2 = 392 mg in sodium chloride 0.9% 500 mL chemo infusion    leucovorin calcium 20 mg/m2 = 45 mg in dextrose 5 % 100 mL infusion    palonosetron 0.25mg/dexamethasone 20mg IVPB    ramucirumab (CYRAMZA) 814 mg in sodium chloride 0.9% 250 mL chemo infusion    sodium chloride 0.9% 100 mL flush bag    sodium chloride 0.9% 250 mL flush bag      Appointments for Next Year     2017  5:00 PM INFUSION 060 MIN (60 min.) [facility], CHEMO    Arrive at check-in approximately 15 minutes before your scheduled appointment time. Bring all outside medical records and  imaging, along with a list of your current medications and insurance card.    [facility], 5th Floor    5/15/2017  5:00 PM CT ABD PEL W CONTRAST (15 min.) [facility] CT1 64- LIMIT 450 LBS    You must  fast four (4) hours prior to your appointment. Arrive 2 hours early for your appointment to drink the exam prep.    2nd floor    5/16/2017 12:00 PM ESTABLISHED PATIENT (30 min.) [facility] Lisa Abernathy MD    Arrive at check-in approximately 15 minutes before your scheduled appointment time. Bring all outside medical records and imaging, along with a list of your current medications and insurance card.    [facility] - 3rd Floor    5/17/2017  2:30 PM INFUSION 210 MIN (210 min.) [facility], CHEMO    Arrive at check-in approximately 15 minutes before your scheduled appointment time. Bring all outside medical records and imaging, along with a list of your current medications and insurance card.    [facility], 5th Floor    5/19/2017  5:00 PM INFUSION 060 MIN (60 min.) [facility], CHEMO    Arrive at check-in approximately 15 minutes before your scheduled appointment time. Bring all outside medical records and imaging, along with a list of your current medications and insurance card.    [facility], 5th Floor         Default Flowsheet Data (last 24 hours)      Amb Complex Vitals Gabriel        05/03/17 1525                Measurements    BP (!)  142/68        Temp 98.6 °F (37 °C)        Pulse 75        Resp 18        Pain Assessment    Pain Score Zero                Allergies     No Known Allergies      Medications You Received from 05/02/2017 1639 to 05/03/2017 1639        Date/Time Order Dose Route Action     05/03/2017 1520 palonosetron 0.25mg/dexamethasone 20mg IVPB 0.25 mg Intravenous New Bag     05/03/2017 1551 ramucirumab (CYRAMZA) 814 mg in sodium chloride 0.9% 250 mL chemo infusion 814  mg Intravenous New Bag     05/03/2017 1520 sodium chloride 0.9% 100 mL flush bag   Intravenous New Bag      Current Discharge Medication List     Cannot display discharge medications since this is not an admission.

## 2017-05-05 ENCOUNTER — INFUSION (OUTPATIENT)
Dept: INFUSION THERAPY | Facility: HOSPITAL | Age: 67
End: 2017-05-05
Attending: INTERNAL MEDICINE
Payer: COMMERCIAL

## 2017-05-05 VITALS
TEMPERATURE: 99 F | HEART RATE: 73 BPM | SYSTOLIC BLOOD PRESSURE: 130 MMHG | RESPIRATION RATE: 17 BRPM | DIASTOLIC BLOOD PRESSURE: 84 MMHG

## 2017-05-05 DIAGNOSIS — Z51.11 ENCOUNTER FOR ANTINEOPLASTIC CHEMOTHERAPY AND IMMUNOTHERAPY: ICD-10-CM

## 2017-05-05 DIAGNOSIS — D70.1 CHEMOTHERAPY INDUCED NEUTROPENIA: Primary | ICD-10-CM

## 2017-05-05 DIAGNOSIS — T45.1X5A CHEMOTHERAPY INDUCED NEUTROPENIA: Primary | ICD-10-CM

## 2017-05-05 DIAGNOSIS — Z51.12 ENCOUNTER FOR ANTINEOPLASTIC CHEMOTHERAPY AND IMMUNOTHERAPY: ICD-10-CM

## 2017-05-05 PROCEDURE — 63600175 PHARM REV CODE 636 W HCPCS: Performed by: INTERNAL MEDICINE

## 2017-05-05 PROCEDURE — 96360 HYDRATION IV INFUSION INIT: CPT

## 2017-05-05 PROCEDURE — 96377 APPLICATON ON-BODY INJECTOR: CPT

## 2017-05-05 PROCEDURE — 25000003 PHARM REV CODE 250: Performed by: INTERNAL MEDICINE

## 2017-05-05 RX ORDER — SODIUM CHLORIDE 0.9 % (FLUSH) 0.9 %
10 SYRINGE (ML) INJECTION
Status: DISCONTINUED | OUTPATIENT
Start: 2017-05-05 | End: 2017-05-05 | Stop reason: HOSPADM

## 2017-05-05 RX ORDER — HEPARIN 100 UNIT/ML
500 SYRINGE INTRAVENOUS
Status: DISCONTINUED | OUTPATIENT
Start: 2017-05-05 | End: 2017-05-05 | Stop reason: HOSPADM

## 2017-05-05 RX ADMIN — PEGFILGRASTIM 6 MG: KIT SUBCUTANEOUS at 05:05

## 2017-05-05 RX ADMIN — SODIUM CHLORIDE 1000 ML: 0.9 INJECTION, SOLUTION INTRAVENOUS at 05:05

## 2017-05-05 RX ADMIN — HEPARIN 500 UNITS: 100 SYRINGE at 06:05

## 2017-05-09 ENCOUNTER — PATIENT MESSAGE (OUTPATIENT)
Dept: HEMATOLOGY/ONCOLOGY | Facility: CLINIC | Age: 67
End: 2017-05-09

## 2017-05-15 ENCOUNTER — HOSPITAL ENCOUNTER (OUTPATIENT)
Dept: RADIOLOGY | Facility: HOSPITAL | Age: 67
Discharge: HOME | End: 2017-05-15
Attending: INTERNAL MEDICINE
Payer: COMMERCIAL

## 2017-05-15 DIAGNOSIS — C20 RECTAL CANCER: ICD-10-CM

## 2017-05-15 PROCEDURE — 74177 CT ABD & PELVIS W/CONTRAST: CPT | Mod: 26,,, | Performed by: RADIOLOGY

## 2017-05-15 PROCEDURE — 71260 CT THORAX DX C+: CPT | Mod: 26,,, | Performed by: RADIOLOGY

## 2017-05-15 PROCEDURE — 74177 CT ABD & PELVIS W/CONTRAST: CPT | Mod: TC

## 2017-05-15 PROCEDURE — 71260 CT THORAX DX C+: CPT | Mod: TC

## 2017-05-15 PROCEDURE — 25500020 PHARM REV CODE 255: Performed by: INTERNAL MEDICINE

## 2017-05-15 RX ADMIN — IOHEXOL 15 ML: 350 INJECTION, SOLUTION INTRAVENOUS at 04:05

## 2017-05-15 RX ADMIN — IOHEXOL 100 ML: 350 INJECTION, SOLUTION INTRAVENOUS at 05:05

## 2017-05-15 RX ADMIN — IOHEXOL 15 ML: 350 INJECTION, SOLUTION INTRAVENOUS at 03:05

## 2017-05-16 ENCOUNTER — OFFICE VISIT (OUTPATIENT)
Dept: HEMATOLOGY/ONCOLOGY | Facility: CLINIC | Age: 67
End: 2017-05-16
Payer: COMMERCIAL

## 2017-05-16 VITALS
BODY MASS INDEX: 35.5 KG/M2 | DIASTOLIC BLOOD PRESSURE: 68 MMHG | WEIGHT: 220.88 LBS | HEIGHT: 66 IN | SYSTOLIC BLOOD PRESSURE: 146 MMHG | OXYGEN SATURATION: 98 % | RESPIRATION RATE: 16 BRPM | HEART RATE: 79 BPM | TEMPERATURE: 100 F

## 2017-05-16 DIAGNOSIS — T45.1X5A CHEMOTHERAPY INDUCED NEUTROPENIA: ICD-10-CM

## 2017-05-16 DIAGNOSIS — C20 RECTAL CANCER: Primary | ICD-10-CM

## 2017-05-16 DIAGNOSIS — C78.01 SECONDARY ADENOCARCINOMA OF LUNG, RIGHT: ICD-10-CM

## 2017-05-16 DIAGNOSIS — Z51.11 ENCOUNTER FOR ANTINEOPLASTIC CHEMOTHERAPY: ICD-10-CM

## 2017-05-16 DIAGNOSIS — D70.1 CHEMOTHERAPY INDUCED NEUTROPENIA: ICD-10-CM

## 2017-05-16 PROCEDURE — 99999 PR PBB SHADOW E&M-EST. PATIENT-LVL IV: CPT | Mod: PBBFAC,,, | Performed by: INTERNAL MEDICINE

## 2017-05-16 PROCEDURE — 99215 OFFICE O/P EST HI 40 MIN: CPT | Mod: S$GLB,,, | Performed by: INTERNAL MEDICINE

## 2017-05-16 PROCEDURE — 1160F RVW MEDS BY RX/DR IN RCRD: CPT | Mod: S$GLB,,, | Performed by: INTERNAL MEDICINE

## 2017-05-16 PROCEDURE — 3078F DIAST BP <80 MM HG: CPT | Mod: S$GLB,,, | Performed by: INTERNAL MEDICINE

## 2017-05-16 PROCEDURE — 3077F SYST BP >= 140 MM HG: CPT | Mod: S$GLB,,, | Performed by: INTERNAL MEDICINE

## 2017-05-16 PROCEDURE — 1126F AMNT PAIN NOTED NONE PRSNT: CPT | Mod: S$GLB,,, | Performed by: INTERNAL MEDICINE

## 2017-05-16 PROCEDURE — 1159F MED LIST DOCD IN RCRD: CPT | Mod: S$GLB,,, | Performed by: INTERNAL MEDICINE

## 2017-05-16 RX ORDER — FLUOROURACIL 50 MG/ML
400 INJECTION, SOLUTION INTRAVENOUS
Status: CANCELLED | OUTPATIENT
Start: 2017-05-19

## 2017-05-16 RX ORDER — ATROPINE SULFATE 0.4 MG/ML
0.4 INJECTION, SOLUTION ENDOTRACHEAL; INTRAMEDULLARY; INTRAMUSCULAR; INTRAVENOUS; SUBCUTANEOUS DAILY PRN
Status: CANCELLED | OUTPATIENT
Start: 2017-05-19

## 2017-05-16 RX ORDER — HEPARIN 100 UNIT/ML
500 SYRINGE INTRAVENOUS
Status: CANCELLED | OUTPATIENT
Start: 2017-05-21

## 2017-05-16 RX ORDER — SODIUM CHLORIDE 0.9 % (FLUSH) 0.9 %
10 SYRINGE (ML) INJECTION
Status: CANCELLED | OUTPATIENT
Start: 2017-05-21

## 2017-05-16 NOTE — Clinical Note
Schedule CBC,CMP, urine protein/creatinie ratio and see me in 2 weeks. He want to do labs and see me on 5/29/17. Chemo with FOLFIRI and Cyramza on 5/31/17 DC pump on day 3. Neulasta on day 3.

## 2017-05-16 NOTE — PROGRESS NOTES
"Subjective:       Patient ID: Karthik Medrano is a 67 y.o. male.    Chief Complaint: Rectal Cancer and Diarrhea (last few days twice a day)  Oncologic History:  66 year old male with history of rectal cancer diagnosed 1/11/10 on colonoscopy (moderately differentiated adenocarcinoma) and based on imaging, was T3NxM0. Patient underwent Neoadjuvant chemort with infusional 5FU. This was completed 3/19/10 with a total of 50.4Gy given to the pelvis. He went to surgery 5/13/10. T3N0M0. Post operative he received no adjuvant treatment and has been followed regularly. He went to his PCP 6/24/13 who ordered a CXR which found pulmonary nodules. On 6/27/13, CT chest confirmed these findings. CEA drawn when initially diagnosed was 2.6ng/ml and 6/27/13 was 30.8 ng/ml. . Pulmonary biopsy was performed 7/18/13 and this confirmed metastatic adenocarcinoma. 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 Avastin was added for second cycle.    Patient completed 12 cycles of FOLFOX avastin. He did not receive Avastin for cycle 3, 4, or 5 (due to uncontrolled blood pressure).    He was on maintenance infusional 5FU and Avastin. Repeat scans 4/14/14 showed numerous bilateral pulmonary soft tissue nodules, the majority of which demonstrate interval enlargement when compared to the prior exam. CEA up 18.2 from 13.    He was restarted on FOLFIRI and AVastin and received 6 months of chemo and then was switched to maintenance chemotherapy with 5FU and Avastin. His CT scans from early May 2015 revealed progression in lung lesions.  He is now on FOLFIRI and Cyramza.  CT from 4/19/16 reveals "Multiple stable metastatic pulmonary nodules in this patient with history of rectal cancer.  Stable postoperative changes are present from prior partial colectomy.Interval increase in right-sided hydronephrosis despite appropriate positioning of a double-J ureteral sent.  There is associated enlargement and decreased perfusion to " "the right kidney consistent with obstruction. These findings are concerning for a nonfunctioning ureteral stent. Cholelithiasis."  CT from 6/13/16 reveal stable disease and hydronephrosis has resolved.  CT from 8/5/16 reveals "Stable pulmonary metastatic disease.Increased prominence of periureteral fat stranding/inflammatory change. Stable mild right hydronephrosis."  CT from 1/6/17 "1. Stable appearance of pulmonary metastatic disease in this patient with history of colon cancer. Pulmonary index lesions are stable in size compared to prior examination. No evidence of new metastatic disease. 2. Right-sided double-J ureteral stent with stable appearing inflammatory change about the right renal collecting system and right ureter. 3. Additional stable incidental findings as discussed above. "      CT scans reveal "In this patient with a history of rectal cancer, index lesions in the right and left lung are stable in size as detailed above. No new pulmonary nodules or masses are identified. There is no evidence of abnormal soft tissue opacity in the rectosigmoid region to suggest residual/recurrent disease. Interval increase in the degree of right hydronephrosis with a stable double-J right ureteral stent in place. Interval development of perihepatic ascites. Stable fat and bowel containing ventral hernia"          HPI Mr. Medrano returns for follow up and for FOLFIRI and Cyramza. CT scans reveal overall stable disease.        Review of Systems   Constitutional: Negative for appetite change, fatigue and unexpected weight change.   HENT: Negative for mouth sores.    Eyes: Negative for visual disturbance.   Respiratory: Negative for cough and shortness of breath.    Cardiovascular: Negative for chest pain.   Gastrointestinal: Negative for abdominal pain and diarrhea.   Genitourinary: Negative for frequency.   Musculoskeletal: Negative for back pain.   Skin: Negative for rash.   Neurological: Negative for headaches. "   Hematological: Negative for adenopathy.   Psychiatric/Behavioral: The patient is not nervous/anxious.    All other systems reviewed and are negative.      PMFSH: all information reviewed and updated as relevant to today's visit  Objective:      Physical Exam   Constitutional: He is oriented to person, place, and time. He appears well-developed and well-nourished.   HENT:   Mouth/Throat: No oropharyngeal exudate.   Cardiovascular: Normal rate and normal heart sounds.    Pulmonary/Chest: Effort normal and breath sounds normal. He has no wheezes.   Abdominal: Soft. Bowel sounds are normal. There is no tenderness.   Musculoskeletal: He exhibits no edema or tenderness.   Lymphadenopathy:     He has no cervical adenopathy.   Neurological: He is alert and oriented to person, place, and time. Coordination normal.   Skin: Skin is warm and dry. No rash noted.   Psychiatric: He has a normal mood and affect. Judgment and thought content normal.   Vitals reviewed.        LABS:  WBC   Date Value Ref Range Status   05/15/2017 12.30 3.90 - 12.70 K/uL Final     Hemoglobin   Date Value Ref Range Status   05/15/2017 11.9 (L) 14.0 - 18.0 g/dL Final     Hematocrit   Date Value Ref Range Status   05/15/2017 37.9 (L) 40.0 - 54.0 % Final     Platelets   Date Value Ref Range Status   05/15/2017 113 (L) 150 - 350 K/uL Final     Gran #   Date Value Ref Range Status   05/15/2017 10.0 (H) 1.8 - 7.7 K/uL Final     Comment:     The ANC is based on a white cell differential from an   automated cell counter. It has not been microscopically   reviewed for the presence of abnormal cells. Clinical   correlation is required.         Chemistry        Component Value Date/Time     05/15/2017 1458    K 3.7 05/15/2017 1458     05/15/2017 1458    CO2 28 05/15/2017 1458    BUN 10 05/15/2017 1458    CREATININE 0.9 05/15/2017 1458     05/15/2017 1458        Component Value Date/Time    CALCIUM 9.2 05/15/2017 1458    ALKPHOS 217 (H)  05/15/2017 1458    AST 22 05/15/2017 1458    ALT 16 05/15/2017 1458    BILITOT 0.5 05/15/2017 1458        Urine protein/creatinine ratio: 1.85.    Assessment:       1. Rectal cancer    2. Secondary adenocarcinoma of lung, right    3. Encounter for antineoplastic chemotherapy    4. Chemotherapy induced neutropenia        Plan:        1,2,3,4. CT scans overall stable, except mild increase in a couple of lesions. He is doing well clinically and will proceed with FOLFIRI and Cyramza and will return in 2 weeks for next cycle.  Neulasta on day 3.    Above care plan was discussed with patient and accompanying wife and all questions were addressed to their satisfaction

## 2017-05-17 ENCOUNTER — INFUSION (OUTPATIENT)
Dept: INFUSION THERAPY | Facility: HOSPITAL | Age: 67
End: 2017-05-17
Attending: INTERNAL MEDICINE
Payer: COMMERCIAL

## 2017-05-17 VITALS
RESPIRATION RATE: 16 BRPM | HEART RATE: 72 BPM | TEMPERATURE: 99 F | SYSTOLIC BLOOD PRESSURE: 129 MMHG | DIASTOLIC BLOOD PRESSURE: 62 MMHG

## 2017-05-17 DIAGNOSIS — Z51.12 ENCOUNTER FOR ANTINEOPLASTIC CHEMOTHERAPY AND IMMUNOTHERAPY: ICD-10-CM

## 2017-05-17 DIAGNOSIS — T45.1X5A CHEMOTHERAPY INDUCED NEUTROPENIA: Primary | ICD-10-CM

## 2017-05-17 DIAGNOSIS — D70.1 CHEMOTHERAPY INDUCED NEUTROPENIA: Primary | ICD-10-CM

## 2017-05-17 DIAGNOSIS — Z51.11 ENCOUNTER FOR ANTINEOPLASTIC CHEMOTHERAPY AND IMMUNOTHERAPY: ICD-10-CM

## 2017-05-17 PROCEDURE — 25000003 PHARM REV CODE 250: Performed by: INTERNAL MEDICINE

## 2017-05-17 PROCEDURE — 96367 TX/PROPH/DG ADDL SEQ IV INF: CPT

## 2017-05-17 PROCEDURE — 96375 TX/PRO/DX INJ NEW DRUG ADDON: CPT

## 2017-05-17 PROCEDURE — 96415 CHEMO IV INFUSION ADDL HR: CPT

## 2017-05-17 PROCEDURE — 63600175 PHARM REV CODE 636 W HCPCS: Performed by: INTERNAL MEDICINE

## 2017-05-17 PROCEDURE — 96417 CHEMO IV INFUS EACH ADDL SEQ: CPT

## 2017-05-17 PROCEDURE — 96411 CHEMO IV PUSH ADDL DRUG: CPT

## 2017-05-17 PROCEDURE — 96416 CHEMO PROLONG INFUSE W/PUMP: CPT

## 2017-05-17 PROCEDURE — 96409 CHEMO IV PUSH SNGL DRUG: CPT

## 2017-05-17 PROCEDURE — 96368 THER/DIAG CONCURRENT INF: CPT

## 2017-05-17 PROCEDURE — 96413 CHEMO IV INFUSION 1 HR: CPT

## 2017-05-17 RX ORDER — ATROPINE SULFATE 0.4 MG/ML
0.4 INJECTION, SOLUTION ENDOTRACHEAL; INTRAMEDULLARY; INTRAMUSCULAR; INTRAVENOUS; SUBCUTANEOUS DAILY PRN
Status: DISCONTINUED | OUTPATIENT
Start: 2017-05-17 | End: 2017-05-17 | Stop reason: HOSPADM

## 2017-05-17 RX ORDER — FLUOROURACIL 50 MG/ML
400 INJECTION, SOLUTION INTRAVENOUS
Status: COMPLETED | OUTPATIENT
Start: 2017-05-17 | End: 2017-05-17

## 2017-05-17 RX ADMIN — DEXAMETHASONE SODIUM PHOSPHATE 0.25 MG: 4 INJECTION, SOLUTION INTRAMUSCULAR; INTRAVENOUS at 03:05

## 2017-05-17 RX ADMIN — FLUOROURACIL 5230 MG: 50 INJECTION, SOLUTION INTRAVENOUS at 06:05

## 2017-05-17 RX ADMIN — FLUOROURACIL 870 MG: 50 INJECTION, SOLUTION INTRAVENOUS at 06:05

## 2017-05-17 RX ADMIN — LEUCOVORIN CALCIUM 45 MG: 350 INJECTION, POWDER, LYOPHILIZED, FOR SOLUTION INTRAMUSCULAR; INTRAVENOUS at 04:05

## 2017-05-17 RX ADMIN — SODIUM CHLORIDE 392 MG: 0.9 INJECTION, SOLUTION INTRAVENOUS at 04:05

## 2017-05-17 RX ADMIN — SODIUM CHLORIDE 814 MG: 9 INJECTION, SOLUTION INTRAVENOUS at 03:05

## 2017-05-17 RX ADMIN — SODIUM CHLORIDE: 9 INJECTION, SOLUTION INTRAVENOUS at 03:05

## 2017-05-17 RX ADMIN — ATROPINE SULFATE 0.4 MG: 0.4 INJECTION, SOLUTION INTRAMUSCULAR; INTRAVENOUS; SUBCUTANEOUS at 04:05

## 2017-05-17 NOTE — PLAN OF CARE
Problem: Patient Care Overview  Goal: Plan of Care Review  Outcome: Ongoing (interventions implemented as appropriate)  Patient received folfiri and Cyramza without any issues. Notified to call MD with any further concerns . No apparent distress noted.

## 2017-05-19 ENCOUNTER — INFUSION (OUTPATIENT)
Dept: INFUSION THERAPY | Facility: HOSPITAL | Age: 67
End: 2017-05-19
Attending: INTERNAL MEDICINE
Payer: COMMERCIAL

## 2017-05-19 VITALS
TEMPERATURE: 98 F | SYSTOLIC BLOOD PRESSURE: 121 MMHG | DIASTOLIC BLOOD PRESSURE: 61 MMHG | RESPIRATION RATE: 20 BRPM | HEART RATE: 71 BPM

## 2017-05-19 DIAGNOSIS — T45.1X5A CHEMOTHERAPY INDUCED NEUTROPENIA: Primary | ICD-10-CM

## 2017-05-19 DIAGNOSIS — Z51.11 ENCOUNTER FOR ANTINEOPLASTIC CHEMOTHERAPY AND IMMUNOTHERAPY: ICD-10-CM

## 2017-05-19 DIAGNOSIS — D70.1 CHEMOTHERAPY INDUCED NEUTROPENIA: Primary | ICD-10-CM

## 2017-05-19 DIAGNOSIS — Z51.12 ENCOUNTER FOR ANTINEOPLASTIC CHEMOTHERAPY AND IMMUNOTHERAPY: ICD-10-CM

## 2017-05-19 PROCEDURE — 96360 HYDRATION IV INFUSION INIT: CPT

## 2017-05-19 PROCEDURE — 25000003 PHARM REV CODE 250: Performed by: INTERNAL MEDICINE

## 2017-05-19 PROCEDURE — 63600175 PHARM REV CODE 636 W HCPCS: Performed by: INTERNAL MEDICINE

## 2017-05-19 PROCEDURE — 96377 APPLICATON ON-BODY INJECTOR: CPT

## 2017-05-19 RX ORDER — HEPARIN 100 UNIT/ML
500 SYRINGE INTRAVENOUS
Status: DISCONTINUED | OUTPATIENT
Start: 2017-05-19 | End: 2017-05-19 | Stop reason: HOSPADM

## 2017-05-19 RX ORDER — SODIUM CHLORIDE 0.9 % (FLUSH) 0.9 %
10 SYRINGE (ML) INJECTION
Status: DISCONTINUED | OUTPATIENT
Start: 2017-05-19 | End: 2017-05-19 | Stop reason: HOSPADM

## 2017-05-19 RX ADMIN — HEPARIN 500 UNITS: 100 SYRINGE at 06:05

## 2017-05-19 RX ADMIN — PEGFILGRASTIM 6 MG: KIT SUBCUTANEOUS at 05:05

## 2017-05-19 RX ADMIN — Medication 10 ML: at 06:05

## 2017-05-19 RX ADMIN — SODIUM CHLORIDE 1000 ML: 0.9 INJECTION, SOLUTION INTRAVENOUS at 05:05

## 2017-05-19 NOTE — PLAN OF CARE
Problem: Patient Care Overview  Goal: Plan of Care Review  Outcome: Ongoing (interventions implemented as appropriate)  Tolerated IVF's without difficulty. No complaints voiced. AVS given to pt. Instructed Pt to notify MD with any concerns or problems. Pt verblaized understanding.

## 2017-05-19 NOTE — MR AVS SNAPSHOT
Patient Information     Patient Name Sex Karthik Lopez Male 1950      Visit Information        Provider Department Dept Phone Center    2017 5:00 PM [facility] [phone] Ru Suárez      Patient Instructions     None      Your Current Medications Are     amlodipine (NORVASC) 5 MG tablet    ascorbic acid (VITAMIN C) 1000 MG tablet    atorvastatin (LIPITOR) 10 MG tablet    cholecalciferol, vitamin D3, (VITAMIN D3) 2,000 unit Cap    co-enzyme Q-10 30 mg capsule    diphenoxylate-atropine 2.5-0.025 mg (LOMOTIL) 2.5-0.025 mg per tablet    ferrous gluconate (FERGON) 324 MG tablet    furosemide (LASIX) 20 MG tablet    gabapentin (NEURONTIN) 300 MG capsule    GARLIC EXTRACT ORAL    glipiZIDE (GLUCOTROL) 5 MG tablet    lisinopril (PRINIVIL,ZESTRIL) 40 MG tablet    lisinopril (PRINIVIL,ZESTRIL) 40 MG tablet    metoprolol succinate (TOPROL-XL) 100 MG 24 hr tablet    multivitamin with iron-mineral TbSR    NYSTATIN (DUKE'S SOLUTION)    ondansetron (ZOFRAN) 8 MG tablet    oxycodone-acetaminophen (PERCOCET) 5-325 mg per tablet    paregoric 2 mg/5 mL Liqd    tamsulosin (FLOMAX) 0.4 mg Cp24      Facility-Administered Medications     heparin, porcine (PF) 100 unit/mL injection flush 500 Units    pegfilgrastim (NEULASTA (ON BODY INJECTOR)) injection 6 mg    sodium chloride 0.9% bolus 1,000 mL    sodium chloride 0.9% flush 10 mL      Appointments for Next Year     2017 12:00 PM NON FASTING LAB (15 min.) [facility], [facility] SAME DAY    Arrive at check-in approximately 15 minutes before your scheduled appointment time. Bring all outside medical records and imaging, along with a list of your current medications and insurance card.    [facility] 3rd Floor    2017 12:15 PM URINE (15 min.) [facility] SPECIMEN, HEMON CANCER BLDG    Arrive at check-in approximately 15 minutes before your scheduled appointment time. Bring all  outside medical records and imaging, along with a list of your current medications and insurance card.    5/29/2017  1:00 PM ESTABLISHED PATIENT SHORT (15 min.) [facility] Lisa Abernathy MD    Arrive at check-in approximately 15 minutes before your scheduled appointment time. Bring all outside medical records and imaging, along with a list of your current medications and insurance card.    [facility] - 3rd Floor    5/31/2017  2:30 PM INFUSION 210 MIN (210 min.) [facility], CHEMO    Arrive at check-in approximately 15 minutes before your scheduled appointment time. Bring all outside medical records and imaging, along with a list of your current medications and insurance card.    [facility], 5th Floor    6/2/2017  5:00 PM INFUSION 060 MIN (60 min.) [facility], [facility]    Arrive at check-in approximately 15 minutes before your scheduled appointment time. Bring all outside medical records and imaging, along with a list of your current medications and insurance card.    [facility], 5th Floor         Default Flowsheet Data (last 24 hours)      Amb Complex Vitals Gabriel        05/19/17 1705                Measurements    /61        Temp 98.3 °F (36.8 °C)        Pulse 71        Resp 20        Pain Assessment    Pain Score Zero                Allergies     No Known Allergies      Medications You Received from 05/18/2017 1756 to 05/19/2017 1756        Date/Time Order Dose Route Action     05/19/2017 1708 pegfilgrastim (NEULASTA (ON BODY INJECTOR)) injection 6 mg 6 mg Subcutaneous Given     05/19/2017 1704 sodium chloride 0.9% bolus 1,000 mL 1,000 mL Intravenous New Bag      Current Discharge Medication List     Cannot display discharge medications since this is not an admission.

## 2017-05-29 ENCOUNTER — OFFICE VISIT (OUTPATIENT)
Dept: HEMATOLOGY/ONCOLOGY | Facility: CLINIC | Age: 67
End: 2017-05-29
Payer: COMMERCIAL

## 2017-05-29 ENCOUNTER — PATIENT MESSAGE (OUTPATIENT)
Dept: HEMATOLOGY/ONCOLOGY | Facility: CLINIC | Age: 67
End: 2017-05-29

## 2017-05-29 ENCOUNTER — TELEPHONE (OUTPATIENT)
Dept: HEMATOLOGY/ONCOLOGY | Facility: CLINIC | Age: 67
End: 2017-05-29

## 2017-05-29 ENCOUNTER — LAB VISIT (OUTPATIENT)
Dept: LAB | Facility: HOSPITAL | Age: 67
End: 2017-05-29
Attending: INTERNAL MEDICINE
Payer: COMMERCIAL

## 2017-05-29 VITALS
HEART RATE: 89 BPM | TEMPERATURE: 100 F | HEIGHT: 66 IN | OXYGEN SATURATION: 98 % | RESPIRATION RATE: 19 BRPM | BODY MASS INDEX: 35.54 KG/M2 | DIASTOLIC BLOOD PRESSURE: 69 MMHG | SYSTOLIC BLOOD PRESSURE: 143 MMHG | WEIGHT: 221.13 LBS

## 2017-05-29 DIAGNOSIS — E11.9 TYPE 2 DIABETES MELLITUS WITHOUT COMPLICATION, WITHOUT LONG-TERM CURRENT USE OF INSULIN: ICD-10-CM

## 2017-05-29 DIAGNOSIS — T45.1X5A CHEMOTHERAPY INDUCED NEUTROPENIA: ICD-10-CM

## 2017-05-29 DIAGNOSIS — C20 RECTAL CANCER: ICD-10-CM

## 2017-05-29 DIAGNOSIS — C78.01 SECONDARY ADENOCARCINOMA OF LUNG, RIGHT: ICD-10-CM

## 2017-05-29 DIAGNOSIS — C20 RECTAL CANCER: Primary | ICD-10-CM

## 2017-05-29 DIAGNOSIS — D70.1 CHEMOTHERAPY INDUCED NEUTROPENIA: ICD-10-CM

## 2017-05-29 DIAGNOSIS — Z51.11 ENCOUNTER FOR ANTINEOPLASTIC CHEMOTHERAPY AND IMMUNOTHERAPY: ICD-10-CM

## 2017-05-29 DIAGNOSIS — Z51.12 ENCOUNTER FOR ANTINEOPLASTIC CHEMOTHERAPY AND IMMUNOTHERAPY: ICD-10-CM

## 2017-05-29 LAB
CREAT UR-MCNC: 120 MG/DL
PROT UR-MCNC: 174 MG/DL
PROT/CREAT RATIO, UR: 1.45

## 2017-05-29 PROCEDURE — 99999 PR PBB SHADOW E&M-EST. PATIENT-LVL IV: CPT | Mod: PBBFAC,,, | Performed by: INTERNAL MEDICINE

## 2017-05-29 PROCEDURE — 84156 ASSAY OF PROTEIN URINE: CPT

## 2017-05-29 PROCEDURE — 4010F ACE/ARB THERAPY RXD/TAKEN: CPT | Mod: S$GLB,,, | Performed by: INTERNAL MEDICINE

## 2017-05-29 PROCEDURE — 1126F AMNT PAIN NOTED NONE PRSNT: CPT | Mod: S$GLB,,, | Performed by: INTERNAL MEDICINE

## 2017-05-29 PROCEDURE — 1159F MED LIST DOCD IN RCRD: CPT | Mod: S$GLB,,, | Performed by: INTERNAL MEDICINE

## 2017-05-29 PROCEDURE — 99215 OFFICE O/P EST HI 40 MIN: CPT | Mod: S$GLB,,, | Performed by: INTERNAL MEDICINE

## 2017-05-29 NOTE — TELEPHONE ENCOUNTER
Left message letting pt know I kept his chemo on Wednesdays per his [facility] request.Asked pt to call back if needed

## 2017-05-29 NOTE — PROGRESS NOTES
"Subjective:       Patient ID: Karthik Medrano is a 67 y.o. male.    Chief Complaint: Rectal Cancer; Nausea; and frequent BMs  Oncologic History:  66 year old male with history of rectal cancer diagnosed 1/11/10 on colonoscopy (moderately differentiated adenocarcinoma) and based on imaging, was T3NxM0. Patient underwent Neoadjuvant chemort with infusional 5FU. This was completed 3/19/10 with a total of 50.4Gy given to the pelvis. He went to surgery 5/13/10. T3N0M0. Post operative he received no adjuvant treatment and has been followed regularly. He went to his PCP 6/24/13 who ordered a CXR which found pulmonary nodules. On 6/27/13, CT chest confirmed these findings. CEA drawn when initially diagnosed was 2.6ng/ml and 6/27/13 was 30.8 ng/ml. . Pulmonary biopsy was performed 7/18/13 and this confirmed metastatic adenocarcinoma. 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 Avastin was added for second cycle.    Patient completed 12 cycles of FOLFOX avastin. He did not receive Avastin for cycle 3, 4, or 5 (due to uncontrolled blood pressure).    He was on maintenance infusional 5FU and Avastin. Repeat scans 4/14/14 showed numerous bilateral pulmonary soft tissue nodules, the majority of which demonstrate interval enlargement when compared to the prior exam. CEA up 18.2 from 13.    He was restarted on FOLFIRI and AVastin and received 6 months of chemo and then was switched to maintenance chemotherapy with 5FU and Avastin. His CT scans from early May 2015 revealed progression in lung lesions.  He is now on FOLFIRI and Cyramza.  CT from 4/19/16 reveals "Multiple stable metastatic pulmonary nodules in this patient with history of rectal cancer.  Stable postoperative changes are present from prior partial colectomy.Interval increase in right-sided hydronephrosis despite appropriate positioning of a double-J ureteral sent.  There is associated enlargement and decreased perfusion to the right kidney " "consistent with obstruction. These findings are concerning for a nonfunctioning ureteral stent. Cholelithiasis."  CT from 6/13/16 reveal stable disease and hydronephrosis has resolved.  CT from 8/5/16 reveals "Stable pulmonary metastatic disease.Increased prominence of periureteral fat stranding/inflammatory change. Stable mild right hydronephrosis."  CT from 1/6/17 "1. Stable appearance of pulmonary metastatic disease in this patient with history of colon cancer. Pulmonary index lesions are stable in size compared to prior examination. No evidence of new metastatic disease. 2. Right-sided double-J ureteral stent with stable appearing inflammatory change about the right renal collecting system and right ureter. 3. Additional stable incidental findings as discussed above. "     CT scans reveal "In this patient with a history of rectal cancer, index lesions in the right and left lung are stable in size as detailed above. No new pulmonary nodules or masses are identified. There is no evidence of abnormal soft tissue opacity in the rectosigmoid region to suggest residual/recurrent disease. Interval increase in the degree of right hydronephrosis with a stable double-J right ureteral stent in place. Interval development of perihepatic ascites. Stable fat and bowel containing ventral hernia"         HPI Mr. Medrano returns for follow up and for FOLFIRI and Cyramza  He notes increased frequency of stools.  He denies any nausea, vomiting, diarrhea, constipation, abdominal pain, weight loss or loss of appetite, chest pain, shortness of breath, leg swelling, fatigue, pain, headache, dizziness, or mood changes. His ECOG PS is zero. He is accompanied by his wife          Review of Systems   Constitutional: Positive for fatigue. Negative for appetite change and unexpected weight change.   HENT: Negative for mouth sores.    Eyes: Negative for visual disturbance.   Respiratory: Negative for cough and shortness of breath.  "   Cardiovascular: Negative for chest pain.   Gastrointestinal: Negative for abdominal pain and diarrhea.   Genitourinary: Negative for frequency.   Musculoskeletal: Negative for back pain.   Skin: Negative for rash.   Neurological: Negative for headaches.   Hematological: Negative for adenopathy.   Psychiatric/Behavioral: The patient is not nervous/anxious.    All other systems reviewed and are negative.      PMFSH: all information reviewed and updated as relevant to today's visit  Objective:      Physical Exam   Constitutional: He is oriented to person, place, and time. He appears well-developed and well-nourished.   HENT:   Mouth/Throat: No oropharyngeal exudate.   Cardiovascular: Normal rate and normal heart sounds.    Pulmonary/Chest: Effort normal and breath sounds normal. He has no wheezes.   Abdominal: Soft. Bowel sounds are normal. There is no tenderness.   Musculoskeletal: He exhibits no edema or tenderness.   Lymphadenopathy:     He has no cervical adenopathy.   Neurological: He is alert and oriented to person, place, and time. Coordination normal.   Skin: Skin is warm and dry. No rash noted.   Psychiatric: He has a normal mood and affect. Judgment and thought content normal.   Vitals reviewed.      LABS:  WBC   Date Value Ref Range Status   05/29/2017 13.84 (H) 3.90 - 12.70 K/uL Final     Hemoglobin   Date Value Ref Range Status   05/29/2017 11.7 (L) 14.0 - 18.0 g/dL Final     Hematocrit   Date Value Ref Range Status   05/29/2017 37.5 (L) 40.0 - 54.0 % Final     Platelets   Date Value Ref Range Status   05/29/2017 92 (L) 150 - 350 K/uL Final     Gran #   Date Value Ref Range Status   05/29/2017 11.1 (H) 1.8 - 7.7 K/uL Final     Comment:     The ANC is based on a white cell differential from an   automated cell counter. It has not been microscopically   reviewed for the presence of abnormal cells. Clinical   correlation is required.           Chemistry        Component Value Date/Time     05/29/2017  1241    K 4.6 05/29/2017 1241     05/29/2017 1241    CO2 23 05/29/2017 1241    BUN 9 05/29/2017 1241    CREATININE 1.1 05/29/2017 1241     (H) 05/29/2017 1241        Component Value Date/Time    CALCIUM 9.2 05/29/2017 1241    ALKPHOS 217 (H) 05/29/2017 1241    AST 29 05/29/2017 1241    ALT 20 05/29/2017 1241    BILITOT 0.5 05/29/2017 1241          Assessment:       1. Rectal cancer    2. Secondary adenocarcinoma of lung, right    3. Encounter for antineoplastic chemotherapy and immunotherapy    4. Chemotherapy induced neutropenia    5. Type 2 diabetes mellitus without complication, without long-term current use of insulin        Plan:        1,2,3,4. He is doing well clinically and will proceed with FOLFIRI and Cyramza and will return in 2 weeks for next cycle/  Neulasta on day 3.  5. Stable on meds.    Above care plan was discussed with patient and accompanying wife and all questions were addressed to their satisfaction

## 2017-05-29 NOTE — Clinical Note
Schedule CBC,CMP, urine/protein/creatine ratio and see me in 2 weeks and for FOLFIRI and Cyramza. DC pump on day 3. Neulasta on day 3.

## 2017-05-31 ENCOUNTER — INFUSION (OUTPATIENT)
Dept: INFUSION THERAPY | Facility: HOSPITAL | Age: 67
End: 2017-05-31
Attending: INTERNAL MEDICINE
Payer: COMMERCIAL

## 2017-05-31 VITALS
TEMPERATURE: 99 F | RESPIRATION RATE: 18 BRPM | SYSTOLIC BLOOD PRESSURE: 141 MMHG | DIASTOLIC BLOOD PRESSURE: 65 MMHG | HEART RATE: 81 BPM

## 2017-05-31 DIAGNOSIS — D70.1 CHEMOTHERAPY INDUCED NEUTROPENIA: Primary | ICD-10-CM

## 2017-05-31 DIAGNOSIS — Z51.12 ENCOUNTER FOR ANTINEOPLASTIC CHEMOTHERAPY AND IMMUNOTHERAPY: ICD-10-CM

## 2017-05-31 DIAGNOSIS — T45.1X5A CHEMOTHERAPY INDUCED NEUTROPENIA: Primary | ICD-10-CM

## 2017-05-31 DIAGNOSIS — Z51.11 ENCOUNTER FOR ANTINEOPLASTIC CHEMOTHERAPY AND IMMUNOTHERAPY: ICD-10-CM

## 2017-05-31 PROCEDURE — 96415 CHEMO IV INFUSION ADDL HR: CPT

## 2017-05-31 PROCEDURE — 96416 CHEMO PROLONG INFUSE W/PUMP: CPT

## 2017-05-31 PROCEDURE — 25000003 PHARM REV CODE 250: Performed by: INTERNAL MEDICINE

## 2017-05-31 PROCEDURE — 96368 THER/DIAG CONCURRENT INF: CPT

## 2017-05-31 PROCEDURE — 63600175 PHARM REV CODE 636 W HCPCS: Performed by: INTERNAL MEDICINE

## 2017-05-31 PROCEDURE — 96413 CHEMO IV INFUSION 1 HR: CPT

## 2017-05-31 PROCEDURE — 96375 TX/PRO/DX INJ NEW DRUG ADDON: CPT

## 2017-05-31 PROCEDURE — 96367 TX/PROPH/DG ADDL SEQ IV INF: CPT

## 2017-05-31 PROCEDURE — 96417 CHEMO IV INFUS EACH ADDL SEQ: CPT

## 2017-05-31 PROCEDURE — 96411 CHEMO IV PUSH ADDL DRUG: CPT

## 2017-05-31 RX ORDER — HEPARIN 100 UNIT/ML
500 SYRINGE INTRAVENOUS
Status: CANCELLED | OUTPATIENT
Start: 2017-06-03

## 2017-05-31 RX ORDER — ATROPINE SULFATE 0.4 MG/ML
0.4 INJECTION, SOLUTION ENDOTRACHEAL; INTRAMEDULLARY; INTRAMUSCULAR; INTRAVENOUS; SUBCUTANEOUS DAILY PRN
Status: CANCELLED | OUTPATIENT
Start: 2017-06-01

## 2017-05-31 RX ORDER — ATROPINE SULFATE 0.4 MG/ML
0.4 INJECTION, SOLUTION ENDOTRACHEAL; INTRAMEDULLARY; INTRAMUSCULAR; INTRAVENOUS; SUBCUTANEOUS DAILY PRN
Status: DISCONTINUED | OUTPATIENT
Start: 2017-05-31 | End: 2017-05-31 | Stop reason: HOSPADM

## 2017-05-31 RX ORDER — SODIUM CHLORIDE 0.9 % (FLUSH) 0.9 %
10 SYRINGE (ML) INJECTION
Status: CANCELLED | OUTPATIENT
Start: 2017-06-03

## 2017-05-31 RX ORDER — FLUOROURACIL 50 MG/ML
400 INJECTION, SOLUTION INTRAVENOUS
Status: CANCELLED | OUTPATIENT
Start: 2017-06-01

## 2017-05-31 RX ORDER — FLUOROURACIL 50 MG/ML
400 INJECTION, SOLUTION INTRAVENOUS
Status: COMPLETED | OUTPATIENT
Start: 2017-05-31 | End: 2017-05-31

## 2017-05-31 RX ADMIN — ATROPINE SULFATE 0.4 MG: 0.4 INJECTION, SOLUTION INTRAMUSCULAR; INTRAVENOUS; SUBCUTANEOUS at 04:05

## 2017-05-31 RX ADMIN — SODIUM CHLORIDE: 0.9 INJECTION, SOLUTION INTRAVENOUS at 03:05

## 2017-05-31 RX ADMIN — SODIUM CHLORIDE: 9 INJECTION, SOLUTION INTRAVENOUS at 03:05

## 2017-05-31 RX ADMIN — FLUOROURACIL 870 MG: 50 INJECTION, SOLUTION INTRAVENOUS at 06:05

## 2017-05-31 RX ADMIN — LEUCOVORIN CALCIUM 45 MG: 350 INJECTION, POWDER, LYOPHILIZED, FOR SOLUTION INTRAMUSCULAR; INTRAVENOUS at 04:05

## 2017-05-31 RX ADMIN — FLUOROURACIL 5230 MG: 50 INJECTION, SOLUTION INTRAVENOUS at 06:05

## 2017-05-31 RX ADMIN — SODIUM CHLORIDE 814 MG: 9 INJECTION, SOLUTION INTRAVENOUS at 03:05

## 2017-05-31 RX ADMIN — SODIUM CHLORIDE 392 MG: 0.9 INJECTION, SOLUTION INTRAVENOUS at 04:05

## 2017-05-31 RX ADMIN — PALONOSETRON HYDROCHLORIDE: 0.25 INJECTION INTRAVENOUS at 03:05

## 2017-05-31 NOTE — TELEPHONE ENCOUNTER
Dr. Abernathy has nothing available on the 13th-later in the day, unfortunately. He will need to stay as is this time.

## 2017-05-31 NOTE — NURSING
1800 -- Patient currently infusing Irinotecan/Leucovorin without issue.  Report given to PEREZ Beckham.

## 2017-05-31 NOTE — PLAN OF CARE
Problem: Patient Care Overview  Goal: Individualization & Mutuality  Outcome: Ongoing (interventions implemented as appropriate)  1450 --  Patient's labs, history, allergies, and medication reviewed.  Patient to receive Cryamza/Folfiri.  Discussed plan of care with patient.  Patient in agreement.  PAC accessed.  Will monitor.

## 2017-06-01 ENCOUNTER — TELEPHONE (OUTPATIENT)
Dept: HEMATOLOGY/ONCOLOGY | Facility: CLINIC | Age: 67
End: 2017-06-01

## 2017-06-01 NOTE — TELEPHONE ENCOUNTER
Returned call to patient's wife, who is calling to reschedule all of patient's appointments, as the appointment time for the MD appt is too early, and she doesn't have time to take off work for full day.  Wife is requesting patient to see Dr. Valles on the 12th or 13th late in the day and to keep chemo on 14th. Nurse informed wife dr valles had nothing else available unfortunately. Wife did not accept this and she wants to speak with dr valles.    Message routed to dr valles (please talk to me about this)

## 2017-06-01 NOTE — PLAN OF CARE
Problem: Patient Care Overview  Goal: Plan of Care Review  Outcome: Ongoing (interventions implemented as appropriate)  Patient tolerated FOLFIRI infusion well,no s/s reaction. Patient connected to 5fu pump and verbally instructed on when to RTC. Patient given AVS,instructed to call MD if any problems.

## 2017-06-02 ENCOUNTER — INFUSION (OUTPATIENT)
Dept: INFUSION THERAPY | Facility: HOSPITAL | Age: 67
End: 2017-06-02
Attending: INTERNAL MEDICINE
Payer: COMMERCIAL

## 2017-06-02 VITALS
SYSTOLIC BLOOD PRESSURE: 127 MMHG | RESPIRATION RATE: 18 BRPM | HEART RATE: 84 BPM | DIASTOLIC BLOOD PRESSURE: 65 MMHG | TEMPERATURE: 99 F

## 2017-06-02 DIAGNOSIS — D70.1 CHEMOTHERAPY INDUCED NEUTROPENIA: Primary | ICD-10-CM

## 2017-06-02 DIAGNOSIS — Z51.12 ENCOUNTER FOR ANTINEOPLASTIC CHEMOTHERAPY AND IMMUNOTHERAPY: ICD-10-CM

## 2017-06-02 DIAGNOSIS — T45.1X5A CHEMOTHERAPY INDUCED NEUTROPENIA: Primary | ICD-10-CM

## 2017-06-02 DIAGNOSIS — Z51.11 ENCOUNTER FOR ANTINEOPLASTIC CHEMOTHERAPY AND IMMUNOTHERAPY: ICD-10-CM

## 2017-06-02 PROCEDURE — 96377 APPLICATON ON-BODY INJECTOR: CPT

## 2017-06-02 PROCEDURE — 63600175 PHARM REV CODE 636 W HCPCS: Performed by: INTERNAL MEDICINE

## 2017-06-02 PROCEDURE — 96360 HYDRATION IV INFUSION INIT: CPT

## 2017-06-02 PROCEDURE — 25000003 PHARM REV CODE 250: Performed by: INTERNAL MEDICINE

## 2017-06-02 RX ORDER — SODIUM CHLORIDE 0.9 % (FLUSH) 0.9 %
10 SYRINGE (ML) INJECTION
Status: DISCONTINUED | OUTPATIENT
Start: 2017-06-02 | End: 2017-06-02 | Stop reason: HOSPADM

## 2017-06-02 RX ORDER — HEPARIN 100 UNIT/ML
500 SYRINGE INTRAVENOUS
Status: DISCONTINUED | OUTPATIENT
Start: 2017-06-02 | End: 2017-06-02 | Stop reason: HOSPADM

## 2017-06-02 RX ADMIN — PEGFILGRASTIM 6 MG: KIT SUBCUTANEOUS at 04:06

## 2017-06-02 RX ADMIN — SODIUM CHLORIDE 1000 ML: 0.9 INJECTION, SOLUTION INTRAVENOUS at 04:06

## 2017-06-02 NOTE — PLAN OF CARE
Problem: Patient Care Overview  Goal: Plan of Care Review  1750- PAtient tolerated one liter of IV fluids.  Patients port was flushed and heparin locked before de-access and blood return was noted.  Patient had neulasta onbody device applied to back of R arm, patient verbalized understanding monitoring and use of device.  Patient discharged to home setting, ambulated independently off the unit and was given AVS upon leaving.

## 2017-06-02 NOTE — PLAN OF CARE
Problem: Patient Care Overview  Goal: Plan of Care Review  1650-Patient arrived ambulatory to the unit for pump dc and iv fluid bolus.  Patients infusion to portable pump completed while on unit, patient shows no signs or symptoms of infusion reaction, reports tolerating infusion well.  Plan of care discussed with patient and patient in agreement.

## 2017-06-12 ENCOUNTER — LAB VISIT (OUTPATIENT)
Dept: LAB | Facility: HOSPITAL | Age: 67
End: 2017-06-12
Attending: INTERNAL MEDICINE
Payer: COMMERCIAL

## 2017-06-12 DIAGNOSIS — C20 RECTAL CANCER: ICD-10-CM

## 2017-06-12 LAB
CREAT UR-MCNC: 135 MG/DL
PROT UR-MCNC: 218 MG/DL
PROT/CREAT RATIO, UR: 1.61

## 2017-06-12 PROCEDURE — 84156 ASSAY OF PROTEIN URINE: CPT

## 2017-06-13 ENCOUNTER — OFFICE VISIT (OUTPATIENT)
Dept: HEMATOLOGY/ONCOLOGY | Facility: CLINIC | Age: 67
End: 2017-06-13
Payer: COMMERCIAL

## 2017-06-13 VITALS
DIASTOLIC BLOOD PRESSURE: 64 MMHG | BODY MASS INDEX: 34.82 KG/M2 | WEIGHT: 216.69 LBS | HEART RATE: 89 BPM | OXYGEN SATURATION: 98 % | TEMPERATURE: 98 F | SYSTOLIC BLOOD PRESSURE: 117 MMHG | HEIGHT: 66 IN | RESPIRATION RATE: 19 BRPM

## 2017-06-13 DIAGNOSIS — Z51.11 ENCOUNTER FOR ANTINEOPLASTIC CHEMOTHERAPY: ICD-10-CM

## 2017-06-13 DIAGNOSIS — D70.1 CHEMOTHERAPY INDUCED NEUTROPENIA: ICD-10-CM

## 2017-06-13 DIAGNOSIS — R80.9 PROTEINURIA, UNSPECIFIED TYPE: ICD-10-CM

## 2017-06-13 DIAGNOSIS — D69.59 CHEMOTHERAPY-INDUCED THROMBOCYTOPENIA: ICD-10-CM

## 2017-06-13 DIAGNOSIS — C78.01 SECONDARY ADENOCARCINOMA OF LUNG, RIGHT: ICD-10-CM

## 2017-06-13 DIAGNOSIS — R19.7 DIARRHEA, UNSPECIFIED TYPE: ICD-10-CM

## 2017-06-13 DIAGNOSIS — T45.1X5A CHEMOTHERAPY-INDUCED THROMBOCYTOPENIA: ICD-10-CM

## 2017-06-13 DIAGNOSIS — C20 RECTAL CANCER: Primary | ICD-10-CM

## 2017-06-13 DIAGNOSIS — E11.9 TYPE 2 DIABETES MELLITUS WITHOUT COMPLICATION, WITHOUT LONG-TERM CURRENT USE OF INSULIN: ICD-10-CM

## 2017-06-13 DIAGNOSIS — T45.1X5A CHEMOTHERAPY INDUCED NEUTROPENIA: ICD-10-CM

## 2017-06-13 PROCEDURE — 1126F AMNT PAIN NOTED NONE PRSNT: CPT | Mod: S$GLB,,, | Performed by: INTERNAL MEDICINE

## 2017-06-13 PROCEDURE — 1159F MED LIST DOCD IN RCRD: CPT | Mod: S$GLB,,, | Performed by: INTERNAL MEDICINE

## 2017-06-13 PROCEDURE — 99999 PR PBB SHADOW E&M-EST. PATIENT-LVL IV: CPT | Mod: PBBFAC,,, | Performed by: INTERNAL MEDICINE

## 2017-06-13 PROCEDURE — 4010F ACE/ARB THERAPY RXD/TAKEN: CPT | Mod: S$GLB,,, | Performed by: INTERNAL MEDICINE

## 2017-06-13 PROCEDURE — 99215 OFFICE O/P EST HI 40 MIN: CPT | Mod: S$GLB,,, | Performed by: INTERNAL MEDICINE

## 2017-06-13 RX ORDER — HEPARIN 100 UNIT/ML
500 SYRINGE INTRAVENOUS
Status: CANCELLED | OUTPATIENT
Start: 2017-06-16

## 2017-06-13 RX ORDER — ATROPINE SULFATE 0.4 MG/ML
0.4 INJECTION, SOLUTION ENDOTRACHEAL; INTRAMEDULLARY; INTRAMUSCULAR; INTRAVENOUS; SUBCUTANEOUS DAILY PRN
Status: CANCELLED | OUTPATIENT
Start: 2017-06-14

## 2017-06-13 RX ORDER — SODIUM CHLORIDE 0.9 % (FLUSH) 0.9 %
10 SYRINGE (ML) INJECTION
Status: CANCELLED | OUTPATIENT
Start: 2017-06-16

## 2017-06-13 RX ORDER — FLUOROURACIL 50 MG/ML
400 INJECTION, SOLUTION INTRAVENOUS
Status: CANCELLED | OUTPATIENT
Start: 2017-06-14

## 2017-06-13 RX ORDER — MORPHINE 10 MG/ML
6 TINCTURE ORAL 4 TIMES DAILY
Qty: 473 ML | Refills: 0 | Status: SHIPPED | OUTPATIENT
Start: 2017-06-13

## 2017-06-13 NOTE — PROGRESS NOTES
"Subjective:       Patient ID: Karthik Medrano is a 67 y.o. male.    Chief Complaint: Rectal Cancer and 10-20 BMs per day  Oncologic History:  66 year old male with history of rectal cancer diagnosed 1/11/10 on colonoscopy (moderately differentiated adenocarcinoma) and based on imaging, was T3NxM0. Patient underwent Neoadjuvant chemort with infusional 5FU. This was completed 3/19/10 with a total of 50.4Gy given to the pelvis. He went to surgery 5/13/10. T3N0M0. Post operative he received no adjuvant treatment and has been followed regularly. He went to his PCP 6/24/13 who ordered a CXR which found pulmonary nodules. On 6/27/13, CT chest confirmed these findings. CEA drawn when initially diagnosed was 2.6ng/ml and 6/27/13 was 30.8 ng/ml. . Pulmonary biopsy was performed 7/18/13 and this confirmed metastatic adenocarcinoma. 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 Avastin was added for second cycle.    Patient completed 12 cycles of FOLFOX avastin. He did not receive Avastin for cycle 3, 4, or 5 (due to uncontrolled blood pressure).    He was on maintenance infusional 5FU and Avastin. Repeat scans 4/14/14 showed numerous bilateral pulmonary soft tissue nodules, the majority of which demonstrate interval enlargement when compared to the prior exam. CEA up 18.2 from 13.    He was restarted on FOLFIRI and AVastin and received 6 months of chemo and then was switched to maintenance chemotherapy with 5FU and Avastin. His CT scans from early May 2015 revealed progression in lung lesions.  He is now on FOLFIRI and Cyramza.  CT from 4/19/16 reveals "Multiple stable metastatic pulmonary nodules in this patient with history of rectal cancer.  Stable postoperative changes are present from prior partial colectomy.Interval increase in right-sided hydronephrosis despite appropriate positioning of a double-J ureteral sent.  There is associated enlargement and decreased perfusion to the right kidney " "consistent with obstruction. These findings are concerning for a nonfunctioning ureteral stent. Cholelithiasis."  CT from 6/13/16 reveal stable disease and hydronephrosis has resolved.  CT from 8/5/16 reveals "Stable pulmonary metastatic disease.Increased prominence of periureteral fat stranding/inflammatory change. Stable mild right hydronephrosis."  CT from 1/6/17 "1. Stable appearance of pulmonary metastatic disease in this patient with history of colon cancer. Pulmonary index lesions are stable in size compared to prior examination. No evidence of new metastatic disease. 2. Right-sided double-J ureteral stent with stable appearing inflammatory change about the right renal collecting system and right ureter. 3. Additional stable incidental findings as discussed above. "     CT scans reveal "In this patient with a history of rectal cancer, index lesions in the right and left lung are stable in size as detailed above. No new pulmonary nodules or masses are identified. There is no evidence of abnormal soft tissue opacity in the rectosigmoid region to suggest residual/recurrent disease. Interval increase in the degree of right hydronephrosis with a stable double-J right ureteral stent in place. Interval development of perihepatic ascites. Stable fat and bowel containing ventral hernia"         HPI Mr. Medrano returns for follow up and for FOLFIRI and Cyramza. He has had 10-20 episodes/day of pasty stools.  Appetite is fair but has weight loss of 5 lbs in 2 weeks.  He denies any nausea, vomiting, constipation, abdominal pain, chest pain, shortness of breath, leg swelling, fatigue, pain, headache, dizziness, or mood changes. His ECOG PS is zero.  He is accompanied by his wife.           Review of Systems   Constitutional: Negative for appetite change, fatigue and unexpected weight change.   HENT: Negative for mouth sores.    Eyes: Negative for visual disturbance.   Respiratory: Negative for cough and shortness of breath. "    Cardiovascular: Negative for chest pain.   Gastrointestinal: Positive for diarrhea. Negative for abdominal pain.   Genitourinary: Negative for frequency.   Musculoskeletal: Negative for back pain.   Skin: Negative for rash.   Neurological: Negative for headaches.   Hematological: Negative for adenopathy.   Psychiatric/Behavioral: The patient is not nervous/anxious.    All other systems reviewed and are negative.      PMFSH: all information reviewed and updated as relevant to today's visit  Objective:      Physical Exam   Constitutional: He is oriented to person, place, and time. He appears well-developed and well-nourished.   HENT:   Mouth/Throat: No oropharyngeal exudate.   Cardiovascular: Normal rate and normal heart sounds.    Pulmonary/Chest: Effort normal and breath sounds normal. He has no wheezes.   Abdominal: Soft. Bowel sounds are normal. There is no tenderness.   Musculoskeletal: He exhibits no edema or tenderness.   Lymphadenopathy:     He has no cervical adenopathy.   Neurological: He is alert and oriented to person, place, and time. Coordination normal.   Skin: Skin is warm and dry. No rash noted.   Psychiatric: He has a normal mood and affect. Judgment and thought content normal.   Vitals reviewed.      LABS:  WBC   Date Value Ref Range Status   06/12/2017 8.44 3.90 - 12.70 K/uL Final     Hemoglobin   Date Value Ref Range Status   06/12/2017 11.2 (L) 14.0 - 18.0 g/dL Final     Hematocrit   Date Value Ref Range Status   06/12/2017 36.1 (L) 40.0 - 54.0 % Final     Platelets   Date Value Ref Range Status   06/12/2017 107 (L) 150 - 350 K/uL Final     Gran #   Date Value Ref Range Status   06/12/2017 6.3 1.8 - 7.7 K/uL Final     Comment:     The ANC is based on a white cell differential from an   automated cell counter. It has not been microscopically   reviewed for the presence of abnormal cells. Clinical   correlation is required.         Chemistry        Component Value Date/Time     06/12/2017  1648    K 4.0 06/12/2017 1648     06/12/2017 1648    CO2 26 06/12/2017 1648    BUN 10 06/12/2017 1648    CREATININE 1.2 06/12/2017 1648     06/12/2017 1648        Component Value Date/Time    CALCIUM 9.1 06/12/2017 1648    ALKPHOS 210 (H) 06/12/2017 1648    AST 25 06/12/2017 1648    ALT 22 06/12/2017 1648    BILITOT 0.4 06/12/2017 1648        Urine protein/creatinine ratio: 1.6  Assessment:       1. Rectal cancer    2. Secondary adenocarcinoma of lung, right    3. Encounter for antineoplastic chemotherapy    4. Chemotherapy induced neutropenia    5. Chemotherapy-induced thrombocytopenia    6. Proteinuria, unspecified type    7. Type 2 diabetes mellitus without complication, without long-term current use of insulin    8. Diarrhea, unspecified type        Plan:        1,2,3,4,5. He is doing well overall and will proceed with FOLFIRI and Cyramza and will return in 2 weeks for next cycle  Neulasta on day 3.  Platelets slightly low but adequate to proceed with chemo  6. Stable, proceed with Cyramza.  7. Stable on meds.  8. E scribed Opium tincture.    Above care plan was discussed with patient and accompanying wife and all questions were addressed to their satisfaction

## 2017-06-13 NOTE — Clinical Note
Schedule CBC,CMP, urine protein/creatinine ratio and see another MD in 2 weeks from now and for FOLFIRI and Cyramza. DC pump on day 3. neulasta on day 3.  Schedule CBC,CMp, urine protein/creatinine ratio and see me in 4 weeks from now and for FOLFIRI and Cyramza. Neulasta on day 3. Dc pump on day 3. Wants afternoon doctors appointmentss on Tuesdays and then schedule chemo on Wednesday afternoon around 2:30

## 2017-06-14 ENCOUNTER — INFUSION (OUTPATIENT)
Dept: INFUSION THERAPY | Facility: HOSPITAL | Age: 67
End: 2017-06-14
Attending: INTERNAL MEDICINE
Payer: COMMERCIAL

## 2017-06-14 VITALS
RESPIRATION RATE: 18 BRPM | HEART RATE: 87 BPM | SYSTOLIC BLOOD PRESSURE: 129 MMHG | TEMPERATURE: 98 F | DIASTOLIC BLOOD PRESSURE: 60 MMHG

## 2017-06-14 DIAGNOSIS — Z51.11 ENCOUNTER FOR ANTINEOPLASTIC CHEMOTHERAPY AND IMMUNOTHERAPY: ICD-10-CM

## 2017-06-14 DIAGNOSIS — Z51.12 ENCOUNTER FOR ANTINEOPLASTIC CHEMOTHERAPY AND IMMUNOTHERAPY: ICD-10-CM

## 2017-06-14 DIAGNOSIS — T45.1X5A CHEMOTHERAPY INDUCED NEUTROPENIA: Primary | ICD-10-CM

## 2017-06-14 DIAGNOSIS — D70.1 CHEMOTHERAPY INDUCED NEUTROPENIA: Primary | ICD-10-CM

## 2017-06-14 PROCEDURE — 96416 CHEMO PROLONG INFUSE W/PUMP: CPT

## 2017-06-14 PROCEDURE — 25000003 PHARM REV CODE 250: Performed by: INTERNAL MEDICINE

## 2017-06-14 PROCEDURE — 96368 THER/DIAG CONCURRENT INF: CPT

## 2017-06-14 PROCEDURE — 96413 CHEMO IV INFUSION 1 HR: CPT

## 2017-06-14 PROCEDURE — 96375 TX/PRO/DX INJ NEW DRUG ADDON: CPT

## 2017-06-14 PROCEDURE — 63600175 PHARM REV CODE 636 W HCPCS: Performed by: INTERNAL MEDICINE

## 2017-06-14 PROCEDURE — 96367 TX/PROPH/DG ADDL SEQ IV INF: CPT

## 2017-06-14 PROCEDURE — 96415 CHEMO IV INFUSION ADDL HR: CPT

## 2017-06-14 PROCEDURE — 96417 CHEMO IV INFUS EACH ADDL SEQ: CPT

## 2017-06-14 PROCEDURE — 96411 CHEMO IV PUSH ADDL DRUG: CPT

## 2017-06-14 RX ORDER — FLUOROURACIL 50 MG/ML
400 INJECTION, SOLUTION INTRAVENOUS
Status: COMPLETED | OUTPATIENT
Start: 2017-06-14 | End: 2017-06-14

## 2017-06-14 RX ORDER — ATROPINE SULFATE 0.4 MG/ML
0.4 INJECTION, SOLUTION ENDOTRACHEAL; INTRAMEDULLARY; INTRAMUSCULAR; INTRAVENOUS; SUBCUTANEOUS DAILY PRN
Status: DISCONTINUED | OUTPATIENT
Start: 2017-06-14 | End: 2017-06-14 | Stop reason: HOSPADM

## 2017-06-14 RX ADMIN — DEXAMETHASONE SODIUM PHOSPHATE: 4 INJECTION, SOLUTION INTRAMUSCULAR; INTRAVENOUS at 02:06

## 2017-06-14 RX ADMIN — SODIUM CHLORIDE 814 MG: 9 INJECTION, SOLUTION INTRAVENOUS at 03:06

## 2017-06-14 RX ADMIN — LEUCOVORIN CALCIUM 45 MG: 350 INJECTION, POWDER, LYOPHILIZED, FOR SOLUTION INTRAMUSCULAR; INTRAVENOUS at 04:06

## 2017-06-14 RX ADMIN — SODIUM CHLORIDE 392 MG: 0.9 INJECTION, SOLUTION INTRAVENOUS at 04:06

## 2017-06-14 RX ADMIN — ATROPINE SULFATE 0.4 MG: 0.4 INJECTION, SOLUTION INTRAMUSCULAR; INTRAVENOUS; SUBCUTANEOUS at 03:06

## 2017-06-14 RX ADMIN — FLUOROURACIL 5230 MG: 50 INJECTION, SOLUTION INTRAVENOUS at 06:06

## 2017-06-14 RX ADMIN — FLUOROURACIL 870 MG: 50 INJECTION, SOLUTION INTRAVENOUS at 06:06

## 2017-06-14 NOTE — PLAN OF CARE
Problem: Patient Care Overview  Goal: Plan of Care Review  Outcome: Ongoing (interventions implemented as appropriate)  Pt. Tolerated treatment without any complications. V/S stable. Placed on ambulatory CAD pump. Port site infusing without difficulty and secured. Pt. Reminded check pump at least twice a day to make sure it was running properly. Pump settings checked by 2nd RN, Crista Mae. Infusion should complete sometime around 1630 on Friday. No questions or concerns at this time.

## 2017-06-14 NOTE — PLAN OF CARE
Problem: Chemotherapy Effects (Adult)  Goal: Signs and Symptoms of Listed Potential Problems Will be Absent, Minimized or Managed (Chemotherapy Effects)  Signs and symptoms of listed potential problems will be absent, minimized or managed by discharge/transition of care (reference Chemotherapy Effects (Adult) CPG).   Outcome: Ongoing (interventions implemented as appropriate)  PT. Here today for Cyramza and Folfiri. Port accessed, blood return is delayed when syringe pulls back, however it is present. Infusing without difficulty. VSS. Pt. Has no questions or concerns.

## 2017-06-16 ENCOUNTER — INFUSION (OUTPATIENT)
Dept: INFUSION THERAPY | Facility: HOSPITAL | Age: 67
End: 2017-06-16
Attending: INTERNAL MEDICINE
Payer: COMMERCIAL

## 2017-06-16 VITALS — RESPIRATION RATE: 17 BRPM | DIASTOLIC BLOOD PRESSURE: 54 MMHG | SYSTOLIC BLOOD PRESSURE: 109 MMHG | HEART RATE: 80 BPM

## 2017-06-16 DIAGNOSIS — T45.1X5A CHEMOTHERAPY INDUCED NEUTROPENIA: Primary | ICD-10-CM

## 2017-06-16 DIAGNOSIS — Z51.12 ENCOUNTER FOR ANTINEOPLASTIC CHEMOTHERAPY AND IMMUNOTHERAPY: ICD-10-CM

## 2017-06-16 DIAGNOSIS — Z51.11 ENCOUNTER FOR ANTINEOPLASTIC CHEMOTHERAPY AND IMMUNOTHERAPY: ICD-10-CM

## 2017-06-16 DIAGNOSIS — D70.1 CHEMOTHERAPY INDUCED NEUTROPENIA: Primary | ICD-10-CM

## 2017-06-16 PROCEDURE — 63600175 PHARM REV CODE 636 W HCPCS: Performed by: INTERNAL MEDICINE

## 2017-06-16 PROCEDURE — 25000003 PHARM REV CODE 250: Performed by: INTERNAL MEDICINE

## 2017-06-16 PROCEDURE — 96377 APPLICATON ON-BODY INJECTOR: CPT

## 2017-06-16 PROCEDURE — 96361 HYDRATE IV INFUSION ADD-ON: CPT

## 2017-06-16 RX ORDER — SODIUM CHLORIDE 0.9 % (FLUSH) 0.9 %
10 SYRINGE (ML) INJECTION
Status: DISCONTINUED | OUTPATIENT
Start: 2017-06-16 | End: 2017-06-16 | Stop reason: HOSPADM

## 2017-06-16 RX ORDER — HEPARIN 100 UNIT/ML
500 SYRINGE INTRAVENOUS
Status: DISCONTINUED | OUTPATIENT
Start: 2017-06-16 | End: 2017-06-16 | Stop reason: HOSPADM

## 2017-06-16 RX ADMIN — SODIUM CHLORIDE 1000 ML: 0.9 INJECTION, SOLUTION INTRAVENOUS at 04:06

## 2017-06-16 RX ADMIN — PEGFILGRASTIM 6 MG: KIT SUBCUTANEOUS at 04:06

## 2017-06-16 RX ADMIN — HEPARIN 500 UNITS: 100 SYRINGE at 05:06

## 2017-06-16 NOTE — PLAN OF CARE
Problem: Patient Care Overview  Goal: Plan of Care Review  Outcome: Ongoing (interventions implemented as appropriate)  Pt Here for D/c pump, V/S stable, pump infusion completed and disconnected from Patient. Line flushed per protocol, pt instructed to call MD with any issues or concerns that may arise.

## 2017-06-19 ENCOUNTER — PATIENT MESSAGE (OUTPATIENT)
Dept: HEMATOLOGY/ONCOLOGY | Facility: CLINIC | Age: 67
End: 2017-06-19

## 2017-06-19 NOTE — LETTER
June 19, 2017    Karthik Medrano  [address] [address]             [facility]  [address]  [facility] [address]  Phone: [phone] To Whom It May Concern:    Karthik Medrano is presently under my care for a diagnosis of Rectal Cancer for which he is receiving chemotherapy every 2 weeks. 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 His travel is limited to the third week of every month, and he is restricted to city roads. If you need any additional information from me, please feel free to contact me at my office.      Sincerely,            Lisa Abernathy MD

## 2017-06-22 ENCOUNTER — TELEPHONE (OUTPATIENT)
Dept: HEMATOLOGY/ONCOLOGY | Facility: CLINIC | Age: 67
End: 2017-06-22

## 2017-06-22 NOTE — TELEPHONE ENCOUNTER
----- Message from Lexis Jorge sent at 6/22/2017 11:44 AM CDT -----  Contact: Self   Pt wants to discuss recent prescription. Please call [phone]

## 2017-06-22 NOTE — TELEPHONE ENCOUNTER
It was prescribed for diarrhea because he has several episodes during the day. He does not need it

## 2017-06-22 NOTE — TELEPHONE ENCOUNTER
Informed patient he does not need to take opium for frequent BMs---only to take if he is having diarrhea.  Patient voiced understanding.

## 2017-06-22 NOTE — TELEPHONE ENCOUNTER
"Returned call to patient, who is calling to state he has some concerns about why he was prescribed opium tincture, as he has frequent BMs, not loose stools. Nurse informed patient she would clarify with dr valles she indeed wants him to start taking medication. Patient thanked nurse. Patient states, "i just don't see why i have to take this."    Message routed to dr valles    "

## 2017-06-23 ENCOUNTER — TELEPHONE (OUTPATIENT)
Dept: FAMILY MEDICINE | Facility: CLINIC | Age: 67
End: 2017-06-23

## 2017-06-23 DIAGNOSIS — N39.0 URINARY TRACT INFECTION WITH HEMATURIA, SITE UNSPECIFIED: Primary | ICD-10-CM

## 2017-06-23 DIAGNOSIS — R31.9 URINARY TRACT INFECTION WITH HEMATURIA, SITE UNSPECIFIED: Primary | ICD-10-CM

## 2017-06-23 DIAGNOSIS — E11.9 TYPE 2 DIABETES MELLITUS WITHOUT COMPLICATION, WITHOUT LONG-TERM CURRENT USE OF INSULIN: ICD-10-CM

## 2017-06-23 DIAGNOSIS — C20 RECTAL CANCER: ICD-10-CM

## 2017-06-23 RX ORDER — CIPROFLOXACIN 250 MG/1
250 TABLET, FILM COATED ORAL 2 TIMES DAILY
Qty: 14 TABLET | Refills: 0 | Status: SHIPPED | OUTPATIENT
Start: 2017-06-23 | End: 2017-08-07

## 2017-06-23 NOTE — TELEPHONE ENCOUNTER
----- Message from Nikole Quinones sent at 6/23/2017 10:57 AM CDT -----  Contact: pt   Pt has UTI and would like an antibiotic sent to pharmacy.

## 2017-06-26 ENCOUNTER — LAB VISIT (OUTPATIENT)
Dept: LAB | Facility: HOSPITAL | Age: 67
End: 2017-06-26
Attending: INTERNAL MEDICINE
Payer: COMMERCIAL

## 2017-06-26 DIAGNOSIS — C20 RECTAL CANCER: ICD-10-CM

## 2017-06-26 LAB
ALBUMIN SERPL BCP-MCNC: 3 G/DL
ALP SERPL-CCNC: 205 U/L
ALT SERPL W/O P-5'-P-CCNC: 16 U/L
ANION GAP SERPL CALC-SCNC: 7 MMOL/L
AST SERPL-CCNC: 25 U/L
BILIRUB SERPL-MCNC: 0.5 MG/DL
BUN SERPL-MCNC: 12 MG/DL
CALCIUM SERPL-MCNC: 9 MG/DL
CHLORIDE SERPL-SCNC: 107 MMOL/L
CO2 SERPL-SCNC: 26 MMOL/L
CREAT SERPL-MCNC: 1.2 MG/DL
ERYTHROCYTE [DISTWIDTH] IN BLOOD BY AUTOMATED COUNT: 18.8 %
EST. GFR  (AFRICAN AMERICAN): >60 ML/MIN/1.73 M^2
EST. GFR  (NON AFRICAN AMERICAN): >60 ML/MIN/1.73 M^2
GLUCOSE SERPL-MCNC: 127 MG/DL
HCT VFR BLD AUTO: 36.6 %
HGB BLD-MCNC: 11.1 G/DL
MCH RBC QN AUTO: 28 PG
MCHC RBC AUTO-ENTMCNC: 30.3 %
MCV RBC AUTO: 92 FL
NEUTROPHILS # BLD AUTO: 6 K/UL
PLATELET # BLD AUTO: 125 K/UL
PMV BLD AUTO: 9.6 FL
POTASSIUM SERPL-SCNC: 4.2 MMOL/L
PROT SERPL-MCNC: 6.2 G/DL
RBC # BLD AUTO: 3.96 M/UL
SODIUM SERPL-SCNC: 140 MMOL/L
WBC # BLD AUTO: 8.21 K/UL

## 2017-06-26 PROCEDURE — 36415 COLL VENOUS BLD VENIPUNCTURE: CPT

## 2017-06-26 PROCEDURE — 80053 COMPREHEN METABOLIC PANEL: CPT

## 2017-06-26 PROCEDURE — 85027 COMPLETE CBC AUTOMATED: CPT

## 2017-06-26 RX ORDER — SODIUM CHLORIDE 0.9 % (FLUSH) 0.9 %
10 SYRINGE (ML) INJECTION
Status: CANCELLED | OUTPATIENT
Start: 2017-07-01

## 2017-06-26 RX ORDER — ATROPINE SULFATE 0.4 MG/ML
0.4 INJECTION, SOLUTION ENDOTRACHEAL; INTRAMEDULLARY; INTRAMUSCULAR; INTRAVENOUS; SUBCUTANEOUS DAILY PRN
Status: CANCELLED | OUTPATIENT
Start: 2017-06-29

## 2017-06-26 RX ORDER — FLUOROURACIL 50 MG/ML
400 INJECTION, SOLUTION INTRAVENOUS
Status: CANCELLED | OUTPATIENT
Start: 2017-06-29

## 2017-06-26 RX ORDER — HEPARIN 100 UNIT/ML
500 SYRINGE INTRAVENOUS
Status: CANCELLED | OUTPATIENT
Start: 2017-07-01

## 2017-06-26 NOTE — PROGRESS NOTES
"Chief Complaint: Rectal Cancer and 10-20 BMs per day    Oncologic History:   is a 67 year old male with history of rectal cancer diagnosed 1/11/10 on colonoscopy (moderately differentiated adenocarcinoma) and based on imaging, was T3NxM0. Patient underwent Neoadjuvant chemort with infusional 5FU. This was completed 3/19/10 with a total of 50.4Gy given to the pelvis. He went to surgery 5/13/10. T3N0M0. Post operative he received no adjuvant treatment and has been followed regularly. He went to his PCP 6/24/13 who ordered a CXR which found pulmonary nodules. On 6/27/13, CT chest confirmed these findings. CEA drawn when initially diagnosed was 2.6ng/ml and 6/27/13 was 30.8 ng/ml. . Pulmonary biopsy was performed 7/18/13 and this confirmed metastatic adenocarcinoma. 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 Avastin was added for second cycle.    Patient completed 12 cycles of FOLFOX avastin. He did not receive Avastin for cycle 3, 4, or 5 (due to uncontrolled blood pressure).    He was on maintenance infusional 5FU and Avastin. Repeat scans 4/14/14 showed numerous bilateral pulmonary soft tissue nodules, the majority of which demonstrate interval enlargement when compared to the prior exam. CEA up 18.2 from 13.    He was restarted on FOLFIRI and AVastin and received 6 months of chemo and then was switched to maintenance chemotherapy with 5FU and Avastin. His CT scans from early May 2015 revealed progression in lung lesions.  He is now on FOLFIRI and Cyramza.  CT from 4/19/16 reveals "Multiple stable metastatic pulmonary nodules in this patient with history of rectal cancer.  Stable postoperative changes are present from prior partial colectomy.Interval increase in right-sided hydronephrosis despite appropriate positioning of a double-J ureteral sent.  There is associated enlargement and decreased perfusion to the right kidney consistent with obstruction. These findings are " "concerning for a nonfunctioning ureteral stent. Cholelithiasis."  CT from 6/13/16 reveal stable disease and hydronephrosis has resolved.  CT from 8/5/16 reveals "Stable pulmonary metastatic disease.Increased prominence of periureteral fat stranding/inflammatory change. Stable mild right hydronephrosis."  CT from 1/6/17 "1. Stable appearance of pulmonary metastatic disease in this patient with history of colon cancer. Pulmonary index lesions are stable in size compared to prior examination. No evidence of new metastatic disease. 2. Right-sided double-J ureteral stent with stable appearing inflammatory change about the right renal collecting system and right ureter. 3. Additional stable incidental findings as discussed above. "     CT scans reveal "In this patient with a history of rectal cancer, index lesions in the right and left lung are stable in size as detailed above. No new pulmonary nodules or masses are identified. There is no evidence of abnormal soft tissue opacity in the rectosigmoid region to suggest residual/recurrent disease. Interval increase in the degree of right hydronephrosis with a stable double-J right ureteral stent in place. Interval development of perihepatic ascites. Stable fat and bowel containing ventral hernia"         HPI Mr. Medrano returns for follow up and for FOLFIRI and Cyramza. He has had 10-20 episodes/day of pasty stools.  Appetite is fair but has weight loss of 5 lbs in 2 weeks.  He denies any nausea, vomiting, constipation, abdominal pain, chest pain, shortness of breath, leg swelling, fatigue, pain, headache, dizziness, or mood changes. His ECOG PS is zero.  He is accompanied by his wife.       Review of Systems   Constitutional: Negative for chills, fever, malaise/fatigue and weight loss.   HENT: Negative for congestion and nosebleeds.    Eyes: Negative for blurred vision.   Cardiovascular: Negative for chest pain, palpitations, orthopnea and leg swelling.   Gastrointestinal: " Positive for diarrhea. Negative for abdominal pain, blood in stool, constipation, heartburn and nausea.   Genitourinary: Positive for dysuria, frequency and urgency. Negative for hematuria.   Musculoskeletal: Negative for myalgias.   Skin: Negative for rash.   Neurological: Negative for dizziness, tingling, focal weakness, seizures and headaches.   Endo/Heme/Allergies: Does not bruise/bleed easily.   Psychiatric/Behavioral: Negative for depression.   All other systems reviewed and are negative.    Review of patient's allergies indicates: No Known Allergies    Current Outpatient Prescriptions   Medication Sig    amlodipine (NORVASC) 5 MG tablet Take 1 tablet (5 mg total) by mouth 2 (two) times daily.    ascorbic acid (VITAMIN C) 1000 MG tablet Take 1,000 mg by mouth once daily.    atorvastatin (LIPITOR) 10 MG tablet Take 1 tablet (10 mg total) by mouth once daily.    cholecalciferol, vitamin D3, (VITAMIN D3) 2,000 unit Cap Take 1 capsule by mouth once daily.    ciprofloxacin HCl (CIPRO) 250 MG tablet Take 1 tablet (250 mg total) by mouth 2 (two) times daily.    co-enzyme Q-10 30 mg capsule Take 30 mg by mouth 3 (three) times daily.    diphenoxylate-atropine 2.5-0.025 mg (LOMOTIL) 2.5-0.025 mg per tablet Take 1 tablet by mouth 4 (four) times daily as needed for Diarrhea.    ferrous gluconate (FERGON) 324 MG tablet Take 324 mg by mouth daily with breakfast.    furosemide (LASIX) 20 MG tablet TAKE 1 TABLET BY MOUTH DAILY AS NEEDED    gabapentin (NEURONTIN) 300 MG capsule TAKE ONE CAPSULE BY MOUTH EVERY EVENING    GARLIC EXTRACT ORAL Take by mouth.    glipiZIDE (GLUCOTROL) 5 MG tablet TAKE 1 TABLET BY MOUTH THREE TIMES DAILY    lisinopril (PRINIVIL,ZESTRIL) 40 MG tablet TAKE 1 TABLET BY MOUTH TWICE DAILY    metoprolol succinate (TOPROL-XL) 100 MG 24 hr tablet Take 1 tablet (100 mg total) by mouth 2 (two) times daily.    multivitamin with iron-mineral TbSR Take 1 tablet by mouth once daily.    NYSTATIN  (DUKE'S SOLUTION) Take 10 mLs by mouth 4 (four) times daily. Mix equal amounts of benadryl, maalox, 2% viscous lidocaine and nystatin    Swish and swallow 10 ml 4 times a day    opium tincture 10 mg/mL (morphine) Tinc Take 0.6 mLs (6 mg total) by mouth 4 (four) times daily.    oxycodone-acetaminophen (PERCOCET) 5-325 mg per tablet TK 1 T PO Q 6 H PRN P    paregoric 2 mg/5 mL Liqd Take 5 mLs by mouth 3 (three) times daily.    tamsulosin (FLOMAX) 0.4 mg Cp24 Take 1 capsule (0.4 mg total) by mouth once daily.     No current facility-administered medications for this visit.        Vitals:    06/27/17 1311   BP: 138/67   Pulse: 74   Resp: 16   Temp: 99 °F (37.2 °C)     Physical Exam   Constitutional: He is oriented to person, place, and time. He appears well-developed and well-nourished.   HENT:   Head: Normocephalic and atraumatic.   Eyes: Pupils are equal, round, and reactive to light. No scleral icterus.   Wears glasses   Neck: Normal range of motion.   Cardiovascular: Normal rate.    Pulmonary/Chest: Effort normal and breath sounds normal. No respiratory distress. He has no wheezes.   Abdominal: Soft. He exhibits no distension and no mass. There is no tenderness. There is no rebound.   Musculoskeletal: Normal range of motion. He exhibits no edema.   Lymphadenopathy:     He has no cervical adenopathy.   Neurological: He is alert and oriented to person, place, and time. No cranial nerve deficit.   Skin: Skin is warm. No erythema.   Psychiatric: He has a normal mood and affect.       Component      Latest Ref Rng & Units 6/26/2017   Sodium      136 - 145 mmol/L 140   Potassium      3.5 - 5.1 mmol/L 4.2   Chloride      95 - 110 mmol/L 107   CO2      23 - 29 mmol/L 26   Glucose      70 - 110 mg/dL 127 (H)   BUN, Bld      8 - 23 mg/dL 12   Creatinine      0.5 - 1.4 mg/dL 1.2   Calcium      8.7 - 10.5 mg/dL 9.0   Total Protein      6.0 - 8.4 g/dL 6.2   Albumin      3.5 - 5.2 g/dL 3.0 (L)   Total Bilirubin      0.1 - 1.0  mg/dL 0.5   Alkaline Phosphatase      55 - 135 U/L 205 (H)   AST      10 - 40 U/L 25   ALT      10 - 44 U/L 16   Anion Gap      8 - 16 mmol/L 7 (L)   eGFR if African American      >60 mL/min/1.73 m:2 >60.0   eGFR if non African American      >60 mL/min/1.73 m:2 >60.0   WBC      3.90 - 12.70 K/uL 8.21   RBC      4.60 - 6.20 M/uL 3.96 (L)   Hemoglobin      14.0 - 18.0 g/dL 11.1 (L)   Hematocrit      40.0 - 54.0 % 36.6 (L)   MCV      82 - 98 fL 92   MCH      27.0 - 31.0 pg 28.0   MCHC      32.0 - 36.0 % 30.3 (L)   RDW      11.5 - 14.5 % 18.8 (H)   Platelets      150 - 350 K/uL 125 (L)   MPV      9.2 - 12.9 fL 9.6   Gran #      1.8 - 7.7 K/uL 6.0   Protein, Urine Random      0 - 15 mg/dL 191 (H)   Creatinine, Random Ur      23.0 - 375.0 mg/dL 165.0   Prot/Creat Ratio, Ur      0.00 - 0.20 1.16 (H)       1. Rectal cancer    2. Secondary adenocarcinoma of lung, right    3. Encounter for antineoplastic chemotherapy    4. Chemotherapy induced neutropenia    5. Chemotherapy-induced thrombocytopenia    6. Proteinuria, unspecified type    7. Likely UTI: Empirically treated with oral antibiotics by his PCP.    8. Type 2 diabetes mellitus without complication, without long-term current use of insulin    9. Diarrhea, unspecified type      Plan:     1,2,3,4,5. He is doing well overall and will proceed with FOLFIRI and Cyramza and will return in 2 weeks for next cycle  Neulasta on day 3.  6. Stable, proceed with Cyramza.  7. Stable on meds.  8. E scribed Opium tincture. Diarrhea is better  9. UTI: Symptoms resolving. On oral antibiotics.

## 2017-06-27 ENCOUNTER — OFFICE VISIT (OUTPATIENT)
Dept: HEMATOLOGY/ONCOLOGY | Facility: CLINIC | Age: 67
End: 2017-06-27
Payer: COMMERCIAL

## 2017-06-27 VITALS
RESPIRATION RATE: 16 BRPM | HEIGHT: 66 IN | TEMPERATURE: 99 F | SYSTOLIC BLOOD PRESSURE: 138 MMHG | WEIGHT: 215.38 LBS | BODY MASS INDEX: 34.61 KG/M2 | DIASTOLIC BLOOD PRESSURE: 67 MMHG | HEART RATE: 74 BPM | OXYGEN SATURATION: 97 %

## 2017-06-27 DIAGNOSIS — C20 RECTAL ADENOCARCINOMA: Primary | ICD-10-CM

## 2017-06-27 PROCEDURE — 99999 PR PBB SHADOW E&M-EST. PATIENT-LVL III: CPT | Mod: PBBFAC,,, | Performed by: INTERNAL MEDICINE

## 2017-06-27 PROCEDURE — 1125F AMNT PAIN NOTED PAIN PRSNT: CPT | Mod: S$GLB,,, | Performed by: INTERNAL MEDICINE

## 2017-06-27 PROCEDURE — 99213 OFFICE O/P EST LOW 20 MIN: CPT | Mod: S$GLB,,, | Performed by: INTERNAL MEDICINE

## 2017-06-27 PROCEDURE — 1159F MED LIST DOCD IN RCRD: CPT | Mod: S$GLB,,, | Performed by: INTERNAL MEDICINE

## 2017-06-27 NOTE — LETTER
June 27, 2017      Lisa Abernathy MD  [address]  [facility] [address]           [facility]  [address]  [facility] [address]  Phone: [phone]          Patient: Karthik Medrano   MR Number: [id number]   YOB: 1950   Date of Visit: 6/27/2017       Dear Dr. Lisa Abernathy:    Thank you for referring Karthik Medrano to me for evaluation. Attached you will find relevant portions of my assessment and plan of care.    If you have questions, please do not hesitate to call me. I look forward to following Karthik Medrano along with you.    Sincerely,    Marie Otero MD    Enclosure  CC:  No Recipients    If you would like to receive this communication electronically, please contact [email or web address] or [phone] to request more information on [facility] access.    For providers and/or their staff who would like to refer a patient to [facility], please contact us through our one-stop-shop provider referral line, [facility] , at [phone].    If you feel you have received this communication in error or would no longer like to receive these types of communications, please e-mail [email or web address]

## 2017-06-28 ENCOUNTER — INFUSION (OUTPATIENT)
Dept: INFUSION THERAPY | Facility: HOSPITAL | Age: 67
End: 2017-06-28
Attending: INTERNAL MEDICINE
Payer: COMMERCIAL

## 2017-06-28 ENCOUNTER — TELEPHONE (OUTPATIENT)
Dept: FAMILY MEDICINE | Facility: CLINIC | Age: 67
End: 2017-06-28

## 2017-06-28 VITALS
DIASTOLIC BLOOD PRESSURE: 68 MMHG | TEMPERATURE: 98 F | RESPIRATION RATE: 18 BRPM | HEART RATE: 79 BPM | SYSTOLIC BLOOD PRESSURE: 150 MMHG

## 2017-06-28 DIAGNOSIS — T45.1X5A CHEMOTHERAPY INDUCED NEUTROPENIA: Primary | ICD-10-CM

## 2017-06-28 DIAGNOSIS — D70.1 CHEMOTHERAPY INDUCED NEUTROPENIA: Primary | ICD-10-CM

## 2017-06-28 DIAGNOSIS — Z51.12 ENCOUNTER FOR ANTINEOPLASTIC CHEMOTHERAPY AND IMMUNOTHERAPY: ICD-10-CM

## 2017-06-28 DIAGNOSIS — I10 ESSENTIAL HYPERTENSION: ICD-10-CM

## 2017-06-28 DIAGNOSIS — Z51.11 ENCOUNTER FOR ANTINEOPLASTIC CHEMOTHERAPY AND IMMUNOTHERAPY: ICD-10-CM

## 2017-06-28 PROCEDURE — 96413 CHEMO IV INFUSION 1 HR: CPT

## 2017-06-28 PROCEDURE — 96416 CHEMO PROLONG INFUSE W/PUMP: CPT

## 2017-06-28 PROCEDURE — 96368 THER/DIAG CONCURRENT INF: CPT

## 2017-06-28 PROCEDURE — 96415 CHEMO IV INFUSION ADDL HR: CPT

## 2017-06-28 PROCEDURE — 25000003 PHARM REV CODE 250: Performed by: INTERNAL MEDICINE

## 2017-06-28 PROCEDURE — 96375 TX/PRO/DX INJ NEW DRUG ADDON: CPT

## 2017-06-28 PROCEDURE — 96417 CHEMO IV INFUS EACH ADDL SEQ: CPT

## 2017-06-28 PROCEDURE — 63600175 PHARM REV CODE 636 W HCPCS: Performed by: INTERNAL MEDICINE

## 2017-06-28 PROCEDURE — 96367 TX/PROPH/DG ADDL SEQ IV INF: CPT

## 2017-06-28 PROCEDURE — 96411 CHEMO IV PUSH ADDL DRUG: CPT

## 2017-06-28 RX ORDER — ATROPINE SULFATE 0.4 MG/ML
0.4 INJECTION, SOLUTION ENDOTRACHEAL; INTRAMEDULLARY; INTRAMUSCULAR; INTRAVENOUS; SUBCUTANEOUS DAILY PRN
Status: DISCONTINUED | OUTPATIENT
Start: 2017-06-28 | End: 2017-06-28 | Stop reason: HOSPADM

## 2017-06-28 RX ORDER — AMLODIPINE BESYLATE 5 MG/1
TABLET ORAL
Qty: 180 TABLET | Refills: 0 | Status: SHIPPED | OUTPATIENT
Start: 2017-06-28 | End: 2017-06-29 | Stop reason: SDUPTHER

## 2017-06-28 RX ORDER — FLUOROURACIL 50 MG/ML
400 INJECTION, SOLUTION INTRAVENOUS
Status: COMPLETED | OUTPATIENT
Start: 2017-06-28 | End: 2017-06-28

## 2017-06-28 RX ADMIN — SODIUM CHLORIDE: 0.9 INJECTION, SOLUTION INTRAVENOUS at 03:06

## 2017-06-28 RX ADMIN — ATROPINE SULFATE 0.4 MG: 0.4 INJECTION, SOLUTION INTRAMUSCULAR; INTRAVENOUS; SUBCUTANEOUS at 04:06

## 2017-06-28 RX ADMIN — SODIUM CHLORIDE: 9 INJECTION, SOLUTION INTRAVENOUS at 03:06

## 2017-06-28 RX ADMIN — SODIUM CHLORIDE 814 MG: 9 INJECTION, SOLUTION INTRAVENOUS at 03:06

## 2017-06-28 RX ADMIN — FLUOROURACIL 870 MG: 50 INJECTION, SOLUTION INTRAVENOUS at 05:06

## 2017-06-28 RX ADMIN — FLUOROURACIL 5230 MG: 50 INJECTION, SOLUTION INTRAVENOUS at 05:06

## 2017-06-28 RX ADMIN — DEXAMETHASONE SODIUM PHOSPHATE: 4 INJECTION, SOLUTION INTRAMUSCULAR; INTRAVENOUS at 03:06

## 2017-06-28 RX ADMIN — SODIUM CHLORIDE 392 MG: 0.9 INJECTION, SOLUTION INTRAVENOUS at 04:06

## 2017-06-28 RX ADMIN — LEUCOVORIN CALCIUM 45 MG: 350 INJECTION, POWDER, LYOPHILIZED, FOR SOLUTION INTRAMUSCULAR; INTRAVENOUS at 04:06

## 2017-06-28 NOTE — PLAN OF CARE
Problem: Patient Care Overview  Goal: Plan of Care Review  1810- Portable pump infusing on discharge all clamps open and connections secured. No kinks in tubing noted. Settings double checked by Crista TODD. Educated patient to be sure pump should say run and volume infused number should increase daily as reservoir volume decreases. Instructed to call with any issues with pump. Phone numbers given.  Instructed to call provider for any questions or concerns.

## 2017-06-28 NOTE — PLAN OF CARE
Problem: Patient Care Overview  Goal: Plan of Care Review  Outcome: Ongoing (interventions implemented as appropriate)  1450-Labs , hx, and medications reviewed, patient was seen by MD earlier this week. Assessment completed. Discussed plan of care with patient. Patient in agreement. Chair reclined and warm blanket and snack offered.

## 2017-06-29 RX ORDER — AMLODIPINE BESYLATE 5 MG/1
TABLET ORAL
Qty: 180 TABLET | Refills: 0 | Status: SHIPPED | OUTPATIENT
Start: 2017-06-29 | End: 2017-09-05 | Stop reason: SDUPTHER

## 2017-06-30 ENCOUNTER — INFUSION (OUTPATIENT)
Dept: INFUSION THERAPY | Facility: HOSPITAL | Age: 67
End: 2017-06-30
Attending: INTERNAL MEDICINE
Payer: COMMERCIAL

## 2017-06-30 VITALS
DIASTOLIC BLOOD PRESSURE: 72 MMHG | HEART RATE: 88 BPM | TEMPERATURE: 98 F | SYSTOLIC BLOOD PRESSURE: 136 MMHG | RESPIRATION RATE: 16 BRPM

## 2017-06-30 DIAGNOSIS — Z51.11 ENCOUNTER FOR ANTINEOPLASTIC CHEMOTHERAPY AND IMMUNOTHERAPY: ICD-10-CM

## 2017-06-30 DIAGNOSIS — T45.1X5A CHEMOTHERAPY INDUCED NEUTROPENIA: Primary | ICD-10-CM

## 2017-06-30 DIAGNOSIS — D70.1 CHEMOTHERAPY INDUCED NEUTROPENIA: Primary | ICD-10-CM

## 2017-06-30 DIAGNOSIS — Z51.12 ENCOUNTER FOR ANTINEOPLASTIC CHEMOTHERAPY AND IMMUNOTHERAPY: ICD-10-CM

## 2017-06-30 PROCEDURE — 96360 HYDRATION IV INFUSION INIT: CPT

## 2017-06-30 PROCEDURE — 25000003 PHARM REV CODE 250: Performed by: INTERNAL MEDICINE

## 2017-06-30 PROCEDURE — 63600175 PHARM REV CODE 636 W HCPCS: Performed by: INTERNAL MEDICINE

## 2017-06-30 PROCEDURE — 96377 APPLICATON ON-BODY INJECTOR: CPT

## 2017-06-30 RX ORDER — HEPARIN 100 UNIT/ML
500 SYRINGE INTRAVENOUS
Status: DISCONTINUED | OUTPATIENT
Start: 2017-06-30 | End: 2017-06-30 | Stop reason: HOSPADM

## 2017-06-30 RX ORDER — SODIUM CHLORIDE 0.9 % (FLUSH) 0.9 %
10 SYRINGE (ML) INJECTION
Status: DISCONTINUED | OUTPATIENT
Start: 2017-06-30 | End: 2017-06-30 | Stop reason: HOSPADM

## 2017-06-30 RX ADMIN — SODIUM CHLORIDE, PRESERVATIVE FREE 10 ML: 5 INJECTION INTRAVENOUS at 05:06

## 2017-06-30 RX ADMIN — HEPARIN 500 UNITS: 100 SYRINGE at 05:06

## 2017-06-30 RX ADMIN — PEGFILGRASTIM 6 MG: KIT SUBCUTANEOUS at 05:06

## 2017-06-30 RX ADMIN — SODIUM CHLORIDE 1000 ML: 0.9 INJECTION, SOLUTION INTRAVENOUS at 04:06

## 2017-07-01 NOTE — PLAN OF CARE
Problem: Patient Care Overview  Goal: Plan of Care Review  Outcome: Ongoing (interventions implemented as appropriate)  Patient received fluids and on body neulasta without any issues. Notified to call MD with any further concerns . Vss. No apparent distress noted.

## 2017-07-01 NOTE — PLAN OF CARE
Problem: Chemotherapy Effects (Adult)  Goal: Signs and Symptoms of Listed Potential Problems Will be Absent, Minimized or Managed (Chemotherapy Effects)  Signs and symptoms of listed potential problems will be absent, minimized or managed by discharge/transition of care (reference Chemotherapy Effects (Adult) CPG).   Outcome: Ongoing (interventions implemented as appropriate)  Pt here for 1 liter of fluid and on body neulasta then to disconnect his pump

## 2017-07-03 DIAGNOSIS — R19.7 DIARRHEA, UNSPECIFIED TYPE: Primary | ICD-10-CM

## 2017-07-03 RX ORDER — CHOLESTYRAMINE
4 POWDER (GRAM) MISCELLANEOUS DAILY
Qty: 500 G | Refills: 3 | Status: SHIPPED | OUTPATIENT
Start: 2017-07-03

## 2017-07-10 ENCOUNTER — LAB VISIT (OUTPATIENT)
Dept: LAB | Facility: HOSPITAL | Age: 67
End: 2017-07-10
Attending: INTERNAL MEDICINE
Payer: COMMERCIAL

## 2017-07-10 DIAGNOSIS — C20 RECTAL CANCER: ICD-10-CM

## 2017-07-10 LAB
CREAT UR-MCNC: 248 MG/DL
PROT UR-MCNC: 161 MG/DL
PROT/CREAT RATIO, UR: 0.65

## 2017-07-10 PROCEDURE — 84156 ASSAY OF PROTEIN URINE: CPT

## 2017-07-11 ENCOUNTER — OFFICE VISIT (OUTPATIENT)
Dept: HEMATOLOGY/ONCOLOGY | Facility: CLINIC | Age: 67
End: 2017-07-11
Payer: COMMERCIAL

## 2017-07-11 VITALS
BODY MASS INDEX: 31.47 KG/M2 | HEART RATE: 80 BPM | HEIGHT: 70 IN | DIASTOLIC BLOOD PRESSURE: 74 MMHG | SYSTOLIC BLOOD PRESSURE: 148 MMHG | WEIGHT: 219.81 LBS | TEMPERATURE: 98 F | OXYGEN SATURATION: 97 % | RESPIRATION RATE: 20 BRPM

## 2017-07-11 DIAGNOSIS — T45.1X5A CHEMOTHERAPY INDUCED NEUTROPENIA: ICD-10-CM

## 2017-07-11 DIAGNOSIS — C78.01 SECONDARY ADENOCARCINOMA OF LUNG, RIGHT: ICD-10-CM

## 2017-07-11 DIAGNOSIS — Z51.11 ENCOUNTER FOR ANTINEOPLASTIC CHEMOTHERAPY: ICD-10-CM

## 2017-07-11 DIAGNOSIS — C20 RECTAL CANCER: Primary | ICD-10-CM

## 2017-07-11 DIAGNOSIS — D70.1 CHEMOTHERAPY INDUCED NEUTROPENIA: ICD-10-CM

## 2017-07-11 DIAGNOSIS — E11.9 TYPE 2 DIABETES MELLITUS WITHOUT COMPLICATION, WITHOUT LONG-TERM CURRENT USE OF INSULIN: ICD-10-CM

## 2017-07-11 PROCEDURE — 99215 OFFICE O/P EST HI 40 MIN: CPT | Mod: S$GLB,,, | Performed by: INTERNAL MEDICINE

## 2017-07-11 PROCEDURE — 1125F AMNT PAIN NOTED PAIN PRSNT: CPT | Mod: S$GLB,,, | Performed by: INTERNAL MEDICINE

## 2017-07-11 PROCEDURE — 1159F MED LIST DOCD IN RCRD: CPT | Mod: S$GLB,,, | Performed by: INTERNAL MEDICINE

## 2017-07-11 PROCEDURE — 99999 PR PBB SHADOW E&M-EST. PATIENT-LVL V: CPT | Mod: PBBFAC,,, | Performed by: INTERNAL MEDICINE

## 2017-07-11 PROCEDURE — 4010F ACE/ARB THERAPY RXD/TAKEN: CPT | Mod: S$GLB,,, | Performed by: INTERNAL MEDICINE

## 2017-07-11 RX ORDER — ATROPINE SULFATE 0.4 MG/ML
0.4 INJECTION, SOLUTION ENDOTRACHEAL; INTRAMEDULLARY; INTRAMUSCULAR; INTRAVENOUS; SUBCUTANEOUS DAILY PRN
Status: CANCELLED | OUTPATIENT
Start: 2017-07-12

## 2017-07-11 RX ORDER — HEPARIN 100 UNIT/ML
500 SYRINGE INTRAVENOUS
Status: CANCELLED | OUTPATIENT
Start: 2017-07-14

## 2017-07-11 RX ORDER — FLUOROURACIL 50 MG/ML
400 INJECTION, SOLUTION INTRAVENOUS
Status: CANCELLED | OUTPATIENT
Start: 2017-07-12

## 2017-07-11 RX ORDER — SODIUM CHLORIDE 0.9 % (FLUSH) 0.9 %
10 SYRINGE (ML) INJECTION
Status: CANCELLED | OUTPATIENT
Start: 2017-07-14

## 2017-07-11 NOTE — Clinical Note
Schedule CBC, CMP, CEA and CT chest, abdomen/pelvis a on 7/21/17  Schedule to see me on 7/25/17.  Schedule FOLFIRI and Cyramza on 7/26/17. DC pump on day 3. Neulasta on day 3. Normal saline 1 liter on day 3.

## 2017-07-11 NOTE — PROGRESS NOTES
"Subjective:       Patient ID: Karthik Medrano is a 67 y.o. male.    Chief Complaint: Rectal Cancer  Oncologic History:  66 year old male with history of rectal cancer diagnosed 1/11/10 on colonoscopy (moderately differentiated adenocarcinoma) and based on imaging, was T3NxM0. Patient underwent Neoadjuvant chemort with infusional 5FU. This was completed 3/19/10 with a total of 50.4Gy given to the pelvis. He went to surgery 5/13/10. T3N0M0. Post operative he received no adjuvant treatment and has been followed regularly. He went to his PCP 6/24/13 who ordered a CXR which found pulmonary nodules. On 6/27/13, CT chest confirmed these findings. CEA drawn when initially diagnosed was 2.6ng/ml and 6/27/13 was 30.8 ng/ml. . Pulmonary biopsy was performed 7/18/13 and this confirmed metastatic adenocarcinoma. 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 Avastin was added for second cycle.    Patient completed 12 cycles of FOLFOX avastin. He did not receive Avastin for cycle 3, 4, or 5 (due to uncontrolled blood pressure).    He was on maintenance infusional 5FU and Avastin. Repeat scans 4/14/14 showed numerous bilateral pulmonary soft tissue nodules, the majority of which demonstrate interval enlargement when compared to the prior exam. CEA up 18.2 from 13.    He was restarted on FOLFIRI and AVastin and received 6 months of chemo and then was switched to maintenance chemotherapy with 5FU and Avastin. His CT scans from early May 2015 revealed progression in lung lesions.  He is now on FOLFIRI and Cyramza.  CT from 4/19/16 reveals "Multiple stable metastatic pulmonary nodules in this patient with history of rectal cancer.  Stable postoperative changes are present from prior partial colectomy.Interval increase in right-sided hydronephrosis despite appropriate positioning of a double-J ureteral sent.  There is associated enlargement and decreased perfusion to the right kidney consistent with " "obstruction. These findings are concerning for a nonfunctioning ureteral stent. Cholelithiasis."  CT from 6/13/16 reveal stable disease and hydronephrosis has resolved.  CT from 8/5/16 reveals "Stable pulmonary metastatic disease.Increased prominence of periureteral fat stranding/inflammatory change. Stable mild right hydronephrosis."  CT from 1/6/17 "1. Stable appearance of pulmonary metastatic disease in this patient with history of colon cancer. Pulmonary index lesions are stable in size compared to prior examination. No evidence of new metastatic disease. 2. Right-sided double-J ureteral stent with stable appearing inflammatory change about the right renal collecting system and right ureter. 3. Additional stable incidental findings as discussed above. "     CT scans reveal "In this patient with a history of rectal cancer, index lesions in the right and left lung are stable in size as detailed above. No new pulmonary nodules or masses are identified. There is no evidence of abnormal soft tissue opacity in the rectosigmoid region to suggest residual/recurrent disease. Interval increase in the degree of right hydronephrosis with a stable double-J right ureteral stent in place. Interval development of perihepatic ascites. Stable fat and bowel containing ventral hernia"           HPI Mr. Medrano returns for follow up and for FOLFIRI and Cyramza  He has alternating constipation and pasty stools. Appetite is good and he has gained 4 lbs since last visit 2 weeks ago.    He denies any nausea, vomiting, diarrhea, constipation, abdominal pain, weight loss or loss of appetite, chest pain, shortness of breath, leg swelling, fatigue, pain, headache, dizziness, or mood changes. His ECOG PS is zero. He is accompanied by his wife,         Review of Systems   Constitutional: Negative for appetite change, fatigue and unexpected weight change.   HENT: Negative for mouth sores.    Eyes: Negative for visual disturbance.   Respiratory: " Negative for cough and shortness of breath.    Cardiovascular: Negative for chest pain.   Gastrointestinal: Positive for constipation and diarrhea. Negative for abdominal pain.   Genitourinary: Negative for frequency.   Musculoskeletal: Negative for back pain.   Skin: Negative for rash.   Neurological: Negative for headaches.   Hematological: Negative for adenopathy.   Psychiatric/Behavioral: The patient is not nervous/anxious.    All other systems reviewed and are negative.      PMFSH: all information reviewed and updated as relevant to today's visit  Objective:      Physical Exam   Constitutional: He is oriented to person, place, and time. He appears well-developed and well-nourished.   HENT:   Mouth/Throat: No oropharyngeal exudate.   Cardiovascular: Normal rate and normal heart sounds.    Pulmonary/Chest: Effort normal and breath sounds normal. He has no wheezes.   Abdominal: Soft. Bowel sounds are normal. There is no tenderness.   Musculoskeletal: He exhibits no edema or tenderness.   Lymphadenopathy:     He has no cervical adenopathy.   Neurological: He is alert and oriented to person, place, and time. Coordination normal.   Skin: Skin is warm and dry. No rash noted.   Psychiatric: He has a normal mood and affect. Judgment and thought content normal.   Vitals reviewed.      LABS:  WBC   Date Value Ref Range Status   07/10/2017 11.13 3.90 - 12.70 K/uL Final     Hemoglobin   Date Value Ref Range Status   07/10/2017 11.5 (L) 14.0 - 18.0 g/dL Final     Hematocrit   Date Value Ref Range Status   07/10/2017 37.0 (L) 40.0 - 54.0 % Final     Platelets   Date Value Ref Range Status   07/10/2017 92 (L) 150 - 350 K/uL Final     Gran #   Date Value Ref Range Status   07/10/2017 8.6 (H) 1.8 - 7.7 K/uL Final     Comment:     The ANC is based on a white cell differential from an   automated cell counter. It has not been microscopically   reviewed for the presence of abnormal cells. Clinical   correlation is required.          Chemistry        Component Value Date/Time     07/10/2017 1647    K 3.8 07/10/2017 1647     07/10/2017 1647    CO2 26 07/10/2017 1647    BUN 11 07/10/2017 1647    CREATININE 1.1 07/10/2017 1647     (H) 07/10/2017 1647        Component Value Date/Time    CALCIUM 9.0 07/10/2017 1647    ALKPHOS 203 (H) 07/10/2017 1647    AST 29 07/10/2017 1647    ALT 21 07/10/2017 1647    BILITOT 0.5 07/10/2017 1647    ESTGFRAFRICA >60.0 07/10/2017 1647    EGFRNONAA >60.0 07/10/2017 1647        Urine protein/creatinine ratio: 0.65    Assessment:       1. Rectal cancer    2. Secondary adenocarcinoma of lung, right    3. Encounter for antineoplastic chemotherapy    4. Chemotherapy induced neutropenia    5. Type 2 diabetes mellitus without complication, without long-term current use of insulin        Plan:        1,2,3,4. He is doing well clinically and will proceed with FOLFIRI and Cyramza chemo and will return in 2 weeks for next cycle with restaging CT scans.  Neulasta on day 3.  5. Stable, notes compliance with glipizide.    Above care plan was discussed with patient and accompanying wife and all questions were addressed to their satisfaction

## 2017-07-12 ENCOUNTER — INFUSION (OUTPATIENT)
Dept: INFUSION THERAPY | Facility: HOSPITAL | Age: 67
End: 2017-07-12
Attending: INTERNAL MEDICINE
Payer: COMMERCIAL

## 2017-07-12 VITALS
HEART RATE: 78 BPM | SYSTOLIC BLOOD PRESSURE: 129 MMHG | DIASTOLIC BLOOD PRESSURE: 63 MMHG | TEMPERATURE: 99 F | RESPIRATION RATE: 18 BRPM

## 2017-07-12 DIAGNOSIS — D70.1 CHEMOTHERAPY INDUCED NEUTROPENIA: Primary | ICD-10-CM

## 2017-07-12 DIAGNOSIS — T45.1X5A CHEMOTHERAPY INDUCED NEUTROPENIA: Primary | ICD-10-CM

## 2017-07-12 DIAGNOSIS — Z51.12 ENCOUNTER FOR ANTINEOPLASTIC CHEMOTHERAPY AND IMMUNOTHERAPY: ICD-10-CM

## 2017-07-12 DIAGNOSIS — Z51.11 ENCOUNTER FOR ANTINEOPLASTIC CHEMOTHERAPY AND IMMUNOTHERAPY: ICD-10-CM

## 2017-07-12 PROCEDURE — 63600175 PHARM REV CODE 636 W HCPCS: Performed by: INTERNAL MEDICINE

## 2017-07-12 PROCEDURE — 96415 CHEMO IV INFUSION ADDL HR: CPT

## 2017-07-12 PROCEDURE — 96367 TX/PROPH/DG ADDL SEQ IV INF: CPT

## 2017-07-12 PROCEDURE — 96416 CHEMO PROLONG INFUSE W/PUMP: CPT

## 2017-07-12 PROCEDURE — 96417 CHEMO IV INFUS EACH ADDL SEQ: CPT

## 2017-07-12 PROCEDURE — 96413 CHEMO IV INFUSION 1 HR: CPT

## 2017-07-12 PROCEDURE — 96368 THER/DIAG CONCURRENT INF: CPT

## 2017-07-12 PROCEDURE — 96411 CHEMO IV PUSH ADDL DRUG: CPT

## 2017-07-12 PROCEDURE — 96375 TX/PRO/DX INJ NEW DRUG ADDON: CPT

## 2017-07-12 PROCEDURE — 25000003 PHARM REV CODE 250: Performed by: INTERNAL MEDICINE

## 2017-07-12 RX ORDER — ATROPINE SULFATE 0.4 MG/ML
0.4 INJECTION, SOLUTION ENDOTRACHEAL; INTRAMEDULLARY; INTRAMUSCULAR; INTRAVENOUS; SUBCUTANEOUS DAILY PRN
Status: DISCONTINUED | OUTPATIENT
Start: 2017-07-12 | End: 2017-07-12 | Stop reason: HOSPADM

## 2017-07-12 RX ORDER — FLUOROURACIL 50 MG/ML
400 INJECTION, SOLUTION INTRAVENOUS
Status: COMPLETED | OUTPATIENT
Start: 2017-07-12 | End: 2017-07-12

## 2017-07-12 RX ADMIN — FLUOROURACIL 5330 MG: 50 INJECTION, SOLUTION INTRAVENOUS at 06:07

## 2017-07-12 RX ADMIN — ATROPINE SULFATE 0.4 MG: 0.4 INJECTION, SOLUTION INTRAMUSCULAR; INTRAVENOUS; SUBCUTANEOUS at 04:07

## 2017-07-12 RX ADMIN — SODIUM CHLORIDE: 9 INJECTION, SOLUTION INTRAVENOUS at 04:07

## 2017-07-12 RX ADMIN — SODIUM CHLORIDE 400 MG: 0.9 INJECTION, SOLUTION INTRAVENOUS at 04:07

## 2017-07-12 RX ADMIN — SODIUM CHLORIDE: 0.9 INJECTION, SOLUTION INTRAVENOUS at 03:07

## 2017-07-12 RX ADMIN — FLUOROURACIL 890 MG: 50 INJECTION, SOLUTION INTRAVENOUS at 06:07

## 2017-07-12 RX ADMIN — SODIUM CHLORIDE 798 MG: 9 INJECTION, SOLUTION INTRAVENOUS at 03:07

## 2017-07-12 RX ADMIN — DEXAMETHASONE SODIUM PHOSPHATE: 4 INJECTION, SOLUTION INTRAMUSCULAR; INTRAVENOUS at 03:07

## 2017-07-12 RX ADMIN — LEUCOVORIN CALCIUM 45 MG: 350 INJECTION, POWDER, LYOPHILIZED, FOR SOLUTION INTRAMUSCULAR; INTRAVENOUS at 04:07

## 2017-07-12 NOTE — PLAN OF CARE
Problem: Chemotherapy Effects (Adult)  Goal: Signs and Symptoms of Listed Potential Problems Will be Absent, Minimized or Managed (Chemotherapy Effects)  Signs and symptoms of listed potential problems will be absent, minimized or managed by discharge/transition of care (reference Chemotherapy Effects (Adult) CPG).   Outcome: Ongoing (interventions implemented as appropriate)  Here for Cyramza and Folfiri.

## 2017-07-12 NOTE — PLAN OF CARE
Problem: Patient Care Overview  Goal: Plan of Care Review  Outcome: Ongoing (interventions implemented as appropriate)  Tolerated treatment well.  Advised to call MD for any problems or concerns.  AVS given.  RTC on 7/14/17 for pump d/c.  NAD noted upon discharge.

## 2017-07-14 ENCOUNTER — INFUSION (OUTPATIENT)
Dept: INFUSION THERAPY | Facility: HOSPITAL | Age: 67
End: 2017-07-14
Attending: INTERNAL MEDICINE
Payer: COMMERCIAL

## 2017-07-14 VITALS
HEART RATE: 80 BPM | SYSTOLIC BLOOD PRESSURE: 137 MMHG | RESPIRATION RATE: 18 BRPM | TEMPERATURE: 98 F | DIASTOLIC BLOOD PRESSURE: 63 MMHG

## 2017-07-14 DIAGNOSIS — Z51.11 ENCOUNTER FOR ANTINEOPLASTIC CHEMOTHERAPY AND IMMUNOTHERAPY: ICD-10-CM

## 2017-07-14 DIAGNOSIS — T45.1X5A CHEMOTHERAPY INDUCED NEUTROPENIA: Primary | ICD-10-CM

## 2017-07-14 DIAGNOSIS — D70.1 CHEMOTHERAPY INDUCED NEUTROPENIA: Primary | ICD-10-CM

## 2017-07-14 DIAGNOSIS — Z51.12 ENCOUNTER FOR ANTINEOPLASTIC CHEMOTHERAPY AND IMMUNOTHERAPY: ICD-10-CM

## 2017-07-14 PROCEDURE — 63600175 PHARM REV CODE 636 W HCPCS: Performed by: INTERNAL MEDICINE

## 2017-07-14 PROCEDURE — A4216 STERILE WATER/SALINE, 10 ML: HCPCS | Performed by: INTERNAL MEDICINE

## 2017-07-14 PROCEDURE — 96377 APPLICATON ON-BODY INJECTOR: CPT

## 2017-07-14 PROCEDURE — 25000003 PHARM REV CODE 250: Performed by: INTERNAL MEDICINE

## 2017-07-14 PROCEDURE — 96360 HYDRATION IV INFUSION INIT: CPT

## 2017-07-14 RX ORDER — SODIUM CHLORIDE 0.9 % (FLUSH) 0.9 %
10 SYRINGE (ML) INJECTION
Status: DISCONTINUED | OUTPATIENT
Start: 2017-07-14 | End: 2017-07-14 | Stop reason: HOSPADM

## 2017-07-14 RX ORDER — HEPARIN 100 UNIT/ML
500 SYRINGE INTRAVENOUS
Status: DISCONTINUED | OUTPATIENT
Start: 2017-07-14 | End: 2017-07-14 | Stop reason: HOSPADM

## 2017-07-14 RX ADMIN — HEPARIN 500 UNITS: 100 SYRINGE at 06:07

## 2017-07-14 RX ADMIN — SODIUM CHLORIDE, PRESERVATIVE FREE 10 ML: 5 INJECTION INTRAVENOUS at 06:07

## 2017-07-14 RX ADMIN — PEGFILGRASTIM 6 MG: KIT SUBCUTANEOUS at 05:07

## 2017-07-14 RX ADMIN — SODIUM CHLORIDE 1000 ML: 0.9 INJECTION, SOLUTION INTRAVENOUS at 05:07

## 2017-07-14 NOTE — PLAN OF CARE
Problem: Patient Care Overview  Goal: Plan of Care Review  1700-Patient arrived ambulatory to the unit with infusion to portable pump already complete. Assessment completed. Discussed plan of care with patient. Patient in agreement. Chair reclined and warm blanket and snack offered.

## 2017-07-14 NOTE — PLAN OF CARE
Problem: Patient Care Overview  Goal: Plan of Care Review  1800-Patient tolerated treatment well, port was flushed and heparin locked before de-access. Patient had neulasta onbody device applied and verbalized understanding monitoring and use of device. Discharged without complaints or S/S of adverse event. AVS given.  Instructed to call provider for any questions or concerns.

## 2017-07-16 DIAGNOSIS — I10 ESSENTIAL HYPERTENSION: ICD-10-CM

## 2017-07-17 RX ORDER — LISINOPRIL 40 MG/1
TABLET ORAL
Qty: 180 TABLET | Refills: 0 | Status: SHIPPED | OUTPATIENT
Start: 2017-07-17 | End: 2017-07-25 | Stop reason: SDUPTHER

## 2017-07-19 ENCOUNTER — PATIENT MESSAGE (OUTPATIENT)
Dept: HEMATOLOGY/ONCOLOGY | Facility: CLINIC | Age: 67
End: 2017-07-19

## 2017-07-20 ENCOUNTER — TELEPHONE (OUTPATIENT)
Dept: RADIOLOGY | Facility: HOSPITAL | Age: 67
End: 2017-07-20

## 2017-07-21 ENCOUNTER — HOSPITAL ENCOUNTER (OUTPATIENT)
Dept: RADIOLOGY | Facility: HOSPITAL | Age: 67
Discharge: HOME | End: 2017-07-21
Attending: INTERNAL MEDICINE
Payer: COMMERCIAL

## 2017-07-21 DIAGNOSIS — C20 RECTAL CANCER: ICD-10-CM

## 2017-07-21 PROCEDURE — 71260 CT THORAX DX C+: CPT | Mod: 26,,, | Performed by: RADIOLOGY

## 2017-07-21 PROCEDURE — 74177 CT ABD & PELVIS W/CONTRAST: CPT | Mod: 26,,, | Performed by: RADIOLOGY

## 2017-07-21 PROCEDURE — 25500020 PHARM REV CODE 255: Performed by: INTERNAL MEDICINE

## 2017-07-21 PROCEDURE — 74177 CT ABD & PELVIS W/CONTRAST: CPT | Mod: TC

## 2017-07-21 PROCEDURE — 71260 CT THORAX DX C+: CPT | Mod: TC

## 2017-07-21 RX ADMIN — IOHEXOL 15 ML: 350 INJECTION, SOLUTION INTRAVENOUS at 01:07

## 2017-07-21 RX ADMIN — IOHEXOL 15 ML: 350 INJECTION, SOLUTION INTRAVENOUS at 02:07

## 2017-07-21 RX ADMIN — IOHEXOL 100 ML: 350 INJECTION, SOLUTION INTRAVENOUS at 02:07

## 2017-07-25 ENCOUNTER — PATIENT MESSAGE (OUTPATIENT)
Dept: HEMATOLOGY/ONCOLOGY | Facility: CLINIC | Age: 67
End: 2017-07-25

## 2017-07-25 ENCOUNTER — OFFICE VISIT (OUTPATIENT)
Dept: HEMATOLOGY/ONCOLOGY | Facility: CLINIC | Age: 67
End: 2017-07-25
Payer: COMMERCIAL

## 2017-07-25 VITALS
BODY MASS INDEX: 31.41 KG/M2 | OXYGEN SATURATION: 97 % | RESPIRATION RATE: 20 BRPM | WEIGHT: 219.38 LBS | HEART RATE: 71 BPM | TEMPERATURE: 98 F | DIASTOLIC BLOOD PRESSURE: 65 MMHG | SYSTOLIC BLOOD PRESSURE: 138 MMHG | HEIGHT: 70 IN

## 2017-07-25 DIAGNOSIS — Z51.11 ENCOUNTER FOR ANTINEOPLASTIC CHEMOTHERAPY AND IMMUNOTHERAPY: ICD-10-CM

## 2017-07-25 DIAGNOSIS — C78.01 SECONDARY ADENOCARCINOMA OF LUNG, RIGHT: ICD-10-CM

## 2017-07-25 DIAGNOSIS — T45.1X5A CHEMOTHERAPY INDUCED NEUTROPENIA: ICD-10-CM

## 2017-07-25 DIAGNOSIS — D70.1 CHEMOTHERAPY INDUCED NEUTROPENIA: ICD-10-CM

## 2017-07-25 DIAGNOSIS — T83.122D: ICD-10-CM

## 2017-07-25 DIAGNOSIS — C20 RECTAL CANCER: Primary | ICD-10-CM

## 2017-07-25 DIAGNOSIS — N39.0 URINARY TRACT INFECTION WITH HEMATURIA, SITE UNSPECIFIED: ICD-10-CM

## 2017-07-25 DIAGNOSIS — Z51.12 ENCOUNTER FOR ANTINEOPLASTIC CHEMOTHERAPY AND IMMUNOTHERAPY: ICD-10-CM

## 2017-07-25 DIAGNOSIS — R31.9 URINARY TRACT INFECTION WITH HEMATURIA, SITE UNSPECIFIED: ICD-10-CM

## 2017-07-25 DIAGNOSIS — E11.9 TYPE 2 DIABETES MELLITUS WITHOUT COMPLICATION, WITHOUT LONG-TERM CURRENT USE OF INSULIN: ICD-10-CM

## 2017-07-25 PROCEDURE — 99215 OFFICE O/P EST HI 40 MIN: CPT | Mod: S$GLB,,, | Performed by: INTERNAL MEDICINE

## 2017-07-25 PROCEDURE — 1126F AMNT PAIN NOTED NONE PRSNT: CPT | Mod: S$GLB,,, | Performed by: INTERNAL MEDICINE

## 2017-07-25 PROCEDURE — 4010F ACE/ARB THERAPY RXD/TAKEN: CPT | Mod: S$GLB,,, | Performed by: INTERNAL MEDICINE

## 2017-07-25 PROCEDURE — 99999 PR PBB SHADOW E&M-EST. PATIENT-LVL IV: CPT | Mod: PBBFAC,,, | Performed by: INTERNAL MEDICINE

## 2017-07-25 PROCEDURE — 1159F MED LIST DOCD IN RCRD: CPT | Mod: S$GLB,,, | Performed by: INTERNAL MEDICINE

## 2017-07-25 RX ORDER — ATROPINE SULFATE 0.4 MG/ML
0.4 INJECTION, SOLUTION ENDOTRACHEAL; INTRAMEDULLARY; INTRAMUSCULAR; INTRAVENOUS; SUBCUTANEOUS DAILY PRN
Status: CANCELLED | OUTPATIENT
Start: 2017-07-26

## 2017-07-25 RX ORDER — SODIUM CHLORIDE 0.9 % (FLUSH) 0.9 %
10 SYRINGE (ML) INJECTION
Status: CANCELLED | OUTPATIENT
Start: 2017-07-28

## 2017-07-25 RX ORDER — HEPARIN 100 UNIT/ML
500 SYRINGE INTRAVENOUS
Status: CANCELLED | OUTPATIENT
Start: 2017-07-28

## 2017-07-25 RX ORDER — FLUOROURACIL 50 MG/ML
400 INJECTION, SOLUTION INTRAVENOUS
Status: CANCELLED | OUTPATIENT
Start: 2017-07-26

## 2017-07-25 NOTE — Clinical Note
Schedule CBC,CMP, urine protein/creatinine ratio and see me in 2 weeks and for FOLFIRI and  Cyramza. DC pump on day 3. Neulasta OBI and IV fluids on day 3.

## 2017-07-25 NOTE — PROGRESS NOTES
"Subjective:       Patient ID: Karthik Medrano is a 67 y.o. male.    Chief Complaint: Rectal Cancer and Urinary Tract Infection  Oncologic History:  66 year old male with history of rectal cancer diagnosed 1/11/10 on colonoscopy (moderately differentiated adenocarcinoma) and based on imaging, was T3NxM0. Patient underwent Neoadjuvant chemort with infusional 5FU. This was completed 3/19/10 with a total of 50.4Gy given to the pelvis. He went to surgery 5/13/10. T3N0M0. Post operative he received no adjuvant treatment and has been followed regularly. He went to his PCP 6/24/13 who ordered a CXR which found pulmonary nodules. On 6/27/13, CT chest confirmed these findings. CEA drawn when initially diagnosed was 2.6ng/ml and 6/27/13 was 30.8 ng/ml. . Pulmonary biopsy was performed 7/18/13 and this confirmed metastatic adenocarcinoma. 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 Avastin was added for second cycle.    Patient completed 12 cycles of FOLFOX avastin. He did not receive Avastin for cycle 3, 4, or 5 (due to uncontrolled blood pressure).    He was on maintenance infusional 5FU and Avastin. Repeat scans 4/14/14 showed numerous bilateral pulmonary soft tissue nodules, the majority of which demonstrate interval enlargement when compared to the prior exam. CEA up 18.2 from 13.    He was restarted on FOLFIRI and AVastin and received 6 months of chemo and then was switched to maintenance chemotherapy with 5FU and Avastin. His CT scans from early May 2015 revealed progression in lung lesions.  He is now on FOLFIRI and Cyramza.  CT from 4/19/16 reveals "Multiple stable metastatic pulmonary nodules in this patient with history of rectal cancer.  Stable postoperative changes are present from prior partial colectomy.Interval increase in right-sided hydronephrosis despite appropriate positioning of a double-J ureteral sent.  There is associated enlargement and decreased perfusion to the right " "kidney consistent with obstruction. These findings are concerning for a nonfunctioning ureteral stent. Cholelithiasis."  CT from 6/13/16 reveal stable disease and hydronephrosis has resolved.  CT from 8/5/16 reveals "Stable pulmonary metastatic disease.Increased prominence of periureteral fat stranding/inflammatory change. Stable mild right hydronephrosis."  CT from 1/6/17 "1. Stable appearance of pulmonary metastatic disease in this patient with history of colon cancer. Pulmonary index lesions are stable in size compared to prior examination. No evidence of new metastatic disease. 2. Right-sided double-J ureteral stent with stable appearing inflammatory change about the right renal collecting system and right ureter. 3. Additional stable incidental findings as discussed above. "     CT scans reveal "In this patient with a history of rectal cancer, index lesions in the right and left lung are stable in size as detailed above. No new pulmonary nodules or masses are identified. There is no evidence of abnormal soft tissue opacity in the rectosigmoid region to suggest residual/recurrent disease. Interval increase in the degree of right hydronephrosis with a stable double-J right ureteral stent in place. Interval development of perihepatic ascites. Stable fat and bowel containing ventral hernia"               HPI Mr. Medrano returns for follow up and for FOLFIRI and Cyramza.   His CT scans reveal stable disease.  He has UTI and he is on Cipro. He notes blood in urine and is scheduled for a stent exchange next week.  He notes alternating diarrhea and constipation.  He denies any nausea, vomiting, abdominal pain, weight loss or loss of appetite, chest pain, shortness of breath, leg swelling, fatigue, pain, headache, dizziness, or mood changes. His ECOG PS is zero. He is accompanied by his wife.            Review of Systems   Constitutional: Negative for appetite change, fatigue and unexpected weight change.   HENT: Negative " for mouth sores.    Eyes: Negative for visual disturbance.   Respiratory: Negative for cough and shortness of breath.    Cardiovascular: Negative for chest pain.   Gastrointestinal: Negative for abdominal pain and diarrhea.   Genitourinary: Positive for hematuria. Negative for frequency.   Musculoskeletal: Negative for back pain.   Skin: Negative for rash.   Neurological: Negative for headaches.   Hematological: Negative for adenopathy.   Psychiatric/Behavioral: The patient is not nervous/anxious.    All other systems reviewed and are negative.      PMFSH: all information reviewed and updated as relevant to today's visit  Objective:      Physical Exam   Constitutional: He is oriented to person, place, and time. He appears well-developed and well-nourished.   HENT:   Mouth/Throat: No oropharyngeal exudate.   Cardiovascular: Normal rate and normal heart sounds.    Pulmonary/Chest: Effort normal and breath sounds normal. He has no wheezes.   Abdominal: Soft. Bowel sounds are normal. There is no tenderness.   Musculoskeletal: He exhibits no edema or tenderness.   Lymphadenopathy:     He has no cervical adenopathy.   Neurological: He is alert and oriented to person, place, and time. Coordination normal.   Skin: Skin is warm and dry. No rash noted.   Psychiatric: He has a normal mood and affect. Judgment and thought content normal.   Vitals reviewed.        WBC   Date Value Ref Range Status   07/21/2017 18.70 (H) 3.90 - 12.70 K/uL Final     Hemoglobin   Date Value Ref Range Status   07/21/2017 11.5 (L) 14.0 - 18.0 g/dL Final     Hematocrit   Date Value Ref Range Status   07/21/2017 37.4 (L) 40.0 - 54.0 % Final     Platelets   Date Value Ref Range Status   07/21/2017 102 (L) 150 - 350 K/uL Final     Gran #   Date Value Ref Range Status   07/21/2017 14.7 (H) 1.8 - 7.7 K/uL Final     Comment:     The ANC is based on a white cell differential from an   automated cell counter. It has not been microscopically   reviewed for the  presence of abnormal cells. Clinical   correlation is required.         Chemistry        Component Value Date/Time     07/21/2017 1240    K 4.5 07/21/2017 1240     07/21/2017 1240    CO2 25 07/21/2017 1240    BUN 13 07/21/2017 1240    CREATININE 1.1 07/21/2017 1240     07/21/2017 1240        Component Value Date/Time    CALCIUM 9.0 07/21/2017 1240    ALKPHOS 231 (H) 07/21/2017 1240    AST 22 07/21/2017 1240    ALT 13 07/21/2017 1240    BILITOT 0.8 07/21/2017 1240    ESTGFRAFRICA >60.0 07/21/2017 1240    EGFRNONAA >60.0 07/21/2017 1240            Urine/protein ratio: 0.36    Assessment:       1. Rectal cancer    2. Secondary adenocarcinoma of lung, right    3. Encounter for antineoplastic chemotherapy and immunotherapy    4. Chemotherapy induced neutropenia    5. Type 2 diabetes mellitus without complication, without long-term current use of insulin    6. Urinary tract infection with hematuria, site unspecified    7. Ureteral stent displacement, subsequent encounter        Plan:        1,2,3,4. He is doing well clinically. His CT scans reveal stable disease. He will proceed with FOLFIRI and Cyramza and will return in 2 weeks for next cycle of chemo. Neulasta on day 3.  5. Stable on meds.  6. Continue with Cipro.  7. He will follow-up with Urology for stent exchange.        Above care plan was discussed with patient and accompanying wife and all questions were addressed to their satisfaction

## 2017-07-26 ENCOUNTER — INFUSION (OUTPATIENT)
Dept: INFUSION THERAPY | Facility: HOSPITAL | Age: 67
End: 2017-07-26
Attending: INTERNAL MEDICINE
Payer: COMMERCIAL

## 2017-07-26 ENCOUNTER — TELEPHONE (OUTPATIENT)
Dept: HEMATOLOGY/ONCOLOGY | Facility: CLINIC | Age: 67
End: 2017-07-26

## 2017-07-26 VITALS
DIASTOLIC BLOOD PRESSURE: 69 MMHG | TEMPERATURE: 97 F | HEART RATE: 77 BPM | RESPIRATION RATE: 18 BRPM | SYSTOLIC BLOOD PRESSURE: 131 MMHG

## 2017-07-26 DIAGNOSIS — D70.1 CHEMOTHERAPY INDUCED NEUTROPENIA: Primary | ICD-10-CM

## 2017-07-26 DIAGNOSIS — T45.1X5A CHEMOTHERAPY INDUCED NEUTROPENIA: Primary | ICD-10-CM

## 2017-07-26 DIAGNOSIS — Z51.12 ENCOUNTER FOR ANTINEOPLASTIC CHEMOTHERAPY AND IMMUNOTHERAPY: ICD-10-CM

## 2017-07-26 DIAGNOSIS — Z51.11 ENCOUNTER FOR ANTINEOPLASTIC CHEMOTHERAPY AND IMMUNOTHERAPY: ICD-10-CM

## 2017-07-26 PROCEDURE — 25000003 PHARM REV CODE 250: Performed by: INTERNAL MEDICINE

## 2017-07-26 PROCEDURE — 96415 CHEMO IV INFUSION ADDL HR: CPT

## 2017-07-26 PROCEDURE — 96416 CHEMO PROLONG INFUSE W/PUMP: CPT

## 2017-07-26 PROCEDURE — 96411 CHEMO IV PUSH ADDL DRUG: CPT

## 2017-07-26 PROCEDURE — 63600175 PHARM REV CODE 636 W HCPCS: Performed by: INTERNAL MEDICINE

## 2017-07-26 PROCEDURE — 96375 TX/PRO/DX INJ NEW DRUG ADDON: CPT

## 2017-07-26 PROCEDURE — 96417 CHEMO IV INFUS EACH ADDL SEQ: CPT

## 2017-07-26 PROCEDURE — 96367 TX/PROPH/DG ADDL SEQ IV INF: CPT

## 2017-07-26 PROCEDURE — 96413 CHEMO IV INFUSION 1 HR: CPT

## 2017-07-26 PROCEDURE — 96368 THER/DIAG CONCURRENT INF: CPT

## 2017-07-26 RX ORDER — FLUOROURACIL 50 MG/ML
400 INJECTION, SOLUTION INTRAVENOUS
Status: COMPLETED | OUTPATIENT
Start: 2017-07-26 | End: 2017-07-26

## 2017-07-26 RX ORDER — ATROPINE SULFATE 0.4 MG/ML
0.4 INJECTION, SOLUTION ENDOTRACHEAL; INTRAMEDULLARY; INTRAMUSCULAR; INTRAVENOUS; SUBCUTANEOUS DAILY PRN
Status: DISCONTINUED | OUTPATIENT
Start: 2017-07-26 | End: 2017-07-26 | Stop reason: HOSPADM

## 2017-07-26 RX ADMIN — FLUOROURACIL 890 MG: 50 INJECTION, SOLUTION INTRAVENOUS at 06:07

## 2017-07-26 RX ADMIN — ATROPINE SULFATE 0.4 MG: 0.4 INJECTION, SOLUTION INTRAMUSCULAR; INTRAVENOUS; SUBCUTANEOUS at 04:07

## 2017-07-26 RX ADMIN — SODIUM CHLORIDE: 9 INJECTION, SOLUTION INTRAVENOUS at 03:07

## 2017-07-26 RX ADMIN — LEUCOVORIN CALCIUM 45 MG: 350 INJECTION, POWDER, LYOPHILIZED, FOR SOLUTION INTRAMUSCULAR; INTRAVENOUS at 04:07

## 2017-07-26 RX ADMIN — FLUOROURACIL 5330 MG: 50 INJECTION, SOLUTION INTRAVENOUS at 06:07

## 2017-07-26 RX ADMIN — SODIUM CHLORIDE 400 MG: 0.9 INJECTION, SOLUTION INTRAVENOUS at 04:07

## 2017-07-26 RX ADMIN — DEXAMETHASONE SODIUM PHOSPHATE: 4 INJECTION, SOLUTION INTRAMUSCULAR; INTRAVENOUS at 03:07

## 2017-07-26 RX ADMIN — SODIUM CHLORIDE 798 MG: 9 INJECTION, SOLUTION INTRAVENOUS at 03:07

## 2017-07-26 RX ADMIN — SODIUM CHLORIDE: 9 INJECTION, SOLUTION INTRAVENOUS at 04:07

## 2017-07-26 NOTE — PLAN OF CARE
Problem: Patient Care Overview  Goal: Plan of Care Review  Outcome: Ongoing (interventions implemented as appropriate)  Pt. Tolerated treatment without any complications. V/S stable. Placed on ambulatory CAD pump. Port site infusing without difficulty and secured. Pt. Reminded check pump at least twice a day to make sure it was running properly. No questions or concerns at this time.

## 2017-07-26 NOTE — PLAN OF CARE
Problem: Chemotherapy Effects (Adult)  Goal: Signs and Symptoms of Listed Potential Problems Will be Absent, Minimized or Managed (Chemotherapy Effects)  Signs and symptoms of listed potential problems will be absent, minimized or managed by discharge/transition of care (reference Chemotherapy Effects (Adult) CPG).   Outcome: Ongoing (interventions implemented as appropriate)  Pt. Here today for infusion. VSS.port accessed, blood return delayed but present. Infusing without difficulty.

## 2017-07-28 ENCOUNTER — INFUSION (OUTPATIENT)
Dept: INFUSION THERAPY | Facility: HOSPITAL | Age: 67
End: 2017-07-28
Attending: INTERNAL MEDICINE
Payer: COMMERCIAL

## 2017-07-28 VITALS
OXYGEN SATURATION: 97 % | DIASTOLIC BLOOD PRESSURE: 65 MMHG | HEART RATE: 75 BPM | RESPIRATION RATE: 20 BRPM | TEMPERATURE: 97 F | SYSTOLIC BLOOD PRESSURE: 143 MMHG

## 2017-07-28 DIAGNOSIS — T45.1X5A CHEMOTHERAPY INDUCED NEUTROPENIA: Primary | ICD-10-CM

## 2017-07-28 DIAGNOSIS — D70.1 CHEMOTHERAPY INDUCED NEUTROPENIA: Primary | ICD-10-CM

## 2017-07-28 DIAGNOSIS — Z51.11 ENCOUNTER FOR ANTINEOPLASTIC CHEMOTHERAPY AND IMMUNOTHERAPY: ICD-10-CM

## 2017-07-28 DIAGNOSIS — Z51.12 ENCOUNTER FOR ANTINEOPLASTIC CHEMOTHERAPY AND IMMUNOTHERAPY: ICD-10-CM

## 2017-07-28 PROCEDURE — 63600175 PHARM REV CODE 636 W HCPCS: Performed by: INTERNAL MEDICINE

## 2017-07-28 PROCEDURE — 96377 APPLICATON ON-BODY INJECTOR: CPT

## 2017-07-28 PROCEDURE — 96361 HYDRATE IV INFUSION ADD-ON: CPT

## 2017-07-28 PROCEDURE — A4216 STERILE WATER/SALINE, 10 ML: HCPCS | Performed by: INTERNAL MEDICINE

## 2017-07-28 PROCEDURE — 25000003 PHARM REV CODE 250: Performed by: INTERNAL MEDICINE

## 2017-07-28 RX ORDER — HEPARIN 100 UNIT/ML
500 SYRINGE INTRAVENOUS
Status: DISCONTINUED | OUTPATIENT
Start: 2017-07-28 | End: 2017-07-28 | Stop reason: HOSPADM

## 2017-07-28 RX ORDER — SODIUM CHLORIDE 0.9 % (FLUSH) 0.9 %
10 SYRINGE (ML) INJECTION
Status: DISCONTINUED | OUTPATIENT
Start: 2017-07-28 | End: 2017-07-28 | Stop reason: HOSPADM

## 2017-07-28 RX ADMIN — PEGFILGRASTIM 6 MG: KIT SUBCUTANEOUS at 05:07

## 2017-07-28 RX ADMIN — SODIUM CHLORIDE 1000 ML: 0.9 INJECTION, SOLUTION INTRAVENOUS at 05:07

## 2017-07-28 RX ADMIN — HEPARIN 500 UNITS: 100 SYRINGE at 06:07

## 2017-07-28 RX ADMIN — SODIUM CHLORIDE, PRESERVATIVE FREE 10 ML: 5 INJECTION INTRAVENOUS at 06:07

## 2017-07-29 NOTE — PLAN OF CARE
Problem: Patient Care Overview  Goal: Plan of Care Review  Outcome: Ongoing (interventions implemented as appropriate)  Patient tolerated one liter IVF well and got OBI placed to arm(see MAR)  Patient given AVS,instructed to call MD with any problems.

## 2017-08-04 ENCOUNTER — LAB VISIT (OUTPATIENT)
Dept: LAB | Facility: HOSPITAL | Age: 67
End: 2017-08-04
Attending: INTERNAL MEDICINE
Payer: COMMERCIAL

## 2017-08-04 DIAGNOSIS — C20 RECTAL CANCER: ICD-10-CM

## 2017-08-04 LAB
CREAT UR-MCNC: 217 MG/DL
PROT UR-MCNC: 75 MG/DL
PROT/CREAT RATIO, UR: 0.35

## 2017-08-04 PROCEDURE — 84156 ASSAY OF PROTEIN URINE: CPT

## 2017-08-07 ENCOUNTER — OFFICE VISIT (OUTPATIENT)
Dept: HEMATOLOGY/ONCOLOGY | Facility: CLINIC | Age: 67
End: 2017-08-07
Payer: COMMERCIAL

## 2017-08-07 VITALS
DIASTOLIC BLOOD PRESSURE: 68 MMHG | WEIGHT: 228.38 LBS | TEMPERATURE: 98 F | SYSTOLIC BLOOD PRESSURE: 141 MMHG | OXYGEN SATURATION: 98 % | HEIGHT: 70 IN | HEART RATE: 87 BPM | BODY MASS INDEX: 32.69 KG/M2 | RESPIRATION RATE: 19 BRPM

## 2017-08-07 DIAGNOSIS — T45.1X5A CHEMOTHERAPY-INDUCED THROMBOCYTOPENIA: ICD-10-CM

## 2017-08-07 DIAGNOSIS — D69.59 CHEMOTHERAPY-INDUCED THROMBOCYTOPENIA: ICD-10-CM

## 2017-08-07 DIAGNOSIS — C20 RECTAL CANCER: Primary | ICD-10-CM

## 2017-08-07 DIAGNOSIS — C78.01 SECONDARY ADENOCARCINOMA OF LUNG, RIGHT: ICD-10-CM

## 2017-08-07 PROCEDURE — 99215 OFFICE O/P EST HI 40 MIN: CPT | Mod: S$GLB,,, | Performed by: INTERNAL MEDICINE

## 2017-08-07 PROCEDURE — 99999 PR PBB SHADOW E&M-EST. PATIENT-LVL IV: CPT | Mod: PBBFAC,,, | Performed by: INTERNAL MEDICINE

## 2017-08-07 PROCEDURE — 1159F MED LIST DOCD IN RCRD: CPT | Mod: S$GLB,,, | Performed by: INTERNAL MEDICINE

## 2017-08-07 PROCEDURE — 3077F SYST BP >= 140 MM HG: CPT | Mod: S$GLB,,, | Performed by: INTERNAL MEDICINE

## 2017-08-07 PROCEDURE — 1126F AMNT PAIN NOTED NONE PRSNT: CPT | Mod: S$GLB,,, | Performed by: INTERNAL MEDICINE

## 2017-08-07 PROCEDURE — 3078F DIAST BP <80 MM HG: CPT | Mod: S$GLB,,, | Performed by: INTERNAL MEDICINE

## 2017-08-07 PROCEDURE — 3008F BODY MASS INDEX DOCD: CPT | Mod: S$GLB,,, | Performed by: INTERNAL MEDICINE

## 2017-08-07 NOTE — PROGRESS NOTES
"PATIENT: Karthik Medrano  MRN: [id number]  DATE: 8/7/2017    Subjective:     Chief complaint:  Chief Complaint   Patient presents with    Rectal Cancer    stent replacement --last week    Fatigue       Oncologic History:  67 year old male with history of rectal cancer diagnosed 1/11/10 on colonoscopy (moderately differentiated adenocarcinoma) and based on imaging, was T3NxM0. Patient underwent Neoadjuvant chemort with infusional 5FU. This was completed 3/19/10 with a total of 50.4Gy given to the pelvis. He went to surgery 5/13/10. T3N0M0. Post operative he received no adjuvant treatment and has been followed regularly. He went to his PCP 6/24/13 who ordered a CXR which found pulmonary nodules. On 6/27/13, CT chest confirmed these findings. CEA drawn when initially diagnosed was 2.6ng/ml and 6/27/13 was 30.8 ng/ml. . Pulmonary biopsy was performed 7/18/13 and this confirmed metastatic adenocarcinoma. 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 Avastin was added for second cycle.    Patient completed 12 cycles of FOLFOX avastin. He did not receive Avastin for cycle 3, 4, or 5 (due to uncontrolled blood pressure).    He was on maintenance infusional 5FU and Avastin. Repeat scans 4/14/14 showed numerous bilateral pulmonary soft tissue nodules, the majority of which demonstrate interval enlargement when compared to the prior exam. CEA up 18.2 from 13.    He was restarted on FOLFIRI and AVastin and received 6 months of chemo and then was switched to maintenance chemotherapy with 5FU and Avastin. His CT scans from early May 2015 revealed progression in lung lesions.  He is now on FOLFIRI and Cyramza.  CT from 4/19/16 reveals "Multiple stable metastatic pulmonary nodules in this patient with history of rectal cancer.  Stable postoperative changes are present from prior partial colectomy.Interval increase in right-sided hydronephrosis despite appropriate positioning of a double-J ureteral sent. " " There is associated enlargement and decreased perfusion to the right kidney consistent with obstruction. These findings are concerning for a nonfunctioning ureteral stent. Cholelithiasis."  CT from 6/13/16 reveal stable disease and hydronephrosis has resolved.  CT from 8/5/16 reveals "Stable pulmonary metastatic disease.Increased prominence of periureteral fat stranding/inflammatory change. Stable mild right hydronephrosis."  CT from 1/6/17 "1. Stable appearance of pulmonary metastatic disease in this patient with history of colon cancer. Pulmonary index lesions are stable in size compared to prior examination. No evidence of new metastatic disease. 2. Right-sided double-J ureteral stent with stable appearing inflammatory change about the right renal collecting system and right ureter. 3. Additional stable incidental findings as discussed above. "     CT scans reveal "In this patient with a history of rectal cancer, index lesions in the right and left lung are stable in size as detailed above. No new pulmonary nodules or masses are identified. There is no evidence of abnormal soft tissue opacity in the rectosigmoid region to suggest residual/recurrent disease. Interval increase in the degree of right hydronephrosis with a stable double-J right ureteral stent in place. Interval development of perihepatic ascites. Stable fat and bowel containing ventral hernia"     CT scans 7/21/17 reveal "Stable appearance of multiple pulmonary lesions, compatible with metastatic disease, in this patient with given history of rectal cancer.  Index lesions below. Surgical changes from low anterior resection of rectal mass, without evidence of local recurrence. Stable right-sided moderate hydroureteronephrosis, with periureteral stranding ; appropriately positioned double-J ureteral stent.  Findings are concerning for UTI. There is thinning of the right renal parenchyma, concerning for chronic obstruction or inflammation. Stable " "appearing soft tissue collection with dystrophic calcifications in the presacral space, which may be postsurgical or post radiation in etiology.  This finding may cause obstruction of the right ureter."     Interval History: Mr. Medrano returns for follow up  and for FOLFIRI and Cyramza. Ureteral stent replaced last Thursday. Urinating ok. No burning/dyrurua.  He notes alternating diarrhea and constipation. He denies any nausea, vomiting, abdominal pain, weight loss or loss of appetite, chest pain, shortness of breath, leg swelling, fatigue, pain, headache, dizziness, or mood changes. His ECOG PS is zero. He is accompanied by his wife.      ECOG Performance Status:   ECOG SCORE    0 - Fully active-able to carry on all pre-disease performance without restriction         PMFSH: all information reviewed and updated as relevant to today's visit    Review of Systems:   Review of Systems   Constitutional: Negative for activity change, appetite change, fatigue and fever.   HENT: Negative for mouth sores, nosebleeds and sore throat.    Eyes: Negative for visual disturbance.   Respiratory: Negative for cough and shortness of breath.    Cardiovascular: Negative for chest pain, palpitations and leg swelling.   Gastrointestinal: Negative for abdominal pain, constipation, diarrhea, nausea and vomiting.   Genitourinary: Negative for difficulty urinating and frequency.   Musculoskeletal: Negative for arthralgias and back pain.   Skin: Negative for rash.   Neurological: Negative for dizziness, numbness and headaches.   Hematological: Negative for adenopathy. Does not bruise/bleed easily.   Psychiatric/Behavioral: Negative for confusion and sleep disturbance. The patient is not nervous/anxious.    All other systems reviewed and are negative.        Objective:      Vitals:   Vitals:    08/07/17 1350   BP: (!) 141/68   Pulse: 87   Resp: 19   Temp: 98.2 °F (36.8 °C)   TempSrc: Oral   SpO2: 98%   Weight: 103.6 kg (228 lb 6.3 oz)   Height: " "5' 10" (1.778 m)     BMI: Body mass index is 32.77 kg/m².      Physical Exam:   Physical Exam   Constitutional: He is oriented to person, place, and time. He appears well-developed and well-nourished. No distress.   HENT:   Head: Normocephalic and atraumatic.   Mouth/Throat: No oropharyngeal exudate.   Eyes: Conjunctivae and lids are normal. Pupils are equal, round, and reactive to light. No scleral icterus.   Neck: Trachea normal and normal range of motion. Neck supple. No thyromegaly present.   Cardiovascular: Normal rate, regular rhythm, normal heart sounds and normal pulses.    Pulmonary/Chest: Effort normal and breath sounds normal.   Abdominal: Soft. Normal appearance and bowel sounds are normal. He exhibits no distension and no mass. There is no hepatosplenomegaly or splenomegaly. There is no tenderness.   Musculoskeletal: Normal range of motion. He exhibits edema (trace ankle edema).   Lymphadenopathy:        Head (right side): No submental and no submandibular adenopathy present.        Head (left side): No submental and no submandibular adenopathy present.     He has no cervical adenopathy.     He has no axillary adenopathy.        Right: No supraclavicular adenopathy present.        Left: No supraclavicular adenopathy present.   Neurological: He is alert and oriented to person, place, and time. No sensory deficit.   Skin: Skin is warm, dry and intact. No bruising and no rash noted. Nails show no clubbing.   Psychiatric: He has a normal mood and affect. His speech is normal and behavior is normal. Cognition and memory are normal.   Vitals reviewed.        Laboratory Data:  WBC   Date Value Ref Range Status   08/04/2017 10.60 3.90 - 12.70 K/uL Final     Hemoglobin   Date Value Ref Range Status   08/04/2017 11.3 (L) 14.0 - 18.0 g/dL Final     Hematocrit   Date Value Ref Range Status   08/04/2017 35.9 (L) 40.0 - 54.0 % Final     Platelets   Date Value Ref Range Status   08/04/2017 78 (L) 150 - 350 K/uL Final "     Gran #   Date Value Ref Range Status   08/04/2017 8.2 (H) 1.8 - 7.7 K/uL Final     Comment:     The ANC is based on a white cell differential from an   automated cell counter. It has not been microscopically   reviewed for the presence of abnormal cells. Clinical   correlation is required.         Chemistry        Component Value Date/Time     08/04/2017 1630    K 4.2 08/04/2017 1630     08/04/2017 1630    CO2 25 08/04/2017 1630    BUN 13 08/04/2017 1630    CREATININE 1.2 08/04/2017 1630     08/04/2017 1630        Component Value Date/Time    CALCIUM 9.0 08/04/2017 1630    ALKPHOS 206 (H) 08/04/2017 1630    AST 25 08/04/2017 1630    ALT 14 08/04/2017 1630    BILITOT 0.6 08/04/2017 1630    ESTGFRAFRICA >60.0 08/04/2017 1630    EGFRNONAA >60.0 08/04/2017 1630        Protein/creatinine ratio: .35      Assessment/Plan:     1. Rectal cancer    2. Secondary adenocarcinoma of lung, right    3. Chemotherapy-induced thrombocytopenia        Plan:  He is doing well, but platelets are 78, so will hold chemo this week and reschedule to next week. Will plan on seeing him prior to next cycle    Above care plan was discussed with patient and accompanying wife and all questions were addressed to their satisfaction

## 2017-08-07 NOTE — Clinical Note
Cancel chemo this week Schedule CBC, CMP, urine protein/creatine ratio on 8/15/17 afternoon Schedule FOLFIRI and Cyramza on 8/16/17 afternoon. DC pump on day 3. Neulasta on day 3.  Schedule CBC,CMP, urine protein/ratio and see me on 8/28 or 8/29 afternoon. Schedule FOLFRI and Cyramza on 8/30/17 afternoon. DC pump on day 3. neulasta on day 3

## 2017-08-16 ENCOUNTER — INFUSION (OUTPATIENT)
Dept: INFUSION THERAPY | Facility: HOSPITAL | Age: 67
End: 2017-08-16
Attending: INTERNAL MEDICINE
Payer: COMMERCIAL

## 2017-08-16 VITALS
RESPIRATION RATE: 20 BRPM | OXYGEN SATURATION: 96 % | TEMPERATURE: 98 F | HEART RATE: 73 BPM | DIASTOLIC BLOOD PRESSURE: 71 MMHG | SYSTOLIC BLOOD PRESSURE: 129 MMHG

## 2017-08-16 DIAGNOSIS — Z51.12 ENCOUNTER FOR ANTINEOPLASTIC CHEMOTHERAPY AND IMMUNOTHERAPY: ICD-10-CM

## 2017-08-16 DIAGNOSIS — T45.1X5A CHEMOTHERAPY INDUCED NEUTROPENIA: Primary | ICD-10-CM

## 2017-08-16 DIAGNOSIS — D70.1 CHEMOTHERAPY INDUCED NEUTROPENIA: Primary | ICD-10-CM

## 2017-08-16 DIAGNOSIS — Z51.11 ENCOUNTER FOR ANTINEOPLASTIC CHEMOTHERAPY AND IMMUNOTHERAPY: ICD-10-CM

## 2017-08-16 PROCEDURE — 96375 TX/PRO/DX INJ NEW DRUG ADDON: CPT

## 2017-08-16 PROCEDURE — 96417 CHEMO IV INFUS EACH ADDL SEQ: CPT

## 2017-08-16 PROCEDURE — 63600175 PHARM REV CODE 636 W HCPCS: Performed by: INTERNAL MEDICINE

## 2017-08-16 PROCEDURE — 96411 CHEMO IV PUSH ADDL DRUG: CPT

## 2017-08-16 PROCEDURE — 96368 THER/DIAG CONCURRENT INF: CPT

## 2017-08-16 PROCEDURE — 96416 CHEMO PROLONG INFUSE W/PUMP: CPT

## 2017-08-16 PROCEDURE — 96413 CHEMO IV INFUSION 1 HR: CPT

## 2017-08-16 PROCEDURE — 96367 TX/PROPH/DG ADDL SEQ IV INF: CPT

## 2017-08-16 PROCEDURE — 25000003 PHARM REV CODE 250: Performed by: INTERNAL MEDICINE

## 2017-08-16 RX ORDER — ATROPINE SULFATE 0.4 MG/ML
0.4 INJECTION, SOLUTION ENDOTRACHEAL; INTRAMEDULLARY; INTRAMUSCULAR; INTRAVENOUS; SUBCUTANEOUS DAILY PRN
Status: DISCONTINUED | OUTPATIENT
Start: 2017-08-16 | End: 2017-08-16 | Stop reason: HOSPADM

## 2017-08-16 RX ORDER — FLUOROURACIL 50 MG/ML
400 INJECTION, SOLUTION INTRAVENOUS
Status: CANCELLED | OUTPATIENT
Start: 2017-08-16

## 2017-08-16 RX ORDER — FLUOROURACIL 50 MG/ML
400 INJECTION, SOLUTION INTRAVENOUS
Status: COMPLETED | OUTPATIENT
Start: 2017-08-16 | End: 2017-08-16

## 2017-08-16 RX ORDER — ATROPINE SULFATE 0.4 MG/ML
0.4 INJECTION, SOLUTION ENDOTRACHEAL; INTRAMEDULLARY; INTRAMUSCULAR; INTRAVENOUS; SUBCUTANEOUS DAILY PRN
Status: CANCELLED | OUTPATIENT
Start: 2017-08-16

## 2017-08-16 RX ORDER — HEPARIN 100 UNIT/ML
500 SYRINGE INTRAVENOUS
Status: CANCELLED | OUTPATIENT
Start: 2017-08-17

## 2017-08-16 RX ORDER — SODIUM CHLORIDE 0.9 % (FLUSH) 0.9 %
10 SYRINGE (ML) INJECTION
Status: CANCELLED | OUTPATIENT
Start: 2017-08-17

## 2017-08-16 RX ADMIN — SODIUM CHLORIDE: 9 INJECTION, SOLUTION INTRAVENOUS at 03:08

## 2017-08-16 RX ADMIN — LEUCOVORIN CALCIUM 45 MG: 350 INJECTION, POWDER, LYOPHILIZED, FOR SOLUTION INTRAMUSCULAR; INTRAVENOUS at 04:08

## 2017-08-16 RX ADMIN — FLUOROURACIL 5330 MG: 50 INJECTION, SOLUTION INTRAVENOUS at 06:08

## 2017-08-16 RX ADMIN — ATROPINE SULFATE 0.4 MG: 0.4 INJECTION, SOLUTION INTRAMUSCULAR; INTRAVENOUS; SUBCUTANEOUS at 04:08

## 2017-08-16 RX ADMIN — SODIUM CHLORIDE: 0.9 INJECTION, SOLUTION INTRAVENOUS at 03:08

## 2017-08-16 RX ADMIN — SODIUM CHLORIDE 400 MG: 0.9 INJECTION, SOLUTION INTRAVENOUS at 04:08

## 2017-08-16 RX ADMIN — SODIUM CHLORIDE 798 MG: 9 INJECTION, SOLUTION INTRAVENOUS at 03:08

## 2017-08-16 RX ADMIN — PALONOSETRON HYDROCHLORIDE: 0.25 INJECTION INTRAVENOUS at 03:08

## 2017-08-16 RX ADMIN — FLUOROURACIL 890 MG: 50 INJECTION, SOLUTION INTRAVENOUS at 06:08

## 2017-08-16 NOTE — PLAN OF CARE
Problem: Patient Care Overview (Adult)  Goal: Individualization & Mutuality  Outcome: Ongoing (interventions implemented as appropriate)  Patient arrived to clinic;no c/o pain and no s/s distress. Pending Cyramza and FOLFIRI infusions.

## 2017-08-17 NOTE — PLAN OF CARE
Problem: Patient Care Overview  Goal: Plan of Care Review  Outcome: Ongoing (interventions implemented as appropriate)  Patient tolerated Cyramza/FOLFIRI well,no s/s reaction. Patient verbalized understanding when to RTC for pump d/c.

## 2017-08-18 ENCOUNTER — INFUSION (OUTPATIENT)
Dept: INFUSION THERAPY | Facility: HOSPITAL | Age: 67
End: 2017-08-18
Attending: INTERNAL MEDICINE
Payer: COMMERCIAL

## 2017-08-18 VITALS
RESPIRATION RATE: 20 BRPM | HEART RATE: 85 BPM | TEMPERATURE: 98 F | DIASTOLIC BLOOD PRESSURE: 63 MMHG | SYSTOLIC BLOOD PRESSURE: 134 MMHG

## 2017-08-18 DIAGNOSIS — D70.1 CHEMOTHERAPY INDUCED NEUTROPENIA: Primary | ICD-10-CM

## 2017-08-18 DIAGNOSIS — Z51.12 ENCOUNTER FOR ANTINEOPLASTIC CHEMOTHERAPY AND IMMUNOTHERAPY: ICD-10-CM

## 2017-08-18 DIAGNOSIS — T45.1X5A CHEMOTHERAPY INDUCED NEUTROPENIA: Primary | ICD-10-CM

## 2017-08-18 DIAGNOSIS — Z51.11 ENCOUNTER FOR ANTINEOPLASTIC CHEMOTHERAPY AND IMMUNOTHERAPY: ICD-10-CM

## 2017-08-18 PROCEDURE — 63600175 PHARM REV CODE 636 W HCPCS: Performed by: INTERNAL MEDICINE

## 2017-08-18 PROCEDURE — 96377 APPLICATON ON-BODY INJECTOR: CPT

## 2017-08-18 PROCEDURE — 25000003 PHARM REV CODE 250: Performed by: INTERNAL MEDICINE

## 2017-08-18 PROCEDURE — A4216 STERILE WATER/SALINE, 10 ML: HCPCS | Performed by: INTERNAL MEDICINE

## 2017-08-18 PROCEDURE — 96360 HYDRATION IV INFUSION INIT: CPT

## 2017-08-18 RX ORDER — SODIUM CHLORIDE 0.9 % (FLUSH) 0.9 %
10 SYRINGE (ML) INJECTION
Status: DISCONTINUED | OUTPATIENT
Start: 2017-08-18 | End: 2017-08-18 | Stop reason: HOSPADM

## 2017-08-18 RX ORDER — HEPARIN 100 UNIT/ML
500 SYRINGE INTRAVENOUS
Status: DISCONTINUED | OUTPATIENT
Start: 2017-08-18 | End: 2017-08-18 | Stop reason: HOSPADM

## 2017-08-18 RX ADMIN — PEGFILGRASTIM 6 MG: KIT SUBCUTANEOUS at 05:08

## 2017-08-18 RX ADMIN — SODIUM CHLORIDE, PRESERVATIVE FREE 10 ML: 5 INJECTION INTRAVENOUS at 06:08

## 2017-08-18 RX ADMIN — HEPARIN 500 UNITS: 100 SYRINGE at 06:08

## 2017-08-18 RX ADMIN — SODIUM CHLORIDE 1000 ML: 0.9 INJECTION, SOLUTION INTRAVENOUS at 05:08

## 2017-08-18 NOTE — PLAN OF CARE
Problem: Patient Care Overview  Goal: Plan of Care Review  Outcome: Ongoing (interventions implemented as appropriate)  Pt tolerated pump dc/OBI/1L NS without adverse effects. VSS. Provided AVS & verbalized understanding of RTC date. DC with family ambulating independently.

## 2017-08-21 ENCOUNTER — TELEPHONE (OUTPATIENT)
Dept: HEMATOLOGY/ONCOLOGY | Facility: CLINIC | Age: 67
End: 2017-08-21

## 2017-08-21 NOTE — TELEPHONE ENCOUNTER
----- Message from Marisa Gomez sent at 8/21/2017  4:50 PM CDT -----  Contact: PT's Wife  PT needs his appointment for 8/28 rescheduled.    Callback: [phone] or [phone]

## 2017-08-21 NOTE — TELEPHONE ENCOUNTER
called pt, but received no answer, a detailed message was left on v/m in regards to appointments on 08/28/17 time need to be changed, new appointment will be mailed out.

## 2017-08-22 ENCOUNTER — TELEPHONE (OUTPATIENT)
Dept: HEMATOLOGY/ONCOLOGY | Facility: CLINIC | Age: 67
End: 2017-08-22

## 2017-08-22 NOTE — TELEPHONE ENCOUNTER
called pt on this morning, but received no answer, a detailed message was left on v/m to contact office to discuss appointments.

## 2017-08-22 NOTE — TELEPHONE ENCOUNTER
spoke with pt on today in regards to appointments on 08/28/17, pt has confirmed new appointment times, pt also ask for labs to be moved to 08/25/17.

## 2017-08-22 NOTE — TELEPHONE ENCOUNTER
----- Message from Timothy Bhatia sent at 8/22/2017 11:09 AM CDT -----  Contact: Pt wife  Pt wife called in about wanting to rs appt.pt would like the labs to be out on Friday afternoon at 4pm. Pt also would like to rs appt for earlier time like 8am or late like 3pm. Pt wife would like the nurse to give her a call back in regards to this matter        Pt can be reached at [phone]      Thank You

## 2017-08-25 ENCOUNTER — LAB VISIT (OUTPATIENT)
Dept: LAB | Facility: HOSPITAL | Age: 67
End: 2017-08-25
Attending: INTERNAL MEDICINE
Payer: COMMERCIAL

## 2017-08-25 DIAGNOSIS — C20 RECTAL ADENOCARCINOMA: ICD-10-CM

## 2017-08-25 LAB
ALBUMIN SERPL BCP-MCNC: 3 G/DL
ALP SERPL-CCNC: 290 U/L
ALT SERPL W/O P-5'-P-CCNC: 21 U/L
ANION GAP SERPL CALC-SCNC: 10 MMOL/L
AST SERPL-CCNC: 37 U/L
BASOPHILS # BLD AUTO: 0.06 K/UL
BASOPHILS NFR BLD: 0.3 %
BILIRUB SERPL-MCNC: 0.6 MG/DL
BUN SERPL-MCNC: 15 MG/DL
CALCIUM SERPL-MCNC: 8.9 MG/DL
CHLORIDE SERPL-SCNC: 107 MMOL/L
CO2 SERPL-SCNC: 25 MMOL/L
CREAT SERPL-MCNC: 1.2 MG/DL
DIFFERENTIAL METHOD: ABNORMAL
EOSINOPHIL # BLD AUTO: 0.3 K/UL
EOSINOPHIL NFR BLD: 1.6 %
ERYTHROCYTE [DISTWIDTH] IN BLOOD BY AUTOMATED COUNT: 19.9 %
EST. GFR  (AFRICAN AMERICAN): >60 ML/MIN/1.73 M^2
EST. GFR  (NON AFRICAN AMERICAN): >60 ML/MIN/1.73 M^2
GLUCOSE SERPL-MCNC: 116 MG/DL
HCT VFR BLD AUTO: 35.3 %
HGB BLD-MCNC: 11.5 G/DL
LYMPHOCYTES # BLD AUTO: 1.9 K/UL
LYMPHOCYTES NFR BLD: 8.9 %
MCH RBC QN AUTO: 29 PG
MCHC RBC AUTO-ENTMCNC: 32.6 G/DL
MCV RBC AUTO: 89 FL
MONOCYTES # BLD AUTO: 1.5 K/UL
MONOCYTES NFR BLD: 7.1 %
NEUTROPHILS # BLD AUTO: 17 K/UL
NEUTROPHILS NFR BLD: 81.1 %
PLATELET # BLD AUTO: 100 K/UL
PMV BLD AUTO: 9.3 FL
POTASSIUM SERPL-SCNC: 4 MMOL/L
PROT SERPL-MCNC: 6.4 G/DL
RBC # BLD AUTO: 3.96 M/UL
SODIUM SERPL-SCNC: 142 MMOL/L
WBC # BLD AUTO: 20.99 K/UL

## 2017-08-25 PROCEDURE — 85025 COMPLETE CBC W/AUTO DIFF WBC: CPT

## 2017-08-25 PROCEDURE — 80053 COMPREHEN METABOLIC PANEL: CPT

## 2017-08-25 PROCEDURE — 36415 COLL VENOUS BLD VENIPUNCTURE: CPT

## 2017-08-28 ENCOUNTER — OFFICE VISIT (OUTPATIENT)
Dept: HEMATOLOGY/ONCOLOGY | Facility: CLINIC | Age: 67
End: 2017-08-28
Payer: COMMERCIAL

## 2017-08-28 VITALS
HEART RATE: 83 BPM | DIASTOLIC BLOOD PRESSURE: 67 MMHG | SYSTOLIC BLOOD PRESSURE: 122 MMHG | RESPIRATION RATE: 20 BRPM | BODY MASS INDEX: 33.46 KG/M2 | OXYGEN SATURATION: 97 % | WEIGHT: 233.69 LBS | TEMPERATURE: 98 F | HEIGHT: 70 IN

## 2017-08-28 DIAGNOSIS — R60.9 EDEMA, UNSPECIFIED TYPE: ICD-10-CM

## 2017-08-28 DIAGNOSIS — Z51.11 ENCOUNTER FOR ANTINEOPLASTIC CHEMOTHERAPY AND IMMUNOTHERAPY: ICD-10-CM

## 2017-08-28 DIAGNOSIS — Z51.12 ENCOUNTER FOR ANTINEOPLASTIC CHEMOTHERAPY AND IMMUNOTHERAPY: ICD-10-CM

## 2017-08-28 DIAGNOSIS — C78.01 SECONDARY ADENOCARCINOMA OF LUNG, RIGHT: ICD-10-CM

## 2017-08-28 DIAGNOSIS — C20 RECTAL CANCER: Primary | ICD-10-CM

## 2017-08-28 DIAGNOSIS — I50.33 ACUTE ON CHRONIC DIASTOLIC CONGESTIVE HEART FAILURE: ICD-10-CM

## 2017-08-28 PROCEDURE — 1126F AMNT PAIN NOTED NONE PRSNT: CPT | Mod: S$GLB,,, | Performed by: INTERNAL MEDICINE

## 2017-08-28 PROCEDURE — 3074F SYST BP LT 130 MM HG: CPT | Mod: S$GLB,,, | Performed by: INTERNAL MEDICINE

## 2017-08-28 PROCEDURE — 3008F BODY MASS INDEX DOCD: CPT | Mod: S$GLB,,, | Performed by: INTERNAL MEDICINE

## 2017-08-28 PROCEDURE — 1159F MED LIST DOCD IN RCRD: CPT | Mod: S$GLB,,, | Performed by: INTERNAL MEDICINE

## 2017-08-28 PROCEDURE — 3078F DIAST BP <80 MM HG: CPT | Mod: S$GLB,,, | Performed by: INTERNAL MEDICINE

## 2017-08-28 PROCEDURE — 99999 PR PBB SHADOW E&M-EST. PATIENT-LVL V: CPT | Mod: PBBFAC,,, | Performed by: INTERNAL MEDICINE

## 2017-08-28 PROCEDURE — 99215 OFFICE O/P EST HI 40 MIN: CPT | Mod: S$GLB,,, | Performed by: INTERNAL MEDICINE

## 2017-08-28 NOTE — PROGRESS NOTES
"Subjective:       Patient ID: Karthik Medrano is a 67 y.o. male.    Chief Complaint: Rectal Cancer; Bloated; and frequent small BMs, 20 per day  Oncologic History:  67 year old male with history of rectal cancer diagnosed 1/11/10 on colonoscopy (moderately differentiated adenocarcinoma) and based on imaging, was T3NxM0. Patient underwent Neoadjuvant chemort with infusional 5FU. This was completed 3/19/10 with a total of 50.4Gy given to the pelvis. He went to surgery 5/13/10. T3N0M0. Post operative he received no adjuvant treatment and has been followed regularly. He went to his PCP 6/24/13 who ordered a CXR which found pulmonary nodules. On 6/27/13, CT chest confirmed these findings. CEA drawn when initially diagnosed was 2.6ng/ml and 6/27/13 was 30.8 ng/ml. . Pulmonary biopsy was performed 7/18/13 and this confirmed metastatic adenocarcinoma. 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 Avastin was added for second cycle.    Patient completed 12 cycles of FOLFOX avastin. He did not receive Avastin for cycle 3, 4, or 5 (due to uncontrolled blood pressure).    He was on maintenance infusional 5FU and Avastin. Repeat scans 4/14/14 showed numerous bilateral pulmonary soft tissue nodules, the majority of which demonstrate interval enlargement when compared to the prior exam. CEA up 18.2 from 13.    He was restarted on FOLFIRI and AVastin and received 6 months of chemo and then was switched to maintenance chemotherapy with 5FU and Avastin. His CT scans from early May 2015 revealed progression in lung lesions.  He is now on FOLFIRI and Cyramza.  CT from 4/19/16 reveals "Multiple stable metastatic pulmonary nodules in this patient with history of rectal cancer.  Stable postoperative changes are present from prior partial colectomy.Interval increase in right-sided hydronephrosis despite appropriate positioning of a double-J ureteral sent.  There is associated enlargement and decreased perfusion " "to the right kidney consistent with obstruction. These findings are concerning for a nonfunctioning ureteral stent. Cholelithiasis."  CT from 6/13/16 reveal stable disease and hydronephrosis has resolved.  CT from 8/5/16 reveals "Stable pulmonary metastatic disease.Increased prominence of periureteral fat stranding/inflammatory change. Stable mild right hydronephrosis."  CT from 1/6/17 "1. Stable appearance of pulmonary metastatic disease in this patient with history of colon cancer. Pulmonary index lesions are stable in size compared to prior examination. No evidence of new metastatic disease. 2. Right-sided double-J ureteral stent with stable appearing inflammatory change about the right renal collecting system and right ureter. 3. Additional stable incidental findings as discussed above. "     CT scans reveal "In this patient with a history of rectal cancer, index lesions in the right and left lung are stable in size as detailed above. No new pulmonary nodules or masses are identified. There is no evidence of abnormal soft tissue opacity in the rectosigmoid region to suggest residual/recurrent disease. Interval increase in the degree of right hydronephrosis with a stable double-J right ureteral stent in place. Interval development of perihepatic ascites. Stable fat and bowel containing ventral hernia"     CT scans 7/21/17 reveal "Stable appearance of multiple pulmonary lesions, compatible with metastatic disease, in this patient with given history of rectal cancer.  Index lesions below. Surgical changes from low anterior resection of rectal mass, without evidence of local recurrence. Stable right-sided moderate hydroureteronephrosis, with periureteral stranding ; appropriately positioned double-J ureteral stent.  Findings are concerning for UTI. There is thinning of the right renal parenchyma, concerning for chronic obstruction or inflammation. Stable appearing soft tissue collection with dystrophic " "calcifications in the presacral space, which may be postsurgical or post radiation in etiology.  This finding may cause obstruction of the right ureter."      HPI Mr. Medrano returns for follow up  and for FOLFIRI and Cyramza. He notes that he bloating and abdomen is distended.  He also notes leg swelling. He has gained 5 lbs        Review of Systems   Constitutional: Positive for fatigue. Negative for appetite change and unexpected weight change.   HENT: Negative for mouth sores.    Eyes: Negative for visual disturbance.   Respiratory: Negative for cough and shortness of breath.    Cardiovascular: Positive for leg swelling. Negative for chest pain.   Gastrointestinal: Positive for abdominal pain. Negative for diarrhea.   Genitourinary: Negative for frequency.   Musculoskeletal: Negative for back pain.   Skin: Negative for rash.   Neurological: Negative for headaches.   Hematological: Negative for adenopathy.   Psychiatric/Behavioral: The patient is not nervous/anxious.    All other systems reviewed and are negative.      Objective:      Physical Exam   Constitutional: He is oriented to person, place, and time. He appears well-developed and well-nourished.   HENT:   Mouth/Throat: No oropharyngeal exudate.   Cardiovascular: Normal rate and normal heart sounds.    Pulmonary/Chest: Effort normal and breath sounds normal. He has no wheezes.   Abdominal: Soft. Bowel sounds are normal. He exhibits distension. There is no tenderness. There is no rebound.   Musculoskeletal: He exhibits no edema or tenderness.   Lymphadenopathy:     He has no cervical adenopathy.   Neurological: He is alert and oriented to person, place, and time. Coordination normal.   Skin: Skin is warm and dry. No rash noted.   Psychiatric: He has a normal mood and affect. Judgment and thought content normal.   Vitals reviewed.      LABS:  WBC   Date Value Ref Range Status   08/25/2017 20.99 (H) 3.90 - 12.70 K/uL Final     Hemoglobin   Date Value Ref Range " Status   08/25/2017 11.5 (L) 14.0 - 18.0 g/dL Final     Hematocrit   Date Value Ref Range Status   08/25/2017 35.3 (L) 40.0 - 54.0 % Final     Platelets   Date Value Ref Range Status   08/25/2017 100 (L) 150 - 350 K/uL Final     Gran #   Date Value Ref Range Status   08/25/2017 17.0 (H) 1.8 - 7.7 K/uL Final     Gran%   Date Value Ref Range Status   08/25/2017 81.1 (H) 38.0 - 73.0 % Final       Chemistry        Component Value Date/Time     08/25/2017 1619    K 4.0 08/25/2017 1619     08/25/2017 1619    CO2 25 08/25/2017 1619    BUN 15 08/25/2017 1619    CREATININE 1.2 08/25/2017 1619     (H) 08/25/2017 1619        Component Value Date/Time    CALCIUM 8.9 08/25/2017 1619    ALKPHOS 290 (H) 08/25/2017 1619    AST 37 08/25/2017 1619    ALT 21 08/25/2017 1619    BILITOT 0.6 08/25/2017 1619    ESTGFRAFRICA >60.0 08/25/2017 1619    EGFRNONAA >60.0 08/25/2017 1619          Assessment:       1. Rectal cancer    2. Secondary adenocarcinoma of lung, right    3. Encounter for antineoplastic chemotherapy and immunotherapy    4. Acute on chronic diastolic congestive heart failure    5. Edema, unspecified type        Plan:        1,2,3,4. Schedule 2D ECHO and IR paracentesis tomorrow. Will reassess for chemo after that.  5.Will try lasix prn.      Distress Screening Results: Psychosocial Distress screening score of Distress Score: 0 noted and reviewed. No intervention indicated.     Above care plan was discussed with patient and accompanying wife and all questions were addressed to their satisfaction     ADDENDUM:  2D ECHO normal. IR para with 5 liters removed. Will plan on restaging CT scans and then reschedule chemo to after that. So cancel chemo tomorrow.

## 2017-08-29 ENCOUNTER — PATIENT MESSAGE (OUTPATIENT)
Dept: HEMATOLOGY/ONCOLOGY | Facility: CLINIC | Age: 67
End: 2017-08-29

## 2017-08-29 ENCOUNTER — HOSPITAL ENCOUNTER (OUTPATIENT)
Dept: INTERVENTIONAL RADIOLOGY/VASCULAR | Facility: HOSPITAL | Age: 67
Discharge: HOME | End: 2017-08-29
Attending: INTERNAL MEDICINE
Payer: COMMERCIAL

## 2017-08-29 ENCOUNTER — HOSPITAL ENCOUNTER (OUTPATIENT)
Dept: CARDIOLOGY | Facility: CLINIC | Age: 67
Discharge: HOME | End: 2017-08-29
Payer: COMMERCIAL

## 2017-08-29 ENCOUNTER — TELEPHONE (OUTPATIENT)
Dept: HEMATOLOGY/ONCOLOGY | Facility: CLINIC | Age: 67
End: 2017-08-29

## 2017-08-29 VITALS
RESPIRATION RATE: 19 BRPM | DIASTOLIC BLOOD PRESSURE: 78 MMHG | HEART RATE: 83 BPM | SYSTOLIC BLOOD PRESSURE: 144 MMHG | OXYGEN SATURATION: 99 %

## 2017-08-29 DIAGNOSIS — C20 RECTAL CANCER: ICD-10-CM

## 2017-08-29 DIAGNOSIS — I35.9 NONRHEUMATIC AORTIC VALVE DISORDER: ICD-10-CM

## 2017-08-29 DIAGNOSIS — R60.9 EDEMA, UNSPECIFIED TYPE: ICD-10-CM

## 2017-08-29 LAB
ESTIMATED PA SYSTOLIC PRESSURE: 22.47
MITRAL VALVE REGURGITATION: NORMAL
RETIRED EF AND QEF - SEE NOTES: 60 (ref 55–65)
TRICUSPID VALVE REGURGITATION: NORMAL

## 2017-08-29 PROCEDURE — 49083 ABD PARACENTESIS W/IMAGING: CPT | Mod: ,,, | Performed by: RADIOLOGY

## 2017-08-29 PROCEDURE — 63600175 PHARM REV CODE 636 W HCPCS: Performed by: INTERNAL MEDICINE

## 2017-08-29 PROCEDURE — A7048 VACUUM DRAIN BOTTLE/TUBE KIT: HCPCS

## 2017-08-29 PROCEDURE — C1729 CATH, DRAINAGE: HCPCS

## 2017-08-29 PROCEDURE — P9047 ALBUMIN (HUMAN), 25%, 50ML: HCPCS | Performed by: INTERNAL MEDICINE

## 2017-08-29 PROCEDURE — 93306 TTE W/DOPPLER COMPLETE: CPT | Mod: S$GLB,,, | Performed by: INTERNAL MEDICINE

## 2017-08-29 RX ORDER — ALBUMIN HUMAN 250 G/1000ML
37.5 SOLUTION INTRAVENOUS ONCE
Status: COMPLETED | OUTPATIENT
Start: 2017-08-29 | End: 2017-08-29

## 2017-08-29 RX ORDER — HEPARIN 100 UNIT/ML
5 SYRINGE INTRAVENOUS ONCE
Status: COMPLETED | OUTPATIENT
Start: 2017-08-29 | End: 2017-08-29

## 2017-08-29 RX ADMIN — HEPARIN 500 UNITS: 100 SYRINGE at 12:08

## 2017-08-29 RX ADMIN — ALBUMIN (HUMAN) 37.5 G: 25 SOLUTION INTRAVENOUS at 11:08

## 2017-08-29 NOTE — PROGRESS NOTES
Para completed, pt tolerated well. No apparent distress noted. 5 Liters removed from right abdomen, mepore applied CDI. Paged Dr. Abernathy, twice to verify if labs were needed, unable to reach. Labs not ordered. Left lower arm port accessed using sterile technique, blood return noted, flushed easily. Albumin 150 ml given per protocol. De accessed and heparinized per protocol. Discharge instructions reviewed and acknowledged. Pt discharged via ambulation and private vehicle, accompanied by family..

## 2017-08-29 NOTE — TELEPHONE ENCOUNTER
----- Message from Lisa Abernathy MD sent at 8/29/2017  4:51 PM CDT -----  Can you schedule him for CT scans this week and then reschedule Chemo to after that

## 2017-08-29 NOTE — TELEPHONE ENCOUNTER
Informed patient chemo is canceled for tomorrow.  Dr. Abernathy would like to scan him asap to see if he is progressing----then proceed with chemo the following week. CTscan scheduled for this Saturday.    Patient voiced understanding.      Message routed to bria (please contact patient to reschedule his labs/MD/chemo to the week of 9/4/17---patient requests you contact him to schedule)

## 2017-08-29 NOTE — DISCHARGE INSTRUCTIONS
[facility] [phone] (MON-FRI 8 AM- 5PM). Radiology Resident on call [phone].

## 2017-08-29 NOTE — H&P
Radiology History & Physical      SUBJECTIVE:     Chief Complaint: Ascites    History of Present Illness:  Karthik Medrano is a 67 y.o. male who presents for ultrasound guided paracentesis.    Past Medical History:   Diagnosis Date    Cancer     rectal cancer    Diabetes mellitus     GERD (gastroesophageal reflux disease)     Hypertension      Past Surgical History:   Procedure Laterality Date    COLON SURGERY         Home Meds:   Prior to Admission medications    Medication Sig Start Date End Date Taking? Authorizing Provider   amlodipine (NORVASC) 5 MG tablet TAKE 1 TABLET(5 MG) BY MOUTH TWICE DAILY 6/29/17   Pinky Thornton MD   ascorbic acid (VITAMIN C) 1000 MG tablet Take 1,000 mg by mouth once daily.    Historical Provider, MD   atorvastatin (LIPITOR) 10 MG tablet Take 1 tablet (10 mg total) by mouth once daily. 4/21/16 4/21/17  Pinky Thornton MD   cholecalciferol, vitamin D3, (VITAMIN D3) 2,000 unit Cap Take 1 capsule by mouth once daily.    Historical Provider, MD   cholestyramine, bulk, Powd 4 g by Misc.(Non-Drug; Combo Route) route once daily at 6am. 7/3/17   Pinky Thornton MD   co-enzyme Q-10 30 mg capsule Take 30 mg by mouth 3 (three) times daily.    Historical Provider, MD   diphenoxylate-atropine 2.5-0.025 mg (LOMOTIL) 2.5-0.025 mg per tablet Take 1 tablet by mouth 4 (four) times daily as needed for Diarrhea. 11/25/16   Lisa Abernathy MD   ferrous gluconate (FERGON) 324 MG tablet Take 324 mg by mouth daily with breakfast.    Historical Provider, MD   furosemide (LASIX) 20 MG tablet TAKE 1 TABLET BY MOUTH DAILY AS NEEDED 7/2/16   Pinky Thornton MD   gabapentin (NEURONTIN) 300 MG capsule TAKE ONE CAPSULE BY MOUTH EVERY EVENING 4/5/16   Lisa Abernathy MD   GARLIC EXTRACT ORAL Take by mouth.    Historical Provider, MD   glipiZIDE (GLUCOTROL) 5 MG tablet TAKE 1 TABLET BY MOUTH THREE TIMES DAILY 11/1/16   Lisa Abernathy MD   lisinopril (PRINIVIL,ZESTRIL) 40 MG  tablet TAKE 1 TABLET BY MOUTH TWICE DAILY 9/9/16   Lisa Abernathy MD   metoprolol succinate (TOPROL-XL) 100 MG 24 hr tablet Take 1 tablet (100 mg total) by mouth 2 (two) times daily. 11/1/16   Lisa Abernathy MD   multivitamin with iron-mineral TbSR Take 1 tablet by mouth once daily. 8/31/14   Pinky Thornton MD   NYSTATIN (DUKE[employer]S SOLUTION) Take 10 mLs by mouth 4 (four) times daily. Mix equal amounts of benadryl, maalox, 2% viscous lidocaine and nystatin    Swish and swallow 10 ml 4 times a day 3/28/17   Nevaeh Dc NP   opium tincture 10 mg/mL (morphine) Tinc Take 0.6 mLs (6 mg total) by mouth 4 (four) times daily. 6/13/17   Lisa Abernathy MD   oxycodone-acetaminophen (PERCOCET) 5-325 mg per tablet TK 1 T PO Q 6 H PRN P 8/31/16   Pedro Dumont MD   paregoric 2 mg/5 mL Liqd Take 5 mLs by mouth 3 (three) times daily. 11/25/16   Lisa Abernathy MD   tamsulosin (FLOMAX) 0.4 mg Cp24 Take 1 capsule (0.4 mg total) by mouth once daily. 3/28/17   Nevaeh Dc NP     Anticoagulants/Antiplatelets: no anticoagulation    Allergies: Review of patient's allergies indicates:  No Known Allergies  Sedation History:  no adverse reactions    Review of Systems:   Hematological: no known coagulopathies  Respiratory: no shortness of breath  Cardiovascular: no chest pain  Gastrointestinal: no abdominal pain  Genito-Urinary: no dysuria  Musculoskeletal: negative  Neurological: no TIA or stroke symptoms         OBJECTIVE:     Vital Signs (Most Recent)  Pulse: 81 (08/29/17 1028)  Resp: 20 (08/29/17 1028)  BP: (!) 142/69 (08/29/17 1028)  SpO2: 96 % (08/29/17 1028)    Physical Exam:  ASA: III  Mallampati: II    General: no acute distress  Mental Status: alert and oriented to person, place and time  HEENT: normocephalic, atraumatic  Chest: unlabored breathing  Abdomen: nondistended  Extremity: moves all extremities    Laboratory  Lab Results   Component Value Date    INR 1.0 08/29/2017       Lab Results   Component  Value Date    WBC 20.99 (H) 08/25/2017    HGB 11.5 (L) 08/25/2017    HCT 35.3 (L) 08/25/2017    MCV 89 08/25/2017     (L) 08/25/2017      Lab Results   Component Value Date     (H) 08/25/2017     08/25/2017    K 4.0 08/25/2017     08/25/2017    CO2 25 08/25/2017    BUN 15 08/25/2017    CREATININE 1.2 08/25/2017    CALCIUM 8.9 08/25/2017    MG 2.0 03/06/2016    ALT 21 08/25/2017    AST 37 08/25/2017    ALBUMIN 3.0 (L) 08/25/2017    BILITOT 0.6 08/25/2017    BILIDIR 0.1 01/13/2009       ASSESSMENT/PLAN:     Sedation Plan: Local anesthesia  Patient will undergo ultrasound guided paracentesis.    Fawad Blanchard MD  PGY-III  Dept of Radiology   Pager: [phone]

## 2017-08-29 NOTE — PROCEDURES
Radiology Post-Procedure Note    Pre Op Diagnosis: Ascites  Post Op Diagnosis: Same    Procedure: Paracentesis    Procedure performed by: Michael Thomas MD    Written Informed Consent Obtained: Yes  Specimen Removed: YES   Estimated Blood Loss: Minimal    Findings:   Successful paracentesis.  Albumin administered PRN per protocol.    Patient tolerated procedure well.    Fawad Blanchard MD  PGY-III  Dept of Radiology   Pager: [phone]

## 2017-09-01 DIAGNOSIS — N40.0 PROSTATE HYPERPLASIA WITHOUT URINARY OBSTRUCTION: ICD-10-CM

## 2017-09-01 RX ORDER — TAMSULOSIN HYDROCHLORIDE 0.4 MG/1
1 CAPSULE ORAL DAILY
Qty: 90 CAPSULE | Refills: 6 | Status: SHIPPED | OUTPATIENT
Start: 2017-09-01 | End: 2017-09-19 | Stop reason: SDUPTHER

## 2017-09-02 ENCOUNTER — HOSPITAL ENCOUNTER (OUTPATIENT)
Dept: RADIOLOGY | Facility: HOSPITAL | Age: 67
Discharge: HOME | End: 2017-09-02
Attending: INTERNAL MEDICINE
Payer: COMMERCIAL

## 2017-09-02 DIAGNOSIS — C20 RECTAL CANCER: ICD-10-CM

## 2017-09-02 PROCEDURE — 71260 CT THORAX DX C+: CPT | Mod: TC

## 2017-09-02 PROCEDURE — 74177 CT ABD & PELVIS W/CONTRAST: CPT | Mod: TC

## 2017-09-02 PROCEDURE — 25500020 PHARM REV CODE 255: Performed by: INTERNAL MEDICINE

## 2017-09-02 PROCEDURE — 74177 CT ABD & PELVIS W/CONTRAST: CPT | Mod: 26,,, | Performed by: RADIOLOGY

## 2017-09-02 PROCEDURE — 71260 CT THORAX DX C+: CPT | Mod: 26,,, | Performed by: RADIOLOGY

## 2017-09-02 RX ADMIN — IOHEXOL 15 ML: 350 INJECTION, SOLUTION INTRAVENOUS at 11:09

## 2017-09-02 RX ADMIN — IOHEXOL 100 ML: 350 INJECTION, SOLUTION INTRAVENOUS at 12:09

## 2017-09-05 ENCOUNTER — LAB VISIT (OUTPATIENT)
Dept: LAB | Facility: HOSPITAL | Age: 67
End: 2017-09-05
Attending: INTERNAL MEDICINE
Payer: COMMERCIAL

## 2017-09-05 ENCOUNTER — OFFICE VISIT (OUTPATIENT)
Dept: HEMATOLOGY/ONCOLOGY | Facility: CLINIC | Age: 67
End: 2017-09-05
Payer: COMMERCIAL

## 2017-09-05 VITALS
DIASTOLIC BLOOD PRESSURE: 61 MMHG | TEMPERATURE: 98 F | SYSTOLIC BLOOD PRESSURE: 131 MMHG | HEIGHT: 70 IN | BODY MASS INDEX: 32.22 KG/M2 | OXYGEN SATURATION: 98 % | HEART RATE: 69 BPM | WEIGHT: 225.06 LBS | RESPIRATION RATE: 19 BRPM

## 2017-09-05 DIAGNOSIS — T45.1X5A CHEMOTHERAPY INDUCED NEUTROPENIA: ICD-10-CM

## 2017-09-05 DIAGNOSIS — Z51.12 ENCOUNTER FOR ANTINEOPLASTIC CHEMOTHERAPY AND IMMUNOTHERAPY: ICD-10-CM

## 2017-09-05 DIAGNOSIS — E11.9 TYPE 2 DIABETES MELLITUS WITHOUT COMPLICATION, WITHOUT LONG-TERM CURRENT USE OF INSULIN: ICD-10-CM

## 2017-09-05 DIAGNOSIS — C20 RECTAL CANCER: ICD-10-CM

## 2017-09-05 DIAGNOSIS — I10 ESSENTIAL HYPERTENSION: ICD-10-CM

## 2017-09-05 DIAGNOSIS — Z51.11 ENCOUNTER FOR ANTINEOPLASTIC CHEMOTHERAPY AND IMMUNOTHERAPY: ICD-10-CM

## 2017-09-05 DIAGNOSIS — C20 RECTAL CANCER: Primary | ICD-10-CM

## 2017-09-05 DIAGNOSIS — D70.1 CHEMOTHERAPY INDUCED NEUTROPENIA: ICD-10-CM

## 2017-09-05 DIAGNOSIS — C78.01 SECONDARY ADENOCARCINOMA OF LUNG, RIGHT: ICD-10-CM

## 2017-09-05 LAB
ALBUMIN SERPL BCP-MCNC: 2.9 G/DL
ALP SERPL-CCNC: 244 U/L
ALT SERPL W/O P-5'-P-CCNC: 16 U/L
ANION GAP SERPL CALC-SCNC: 7 MMOL/L
AST SERPL-CCNC: 29 U/L
BILIRUB SERPL-MCNC: 0.7 MG/DL
BUN SERPL-MCNC: 15 MG/DL
CALCIUM SERPL-MCNC: 8.9 MG/DL
CEA SERPL-MCNC: 96.6 NG/ML
CHLORIDE SERPL-SCNC: 106 MMOL/L
CO2 SERPL-SCNC: 29 MMOL/L
CREAT SERPL-MCNC: 1 MG/DL
CREAT UR-MCNC: 303 MG/DL
ERYTHROCYTE [DISTWIDTH] IN BLOOD BY AUTOMATED COUNT: 19.5 %
EST. GFR  (AFRICAN AMERICAN): >60 ML/MIN/1.73 M^2
EST. GFR  (NON AFRICAN AMERICAN): >60 ML/MIN/1.73 M^2
GLUCOSE SERPL-MCNC: 130 MG/DL
HCT VFR BLD AUTO: 36.4 %
HGB BLD-MCNC: 11.7 G/DL
MCH RBC QN AUTO: 28.7 PG
MCHC RBC AUTO-ENTMCNC: 32.1 G/DL
MCV RBC AUTO: 89 FL
NEUTROPHILS # BLD AUTO: 6.4 K/UL
PLATELET # BLD AUTO: 124 K/UL
PMV BLD AUTO: 9.5 FL
POTASSIUM SERPL-SCNC: 3.6 MMOL/L
PROT SERPL-MCNC: 6.1 G/DL
PROT UR-MCNC: 58 MG/DL
PROT/CREAT RATIO, UR: 0.19
RBC # BLD AUTO: 4.07 M/UL
SODIUM SERPL-SCNC: 142 MMOL/L
WBC # BLD AUTO: 9.11 K/UL

## 2017-09-05 PROCEDURE — 99999 PR PBB SHADOW E&M-EST. PATIENT-LVL IV: CPT | Mod: PBBFAC,,, | Performed by: INTERNAL MEDICINE

## 2017-09-05 PROCEDURE — 3008F BODY MASS INDEX DOCD: CPT | Mod: S$GLB,,, | Performed by: INTERNAL MEDICINE

## 2017-09-05 PROCEDURE — 99215 OFFICE O/P EST HI 40 MIN: CPT | Mod: S$GLB,,, | Performed by: INTERNAL MEDICINE

## 2017-09-05 PROCEDURE — 36415 COLL VENOUS BLD VENIPUNCTURE: CPT

## 2017-09-05 PROCEDURE — 82378 CARCINOEMBRYONIC ANTIGEN: CPT

## 2017-09-05 PROCEDURE — 80053 COMPREHEN METABOLIC PANEL: CPT

## 2017-09-05 PROCEDURE — 82570 ASSAY OF URINE CREATININE: CPT

## 2017-09-05 PROCEDURE — 1126F AMNT PAIN NOTED NONE PRSNT: CPT | Mod: S$GLB,,, | Performed by: INTERNAL MEDICINE

## 2017-09-05 PROCEDURE — 3078F DIAST BP <80 MM HG: CPT | Mod: S$GLB,,, | Performed by: INTERNAL MEDICINE

## 2017-09-05 PROCEDURE — 3075F SYST BP GE 130 - 139MM HG: CPT | Mod: S$GLB,,, | Performed by: INTERNAL MEDICINE

## 2017-09-05 PROCEDURE — 85027 COMPLETE CBC AUTOMATED: CPT

## 2017-09-05 PROCEDURE — 1159F MED LIST DOCD IN RCRD: CPT | Mod: S$GLB,,, | Performed by: INTERNAL MEDICINE

## 2017-09-05 PROCEDURE — 4010F ACE/ARB THERAPY RXD/TAKEN: CPT | Mod: S$GLB,,, | Performed by: INTERNAL MEDICINE

## 2017-09-05 RX ORDER — SODIUM CHLORIDE 0.9 % (FLUSH) 0.9 %
10 SYRINGE (ML) INJECTION
Status: CANCELLED | OUTPATIENT
Start: 2017-09-12

## 2017-09-05 RX ORDER — HEPARIN 100 UNIT/ML
500 SYRINGE INTRAVENOUS
Status: CANCELLED | OUTPATIENT
Start: 2017-09-12

## 2017-09-05 RX ORDER — ATROPINE SULFATE 0.4 MG/ML
0.4 INJECTION, SOLUTION ENDOTRACHEAL; INTRAMEDULLARY; INTRAMUSCULAR; INTRAVENOUS; SUBCUTANEOUS DAILY PRN
Status: CANCELLED | OUTPATIENT
Start: 2017-09-05

## 2017-09-05 RX ORDER — AMLODIPINE BESYLATE 5 MG/1
TABLET ORAL
Qty: 180 TABLET | Refills: 0 | Status: SHIPPED | OUTPATIENT
Start: 2017-09-05 | End: 2017-10-24 | Stop reason: SDUPTHER

## 2017-09-05 RX ORDER — FLUOROURACIL 50 MG/ML
400 INJECTION, SOLUTION INTRAVENOUS
Status: CANCELLED | OUTPATIENT
Start: 2017-09-05

## 2017-09-05 NOTE — Clinical Note
Schedule CBC, CMP, urine protein/creatinine ratio and see me in 2 weeks and for FOLFIRI and Cyramza. DC pump on day 3. neulasta on day 3

## 2017-09-05 NOTE — PROGRESS NOTES
"Subjective:       Patient ID: Karthik Medrano is a 67 y.o. male.    Chief Complaint: Rectal Cancer; Abdominal Cramping; Fatigue; and Bloated  Oncologic History:  67 year old male with history of rectal cancer diagnosed 1/11/10 on colonoscopy (moderately differentiated adenocarcinoma) and based on imaging, was T3NxM0. Patient underwent Neoadjuvant chemort with infusional 5FU. This was completed 3/19/10 with a total of 50.4Gy given to the pelvis. He went to surgery 5/13/10. T3N0M0. Post operative he received no adjuvant treatment and has been followed regularly. He went to his PCP 6/24/13 who ordered a CXR which found pulmonary nodules. On 6/27/13, CT chest confirmed these findings. CEA drawn when initially diagnosed was 2.6ng/ml and 6/27/13 was 30.8 ng/ml. . Pulmonary biopsy was performed 7/18/13 and this confirmed metastatic adenocarcinoma. 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 Avastin was added for second cycle.    Patient completed 12 cycles of FOLFOX avastin. He did not receive Avastin for cycle 3, 4, or 5 (due to uncontrolled blood pressure).    He was on maintenance infusional 5FU and Avastin. Repeat scans 4/14/14 showed numerous bilateral pulmonary soft tissue nodules, the majority of which demonstrate interval enlargement when compared to the prior exam. CEA up 18.2 from 13.    He was restarted on FOLFIRI and AVastin and received 6 months of chemo and then was switched to maintenance chemotherapy with 5FU and Avastin. His CT scans from early May 2015 revealed progression in lung lesions.  He is now on FOLFIRI and Cyramza.  CT from 4/19/16 reveals "Multiple stable metastatic pulmonary nodules in this patient with history of rectal cancer.  Stable postoperative changes are present from prior partial colectomy.Interval increase in right-sided hydronephrosis despite appropriate positioning of a double-J ureteral sent.  There is associated enlargement and decreased perfusion to " "the right kidney consistent with obstruction. These findings are concerning for a nonfunctioning ureteral stent. Cholelithiasis."  CT from 6/13/16 reveal stable disease and hydronephrosis has resolved.  CT from 8/5/16 reveals "Stable pulmonary metastatic disease.Increased prominence of periureteral fat stranding/inflammatory change. Stable mild right hydronephrosis."  CT from 1/6/17 "1. Stable appearance of pulmonary metastatic disease in this patient with history of colon cancer. Pulmonary index lesions are stable in size compared to prior examination. No evidence of new metastatic disease. 2. Right-sided double-J ureteral stent with stable appearing inflammatory change about the right renal collecting system and right ureter. 3. Additional stable incidental findings as discussed above. "     CT scans reveal "In this patient with a history of rectal cancer, index lesions in the right and left lung are stable in size as detailed above. No new pulmonary nodules or masses are identified. There is no evidence of abnormal soft tissue opacity in the rectosigmoid region to suggest residual/recurrent disease. Interval increase in the degree of right hydronephrosis with a stable double-J right ureteral stent in place. Interval development of perihepatic ascites. Stable fat and bowel containing ventral hernia"     CT scans 7/21/17 reveal "Stable appearance of multiple pulmonary lesions, compatible with metastatic disease, in this patient with given history of rectal cancer.  Index lesions below. Surgical changes from low anterior resection of rectal mass, without evidence of local recurrence. Stable right-sided moderate hydroureteronephrosis, with periureteral stranding ; appropriately positioned double-J ureteral stent.  Findings are concerning for UTI. There is thinning of the right renal parenchyma, concerning for chronic obstruction or inflammation. Stable appearing soft tissue collection with dystrophic calcifications " "in the presacral space, which may be postsurgical or post radiation in etiology.  This finding may cause obstruction of the right ureter."      HPI Mr. Medrano returns for follow up  and for FOLFIRI and Cyramza.  He underwent therapeutic paracentesis and 5 liters of fluids was removed on 8/29/17  He underwent CT scans 9/2/17 which reveal "Status post low anterior resection for colon cancer. Pulmonary metastatic disease without significant interval change. See summary below.   Continued moderate volume of ascites, possibly malignant ascites. Chronic obstruction of right distal right ureter despite presence of ureter stent. Distal right ureter may be involved by soft tissue finding in presacral area, favored to represent postsurgical or post radiation change"    Review of Systems   Constitutional: Negative for appetite change, fatigue and unexpected weight change.   HENT: Negative for mouth sores.    Eyes: Negative for visual disturbance.   Respiratory: Negative for cough and shortness of breath.    Cardiovascular: Negative for chest pain.   Gastrointestinal: Negative for abdominal pain and diarrhea.   Genitourinary: Negative for frequency.   Musculoskeletal: Negative for back pain.   Skin: Negative for rash.   Neurological: Negative for headaches.   Hematological: Negative for adenopathy.   Psychiatric/Behavioral: The patient is not nervous/anxious.    All other systems reviewed and are negative.      Objective:      Physical Exam   Constitutional: He is oriented to person, place, and time. He appears well-developed and well-nourished.   HENT:   Mouth/Throat: No oropharyngeal exudate.   Cardiovascular: Normal rate and normal heart sounds.    Pulmonary/Chest: Effort normal and breath sounds normal. He has no wheezes.   Abdominal: Soft. Bowel sounds are normal. There is no tenderness.   Musculoskeletal: He exhibits no edema or tenderness.   Lymphadenopathy:     He has no cervical adenopathy.   Neurological: He is alert " and oriented to person, place, and time. Coordination normal.   Skin: Skin is warm and dry. No rash noted.   Psychiatric: He has a normal mood and affect. Judgment and thought content normal.   Vitals reviewed.      LABS:  WBC   Date Value Ref Range Status   09/05/2017 9.11 3.90 - 12.70 K/uL Final     Hemoglobin   Date Value Ref Range Status   09/05/2017 11.7 (L) 14.0 - 18.0 g/dL Final     Hematocrit   Date Value Ref Range Status   09/05/2017 36.4 (L) 40.0 - 54.0 % Final     Platelets   Date Value Ref Range Status   09/05/2017 124 (L) 150 - 350 K/uL Final     Gran #   Date Value Ref Range Status   09/05/2017 6.4 1.8 - 7.7 K/uL Final     Comment:     The ANC is based on a white cell differential from an   automated cell counter. It has not been microscopically   reviewed for the presence of abnormal cells. Clinical   correlation is required.         Chemistry        Component Value Date/Time     09/05/2017 1014    K 3.6 09/05/2017 1014     09/05/2017 1014    CO2 29 09/05/2017 1014    BUN 15 09/05/2017 1014    CREATININE 1.0 09/05/2017 1014     (H) 09/05/2017 1014        Component Value Date/Time    CALCIUM 8.9 09/05/2017 1014    ALKPHOS 244 (H) 09/05/2017 1014    AST 29 09/05/2017 1014    ALT 16 09/05/2017 1014    BILITOT 0.7 09/05/2017 1014    ESTGFRAFRICA >60.0 09/05/2017 1014    EGFRNONAA >60.0 09/05/2017 1014          Assessment:       1. Rectal cancer    2. Secondary adenocarcinoma of lung, right    3. Encounter for antineoplastic chemotherapy and immunotherapy    4. Chemotherapy induced neutropenia    5. Essential hypertension    6. Type 2 diabetes mellitus without complication, without long-term current use of insulin        Plan:        1,2,3,4. He will proceed with FOLFIRI and Cyramza and will return in 2 weeks for next cycle. Reviewed his CT scans, disease is overall stable but he had new ascitis. He understands that we will continue same chemo for now. However if he has repeated issues  with ascitis we may have to change chemo. There are not many viable options at this time. Maybe Tipuricil or Regorafenib    Neulasta on day 4.  5. Stable on meds.  6. Stable on meds.     Above care plan was discussed with patient and accompanying wife and all questions were addressed to their satisfaction

## 2017-09-06 ENCOUNTER — INFUSION (OUTPATIENT)
Dept: INFUSION THERAPY | Facility: HOSPITAL | Age: 67
End: 2017-09-06
Attending: INTERNAL MEDICINE
Payer: COMMERCIAL

## 2017-09-06 VITALS
SYSTOLIC BLOOD PRESSURE: 137 MMHG | HEART RATE: 70 BPM | DIASTOLIC BLOOD PRESSURE: 67 MMHG | RESPIRATION RATE: 18 BRPM | TEMPERATURE: 98 F

## 2017-09-06 DIAGNOSIS — Z51.11 ENCOUNTER FOR ANTINEOPLASTIC CHEMOTHERAPY AND IMMUNOTHERAPY: ICD-10-CM

## 2017-09-06 DIAGNOSIS — T45.1X5A CHEMOTHERAPY INDUCED NEUTROPENIA: Primary | ICD-10-CM

## 2017-09-06 DIAGNOSIS — D70.1 CHEMOTHERAPY INDUCED NEUTROPENIA: Primary | ICD-10-CM

## 2017-09-06 DIAGNOSIS — Z51.12 ENCOUNTER FOR ANTINEOPLASTIC CHEMOTHERAPY AND IMMUNOTHERAPY: ICD-10-CM

## 2017-09-06 PROCEDURE — 96417 CHEMO IV INFUS EACH ADDL SEQ: CPT

## 2017-09-06 PROCEDURE — 96413 CHEMO IV INFUSION 1 HR: CPT

## 2017-09-06 PROCEDURE — 96367 TX/PROPH/DG ADDL SEQ IV INF: CPT

## 2017-09-06 PROCEDURE — 96368 THER/DIAG CONCURRENT INF: CPT

## 2017-09-06 PROCEDURE — 63600175 PHARM REV CODE 636 W HCPCS: Performed by: INTERNAL MEDICINE

## 2017-09-06 PROCEDURE — 25000003 PHARM REV CODE 250: Performed by: INTERNAL MEDICINE

## 2017-09-06 PROCEDURE — 96415 CHEMO IV INFUSION ADDL HR: CPT

## 2017-09-06 PROCEDURE — 96375 TX/PRO/DX INJ NEW DRUG ADDON: CPT

## 2017-09-06 PROCEDURE — 96416 CHEMO PROLONG INFUSE W/PUMP: CPT

## 2017-09-06 PROCEDURE — 96411 CHEMO IV PUSH ADDL DRUG: CPT

## 2017-09-06 RX ORDER — FLUOROURACIL 50 MG/ML
400 INJECTION, SOLUTION INTRAVENOUS
Status: COMPLETED | OUTPATIENT
Start: 2017-09-06 | End: 2017-09-06

## 2017-09-06 RX ORDER — ATROPINE SULFATE 0.4 MG/ML
0.4 INJECTION, SOLUTION ENDOTRACHEAL; INTRAMEDULLARY; INTRAMUSCULAR; INTRAVENOUS; SUBCUTANEOUS DAILY PRN
Status: DISCONTINUED | OUTPATIENT
Start: 2017-09-06 | End: 2017-09-06 | Stop reason: HOSPADM

## 2017-09-06 RX ADMIN — LEUCOVORIN CALCIUM 45 MG: 350 INJECTION, POWDER, LYOPHILIZED, FOR SOLUTION INTRAMUSCULAR; INTRAVENOUS at 05:09

## 2017-09-06 RX ADMIN — SODIUM CHLORIDE: 9 INJECTION, SOLUTION INTRAVENOUS at 03:09

## 2017-09-06 RX ADMIN — FLUOROURACIL 5330 MG: 50 INJECTION, SOLUTION INTRAVENOUS at 06:09

## 2017-09-06 RX ADMIN — SODIUM CHLORIDE 798 MG: 9 INJECTION, SOLUTION INTRAVENOUS at 03:09

## 2017-09-06 RX ADMIN — PALONOSETRON HYDROCHLORIDE: 0.25 INJECTION INTRAVENOUS at 03:09

## 2017-09-06 RX ADMIN — FLUOROURACIL 890 MG: 50 INJECTION, SOLUTION INTRAVENOUS at 06:09

## 2017-09-06 RX ADMIN — SODIUM CHLORIDE 400 MG: 0.9 INJECTION, SOLUTION INTRAVENOUS at 05:09

## 2017-09-06 RX ADMIN — ATROPINE SULFATE 0.4 MG: 0.4 INJECTION, SOLUTION INTRAMUSCULAR; INTRAVENOUS; SUBCUTANEOUS at 04:09

## 2017-09-06 NOTE — PLAN OF CARE
Problem: Chemotherapy Effects (Adult)  Goal: Signs and Symptoms of Listed Potential Problems Will be Absent, Minimized or Managed (Chemotherapy Effects)  Signs and symptoms of listed potential problems will be absent, minimized or managed by discharge/transition of care (reference Chemotherapy Effects (Adult) CPG).   Outcome: Ongoing (interventions implemented as appropriate)  Pt. Here today for chemo. Port accessed, blood return present, infusing without difficulty. VSS.

## 2017-09-08 ENCOUNTER — INFUSION (OUTPATIENT)
Dept: INFUSION THERAPY | Facility: HOSPITAL | Age: 67
End: 2017-09-08
Attending: INTERNAL MEDICINE
Payer: COMMERCIAL

## 2017-09-08 VITALS
TEMPERATURE: 99 F | OXYGEN SATURATION: 98 % | HEART RATE: 79 BPM | BODY MASS INDEX: 32.21 KG/M2 | WEIGHT: 225 LBS | SYSTOLIC BLOOD PRESSURE: 115 MMHG | RESPIRATION RATE: 18 BRPM | DIASTOLIC BLOOD PRESSURE: 58 MMHG | HEIGHT: 70 IN

## 2017-09-08 DIAGNOSIS — Z51.11 ENCOUNTER FOR ANTINEOPLASTIC CHEMOTHERAPY AND IMMUNOTHERAPY: ICD-10-CM

## 2017-09-08 DIAGNOSIS — T45.1X5A CHEMOTHERAPY INDUCED NEUTROPENIA: Primary | ICD-10-CM

## 2017-09-08 DIAGNOSIS — D70.1 CHEMOTHERAPY INDUCED NEUTROPENIA: Primary | ICD-10-CM

## 2017-09-08 DIAGNOSIS — Z51.12 ENCOUNTER FOR ANTINEOPLASTIC CHEMOTHERAPY AND IMMUNOTHERAPY: ICD-10-CM

## 2017-09-08 PROCEDURE — 63600175 PHARM REV CODE 636 W HCPCS: Performed by: INTERNAL MEDICINE

## 2017-09-08 PROCEDURE — 96377 APPLICATON ON-BODY INJECTOR: CPT

## 2017-09-08 PROCEDURE — A4216 STERILE WATER/SALINE, 10 ML: HCPCS | Performed by: INTERNAL MEDICINE

## 2017-09-08 PROCEDURE — 96523 IRRIG DRUG DELIVERY DEVICE: CPT

## 2017-09-08 PROCEDURE — 25000003 PHARM REV CODE 250: Performed by: INTERNAL MEDICINE

## 2017-09-08 RX ORDER — SODIUM CHLORIDE 0.9 % (FLUSH) 0.9 %
10 SYRINGE (ML) INJECTION
Status: DISCONTINUED | OUTPATIENT
Start: 2017-09-08 | End: 2017-09-08 | Stop reason: HOSPADM

## 2017-09-08 RX ORDER — HEPARIN 100 UNIT/ML
500 SYRINGE INTRAVENOUS
Status: DISCONTINUED | OUTPATIENT
Start: 2017-09-08 | End: 2017-09-08 | Stop reason: HOSPADM

## 2017-09-08 RX ADMIN — PEGFILGRASTIM 6 MG: KIT SUBCUTANEOUS at 05:09

## 2017-09-08 RX ADMIN — SODIUM CHLORIDE, PRESERVATIVE FREE 10 ML: 5 INJECTION INTRAVENOUS at 05:09

## 2017-09-08 RX ADMIN — HEPARIN 500 UNITS: 100 SYRINGE at 05:09

## 2017-09-08 NOTE — NURSING
1715: Pt arrived for Neulasta OBI & pump D/C. Pt tolerated OBI to RUE & home infusion very well. PAC heparinized & de accessed w/o difficulty. Pt D/C'd home with family & instructions

## 2017-09-15 ENCOUNTER — TELEPHONE (OUTPATIENT)
Dept: HEMATOLOGY/ONCOLOGY | Facility: CLINIC | Age: 67
End: 2017-09-15

## 2017-09-15 DIAGNOSIS — C20 RECTAL CANCER: Primary | ICD-10-CM

## 2017-09-15 NOTE — TELEPHONE ENCOUNTER
"Returned call to patient, who is calling to state he had a "fair amount of rectal bleeding over the weekend," which was dark red with small clots. On this past Monday, the rectal bleeding stopped.  Again tomorrow, around 4pm, patient had dark red bood with large clots x 20 mins in duration.  Patient denies having bleeding anywhere else. Is experiencing weakness and SOB.     Nurse spoke with dr valles---patient will come in for cbc/t&s---he understands to wait for the results---with the possibility of being sent to the ED if he shows anemia.  "

## 2017-09-15 NOTE — TELEPHONE ENCOUNTER
----- Message from Jesse Swan sent at 9/15/2017 10:07 AM CDT -----  Contact: pt   Will like a call from Candie regarding rectal bleeding     Contact::[phone]

## 2017-09-16 ENCOUNTER — PATIENT MESSAGE (OUTPATIENT)
Dept: HEMATOLOGY/ONCOLOGY | Facility: CLINIC | Age: 67
End: 2017-09-16

## 2017-09-18 ENCOUNTER — LAB VISIT (OUTPATIENT)
Dept: LAB | Facility: HOSPITAL | Age: 67
End: 2017-09-18
Attending: INTERNAL MEDICINE
Payer: COMMERCIAL

## 2017-09-18 DIAGNOSIS — C20 RECTAL CANCER: ICD-10-CM

## 2017-09-18 LAB
CREAT UR-MCNC: 250 MG/DL
PROT UR-MCNC: 948 MG/DL
PROT/CREAT RATIO, UR: 3.79

## 2017-09-18 PROCEDURE — 84156 ASSAY OF PROTEIN URINE: CPT

## 2017-09-19 ENCOUNTER — INFUSION (OUTPATIENT)
Dept: INFUSION THERAPY | Facility: HOSPITAL | Age: 67
End: 2017-09-19
Attending: INTERNAL MEDICINE
Payer: COMMERCIAL

## 2017-09-19 ENCOUNTER — TELEPHONE (OUTPATIENT)
Dept: HEMATOLOGY/ONCOLOGY | Facility: CLINIC | Age: 67
End: 2017-09-19

## 2017-09-19 ENCOUNTER — OFFICE VISIT (OUTPATIENT)
Dept: HEMATOLOGY/ONCOLOGY | Facility: CLINIC | Age: 67
End: 2017-09-19
Payer: COMMERCIAL

## 2017-09-19 ENCOUNTER — TELEPHONE (OUTPATIENT)
Dept: SURGERY | Facility: CLINIC | Age: 67
End: 2017-09-19

## 2017-09-19 VITALS
RESPIRATION RATE: 16 BRPM | HEART RATE: 86 BPM | SYSTOLIC BLOOD PRESSURE: 159 MMHG | DIASTOLIC BLOOD PRESSURE: 73 MMHG | TEMPERATURE: 99 F

## 2017-09-19 VITALS
WEIGHT: 225.06 LBS | SYSTOLIC BLOOD PRESSURE: 109 MMHG | TEMPERATURE: 98 F | RESPIRATION RATE: 19 BRPM | OXYGEN SATURATION: 97 % | BODY MASS INDEX: 32.22 KG/M2 | HEART RATE: 92 BPM | DIASTOLIC BLOOD PRESSURE: 58 MMHG | HEIGHT: 70 IN

## 2017-09-19 DIAGNOSIS — D64.81 ANTINEOPLASTIC CHEMOTHERAPY INDUCED ANEMIA: Primary | ICD-10-CM

## 2017-09-19 DIAGNOSIS — T45.1X5A CHEMOTHERAPY-INDUCED THROMBOCYTOPENIA: ICD-10-CM

## 2017-09-19 DIAGNOSIS — R60.9 EDEMA, UNSPECIFIED TYPE: ICD-10-CM

## 2017-09-19 DIAGNOSIS — N40.0 PROSTATE HYPERPLASIA WITHOUT URINARY OBSTRUCTION: ICD-10-CM

## 2017-09-19 DIAGNOSIS — D64.81 ANTINEOPLASTIC CHEMOTHERAPY INDUCED ANEMIA: ICD-10-CM

## 2017-09-19 DIAGNOSIS — T45.1X5A ANTINEOPLASTIC CHEMOTHERAPY INDUCED ANEMIA: ICD-10-CM

## 2017-09-19 DIAGNOSIS — C20 RECTAL CANCER: Primary | ICD-10-CM

## 2017-09-19 DIAGNOSIS — D69.59 CHEMOTHERAPY-INDUCED THROMBOCYTOPENIA: ICD-10-CM

## 2017-09-19 DIAGNOSIS — N17.9 ACUTE RENAL FAILURE, UNSPECIFIED ACUTE RENAL FAILURE TYPE: ICD-10-CM

## 2017-09-19 DIAGNOSIS — K92.2 LOWER GI BLEED: ICD-10-CM

## 2017-09-19 DIAGNOSIS — C20 RECTAL CANCER: ICD-10-CM

## 2017-09-19 DIAGNOSIS — C78.01 SECONDARY ADENOCARCINOMA OF LUNG, RIGHT: ICD-10-CM

## 2017-09-19 DIAGNOSIS — T45.1X5A ANTINEOPLASTIC CHEMOTHERAPY INDUCED ANEMIA: Primary | ICD-10-CM

## 2017-09-19 LAB
BLD PROD TYP BPU: NORMAL
BLD PROD TYP BPU: NORMAL
BLOOD UNIT EXPIRATION DATE: NORMAL
BLOOD UNIT EXPIRATION DATE: NORMAL
BLOOD UNIT TYPE CODE: 5100
BLOOD UNIT TYPE CODE: 5100
BLOOD UNIT TYPE: NORMAL
BLOOD UNIT TYPE: NORMAL
CODING SYSTEM: NORMAL
CODING SYSTEM: NORMAL
DISPENSE STATUS: NORMAL
DISPENSE STATUS: NORMAL
NUM UNITS TRANS PACKED RBC: NORMAL
NUM UNITS TRANS PACKED RBC: NORMAL

## 2017-09-19 PROCEDURE — 3074F SYST BP LT 130 MM HG: CPT | Mod: S$GLB,,, | Performed by: INTERNAL MEDICINE

## 2017-09-19 PROCEDURE — 99999 PR PBB SHADOW E&M-EST. PATIENT-LVL IV: CPT | Mod: PBBFAC,,, | Performed by: INTERNAL MEDICINE

## 2017-09-19 PROCEDURE — P9038 RBC IRRADIATED: HCPCS

## 2017-09-19 PROCEDURE — 63600175 PHARM REV CODE 636 W HCPCS: Performed by: INTERNAL MEDICINE

## 2017-09-19 PROCEDURE — 96375 TX/PRO/DX INJ NEW DRUG ADDON: CPT

## 2017-09-19 PROCEDURE — 96367 TX/PROPH/DG ADDL SEQ IV INF: CPT

## 2017-09-19 PROCEDURE — 27201040 HC RC 50 FILTER

## 2017-09-19 PROCEDURE — A4216 STERILE WATER/SALINE, 10 ML: HCPCS | Performed by: INTERNAL MEDICINE

## 2017-09-19 PROCEDURE — 1159F MED LIST DOCD IN RCRD: CPT | Mod: S$GLB,,, | Performed by: INTERNAL MEDICINE

## 2017-09-19 PROCEDURE — 86920 COMPATIBILITY TEST SPIN: CPT

## 2017-09-19 PROCEDURE — 99215 OFFICE O/P EST HI 40 MIN: CPT | Mod: S$GLB,,, | Performed by: INTERNAL MEDICINE

## 2017-09-19 PROCEDURE — 25000003 PHARM REV CODE 250: Performed by: INTERNAL MEDICINE

## 2017-09-19 PROCEDURE — 3078F DIAST BP <80 MM HG: CPT | Mod: S$GLB,,, | Performed by: INTERNAL MEDICINE

## 2017-09-19 PROCEDURE — 36430 TRANSFUSION BLD/BLD COMPNT: CPT

## 2017-09-19 PROCEDURE — 1126F AMNT PAIN NOTED NONE PRSNT: CPT | Mod: S$GLB,,, | Performed by: INTERNAL MEDICINE

## 2017-09-19 PROCEDURE — 3008F BODY MASS INDEX DOCD: CPT | Mod: S$GLB,,, | Performed by: INTERNAL MEDICINE

## 2017-09-19 RX ORDER — TAMSULOSIN HYDROCHLORIDE 0.4 MG/1
1 CAPSULE ORAL DAILY
Qty: 90 CAPSULE | Refills: 6 | Status: SHIPPED | OUTPATIENT
Start: 2017-09-19

## 2017-09-19 RX ORDER — HYDROCODONE BITARTRATE AND ACETAMINOPHEN 500; 5 MG/1; MG/1
TABLET ORAL ONCE
Status: COMPLETED | OUTPATIENT
Start: 2017-09-19 | End: 2017-09-19

## 2017-09-19 RX ORDER — HEPARIN 100 UNIT/ML
500 SYRINGE INTRAVENOUS
Status: COMPLETED | OUTPATIENT
Start: 2017-09-19 | End: 2017-09-19

## 2017-09-19 RX ORDER — FUROSEMIDE 20 MG/1
40 TABLET ORAL DAILY PRN
Qty: 90 TABLET | Refills: 6 | Status: SHIPPED | OUTPATIENT
Start: 2017-09-19

## 2017-09-19 RX ORDER — HYDROCODONE BITARTRATE AND ACETAMINOPHEN 500; 5 MG/1; MG/1
TABLET ORAL ONCE
Status: CANCELLED | OUTPATIENT
Start: 2017-09-19 | End: 2017-09-19

## 2017-09-19 RX ORDER — ACETAMINOPHEN 325 MG/1
650 TABLET ORAL
Status: COMPLETED | OUTPATIENT
Start: 2017-09-19 | End: 2017-09-19

## 2017-09-19 RX ORDER — ACETAMINOPHEN 325 MG/1
650 TABLET ORAL
Status: CANCELLED | OUTPATIENT
Start: 2017-09-19

## 2017-09-19 RX ORDER — SODIUM CHLORIDE 0.9 % (FLUSH) 0.9 %
10 SYRINGE (ML) INJECTION
Status: COMPLETED | OUTPATIENT
Start: 2017-09-19 | End: 2017-09-19

## 2017-09-19 RX ADMIN — SODIUM CHLORIDE, PRESERVATIVE FREE 10 ML: 5 INJECTION INTRAVENOUS at 07:09

## 2017-09-19 RX ADMIN — ACETAMINOPHEN 650 MG: 325 TABLET ORAL at 02:09

## 2017-09-19 RX ADMIN — HYDROCORTISONE SODIUM SUCCINATE 50 MG: 100 INJECTION, POWDER, FOR SOLUTION INTRAMUSCULAR; INTRAVENOUS at 02:09

## 2017-09-19 RX ADMIN — HEPARIN 500 UNITS: 100 SYRINGE at 07:09

## 2017-09-19 RX ADMIN — SODIUM CHLORIDE: 0.9 INJECTION, SOLUTION INTRAVENOUS at 03:09

## 2017-09-19 NOTE — Clinical Note
Cancel chemo tomorrow. Schedule CBC<CMP, Urine protein/creatinine ratio and see me in 1 week and for chemo

## 2017-09-19 NOTE — TELEPHONE ENCOUNTER
Left VM for patient about dr holliday's appointment today at 1:30-2pm---as he has a blood transfusion today at 230pm.

## 2017-09-19 NOTE — PLAN OF CARE
Problem: Patient Care Overview  Goal: Plan of Care Review  Outcome: Ongoing (interventions implemented as appropriate)  Pt tolerated 2 units of PRBC without complications. VSS. No s/s of adverse reaction. Told to notify MD with any changes. AVS given to patient.

## 2017-09-19 NOTE — PROGRESS NOTES
"Subjective:       Patient ID: Karthik Medrano is a 67 y.o. male.    Chief Complaint: Rectal Cancer; Rectal Bleeding; and 2-6 loose stools per day  Oncologic History:  67 year old male with history of rectal cancer diagnosed 1/11/10 on colonoscopy (moderately differentiated adenocarcinoma) and based on imaging, was T3NxM0. Patient underwent Neoadjuvant chemort with infusional 5FU. This was completed 3/19/10 with a total of 50.4Gy given to the pelvis. He went to surgery 5/13/10. T3N0M0. Post operative he received no adjuvant treatment and has been followed regularly. He went to his PCP 6/24/13 who ordered a CXR which found pulmonary nodules. On 6/27/13, CT chest confirmed these findings. CEA drawn when initially diagnosed was 2.6ng/ml and 6/27/13 was 30.8 ng/ml. . Pulmonary biopsy was performed 7/18/13 and this confirmed metastatic adenocarcinoma. 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 Avastin was added for second cycle.    Patient completed 12 cycles of FOLFOX avastin. He did not receive Avastin for cycle 3, 4, or 5 (due to uncontrolled blood pressure).    He was on maintenance infusional 5FU and Avastin. Repeat scans 4/14/14 showed numerous bilateral pulmonary soft tissue nodules, the majority of which demonstrate interval enlargement when compared to the prior exam. CEA up 18.2 from 13.    He was restarted on FOLFIRI and AVastin and received 6 months of chemo and then was switched to maintenance chemotherapy with 5FU and Avastin. His CT scans from early May 2015 revealed progression in lung lesions.  He is now on FOLFIRI and Cyramza.  CT from 4/19/16 reveals "Multiple stable metastatic pulmonary nodules in this patient with history of rectal cancer.  Stable postoperative changes are present from prior partial colectomy.Interval increase in right-sided hydronephrosis despite appropriate positioning of a double-J ureteral sent.  There is associated enlargement and decreased " "perfusion to the right kidney consistent with obstruction. These findings are concerning for a nonfunctioning ureteral stent. Cholelithiasis."  CT from 6/13/16 reveal stable disease and hydronephrosis has resolved.  CT from 8/5/16 reveals "Stable pulmonary metastatic disease.Increased prominence of periureteral fat stranding/inflammatory change. Stable mild right hydronephrosis."  CT from 1/6/17 "1. Stable appearance of pulmonary metastatic disease in this patient with history of colon cancer. Pulmonary index lesions are stable in size compared to prior examination. No evidence of new metastatic disease. 2. Right-sided double-J ureteral stent with stable appearing inflammatory change about the right renal collecting system and right ureter. 3. Additional stable incidental findings as discussed above. "     CT scans reveal "In this patient with a history of rectal cancer, index lesions in the right and left lung are stable in size as detailed above. No new pulmonary nodules or masses are identified. There is no evidence of abnormal soft tissue opacity in the rectosigmoid region to suggest residual/recurrent disease. Interval increase in the degree of right hydronephrosis with a stable double-J right ureteral stent in place. Interval development of perihepatic ascites. Stable fat and bowel containing ventral hernia"     CT scans 7/21/17 reveal "Stable appearance of multiple pulmonary lesions, compatible with metastatic disease, in this patient with given history of rectal cancer.  Index lesions below. Surgical changes from low anterior resection of rectal mass, without evidence of local recurrence. Stable right-sided moderate hydroureteronephrosis, with periureteral stranding ; appropriately positioned double-J ureteral stent.  Findings are concerning for UTI. There is thinning of the right renal parenchyma, concerning for chronic obstruction or inflammation. Stable appearing soft tissue collection with dystrophic " "calcifications in the presacral space, which may be postsurgical or post radiation in etiology.  This finding may cause obstruction of the right ureter."       HPI Mr. Medrano returns for follow up  and for FOLFIRI and Cyramza. He has had 2 episodes of bright red blood per rectum lasting anywhere from 10-15 minutes (once on 9/14 and second episode on 9/17)  No further episodes. He continues to have 2-3 loose stools/day  He denies any nausea, vomiting, constipation, abdominal pain, weight loss or loss of appetite, chest pain, shortness of breath, leg swelling, fatigue, pain, headache, dizziness, or mood changes. His ECOG PS is zero. He is accompanied by his wife.             Review of Systems   Constitutional: Positive for fatigue. Negative for appetite change and unexpected weight change.   HENT: Negative for mouth sores.    Eyes: Negative for visual disturbance.   Respiratory: Negative for cough and shortness of breath.    Cardiovascular: Negative for chest pain.   Gastrointestinal: Positive for blood in stool and diarrhea. Negative for abdominal pain.   Genitourinary: Negative for frequency.   Musculoskeletal: Negative for back pain.   Skin: Negative for rash.   Neurological: Negative for headaches.   Hematological: Negative for adenopathy.   Psychiatric/Behavioral: The patient is not nervous/anxious.    All other systems reviewed and are negative.      Objective:      Physical Exam   Constitutional: He is oriented to person, place, and time. He appears well-developed and well-nourished.   HENT:   Mouth/Throat: No oropharyngeal exudate.   Cardiovascular: Normal rate and normal heart sounds.    Pulmonary/Chest: Effort normal and breath sounds normal. He has no wheezes.   Abdominal: Soft. Bowel sounds are normal. There is no tenderness.   Musculoskeletal: He exhibits no edema or tenderness.   Lymphadenopathy:     He has no cervical adenopathy.   Neurological: He is alert and oriented to person, place, and time. " Coordination normal.   Skin: Skin is warm and dry. No rash noted.   Psychiatric: He has a normal mood and affect. Judgment and thought content normal.   Vitals reviewed.        LABS:  WBC   Date Value Ref Range Status   09/18/2017 11.00 3.90 - 12.70 K/uL Final     Hemoglobin   Date Value Ref Range Status   09/18/2017 9.5 (L) 14.0 - 18.0 g/dL Final     Hematocrit   Date Value Ref Range Status   09/18/2017 29.6 (L) 40.0 - 54.0 % Final     Platelets   Date Value Ref Range Status   09/18/2017 85 (L) 150 - 350 K/uL Final     Gran #   Date Value Ref Range Status   09/18/2017 8.7 (H) 1.8 - 7.7 K/uL Final     Comment:     The ANC is based on a white cell differential from an   automated cell counter. It has not been microscopically   reviewed for the presence of abnormal cells. Clinical   correlation is required.         Chemistry        Component Value Date/Time     09/18/2017 1514    K 3.7 09/18/2017 1514     09/18/2017 1514    CO2 25 09/18/2017 1514    BUN 17 09/18/2017 1514    CREATININE 1.6 (H) 09/18/2017 1514     (H) 09/18/2017 1514        Component Value Date/Time    CALCIUM 8.2 (L) 09/18/2017 1514    ALKPHOS 234 (H) 09/18/2017 1514    AST 22 09/18/2017 1514    ALT 15 09/18/2017 1514    BILITOT 0.9 09/18/2017 1514    ESTGFRAFRICA 50.1 (A) 09/18/2017 1514    EGFRNONAA 43.3 (A) 09/18/2017 1514          Assessment:       1. Rectal cancer    2. Secondary adenocarcinoma of lung, right    3. Lower GI bleed    4. Antineoplastic chemotherapy induced anemia    5. Prostate hyperplasia without urinary obstruction    6. Edema, unspecified type    7. Chemotherapy-induced thrombocytopenia    8. Acute renal failure, unspecified acute renal failure type        Plan:        1,2. Hold chemo.  3,4. Sent message to Dr. Rivas. He has graciously agreed to see him today. Hold chemo. He will receive 2 units PRBC as well.  5. STable, refilled Flomax.  6. Refilled lasix.  7. Low, holding chemo  8. Most likely related to  blood loss. Transfusion should help.    Above care plan was discussed with patient and accompanying wife and all questions were addressed to their satisfaction

## 2017-09-19 NOTE — TELEPHONE ENCOUNTER
----- Message from Suzanne Zepeda sent at 9/19/2017  1:59 PM CDT -----  Contact: pt  Pt contact [phone]    Pt missed his new pt consult appt today for 1:30/2:00 and n

## 2017-09-22 ENCOUNTER — TELEPHONE (OUTPATIENT)
Dept: SURGERY | Facility: CLINIC | Age: 67
End: 2017-09-22

## 2017-09-22 ENCOUNTER — PATIENT MESSAGE (OUTPATIENT)
Dept: HEMATOLOGY/ONCOLOGY | Facility: CLINIC | Age: 67
End: 2017-09-22

## 2017-09-22 NOTE — TELEPHONE ENCOUNTER
----- Message from Christi Hoskins sent at 9/22/2017  2:54 PM CDT -----  Contact: Nelia, pts wife  Nelia is calling to schedule an appt for pt to be seen.  He already has an appt with Dr Rivas and would like to change it to Dr Bowling because Dr Bowling has done surgery on pt in the past.    Nelia can be reached at [phone]

## 2017-09-26 ENCOUNTER — OFFICE VISIT (OUTPATIENT)
Dept: HEMATOLOGY/ONCOLOGY | Facility: CLINIC | Age: 67
End: 2017-09-26
Payer: COMMERCIAL

## 2017-09-26 ENCOUNTER — PATIENT MESSAGE (OUTPATIENT)
Dept: HEMATOLOGY/ONCOLOGY | Facility: CLINIC | Age: 67
End: 2017-09-26

## 2017-09-26 VITALS
RESPIRATION RATE: 19 BRPM | WEIGHT: 231.06 LBS | BODY MASS INDEX: 33.08 KG/M2 | OXYGEN SATURATION: 96 % | DIASTOLIC BLOOD PRESSURE: 60 MMHG | SYSTOLIC BLOOD PRESSURE: 120 MMHG | TEMPERATURE: 98 F | HEIGHT: 70 IN | HEART RATE: 71 BPM

## 2017-09-26 DIAGNOSIS — E11.9 TYPE 2 DIABETES MELLITUS WITHOUT COMPLICATION, WITHOUT LONG-TERM CURRENT USE OF INSULIN: ICD-10-CM

## 2017-09-26 DIAGNOSIS — D70.1 CHEMOTHERAPY INDUCED NEUTROPENIA: ICD-10-CM

## 2017-09-26 DIAGNOSIS — K62.5 RECTAL BLEED: ICD-10-CM

## 2017-09-26 DIAGNOSIS — T45.1X5A CHEMOTHERAPY-INDUCED THROMBOCYTOPENIA: ICD-10-CM

## 2017-09-26 DIAGNOSIS — C20 RECTAL CANCER: Primary | ICD-10-CM

## 2017-09-26 DIAGNOSIS — D69.59 CHEMOTHERAPY-INDUCED THROMBOCYTOPENIA: ICD-10-CM

## 2017-09-26 DIAGNOSIS — T45.1X5A CHEMOTHERAPY INDUCED NEUTROPENIA: ICD-10-CM

## 2017-09-26 DIAGNOSIS — C78.01 SECONDARY ADENOCARCINOMA OF LUNG, RIGHT: ICD-10-CM

## 2017-09-26 PROCEDURE — 1159F MED LIST DOCD IN RCRD: CPT | Mod: S$GLB,,, | Performed by: INTERNAL MEDICINE

## 2017-09-26 PROCEDURE — 3008F BODY MASS INDEX DOCD: CPT | Mod: S$GLB,,, | Performed by: INTERNAL MEDICINE

## 2017-09-26 PROCEDURE — 99999 PR PBB SHADOW E&M-EST. PATIENT-LVL IV: CPT | Mod: PBBFAC,,, | Performed by: INTERNAL MEDICINE

## 2017-09-26 PROCEDURE — 1126F AMNT PAIN NOTED NONE PRSNT: CPT | Mod: S$GLB,,, | Performed by: INTERNAL MEDICINE

## 2017-09-26 PROCEDURE — 99215 OFFICE O/P EST HI 40 MIN: CPT | Mod: S$GLB,,, | Performed by: INTERNAL MEDICINE

## 2017-09-26 PROCEDURE — 3078F DIAST BP <80 MM HG: CPT | Mod: S$GLB,,, | Performed by: INTERNAL MEDICINE

## 2017-09-26 PROCEDURE — 3074F SYST BP LT 130 MM HG: CPT | Mod: S$GLB,,, | Performed by: INTERNAL MEDICINE

## 2017-09-26 RX ORDER — ATROPINE SULFATE 0.4 MG/ML
0.4 INJECTION, SOLUTION ENDOTRACHEAL; INTRAMEDULLARY; INTRAMUSCULAR; INTRAVENOUS; SUBCUTANEOUS DAILY PRN
Status: CANCELLED | OUTPATIENT
Start: 2017-10-31

## 2017-09-26 RX ORDER — HEPARIN 100 UNIT/ML
500 SYRINGE INTRAVENOUS
Status: CANCELLED | OUTPATIENT
Start: 2017-11-07

## 2017-09-26 RX ORDER — SODIUM CHLORIDE 0.9 % (FLUSH) 0.9 %
10 SYRINGE (ML) INJECTION
Status: CANCELLED | OUTPATIENT
Start: 2017-11-07

## 2017-09-26 RX ORDER — FLUOROURACIL 50 MG/ML
400 INJECTION, SOLUTION INTRAVENOUS
Status: CANCELLED | OUTPATIENT
Start: 2017-10-31

## 2017-09-26 NOTE — PROGRESS NOTES
"PATIENT: Karthik Medrano  MRN: [id number]  DATE: 9/26/2017    Subjective:     Chief complaint:  Chief Complaint   Patient presents with    Rectal Cancer    Bloated    increase SOB    3-4 stools per day       Oncologic History:  67 year old male with history of rectal cancer diagnosed 1/11/10 on colonoscopy (moderately differentiated adenocarcinoma) and based on imaging, was T3NxM0. Patient underwent Neoadjuvant chemort with infusional 5FU. This was completed 3/19/10 with a total of 50.4Gy given to the pelvis. He went to surgery 5/13/10. T3N0M0. Post operative he received no adjuvant treatment and has been followed regularly. He went to his PCP 6/24/13 who ordered a CXR which found pulmonary nodules. On 6/27/13, CT chest confirmed these findings. CEA drawn when initially diagnosed was 2.6ng/ml and 6/27/13 was 30.8 ng/ml. . Pulmonary biopsy was performed 7/18/13 and this confirmed metastatic adenocarcinoma. 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 Avastin was added for second cycle.    Patient completed 12 cycles of FOLFOX avastin. He did not receive Avastin for cycle 3, 4, or 5 (due to uncontrolled blood pressure).    He was on maintenance infusional 5FU and Avastin. Repeat scans 4/14/14 showed numerous bilateral pulmonary soft tissue nodules, the majority of which demonstrate interval enlargement when compared to the prior exam. CEA up 18.2 from 13.    He was restarted on FOLFIRI and AVastin and received 6 months of chemo and then was switched to maintenance chemotherapy with 5FU and Avastin. His CT scans from early May 2015 revealed progression in lung lesions.  He is now on FOLFIRI and Cyramza.  CT from 4/19/16 reveals "Multiple stable metastatic pulmonary nodules in this patient with history of rectal cancer.  Stable postoperative changes are present from prior partial colectomy.Interval increase in right-sided hydronephrosis despite appropriate positioning of a double-J ureteral " "sent.  There is associated enlargement and decreased perfusion to the right kidney consistent with obstruction. These findings are concerning for a nonfunctioning ureteral stent. Cholelithiasis."  CT from 6/13/16 reveal stable disease and hydronephrosis has resolved.  CT from 8/5/16 reveals "Stable pulmonary metastatic disease.Increased prominence of periureteral fat stranding/inflammatory change. Stable mild right hydronephrosis."  CT from 1/6/17 "1. Stable appearance of pulmonary metastatic disease in this patient with history of colon cancer. Pulmonary index lesions are stable in size compared to prior examination. No evidence of new metastatic disease. 2. Right-sided double-J ureteral stent with stable appearing inflammatory change about the right renal collecting system and right ureter. 3. Additional stable incidental findings as discussed above. "     CT scans reveal "In this patient with a history of rectal cancer, index lesions in the right and left lung are stable in size as detailed above. No new pulmonary nodules or masses are identified. There is no evidence of abnormal soft tissue opacity in the rectosigmoid region to suggest residual/recurrent disease. Interval increase in the degree of right hydronephrosis with a stable double-J right ureteral stent in place. Interval development of perihepatic ascites. Stable fat and bowel containing ventral hernia"     CT scans 7/21/17 reveal "Stable appearance of multiple pulmonary lesions, compatible with metastatic disease, in this patient with given history of rectal cancer.  Index lesions below. Surgical changes from low anterior resection of rectal mass, without evidence of local recurrence. Stable right-sided moderate hydroureteronephrosis, with periureteral stranding ; appropriately positioned double-J ureteral stent.  Findings are concerning for UTI. There is thinning of the right renal parenchyma, concerning for chronic obstruction or inflammation. Stable " "appearing soft tissue collection with dystrophic calcifications in the presacral space, which may be postsurgical or post radiation in etiology.  This finding may cause obstruction of the right ureter."       HPI Mr. Medrano returns for follow up  and for FOLFIRI and Cyramza. He has had 2 episodes of bright red blood per rectum lasting anywhere from 10-15 minutes (once on 9/14 and second episode on 9/17)  No further episodes. He continues to have 2-3 loose stools/day  He denies any nausea, vomiting, constipation, abdominal pain, weight loss or loss of appetite, chest pain, shortness of breath, leg swelling, fatigue, pain, headache, dizziness, or mood changes. His ECOG PS is zero. He is accompanied by his wife.           Interval History: Mr. Medrano returns for follow up and for FOLFIRI and Cyramza. Chemo was held last week due to anemia, rectal bleeding and thrombocytopenia. He received 2 u PRBC's. Patient was set up with an appointment to see Dr. Rivas but he missed it. He is scheduled to see Dr. Bowling on 10/4/17. He feels ok today. He denies anymore episodes of rectal bleeding. He is slightly more SOB on exertion. He has had fluid retention because he ran out of his lasix. He will resume Lasix today. He also notes the fluid retention started after transfusion.   He denies any nausea, vomiting, diarrhea, constipation, abdominal pain, weight loss or loss of appetite, chest pain, fatigue, pain, headache, dizziness, or mood changes. He is accompanied by his wife.      ECOG Performance Status:   ECOG SCORE    1 - Restricted in strenuous activity-ambulatory and able to carry out work of a light nature         PMFSH: all information reviewed and updated as relevant to today's visit    Review of Systems:   Review of Systems   Constitutional: Negative for activity change, appetite change, fatigue and fever.   HENT: Negative for mouth sores, nosebleeds and sore throat.    Eyes: Negative for visual disturbance.   Respiratory: " "Positive for shortness of breath (mild). Negative for cough.    Cardiovascular: Positive for leg swelling. Negative for chest pain and palpitations.   Gastrointestinal: Negative for abdominal pain, constipation, diarrhea, nausea and vomiting.   Genitourinary: Negative for difficulty urinating and frequency.   Musculoskeletal: Negative for arthralgias and back pain.   Skin: Negative for rash.   Neurological: Negative for dizziness, numbness and headaches.   Hematological: Negative for adenopathy. Does not bruise/bleed easily.   Psychiatric/Behavioral: Negative for confusion and sleep disturbance. The patient is not nervous/anxious.    All other systems reviewed and are negative.        Objective:      Vitals:   Vitals:    09/26/17 0923   BP: 120/60   Pulse: 71   Resp: 19   Temp: 98.2 °F (36.8 °C)   TempSrc: Oral   SpO2: 96%   Weight: 104.8 kg (231 lb 0.7 oz)   Height: 5' 10" (1.778 m)     BMI: Body mass index is 33.15 kg/m².      Physical Exam:   Physical Exam   Constitutional: He is oriented to person, place, and time. He appears well-developed and well-nourished. No distress.   HENT:   Right Ear: External ear normal.   Left Ear: External ear normal.   Mouth/Throat: No oropharyngeal exudate.   Eyes: Conjunctivae and lids are normal. Pupils are equal, round, and reactive to light. No scleral icterus.   Neck: Trachea normal and normal range of motion. Neck supple. No thyromegaly present.   Cardiovascular: Normal rate, regular rhythm, normal heart sounds and normal pulses.    Pulmonary/Chest: Effort normal and breath sounds normal.   Slightly decreased bases   Abdominal: Soft. Normal appearance and bowel sounds are normal. He exhibits no distension and no mass. There is no hepatosplenomegaly or splenomegaly. There is no tenderness.   Musculoskeletal: Normal range of motion. He exhibits edema (1-2+ bilateral LE edema).   Lymphadenopathy:        Head (right side): No submental and no submandibular adenopathy present.     "    Head (left side): No submental and no submandibular adenopathy present.     He has no cervical adenopathy.     He has no axillary adenopathy.        Right: No supraclavicular adenopathy present.        Left: No supraclavicular adenopathy present.   Neurological: He is alert and oriented to person, place, and time. No sensory deficit.   Skin: Skin is warm, dry and intact. No bruising and no rash noted. Nails show no clubbing.   Psychiatric: He has a normal mood and affect. His speech is normal and behavior is normal. Cognition and memory are normal.   Vitals reviewed.        Laboratory Data:  WBC   Date Value Ref Range Status   09/25/2017 6.80 3.90 - 12.70 K/uL Final     Hemoglobin   Date Value Ref Range Status   09/25/2017 11.5 (L) 14.0 - 18.0 g/dL Final     Hematocrit   Date Value Ref Range Status   09/25/2017 37.0 (L) 40.0 - 54.0 % Final     Platelets   Date Value Ref Range Status   09/25/2017 103 (L) 150 - 350 K/uL Final     Gran #   Date Value Ref Range Status   09/25/2017 4.7 1.8 - 7.7 K/uL Final     Comment:     The ANC is based on a white cell differential from an   automated cell counter. It has not been microscopically   reviewed for the presence of abnormal cells. Clinical   correlation is required.         Chemistry        Component Value Date/Time     09/25/2017 1706    K 3.9 09/25/2017 1706     09/25/2017 1706    CO2 29 09/25/2017 1706    BUN 16 09/25/2017 1706    CREATININE 1.1 09/25/2017 1706     (H) 09/25/2017 1706        Component Value Date/Time    CALCIUM 8.6 (L) 09/25/2017 1706    ALKPHOS 261 (H) 09/25/2017 1706    AST 29 09/25/2017 1706    ALT 16 09/25/2017 1706    BILITOT 0.5 09/25/2017 1706    ESTGFRAFRICA >60.0 09/25/2017 1706    EGFRNONAA >60.0 09/25/2017 1706        Protein/creat ration 0.31      Assessment/Plan:     1. Rectal cancer    2. Secondary adenocarcinoma of lung, right    3. Chemotherapy-induced thrombocytopenia    4. Chemotherapy induced neutropenia    5.  Type 2 diabetes mellitus without complication, without long-term current use of insulin    6. Rectal bleed        1,2,3,4. Hold Cyramza until his rectal bleed situation is sorted out. He is seeing Dr. Bowling next week. Proceed with FOLFIRI as scheduled. D/c pump day 3 and Neulasta OBI day 3. Platelets low but adequate to proceed with chemo. Hemoglobin now stable.  5. Stable on meds.  6. See above      More than 30 mins were spent during this encounter, greater than 50% was spent in direct counseling and/or coordination of care.   Patient was also seen and examined by Dr. Abernathy who agrees with above plan. Patient is in agreement with the proposed treatment plan. All questions were answered to the patient's satisfaction. Pt knows to call clinic if anything is needed before the next clinic visit.    Nevaeh Dc, ALIYAHP-C  Hematology and Medical Oncology    Distress Screening Results: Psychosocial Distress screening score of Distress Score: 0 noted and reviewed. No intervention indicated.     STAFF NOTE:  I have reviewed the notes, assessments, and/or procedures performed by the nurse practitioner, as above.  I have personally interviewed the patient at the beside, and rounded with the nurse practitioner. I formulated the plan of care.  I concur with her documentation of Karthik Medrano.

## 2017-09-27 ENCOUNTER — PATIENT MESSAGE (OUTPATIENT)
Dept: HEMATOLOGY/ONCOLOGY | Facility: CLINIC | Age: 67
End: 2017-09-27

## 2017-09-27 ENCOUNTER — TELEPHONE (OUTPATIENT)
Dept: HEMATOLOGY/ONCOLOGY | Facility: CLINIC | Age: 67
End: 2017-09-27

## 2017-09-27 ENCOUNTER — TELEPHONE (OUTPATIENT)
Dept: SURGERY | Facility: CLINIC | Age: 67
End: 2017-09-27

## 2017-09-27 NOTE — TELEPHONE ENCOUNTER
----- Message from Elaina Abernathy sent at 9/27/2017  3:07 PM CDT -----  Contact: pt#[phone]  Pt wants to know if he can come in tomorrow due to the retal bleeding and he states that Dr Abernathy had to cancel chemo due to it. Please call

## 2017-09-27 NOTE — TELEPHONE ENCOUNTER
----- Message from Samantha Olivares sent at 9/27/2017  2:36 PM CDT -----  Contact: Pt can be reached at [phone]  Pt is calling to cancel his Chemo Infusion appt      Thank you!

## 2017-09-27 NOTE — TELEPHONE ENCOUNTER
Left VM for patient to contact [facility] if he is having rectal bleeding, as the clinic had scheduled him for a couple [facility] appointments, for which he canceled.

## 2017-09-27 NOTE — TELEPHONE ENCOUNTER
----- Message from Mitzi Brush sent at 9/27/2017  2:20 PM CDT -----  Contact: Pt  Pt calling stating that he is having rectal bleeding and has some questions      Pt call [phone] or [phone]

## 2017-09-27 NOTE — TELEPHONE ENCOUNTER
"Patient is calling to report rectal bleeding in moderate amount. Patient states, "it's not as bad as last time."  Patient is electing to cancel chemo today. He was advised to contact [facility] to schedule an appointment today/tomorrow to be seen. His present appointment isn't until next week with dr parekh. Nurse advised patient to see first available. Patient states his wife insists on waiting for dr parekh.  Patient states he will call [facility] now.    Message routed to dr valles (just fyi)    "

## 2017-09-28 ENCOUNTER — TELEPHONE (OUTPATIENT)
Dept: ENDOSCOPY | Facility: HOSPITAL | Age: 67
End: 2017-09-28

## 2017-09-28 ENCOUNTER — PATIENT MESSAGE (OUTPATIENT)
Dept: HEMATOLOGY/ONCOLOGY | Facility: CLINIC | Age: 67
End: 2017-09-28

## 2017-09-28 ENCOUNTER — OFFICE VISIT (OUTPATIENT)
Dept: SURGERY | Facility: CLINIC | Age: 67
End: 2017-09-28
Payer: COMMERCIAL

## 2017-09-28 VITALS
DIASTOLIC BLOOD PRESSURE: 81 MMHG | HEART RATE: 123 BPM | WEIGHT: 233.69 LBS | BODY MASS INDEX: 33.46 KG/M2 | SYSTOLIC BLOOD PRESSURE: 142 MMHG | HEIGHT: 70 IN

## 2017-09-28 DIAGNOSIS — Z85.048 HISTORY OF RECTAL OR ANAL CANCER: ICD-10-CM

## 2017-09-28 DIAGNOSIS — Z12.11 SPECIAL SCREENING FOR MALIGNANT NEOPLASMS, COLON: Primary | ICD-10-CM

## 2017-09-28 DIAGNOSIS — C20 RECTAL CANCER: ICD-10-CM

## 2017-09-28 DIAGNOSIS — K62.5 RECTAL BLEEDING: Primary | ICD-10-CM

## 2017-09-28 PROCEDURE — 3079F DIAST BP 80-89 MM HG: CPT | Mod: S$GLB,,, | Performed by: COLON & RECTAL SURGERY

## 2017-09-28 PROCEDURE — 1159F MED LIST DOCD IN RCRD: CPT | Mod: S$GLB,,, | Performed by: COLON & RECTAL SURGERY

## 2017-09-28 PROCEDURE — 99204 OFFICE O/P NEW MOD 45 MIN: CPT | Mod: 25,S$GLB,, | Performed by: COLON & RECTAL SURGERY

## 2017-09-28 PROCEDURE — 46600 DIAGNOSTIC ANOSCOPY SPX: CPT | Mod: S$GLB,,, | Performed by: COLON & RECTAL SURGERY

## 2017-09-28 PROCEDURE — 3077F SYST BP >= 140 MM HG: CPT | Mod: S$GLB,,, | Performed by: COLON & RECTAL SURGERY

## 2017-09-28 PROCEDURE — 3008F BODY MASS INDEX DOCD: CPT | Mod: S$GLB,,, | Performed by: COLON & RECTAL SURGERY

## 2017-09-28 PROCEDURE — 99999 PR PBB SHADOW E&M-EST. PATIENT-LVL IV: CPT | Mod: PBBFAC,,, | Performed by: COLON & RECTAL SURGERY

## 2017-09-28 PROCEDURE — 1126F AMNT PAIN NOTED NONE PRSNT: CPT | Mod: S$GLB,,, | Performed by: COLON & RECTAL SURGERY

## 2017-09-28 RX ORDER — POLYETHYLENE GLYCOL 3350, SODIUM SULFATE ANHYDROUS, SODIUM BICARBONATE, SODIUM CHLORIDE, POTASSIUM CHLORIDE 236; 22.74; 6.74; 5.86; 2.97 G/4L; G/4L; G/4L; G/4L; G/4L
4 POWDER, FOR SOLUTION ORAL ONCE
Qty: 4000 ML | Refills: 0 | Status: SHIPPED | OUTPATIENT
Start: 2017-09-28 | End: 2017-09-28

## 2017-09-28 NOTE — PROGRESS NOTES
"Patient ID:  Karthik Medrano is a 67 y.o. male     Chief Complaint:   Chief Complaint   Patient presents with    Rectal Cancer    Rectal Bleeding        HPI: has had several days of rectal bleeding. Last scope 2011    67 year old male with history of rectal cancer diagnosed 1/11/10 on colonoscopy (moderately differentiated adenocarcinoma) and based on imaging, was T3NxM0. Patient underwent Neoadjuvant chemort with infusional 5FU. This was completed 3/19/10 with a total of 50.4Gy given to the pelvis. He went to surgery 5/13/10. T3N0M0. Post operative he received no adjuvant treatment and has been followed regularly. He went to his PCP 6/24/13 who ordered a CXR which found pulmonary nodules. On 6/27/13, CT chest confirmed these findings. CEA drawn when initially diagnosed was 2.6ng/ml and 6/27/13 was 30.8 ng/ml. . Pulmonary biopsy was performed 7/18/13 and this confirmed metastatic adenocarcinoma. 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 Avastin was added for second cycle.    Patient completed 12 cycles of FOLFOX avastin. He did not receive Avastin for cycle 3, 4, or 5 (due to uncontrolled blood pressure).    He was on maintenance infusional 5FU and Avastin. Repeat scans 4/14/14 showed numerous bilateral pulmonary soft tissue nodules, the majority of which demonstrate interval enlargement when compared to the prior exam. CEA up 18.2 from 13.    He was restarted on FOLFIRI and AVastin and received 6 months of chemo and then was switched to maintenance chemotherapy with 5FU and Avastin. His CT scans from early May 2015 revealed progression in lung lesions.  He is now on FOLFIRI and Cyramza.  CT from 4/19/16 reveals "Multiple stable metastatic pulmonary nodules in this patient with history of rectal cancer.  Stable postoperative changes are present from prior partial colectomy.Interval increase in right-sided hydronephrosis despite appropriate positioning of a double-J ureteral sent. " " There is associated enlargement and decreased perfusion to the right kidney consistent with obstruction. These findings are concerning for a nonfunctioning ureteral stent. Cholelithiasis."    ROS:        Constitutional: No fever, chills, activity or appetite change.      HENT: No hearing loss, facial swelling, neck pain or stiffness.       Eyes: No discharge, itching and visual disturbance.      Respiratory: No apnea, cough, choking or shortness of breath.       Cardiovascular: No leg swelling or chest pain      Gastrointestinal: No abdominal distention or change in bowel habbits     Genitourinary: No dysuria, frequency or flank pain.      Musculoskeletal: No arthralgias or gait problem.      Neurological: No dizziness, seizures or weakness.      Hematological: No adenopathy.      Psychiatric/Behavioral: No hallucinations or behavioral problems.       PE:    APPEARANCE: Well nourished, well developed, in no acute distress.   CHEST: Lungs clear. Normal respiratory effort.  CARDIOVASCULAR: Normal rhythm. No edema.  ABDOMEN: Soft. No tenderness or masses.  Rectum:  Normal skin, NST, no masses or tenderness. Residual stool  Anoscopy Grade 2 int hemorrhoids. Stool imited exa,m    Musculoskeletal: Symmetric, normal range of motion and strength.   Neurological: Alert and oriented to person, place, and time. Normal reflexes.   Skin: Skin is warm and dry.   Psychiatric: Normal mood and affect. Behavior is normal and appropriate.     Impression: Metastatic rectal cancer, rectal bleeding  PLAN: colonoscopy  "

## 2017-10-03 ENCOUNTER — ANESTHESIA (OUTPATIENT)
Dept: ENDOSCOPY | Facility: HOSPITAL | Age: 67
End: 2017-10-03
Payer: COMMERCIAL

## 2017-10-03 ENCOUNTER — ANESTHESIA EVENT (OUTPATIENT)
Dept: ENDOSCOPY | Facility: HOSPITAL | Age: 67
End: 2017-10-03
Payer: COMMERCIAL

## 2017-10-03 ENCOUNTER — HOSPITAL ENCOUNTER (OUTPATIENT)
Facility: HOSPITAL | Age: 67
Discharge: HOME | End: 2017-10-03
Attending: COLON & RECTAL SURGERY | Admitting: COLON & RECTAL SURGERY
Payer: COMMERCIAL

## 2017-10-03 VITALS
HEART RATE: 74 BPM | RESPIRATION RATE: 16 BRPM | BODY MASS INDEX: 33.64 KG/M2 | SYSTOLIC BLOOD PRESSURE: 132 MMHG | OXYGEN SATURATION: 96 % | DIASTOLIC BLOOD PRESSURE: 71 MMHG | TEMPERATURE: 98 F | HEIGHT: 70 IN | WEIGHT: 235 LBS

## 2017-10-03 DIAGNOSIS — K52.1 CHEMOTHERAPY INDUCED DIARRHEA: ICD-10-CM

## 2017-10-03 DIAGNOSIS — K62.5 RECTAL BLEEDING: ICD-10-CM

## 2017-10-03 DIAGNOSIS — Z12.11 COLON CANCER SCREENING: ICD-10-CM

## 2017-10-03 DIAGNOSIS — T45.1X5A CHEMOTHERAPY INDUCED DIARRHEA: ICD-10-CM

## 2017-10-03 DIAGNOSIS — C20 RECTAL CANCER: Primary | ICD-10-CM

## 2017-10-03 LAB — POCT GLUCOSE: 119 MG/DL (ref 70–110)

## 2017-10-03 PROCEDURE — 25000003 PHARM REV CODE 250: Performed by: COLON & RECTAL SURGERY

## 2017-10-03 PROCEDURE — 37000009 HC ANESTHESIA EA ADD 15 MINS: Performed by: COLON & RECTAL SURGERY

## 2017-10-03 PROCEDURE — D9220A PRA ANESTHESIA: Mod: CRNA,,, | Performed by: NURSE ANESTHETIST, CERTIFIED REGISTERED

## 2017-10-03 PROCEDURE — 37000008 HC ANESTHESIA 1ST 15 MINUTES: Performed by: COLON & RECTAL SURGERY

## 2017-10-03 PROCEDURE — 63600175 PHARM REV CODE 636 W HCPCS: Performed by: NURSE ANESTHETIST, CERTIFIED REGISTERED

## 2017-10-03 PROCEDURE — 45384 COLONOSCOPY W/LESION REMOVAL: CPT | Mod: 51,,, | Performed by: COLON & RECTAL SURGERY

## 2017-10-03 PROCEDURE — 45384 COLONOSCOPY W/LESION REMOVAL: CPT | Performed by: COLON & RECTAL SURGERY

## 2017-10-03 PROCEDURE — 88305 TISSUE EXAM BY PATHOLOGIST: CPT | Performed by: PATHOLOGY

## 2017-10-03 PROCEDURE — 27201012 HC FORCEPS, HOT/COLD, DISP: Performed by: COLON & RECTAL SURGERY

## 2017-10-03 PROCEDURE — D9220A PRA ANESTHESIA: Mod: ANES,,, | Performed by: ANESTHESIOLOGY

## 2017-10-03 PROCEDURE — 82962 GLUCOSE BLOOD TEST: CPT | Performed by: COLON & RECTAL SURGERY

## 2017-10-03 PROCEDURE — 45398 COLONOSCOPY W/BAND LIGATION: CPT | Performed by: COLON & RECTAL SURGERY

## 2017-10-03 PROCEDURE — 27201022: Performed by: COLON & RECTAL SURGERY

## 2017-10-03 PROCEDURE — 45398 COLONOSCOPY W/BAND LIGATION: CPT | Mod: ,,, | Performed by: COLON & RECTAL SURGERY

## 2017-10-03 RX ORDER — PROPOFOL 10 MG/ML
VIAL (ML) INTRAVENOUS
Status: DISCONTINUED | OUTPATIENT
Start: 2017-10-03 | End: 2017-10-03

## 2017-10-03 RX ORDER — SODIUM CHLORIDE 9 MG/ML
INJECTION, SOLUTION INTRAVENOUS CONTINUOUS
Status: DISCONTINUED | OUTPATIENT
Start: 2017-10-03 | End: 2017-10-03 | Stop reason: HOSPADM

## 2017-10-03 RX ORDER — PROPOFOL 10 MG/ML
VIAL (ML) INTRAVENOUS CONTINUOUS PRN
Status: DISCONTINUED | OUTPATIENT
Start: 2017-10-03 | End: 2017-10-03

## 2017-10-03 RX ADMIN — SODIUM CHLORIDE: 0.9 INJECTION, SOLUTION INTRAVENOUS at 10:10

## 2017-10-03 RX ADMIN — PROPOFOL 50 MG: 10 INJECTION, EMULSION INTRAVENOUS at 11:10

## 2017-10-03 RX ADMIN — PROPOFOL 100 MCG/KG/MIN: 10 INJECTION, EMULSION INTRAVENOUS at 11:10

## 2017-10-03 NOTE — TRANSFER OF CARE
"Anesthesia Transfer of Care Note    Patient: Karthik Medrano    Procedure(s) Performed: Procedure(s) (LRB):  COLONOSCOPY (N/A)    Patient location: PACU    Anesthesia Type: general    Transport from OR: Transported from OR on room air with adequate spontaneous ventilation    Post pain: adequate analgesia    Post assessment: no apparent anesthetic complications and tolerated procedure well    Post vital signs: stable    Level of consciousness: awake and alert    Nausea/Vomiting: no nausea/vomiting    Complications: none    Transfer of care protocol was followed      Last vitals:   Visit Vitals  /67 (Patient Position: Lying)   Pulse 90   Temp 36.6 °C (97.9 °F) (Temporal)   Resp 16   Ht 5' 10" (1.778 m)   Wt 106.6 kg (235 lb)   SpO2 96%   BMI 33.72 kg/m²     "

## 2017-10-03 NOTE — H&P
Endoscopy H&P    Procedure : Colonoscopy      rectal bleeding and metastatic rectal cancer      Past Medical History:   Diagnosis Date    Cancer     rectal cancer    Diabetes mellitus     GERD (gastroesophageal reflux disease)     Hypertension              Review of Systems -ROS:  GENERAL: No fever, chills, fatigability or weight loss.  CHEST: Denies HUGHES, cyanosis, wheezing, cough and sputum production.  CARDIOVASCULAR: Denies chest pain, PND, orthopnea or reduced exercise tolerance.   Musculoskeletal ROS: negative for - gait disturbance or joint pain  Neurological ROS: negative for - confusion or memory loss        Physical Exam:  General: well developed, well nourished, no distress  Head: normocephalic  Neck: supple, symmetrical, trachea midline  Lungs:  clear to auscultation bilaterally and normal respiratory effort  Heart: regular rate and rhythm, S1, S2 normal, no murmur, rub or gallop and regular rate and rhythm  Abdomen: soft, non-tender non-distented; bowel sounds normal; no masses,  no organomegaly  Extremities: no cyanosis or edema, or clubbing       Moderate Sedation (choice): Mallampati Score 1    ASA : II    IMP: rectal bleeding and metastatic rectal cancer      Plan: Colonoscopy with Moderate sedation.  I have explained the procedure including indications, alternatives, expected outcomes and potential complications. The patient appears to understand and gives informed consent. The patient is medically ready for surgery.

## 2017-10-03 NOTE — PATIENT INSTRUCTIONS
Discharge Summary/Instructions after an Endoscopic Procedure  Patient Name: Karthik Medrano  Patient MRN: [id number]  Patient YOB: 1950  Tuesday, October 03, 2017  Sandoval Bowling MD  RESTRICTIONS:  During your procedure today, you received medications for sedation.  These   medications may affect your judgment, balance and coordination.  Therefore,   for 24 hours, you have the following restrictions:   - DO NOT drive a car, operate machinery, make legal/financial decisions,   sign important papers or drink alcohol.    ACTIVITY:  The following day: return to full activity including work, except no heavy   lifting, straining or running for 3 days if polyps were removed.  DIET:  Eat and drink normally unless instructed otherwise.  TREATMENT FOR COMMON SIDE EFFECTS:  - Mild abdominal pain, belching, bloating or excessive gas: rest, eat   lightly and use a heating pad.  - Sore Throat: treat with throat lozenges and/or gargle with warm salt   water.  SYMPTOMS TO WATCH FOR AND REPORT TO YOUR PHYSICIAN:  1. Abdominal pain or bloating, other than gas cramps.  2. Chest pain.  3. Back pain.  4. Chills or fever occurring within 24 hours after the procedure.  5. Rectal bleeding, which would show as bright red, maroon, or black stools.   (A tablespoon of blood from the rectum is not serious, especially if   hemorrhoids are present.)  6. Vomiting.  7. Weakness or dizziness.  8. Because air was used during the procedure, expelling large amounts of air   from your rectum or belching is normal.  9. If a bowel prep was taken, you may not have a bowel movement for 1-3   days.  This is normal.  GO DIRECTLY TO THE EMERGENCY ROOM IF YOU HAVE ANY OF THE FOLLOWING:   Difficulty breathing   Chills and/or fever over 101 F   Persistent vomiting and/or vomiting blood   Severe abdominal pain   Severe chest pain   Black, tarry stools   Bleeding- more than one tablespoon  Your doctor recommends these additional instructions:  If any biopsies  were taken, your doctors clinic will call you in 1 to 2   weeks with any results.  You are being discharged to home.   Your physician has recommended a repeat colonoscopy in three years for   surveillance.  For questions, problems or results please call your physician - Sandoval Bowling MD at Work:  [phone].  [facility], EMERGENCY ROOM PHONE NUMBER: [phone]  IF A COMPLICATION OR EMERGENCY SITUATION ARISES AND YOU ARE UNABLE TO REACH   YOUR PHYSICIAN - GO DIRECTLY TO THE EMERGENCY ROOM.  Sandoval Bowling MD  10/3/2017 11:47:44 AM  This report has been verified and signed electronically.

## 2017-10-03 NOTE — ANESTHESIA RELEASE NOTE
Anesthesia Release from PACU note    Patient: Karthik Medrano    Procedure(s) Performed: Procedure(s) (LRB):  COLONOSCOPY (N/A)    Anesthesia type: general    Post pain: Adequate analgesia    Post assessment: no apparent anesthetic complications, tolerated procedure well and no evidence of recall    Last Vitals:   Vitals:    10/03/17 1227   BP: 132/71   Pulse: 74   Resp: 16   Temp:    SpO2: 96%       Post vital signs: stable    Level of consciousness: awake, alert  and oriented    Nausea/Vomiting: no nausea/no vomiting    Complications: none    Airway Patency: patent    Respiratory: unassisted    Cardiovascular: stable and blood pressure at baseline    Hydration: euvolemic

## 2017-10-03 NOTE — ANESTHESIA PREPROCEDURE EVALUATION
10/03/2017  Karthik Medrano is a 67 y.o., male.    Pre-operative evaluation for Procedure(s) (LRB):  COLONOSCOPY (N/A)    Karthik Medrano is a 67 y.o. male     Patient Active Problem List   Diagnosis    CHF (congestive heart failure)    HTN (hypertension)    Overweight(278.02)    Secondary adenocarcinoma of lung    Encounter for antineoplastic chemotherapy and immunotherapy    Rectal cancer    Type 2 diabetes mellitus without complication    Hydronephrosis    Proteinuria    Chemotherapy induced neutropenia    Chemotherapy induced diarrhea    Encounter for antineoplastic chemotherapy    Vitamin D deficiency    History of rectal or anal cancer    Rectal bleeding       Review of patient's allergies indicates:  No Known Allergies    No current facility-administered medications on file prior to encounter.      Current Outpatient Prescriptions on File Prior to Encounter   Medication Sig Dispense Refill    amlodipine (NORVASC) 5 MG tablet TAKE 1 TABLET(5 MG) BY MOUTH TWICE DAILY 180 tablet 0    ascorbic acid (VITAMIN C) 1000 MG tablet Take 1,000 mg by mouth once daily.      cholecalciferol, vitamin D3, (VITAMIN D3) 2,000 unit Cap Take 1 capsule by mouth once daily.      co-enzyme Q-10 30 mg capsule Take 30 mg by mouth 3 (three) times daily.      diphenoxylate-atropine 2.5-0.025 mg (LOMOTIL) 2.5-0.025 mg per tablet Take 1 tablet by mouth 4 (four) times daily as needed for Diarrhea. 30 tablet 0    ferrous gluconate (FERGON) 324 MG tablet Take 324 mg by mouth daily with breakfast.      furosemide (LASIX) 20 MG tablet Take 2 tablets (40 mg total) by mouth daily as needed. 90 tablet 6    GARLIC EXTRACT ORAL Take by mouth.      glipiZIDE (GLUCOTROL) 5 MG tablet TAKE 1 TABLET BY MOUTH THREE TIMES DAILY 270 tablet 4    lisinopril (PRINIVIL,ZESTRIL) 40 MG tablet TAKE 1 TABLET BY MOUTH TWICE DAILY 180  tablet 0    metoprolol succinate (TOPROL-XL) 100 MG 24 hr tablet Take 1 tablet (100 mg total) by mouth 2 (two) times daily. 180 tablet 3    multivitamin with iron-mineral TbSR Take 1 tablet by mouth once daily. 100 tablet 2    tamsulosin (FLOMAX) 0.4 mg Cp24 Take 1 capsule (0.4 mg total) by mouth once daily. 90 capsule 6    atorvastatin (LIPITOR) 10 MG tablet Take 1 tablet (10 mg total) by mouth once daily. 90 tablet 0    cholestyramine, bulk, Powd 4 g by Misc.(Non-Drug; Combo Route) route once daily at 6am. 500 g 3    gabapentin (NEURONTIN) 300 MG capsule TAKE ONE CAPSULE BY MOUTH EVERY EVENING 90 capsule 11    NYSTATIN (DUKE'S SOLUTION) Take 10 mLs by mouth 4 (four) times daily. Mix equal amounts of benadryl, maalox, 2% viscous lidocaine and nystatin    Swish and swallow 10 ml 4 times a day 500 mL 8    opium tincture 10 mg/mL (morphine) Tinc Take 0.6 mLs (6 mg total) by mouth 4 (four) times daily. 473 mL 0    oxycodone-acetaminophen (PERCOCET) 5-325 mg per tablet TK 1 T PO Q 6 H PRN P  0    paregoric 2 mg/5 mL Liqd Take 5 mLs by mouth 3 (three) times daily. 473 mL 3       Past Surgical History:   Procedure Laterality Date    COLON SURGERY         Social History     Social History    Marital status:      Spouse name: N/A    Number of children: N/A    Years of education: N/A     Occupational History    Not on file.     Social History Main Topics    Smoking status: Never Smoker    Smokeless tobacco: Never Used    Alcohol use No    Drug use: Unknown    Sexual activity: Not on file     Other Topics Concern    Not on file     Social History Narrative    No narrative on file         Vital Signs Range (Last 24H):  Temp:  [36.6 °C (97.9 °F)]   Pulse:  [90]   Resp:  [16]   BP: (129)/(67)   SpO2:  [96 %]       CBC: No results for input(s): WBC, RBC, HGB, HCT, PLT, MCV, MCH, MCHC in the last 72 hours.    CMP: No results for input(s): NA, K, CL, CO2, BUN, CREATININE, GLU, MG, PHOS, CALCIUM,  ALBUMIN, PROT, ALKPHOS, ALT, AST, BILITOT in the last 72 hours.    INR  No results for input(s): INR, PROTIME, APTT in the last 72 hours.    Invalid input(s): PT        Diagnostic Studies:      [facility] Echo: 2017  CONCLUSIONS     1 - Normal left ventricular systolic function (EF 60-65%).     2 - Indeterminate LV diastolic function.     3 - No wall motion abnormalities.     4 - Biatrial enlargement.     5 - Normal right ventricular systolic function .     6 - The estimated PA systolic pressure is greater than 22 mmHg.     7 - Mild mitral regurgitation.     8 - Mild tricuspid regurgitation      Anesthesia Evaluation    I have reviewed the Patient Summary Reports.    I have reviewed the Nursing Notes.   I have reviewed the Medications.     Review of Systems  Anesthesia Hx:  No problems with previous Anesthesia  History of prior surgery of interest to airway management or planning: Denies Family Hx of Anesthesia complications.    Social:  Non-Smoker, No Alcohol Use    Hematology/Oncology:         -- Anemia: Current/Recent Cancer. chemotherapy Oncology Comments: Metastatic rectal cancer     Cardiovascular:   Exercise tolerance: poor Hypertension    Pulmonary:   Denies Asthma. Metastatic rectal cancer to the lungs   Renal/:   Hydronephrosis    Hepatic/GI:   GERD, well controlled    Neurological:   Peripheral Neuropathy    Endocrine:   Diabetes        Physical Exam  General:  Obesity    Airway/Jaw/Neck:  Airway Findings: Mouth Opening: Normal Tongue: Normal  Mallampati: III  TM Distance: Normal, at least 6 cm      Dental:  Dental Findings:   Chest/Lungs:  Chest/Lungs Findings: Clear to auscultation, Normal Respiratory Rate     Heart/Vascular:  Heart Findings: Rate: Normal  Rhythm: Regular Rhythm        Mental Status:  Mental Status Findings:  Cooperative, Alert and Oriented         Anesthesia Plan  Type of Anesthesia, risks & benefits discussed:  Anesthesia Type:  general  Patient's Preference:   Intra-op  Monitoring Plan: standard ASA monitors  Intra-op Monitoring Plan Comments:   Post Op Pain Control Plan: IV/PO Opioids PRN  Post Op Pain Control Plan Comments:   Induction:   IV  Beta Blocker:  Patient is not currently on a Beta-Blocker (No further documentation required).       Informed Consent: Patient understands risks and agrees with Anesthesia plan.  Questions answered. Anesthesia consent signed with patient.  ASA Score: 3     Day of Surgery Review of History & Physical:    H&P update referred to the provider.         Ready For Surgery From Anesthesia Perspective.

## 2017-10-03 NOTE — ANESTHESIA POSTPROCEDURE EVALUATION
"Anesthesia Post Evaluation    Patient: aKrthik Medrano    Procedure(s) Performed: Procedure(s) (LRB):  COLONOSCOPY (N/A)    Final Anesthesia Type: general  Patient location during evaluation: GI PACU  Patient participation: Yes- Able to Participate  Level of consciousness: awake and alert  Post-procedure vital signs: reviewed and stable  Pain management: adequate  Airway patency: patent  PONV status at discharge: No PONV  Anesthetic complications: no      Cardiovascular status: blood pressure returned to baseline  Respiratory status: unassisted  Hydration status: euvolemic  Follow-up not needed.        Visit Vitals  /71   Pulse 74   Temp 36.6 °C (97.9 °F)   Resp 16   Ht 5' 10" (1.778 m)   Wt 106.6 kg (235 lb)   SpO2 96%   BMI 33.72 kg/m²       Pain/Virgilio Score: Pain Assessment Performed: Yes (10/3/2017 10:25 AM)  Virgilio Score: 9 (10/3/2017 12:05 PM)      "

## 2017-10-09 ENCOUNTER — LAB VISIT (OUTPATIENT)
Dept: LAB | Facility: HOSPITAL | Age: 67
End: 2017-10-09
Attending: INTERNAL MEDICINE
Payer: COMMERCIAL

## 2017-10-09 DIAGNOSIS — C20 RECTAL CANCER: ICD-10-CM

## 2017-10-09 LAB
CREAT UR-MCNC: 139 MG/DL
PROT UR-MCNC: 127 MG/DL
PROT/CREAT RATIO, UR: 0.91

## 2017-10-09 PROCEDURE — 84156 ASSAY OF PROTEIN URINE: CPT

## 2017-10-10 ENCOUNTER — TELEPHONE (OUTPATIENT)
Dept: ENDOSCOPY | Facility: HOSPITAL | Age: 67
End: 2017-10-10

## 2017-10-11 ENCOUNTER — OFFICE VISIT (OUTPATIENT)
Dept: HEMATOLOGY/ONCOLOGY | Facility: CLINIC | Age: 67
End: 2017-10-11
Payer: COMMERCIAL

## 2017-10-11 VITALS
WEIGHT: 235.88 LBS | DIASTOLIC BLOOD PRESSURE: 61 MMHG | HEART RATE: 87 BPM | RESPIRATION RATE: 21 BRPM | TEMPERATURE: 98 F | HEIGHT: 70 IN | OXYGEN SATURATION: 98 % | BODY MASS INDEX: 33.77 KG/M2 | SYSTOLIC BLOOD PRESSURE: 119 MMHG

## 2017-10-11 DIAGNOSIS — T45.1X5A CHEMOTHERAPY INDUCED NEUTROPENIA: ICD-10-CM

## 2017-10-11 DIAGNOSIS — D70.1 CHEMOTHERAPY INDUCED NEUTROPENIA: ICD-10-CM

## 2017-10-11 DIAGNOSIS — I10 ESSENTIAL HYPERTENSION: ICD-10-CM

## 2017-10-11 DIAGNOSIS — Z51.12 ENCOUNTER FOR ANTINEOPLASTIC CHEMOTHERAPY AND IMMUNOTHERAPY: ICD-10-CM

## 2017-10-11 DIAGNOSIS — C20 RECTAL CANCER: Primary | ICD-10-CM

## 2017-10-11 DIAGNOSIS — R18.8 OTHER ASCITES: ICD-10-CM

## 2017-10-11 DIAGNOSIS — C78.01 SECONDARY ADENOCARCINOMA OF RIGHT LUNG: ICD-10-CM

## 2017-10-11 DIAGNOSIS — N13.30 HYDRONEPHROSIS, UNSPECIFIED HYDRONEPHROSIS TYPE: ICD-10-CM

## 2017-10-11 DIAGNOSIS — Z51.11 ENCOUNTER FOR ANTINEOPLASTIC CHEMOTHERAPY AND IMMUNOTHERAPY: ICD-10-CM

## 2017-10-11 DIAGNOSIS — T45.1X5A CHEMOTHERAPY-INDUCED THROMBOCYTOPENIA: ICD-10-CM

## 2017-10-11 DIAGNOSIS — D69.59 CHEMOTHERAPY-INDUCED THROMBOCYTOPENIA: ICD-10-CM

## 2017-10-11 PROCEDURE — 99999 PR PBB SHADOW E&M-EST. PATIENT-LVL IV: CPT | Mod: PBBFAC,,, | Performed by: INTERNAL MEDICINE

## 2017-10-11 PROCEDURE — 99215 OFFICE O/P EST HI 40 MIN: CPT | Mod: S$GLB,,, | Performed by: INTERNAL MEDICINE

## 2017-10-11 NOTE — Clinical Note
No chemo this week.  Plan for CBC, CMP next week and to see Dr. Abernathy for FOLFIRI. D/c pump day 3 and Neulasta OBI.

## 2017-10-11 NOTE — PROGRESS NOTES
"PATIENT: Karthik Medrano  MRN: [id number]  DATE: 10/11/2017    Subjective:     Chief complaint:  Chief Complaint   Patient presents with    Rectal Cancer    Ascites    Peripheral Neuropathy    3/10 left lower back pain    no sob    fluctuating appetite    rectal bleeding x 1 since colonscopy       Oncologic History:  67 year old male with history of rectal cancer diagnosed 1/11/10 on colonoscopy (moderately differentiated adenocarcinoma) and based on imaging, was T3NxM0. Patient underwent Neoadjuvant chemort with infusional 5FU. This was completed 3/19/10 with a total of 50.4Gy given to the pelvis. He went to surgery 5/13/10. T3N0M0. Post operative he received no adjuvant treatment and has been followed regularly. He went to his PCP 6/24/13 who ordered a CXR which found pulmonary nodules. On 6/27/13, CT chest confirmed these findings. CEA drawn when initially diagnosed was 2.6ng/ml and 6/27/13 was 30.8 ng/ml. . Pulmonary biopsy was performed 7/18/13 and this confirmed metastatic adenocarcinoma. 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 Avastin was added for second cycle.    Patient completed 12 cycles of FOLFOX avastin. He did not receive Avastin for cycle 3, 4, or 5 (due to uncontrolled blood pressure).    He was on maintenance infusional 5FU and Avastin. Repeat scans 4/14/14 showed numerous bilateral pulmonary soft tissue nodules, the majority of which demonstrate interval enlargement when compared to the prior exam. CEA up 18.2 from 13.    He was restarted on FOLFIRI and AVastin and received 6 months of chemo and then was switched to maintenance chemotherapy with 5FU and Avastin. His CT scans from early May 2015 revealed progression in lung lesions.  He is now on FOLFIRI and Cyramza.  CT from 4/19/16 reveals "Multiple stable metastatic pulmonary nodules in this patient with history of rectal cancer.  Stable postoperative changes are present from prior partial colectomy.Interval " "increase in right-sided hydronephrosis despite appropriate positioning of a double-J ureteral sent.  There is associated enlargement and decreased perfusion to the right kidney consistent with obstruction. These findings are concerning for a nonfunctioning ureteral stent. Cholelithiasis."  CT from 6/13/16 reveal stable disease and hydronephrosis has resolved.  CT from 8/5/16 reveals "Stable pulmonary metastatic disease.Increased prominence of periureteral fat stranding/inflammatory change. Stable mild right hydronephrosis."  CT from 1/6/17 "1. Stable appearance of pulmonary metastatic disease in this patient with history of colon cancer. Pulmonary index lesions are stable in size compared to prior examination. No evidence of new metastatic disease. 2. Right-sided double-J ureteral stent with stable appearing inflammatory change about the right renal collecting system and right ureter. 3. Additional stable incidental findings as discussed above. "     CT scans reveal "In this patient with a history of rectal cancer, index lesions in the right and left lung are stable in size as detailed above. No new pulmonary nodules or masses are identified. There is no evidence of abnormal soft tissue opacity in the rectosigmoid region to suggest residual/recurrent disease. Interval increase in the degree of right hydronephrosis with a stable double-J right ureteral stent in place. Interval development of perihepatic ascites. Stable fat and bowel containing ventral hernia"     CT scans 7/21/17 reveal "Stable appearance of multiple pulmonary lesions, compatible with metastatic disease, in this patient with given history of rectal cancer.  Index lesions below. Surgical changes from low anterior resection of rectal mass, without evidence of local recurrence. Stable right-sided moderate hydroureteronephrosis, with periureteral stranding ; appropriately positioned double-J ureteral stent.  Findings are concerning for UTI. There is " "thinning of the right renal parenchyma, concerning for chronic obstruction or inflammation. Stable appearing soft tissue collection with dystrophic calcifications in the presacral space, which may be postsurgical or post radiation in etiology.  This finding may cause obstruction of the right ureter."            Interval History: Mr. Medrano returns for follow up and for FOLFIRI and Cyramza. Chemo has been on hold  due to anemia, rectal bleeding and thrombocytopenia. He recently had a colonoscopy which revealed "Hemorrhagic mucosa in the rectum. (Biopsy- benign. )  - Internal hemorrhoids. Banded" He feels ok today except notices fluid build up in abd.  He  Had 1 episode of rectal bleeding 3 days ago. He has no active bleeding. No easy bruising. He feels that he is retaining urine and will make appointment with urology.  He denies any nausea, vomiting, diarrhea, constipation, abdominal pain, weight loss or loss of appetite, chest pain, fatigue, pain, headache, dizziness, or mood changes. He is accompanied by his wife.       ECOG Performance Status:   ECOG SCORE    0 - Fully active-able to carry on all pre-disease performance without restriction         PMFSH: all information reviewed and updated as relevant to today's visit    Review of Systems:   Review of Systems   Constitutional: Negative for activity change, appetite change, fatigue and fever.   HENT: Negative for mouth sores, nosebleeds and sore throat.    Eyes: Negative for visual disturbance.   Respiratory: Positive for shortness of breath (on exertion- no worse than usual). Negative for cough.    Cardiovascular: Positive for leg swelling. Negative for chest pain and palpitations.   Gastrointestinal: Negative for abdominal pain, constipation, diarrhea, nausea and vomiting.   Genitourinary: Negative for difficulty urinating and frequency.   Musculoskeletal: Negative for arthralgias and back pain.   Skin: Negative for rash.   Neurological: Negative for dizziness, " "numbness and headaches.   Hematological: Negative for adenopathy. Does not bruise/bleed easily.   Psychiatric/Behavioral: Negative for confusion and sleep disturbance. The patient is not nervous/anxious.    All other systems reviewed and are negative.        Objective:      Vitals:   Vitals:    10/11/17 1153   BP: 119/61   Pulse: 87   Resp: (!) 21   Temp: 98.2 °F (36.8 °C)   TempSrc: Oral   SpO2: 98%   Weight: 107 kg (235 lb 14.3 oz)   Height: 5' 10" (1.778 m)     BMI: Body mass index is 33.85 kg/m².      Physical Exam:   Physical Exam   Constitutional: He is oriented to person, place, and time. He appears well-developed and well-nourished. No distress.   HENT:   Right Ear: External ear normal.   Left Ear: External ear normal.   Mouth/Throat: No oropharyngeal exudate.   Eyes: Conjunctivae and lids are normal. Pupils are equal, round, and reactive to light. No scleral icterus.   Neck: Trachea normal and normal range of motion. Neck supple. No thyromegaly present.   Cardiovascular: Normal rate, regular rhythm, normal heart sounds and normal pulses.    Pulmonary/Chest: Effort normal and breath sounds normal.   Abdominal: Soft. Normal appearance and bowel sounds are normal. He exhibits distension (mild ascites noted). He exhibits no mass. There is no hepatosplenomegaly or splenomegaly. There is no tenderness.   Musculoskeletal: Normal range of motion. He exhibits edema (1+ bilateral LE edema).   Lymphadenopathy:        Head (right side): No submental and no submandibular adenopathy present.        Head (left side): No submental and no submandibular adenopathy present.     He has no cervical adenopathy.     He has no axillary adenopathy.        Right: No supraclavicular adenopathy present.        Left: No supraclavicular adenopathy present.   Neurological: He is alert and oriented to person, place, and time. No sensory deficit.   Skin: Skin is warm, dry and intact. No bruising and no rash noted. Nails show no clubbing. "   Psychiatric: He has a normal mood and affect. His speech is normal and behavior is normal. Cognition and memory are normal.   Vitals reviewed.        Laboratory Data:  WBC   Date Value Ref Range Status   10/09/2017 7.30 3.90 - 12.70 K/uL Final     Hemoglobin   Date Value Ref Range Status   10/09/2017 11.2 (L) 14.0 - 18.0 g/dL Final     Hematocrit   Date Value Ref Range Status   10/09/2017 35.9 (L) 40.0 - 54.0 % Final     Platelets   Date Value Ref Range Status   10/09/2017 40 (L) 150 - 350 K/uL Final     Gran #   Date Value Ref Range Status   10/09/2017 4.3 1.8 - 7.7 K/uL Final     Comment:     The ANC is based on a white cell differential from an   automated cell counter. It has not been microscopically   reviewed for the presence of abnormal cells. Clinical   correlation is required.         Chemistry        Component Value Date/Time     10/09/2017 1539    K 4.1 10/09/2017 1539     10/09/2017 1539    CO2 29 10/09/2017 1539    BUN 13 10/09/2017 1539    CREATININE 1.0 10/09/2017 1539     (H) 10/09/2017 1539        Component Value Date/Time    CALCIUM 8.8 10/09/2017 1539    ALKPHOS 250 (H) 10/09/2017 1539    AST 40 10/09/2017 1539    ALT 14 10/09/2017 1539    BILITOT 0.6 10/09/2017 1539    ESTGFRAFRICA >60.0 10/09/2017 1539    EGFRNONAA >60.0 10/09/2017 1539              Assessment/Plan:     1. Rectal cancer    2. Secondary adenocarcinoma of right lung    3. Encounter for antineoplastic chemotherapy and immunotherapy    4. Chemotherapy induced neutropenia    5. Chemotherapy-induced thrombocytopenia    6. Other ascites    7. Hydronephrosis, unspecified hydronephrosis type    8. Essential hypertension        Plan:    HOLD chemo due to thrombocytopenia. Patient's last episode of rectal bleeding was 3 days ago. Will not give Cyramza if patient continues to have episodes of rectal bleeding. Plan for CBC, CMP next week and to see Dr. Abernathy for FOLFIRI. D/c pump day 3 and Neulasta OBI.   Abdominal  ultrasound this week. May need paracentesis.   Patient will call urology and make an appointment.   BP controlled.       Med and Orders:    Orders Placed This Encounter    US Abdomen Complete    CBC Oncology    Comprehensive metabolic panel       Follow Up:  Return in about 1 week (around 10/18/2017).    Patient instructions:   There are no Patient Instructions on file for this visit.      More than 30 mins were spent during this encounter, greater than 50% was spent in direct counseling and/or coordination of care.   Patient was also seen and examined by Dr. Abernathy who agrees with above plan. Patient is in agreement with the proposed treatment plan. All questions were answered to the patient's satisfaction. Pt knows to call clinic if anything is needed before the next clinic visit.    GAY Finn-SHANON  Hematology and Medical Oncology    STAFF NOTE:  I have reviewed the notes, assessments, and/or procedures performed by the nurse practitioner, as above.  I have personally interviewed the patient at the beside, and rounded with the nurse practitioner. I formulated the plan of care.  I concur with her documentation of Karthik Medrano.

## 2017-10-12 ENCOUNTER — HOSPITAL ENCOUNTER (OUTPATIENT)
Dept: RADIOLOGY | Facility: HOSPITAL | Age: 67
Discharge: HOME | End: 2017-10-12
Attending: NURSE PRACTITIONER
Payer: COMMERCIAL

## 2017-10-12 DIAGNOSIS — R18.8 OTHER ASCITES: ICD-10-CM

## 2017-10-12 PROCEDURE — 76700 US EXAM ABDOM COMPLETE: CPT | Mod: TC

## 2017-10-12 PROCEDURE — 76700 US EXAM ABDOM COMPLETE: CPT | Mod: 26,,, | Performed by: RADIOLOGY

## 2017-10-17 ENCOUNTER — OFFICE VISIT (OUTPATIENT)
Dept: HEMATOLOGY/ONCOLOGY | Facility: CLINIC | Age: 67
End: 2017-10-17
Payer: COMMERCIAL

## 2017-10-17 VITALS
SYSTOLIC BLOOD PRESSURE: 118 MMHG | DIASTOLIC BLOOD PRESSURE: 58 MMHG | TEMPERATURE: 98 F | RESPIRATION RATE: 21 BRPM | HEIGHT: 70 IN | OXYGEN SATURATION: 97 % | BODY MASS INDEX: 33.83 KG/M2 | HEART RATE: 72 BPM | WEIGHT: 236.31 LBS

## 2017-10-17 DIAGNOSIS — R18.8 OTHER ASCITES: Primary | ICD-10-CM

## 2017-10-17 DIAGNOSIS — C78.01 SECONDARY ADENOCARCINOMA OF RIGHT LUNG: ICD-10-CM

## 2017-10-17 DIAGNOSIS — C20 RECTAL CANCER: Primary | ICD-10-CM

## 2017-10-17 DIAGNOSIS — T45.1X5A CHEMOTHERAPY-INDUCED THROMBOCYTOPENIA: ICD-10-CM

## 2017-10-17 DIAGNOSIS — R18.8 PERITONEAL EFFUSION: ICD-10-CM

## 2017-10-17 DIAGNOSIS — D69.59 CHEMOTHERAPY-INDUCED THROMBOCYTOPENIA: ICD-10-CM

## 2017-10-17 PROCEDURE — 99999 PR PBB SHADOW E&M-EST. PATIENT-LVL IV: CPT | Mod: PBBFAC,,, | Performed by: INTERNAL MEDICINE

## 2017-10-17 PROCEDURE — 99215 OFFICE O/P EST HI 40 MIN: CPT | Mod: S$GLB,,, | Performed by: INTERNAL MEDICINE

## 2017-10-17 NOTE — PROGRESS NOTES
"Subjective:       Patient ID: Karthik Medrano is a 67 y.o. male.    Chief Complaint: Rectal Cancer; Fatigue; Ascites; urinating OK; BMs every other day; SOB/HUGHES; and interm lower back pain 0/10  Oncologic History:  67 year old male with history of rectal cancer diagnosed 1/11/10 on colonoscopy (moderately differentiated adenocarcinoma) and based on imaging, was T3NxM0. Patient underwent Neoadjuvant chemort with infusional 5FU. This was completed 3/19/10 with a total of 50.4Gy given to the pelvis. He went to surgery 5/13/10. T3N0M0. Post operative he received no adjuvant treatment and has been followed regularly. He went to his PCP 6/24/13 who ordered a CXR which found pulmonary nodules. On 6/27/13, CT chest confirmed these findings. CEA drawn when initially diagnosed was 2.6ng/ml and 6/27/13 was 30.8 ng/ml. . Pulmonary biopsy was performed 7/18/13 and this confirmed metastatic adenocarcinoma. 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 Avastin was added for second cycle.    Patient completed 12 cycles of FOLFOX avastin. He did not receive Avastin for cycle 3, 4, or 5 (due to uncontrolled blood pressure).    He was on maintenance infusional 5FU and Avastin. Repeat scans 4/14/14 showed numerous bilateral pulmonary soft tissue nodules, the majority of which demonstrate interval enlargement when compared to the prior exam. CEA up 18.2 from 13.    He was restarted on FOLFIRI and AVastin and received 6 months of chemo and then was switched to maintenance chemotherapy with 5FU and Avastin. His CT scans from early May 2015 revealed progression in lung lesions.  He is now on FOLFIRI and Cyramza.  CT from 4/19/16 reveals "Multiple stable metastatic pulmonary nodules in this patient with history of rectal cancer.  Stable postoperative changes are present from prior partial colectomy.Interval increase in right-sided hydronephrosis despite appropriate positioning of a double-J ureteral sent.  There " "is associated enlargement and decreased perfusion to the right kidney consistent with obstruction. These findings are concerning for a nonfunctioning ureteral stent. Cholelithiasis."  CT from 6/13/16 reveal stable disease and hydronephrosis has resolved.  CT from 8/5/16 reveals "Stable pulmonary metastatic disease.Increased prominence of periureteral fat stranding/inflammatory change. Stable mild right hydronephrosis."  CT from 1/6/17 "1. Stable appearance of pulmonary metastatic disease in this patient with history of colon cancer. Pulmonary index lesions are stable in size compared to prior examination. No evidence of new metastatic disease. 2. Right-sided double-J ureteral stent with stable appearing inflammatory change about the right renal collecting system and right ureter. 3. Additional stable incidental findings as discussed above. "     CT scans reveal "In this patient with a history of rectal cancer, index lesions in the right and left lung are stable in size as detailed above. No new pulmonary nodules or masses are identified. There is no evidence of abnormal soft tissue opacity in the rectosigmoid region to suggest residual/recurrent disease. Interval increase in the degree of right hydronephrosis with a stable double-J right ureteral stent in place. Interval development of perihepatic ascites. Stable fat and bowel containing ventral hernia"     CT scans 7/21/17 reveal "Stable appearance of multiple pulmonary lesions, compatible with metastatic disease, in this patient with given history of rectal cancer.  Index lesions below. Surgical changes from low anterior resection of rectal mass, without evidence of local recurrence. Stable right-sided moderate hydroureteronephrosis, with periureteral stranding ; appropriately positioned double-J ureteral stent.  Findings are concerning for UTI. There is thinning of the right renal parenchyma, concerning for chronic obstruction or inflammation. Stable appearing " "soft tissue collection with dystrophic calcifications in the presacral space, which may be postsurgical or post radiation in etiology.  This finding may cause obstruction of the right ureter."              HPI  Mr. Medrano returns for follow up and for FOLFIRI. Chemo has been on hold  due to anemia, rectal bleeding and thrombocytopenia. He recently had a colonoscopy which revealed "Hemorrhagic mucosa in the rectum. (Biopsy- benign. )  - Internal hemorrhoids. Banded"   His abdomen ultrasound revealed ascitis but he was unable to undergo paracentesis due to low platelets  He has not had any further rectal bleeds.    Review of Systems   Constitutional: Positive for appetite change and fatigue. Negative for unexpected weight change.   HENT: Negative for mouth sores.    Eyes: Negative for visual disturbance.   Respiratory: Positive for shortness of breath. Negative for cough.    Cardiovascular: Negative for chest pain.   Gastrointestinal: Positive for abdominal distention and abdominal pain. Negative for diarrhea.   Genitourinary: Negative for frequency.   Musculoskeletal: Negative for back pain.   Skin: Negative for rash.   Neurological: Negative for headaches.   Hematological: Negative for adenopathy.   Psychiatric/Behavioral: The patient is not nervous/anxious.    All other systems reviewed and are negative.      Objective:      Physical Exam   Constitutional: He is oriented to person, place, and time. He appears well-developed and well-nourished.   HENT:   Mouth/Throat: No oropharyngeal exudate.   Cardiovascular: Normal rate and normal heart sounds.    Pulmonary/Chest: Effort normal and breath sounds normal. He has no wheezes.   Abdominal: Soft. Bowel sounds are normal. He exhibits distension, fluid wave and ascites. There is no tenderness.   Musculoskeletal: He exhibits no edema or tenderness.   Lymphadenopathy:     He has no cervical adenopathy.   Neurological: He is alert and oriented to person, place, and time. " Coordination normal.   Skin: Skin is warm and dry. No rash noted.   Psychiatric: He has a normal mood and affect. Judgment and thought content normal.   Vitals reviewed.        LABS:  WBC   Date Value Ref Range Status   10/17/2017 8.13 3.90 - 12.70 K/uL Final     Hemoglobin   Date Value Ref Range Status   10/17/2017 11.1 (L) 14.0 - 18.0 g/dL Final     Hematocrit   Date Value Ref Range Status   10/17/2017 35.8 (L) 40.0 - 54.0 % Final     Platelets   Date Value Ref Range Status   10/17/2017 58 (L) 150 - 350 K/uL Final     Gran #   Date Value Ref Range Status   10/17/2017 4.6 1.8 - 7.7 K/uL Final     Comment:     The ANC is based on a white cell differential from an   automated cell counter. It has not been microscopically   reviewed for the presence of abnormal cells. Clinical   correlation is required.         Chemistry        Component Value Date/Time     10/17/2017 1459    K 3.5 10/17/2017 1459     10/17/2017 1459    CO2 25 10/17/2017 1459    BUN 18 10/17/2017 1459    CREATININE 1.3 10/17/2017 1459    GLU 91 10/17/2017 1459        Component Value Date/Time    CALCIUM 8.6 (L) 10/17/2017 1459    ALKPHOS 261 (H) 10/17/2017 1459    AST 23 10/17/2017 1459    ALT 12 10/17/2017 1459    BILITOT 0.7 10/17/2017 1459    ESTGFRAFRICA >60.0 10/17/2017 1459    EGFRNONAA 55.7 (A) 10/17/2017 1459          Assessment:       1. Rectal cancer    2. Secondary adenocarcinoma of right lung    3. Chemotherapy-induced thrombocytopenia    4. Peritoneal effusion        Plan:        1,2,3. Cancel chemo tomorrow for dose limiting chemo induced thrombocytopenia  Will recheck labs in 1 week.  His low platelets could also be due to portal hypertension   4. Schedule for therapeutic paracentesis on Friday 11/20 and send ascitic fluid for cytology. Spoke with IR and they are OK with current platelet count to proceed with paracentesis.    Above care plan was discussed with patient and accompanying wife and all questions were addressed to  their satisfaction

## 2017-10-17 NOTE — Clinical Note
Cancel chemo tomorrow. Schedule to see me next Tuesday with CBC,CMp and schedule FOLFIRI in 1 week on Wednesday. DC pump on day 3. Neulasta on day 3

## 2017-10-19 DIAGNOSIS — C20 RECTAL CANCER: ICD-10-CM

## 2017-10-19 DIAGNOSIS — R18.8 OTHER ASCITES: Primary | ICD-10-CM

## 2017-10-20 ENCOUNTER — HOSPITAL ENCOUNTER (OUTPATIENT)
Dept: INTERVENTIONAL RADIOLOGY/VASCULAR | Facility: HOSPITAL | Age: 67
Discharge: HOME | End: 2017-10-20
Attending: INTERNAL MEDICINE
Payer: COMMERCIAL

## 2017-10-20 VITALS
DIASTOLIC BLOOD PRESSURE: 60 MMHG | SYSTOLIC BLOOD PRESSURE: 129 MMHG | HEART RATE: 81 BPM | OXYGEN SATURATION: 96 % | RESPIRATION RATE: 18 BRPM

## 2017-10-20 DIAGNOSIS — R18.8 OTHER ASCITES: ICD-10-CM

## 2017-10-20 PROCEDURE — 88305 TISSUE EXAM BY PATHOLOGIST: CPT | Performed by: PATHOLOGY

## 2017-10-20 PROCEDURE — A7048 VACUUM DRAIN BOTTLE/TUBE KIT: HCPCS

## 2017-10-20 PROCEDURE — 88342 IMHCHEM/IMCYTCHM 1ST ANTB: CPT | Mod: 26,,, | Performed by: PATHOLOGY

## 2017-10-20 PROCEDURE — P9047 ALBUMIN (HUMAN), 25%, 50ML: HCPCS | Performed by: INTERNAL MEDICINE

## 2017-10-20 PROCEDURE — 88305 TISSUE EXAM BY PATHOLOGIST: CPT | Mod: 26,,, | Performed by: PATHOLOGY

## 2017-10-20 PROCEDURE — C1729 CATH, DRAINAGE: HCPCS

## 2017-10-20 PROCEDURE — 88112 CYTOPATH CELL ENHANCE TECH: CPT | Mod: 26,,, | Performed by: PATHOLOGY

## 2017-10-20 PROCEDURE — 49083 ABD PARACENTESIS W/IMAGING: CPT | Mod: ,,, | Performed by: RADIOLOGY

## 2017-10-20 PROCEDURE — 88341 IMHCHEM/IMCYTCHM EA ADD ANTB: CPT | Mod: 26,,, | Performed by: PATHOLOGY

## 2017-10-20 PROCEDURE — 63600175 PHARM REV CODE 636 W HCPCS: Performed by: INTERNAL MEDICINE

## 2017-10-20 RX ORDER — ALBUMIN HUMAN 250 G/1000ML
25 SOLUTION INTRAVENOUS
Status: DISCONTINUED | OUTPATIENT
Start: 2017-10-20 | End: 2017-10-21 | Stop reason: HOSPADM

## 2017-10-20 RX ADMIN — ALBUMIN (HUMAN) 25 G: 25 SOLUTION INTRAVENOUS at 10:10

## 2017-10-20 RX ADMIN — ALBUMIN (HUMAN) 12.5 G: 25 SOLUTION INTRAVENOUS at 10:10

## 2017-10-20 NOTE — DISCHARGE SUMMARY
Radiology Discharge Summary      Hospital Course: No complications    Admit Date: 10/20/2017  Discharge Date: 10/20/2017     Instructions Given to Patient: Yes  Diet: Resume prior diet  Activity: activity as tolerated    Description of Condition on Discharge: Stable  Vital Signs (Most Recent): Pulse: 81 (10/20/17 1045)  Resp: 18 (10/20/17 1045)  BP: 129/60 (10/20/17 1045)  SpO2: 96 % (10/20/17 1045)    Discharge Disposition: Home    Discharge Diagnosis: Ascites. F/U as scheduled.    Michael Thomas M.D.  Diagnostic and Interventional Radiologist  Department of Radiology  Pager: [phone]

## 2017-10-20 NOTE — PROGRESS NOTES
Paracentesis complete. 6800 mLs peritoneal fluid drained. Pt tolerated well. Dressing to clean, dry, and intact. Albumin 25% given 150 mLs. Specimens sent per lab order. Discharge instructions and handouts provided. Pt verbalized understanding.

## 2017-10-20 NOTE — PROCEDURES
Radiology Post-Procedure Note    Pre Op Diagnosis: Ascites  Post Op Diagnosis: Same    Procedure: Paracentesis    Procedure performed by: Dr. Flavio Wagner    Written Informed Consent Obtained: Yes  Specimen Removed: YES   Estimated Blood Loss: Minimal    Findings:   Successful paracentesis.  Albumin administered PRN per protocol.    Patient tolerated procedure well.    Flavio Wagner MD  Radiology, PGY - II  [phone]

## 2017-10-20 NOTE — DISCHARGE INSTRUCTIONS
For scheduling: Call Lizzette at [phone]    For questions or concerns call: NANDA MON-FRI 8 AM- 5PM [phone]. Radiology resident on call [phone].    For immediate concerns that are not emergent, you may call our radiology clinic at: [phone]

## 2017-10-20 NOTE — H&P
Radiology History & Physical      SUBJECTIVE:     Chief Complaint: Ascites    History of Present Illness:    Karthik Medrano is a 67 y.o. male with PMHx rectal cancer with lung mets who presents for paracentesis.    Past Medical History:   Diagnosis Date    Cancer     rectal cancer    Diabetes mellitus     GERD (gastroesophageal reflux disease)     Hypertension      Past Surgical History:   Procedure Laterality Date    COLON SURGERY      COLONOSCOPY N/A 10/3/2017    Procedure: COLONOSCOPY;  Surgeon: Sandoval Bowling MD;  Location: [address];  Service: Endoscopy;  Laterality: N/A;       Home Meds:   Prior to Admission medications    Medication Sig Start Date End Date Taking? Authorizing Provider   amlodipine (NORVASC) 5 MG tablet TAKE 1 TABLET(5 MG) BY MOUTH TWICE DAILY 9/5/17   Lisa Abernathy MD   ascorbic acid (VITAMIN C) 1000 MG tablet Take 1,000 mg by mouth once daily.    Historical Provider, MD   atorvastatin (LIPITOR) 10 MG tablet Take 1 tablet (10 mg total) by mouth once daily. 4/21/16 4/21/17  Pinky Thornton MD   cholecalciferol, vitamin D3, (VITAMIN D3) 2,000 unit Cap Take 1 capsule by mouth once daily.    Historical Provider, MD   cholestyramine, bulk, Powd 4 g by Misc.(Non-Drug; Combo Route) route once daily at 6am. 7/3/17   Pinky Thornton MD   co-enzyme Q-10 30 mg capsule Take 30 mg by mouth 3 (three) times daily.    Historical Provider, MD   diphenoxylate-atropine 2.5-0.025 mg (LOMOTIL) 2.5-0.025 mg per tablet Take 1 tablet by mouth 4 (four) times daily as needed for Diarrhea. 11/25/16   Lisa Abernathy MD   ferrous gluconate (FERGON) 324 MG tablet Take 324 mg by mouth daily with breakfast.    Historical Provider, MD   furosemide (LASIX) 20 MG tablet Take 2 tablets (40 mg total) by mouth daily as needed. 9/19/17   Lisa Abernathy MD   gabapentin (NEURONTIN) 300 MG capsule TAKE ONE CAPSULE BY MOUTH EVERY EVENING 4/5/16   Lisa Abernathy MD   GARLIC EXTRACT ORAL Take by  mouth.    Historical Provider, MD   glipiZIDE (GLUCOTROL) 5 MG tablet TAKE 1 TABLET BY MOUTH THREE TIMES DAILY 11/1/16   Lisa Abernathy MD   lisinopril (PRINIVIL,ZESTRIL) 40 MG tablet TAKE 1 TABLET BY MOUTH TWICE DAILY 9/9/16   Lisa Abernathy MD   metoprolol succinate (TOPROL-XL) 100 MG 24 hr tablet Take 1 tablet (100 mg total) by mouth 2 (two) times daily. 11/1/16   Lisa Abernathy MD   multivitamin with iron-mineral TbSR Take 1 tablet by mouth once daily. 8/31/14   Pinky Thornton MD   NYSTATIN (DUKE[employer]S SOLUTION) Take 10 mLs by mouth 4 (four) times daily. Mix equal amounts of benadryl, maalox, 2% viscous lidocaine and nystatin    Swish and swallow 10 ml 4 times a day 3/28/17   Nevaeh Dc NP   opium tincture 10 mg/mL (morphine) Tinc Take 0.6 mLs (6 mg total) by mouth 4 (four) times daily. 6/13/17   Lisa Abernathy MD   oxycodone-acetaminophen (PERCOCET) 5-325 mg per tablet TK 1 T PO Q 6 H PRN P 8/31/16   Historical Provider, MD   paregoric 2 mg/5 mL Liqd Take 5 mLs by mouth 3 (three) times daily. 11/25/16   Lisa Abernathy MD   tamsulosin (FLOMAX) 0.4 mg Cp24 Take 1 capsule (0.4 mg total) by mouth once daily. 9/19/17   Lisa Abernathy MD     Anticoagulants/Antiplatelets: no anticoagulation    Allergies: Review of patient's allergies indicates:  No Known Allergies  Sedation History:  no adverse reactions    Review of Systems:   Hematological: no known coagulopathies  Respiratory: no shortness of breath  Cardiovascular: no chest pain  Gastrointestinal: no abdominal pain  Genito-Urinary: no dysuria  Musculoskeletal: negative  Neurological: no TIA or stroke symptoms         OBJECTIVE:     Vital Signs (Most Recent)  Pulse: 82 (10/20/17 0920)  Resp: 18 (10/20/17 0920)  BP: 127/64 (10/20/17 0920)  SpO2: 97 % (10/20/17 0920)    Physical Exam:  ASA: 2  Mallampati: 2    General: no acute distress  Mental Status: alert and oriented to person, place and time  HEENT: normocephalic, atraumatic  Chest: unlabored  breathing  Heart: regular heart rate  Abdomen: distended  Extremity: moves all extremities    Laboratory  Lab Results   Component Value Date    INR 1.0 10/20/2017       Lab Results   Component Value Date    WBC 8.13 10/17/2017    HGB 11.1 (L) 10/17/2017    HCT 35.8 (L) 10/17/2017    MCV 88 10/17/2017    PLT 58 (L) 10/17/2017      Lab Results   Component Value Date    GLU 91 10/17/2017     10/17/2017    K 3.5 10/17/2017     10/17/2017    CO2 25 10/17/2017    BUN 18 10/17/2017    CREATININE 1.3 10/17/2017    CALCIUM 8.6 (L) 10/17/2017    MG 2.0 03/06/2016    ALT 12 10/17/2017    AST 23 10/17/2017    ALBUMIN 2.4 (L) 10/17/2017    BILITOT 0.7 10/17/2017    BILIDIR 0.1 01/13/2009       ASSESSMENT/PLAN:     Sedation Plan: Local    Patient will undergo Paracentesis..    Flavio Wagner MD  Radiology, PGY - II  [phone]

## 2017-10-24 ENCOUNTER — TELEPHONE (OUTPATIENT)
Dept: HEMATOLOGY/ONCOLOGY | Facility: CLINIC | Age: 67
End: 2017-10-24

## 2017-10-24 ENCOUNTER — OFFICE VISIT (OUTPATIENT)
Dept: HEMATOLOGY/ONCOLOGY | Facility: CLINIC | Age: 67
End: 2017-10-24
Payer: COMMERCIAL

## 2017-10-24 VITALS
SYSTOLIC BLOOD PRESSURE: 94 MMHG | RESPIRATION RATE: 24 BRPM | OXYGEN SATURATION: 99 % | HEIGHT: 70 IN | TEMPERATURE: 98 F | WEIGHT: 222.69 LBS | HEART RATE: 91 BPM | BODY MASS INDEX: 31.88 KG/M2 | DIASTOLIC BLOOD PRESSURE: 55 MMHG

## 2017-10-24 DIAGNOSIS — C20 RECTAL CANCER: Primary | ICD-10-CM

## 2017-10-24 DIAGNOSIS — D70.1 CHEMOTHERAPY INDUCED NEUTROPENIA: ICD-10-CM

## 2017-10-24 DIAGNOSIS — C78.01 SECONDARY ADENOCARCINOMA OF RIGHT LUNG: ICD-10-CM

## 2017-10-24 DIAGNOSIS — T45.1X5A CHEMOTHERAPY INDUCED NEUTROPENIA: ICD-10-CM

## 2017-10-24 DIAGNOSIS — N17.9 ACUTE RENAL FAILURE, UNSPECIFIED ACUTE RENAL FAILURE TYPE: ICD-10-CM

## 2017-10-24 DIAGNOSIS — E11.9 TYPE 2 DIABETES MELLITUS WITHOUT COMPLICATION, WITHOUT LONG-TERM CURRENT USE OF INSULIN: ICD-10-CM

## 2017-10-24 DIAGNOSIS — I10 ESSENTIAL HYPERTENSION: ICD-10-CM

## 2017-10-24 PROCEDURE — 99215 OFFICE O/P EST HI 40 MIN: CPT | Mod: S$GLB,,, | Performed by: INTERNAL MEDICINE

## 2017-10-24 PROCEDURE — 99999 PR PBB SHADOW E&M-EST. PATIENT-LVL IV: CPT | Mod: PBBFAC,,, | Performed by: INTERNAL MEDICINE

## 2017-10-24 NOTE — Clinical Note
Add 1 liter normal saline to IV fluids today. Schedule CBC,CMP and see me in 2 weeks and for FOLFIRI. DC pump on day 3. Neulasta on day 3.

## 2017-10-24 NOTE — TELEPHONE ENCOUNTER
Informed wife patient's labs are OK for treatment tomorrow.  Wife thanked nurse.  Message sent to dr valles to sign chemo orders.

## 2017-10-24 NOTE — PROGRESS NOTES
"Subjective:       Patient ID: Karthik Medrano is a 67 y.o. male.    Chief Complaint: Rectal Cancer; Ascites; Fatigue; dark stool x 1 month--bright red blood on saturday; abdominal spasms--interm; PND; and 20th--para 6.8 l removed  Oncologic History:  67 year old male with history of rectal cancer diagnosed 1/11/10 on colonoscopy (moderately differentiated adenocarcinoma) and based on imaging, was T3NxM0. Patient underwent Neoadjuvant chemort with infusional 5FU. This was completed 3/19/10 with a total of 50.4Gy given to the pelvis. He went to surgery 5/13/10. T3N0M0. Post operative he received no adjuvant treatment and has been followed regularly. He went to his PCP 6/24/13 who ordered a CXR which found pulmonary nodules. On 6/27/13, CT chest confirmed these findings. CEA drawn when initially diagnosed was 2.6ng/ml and 6/27/13 was 30.8 ng/ml. . Pulmonary biopsy was performed 7/18/13 and this confirmed metastatic adenocarcinoma. 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 Avastin was added for second cycle.    Patient completed 12 cycles of FOLFOX avastin. He did not receive Avastin for cycle 3, 4, or 5 (due to uncontrolled blood pressure).    He was on maintenance infusional 5FU and Avastin. Repeat scans 4/14/14 showed numerous bilateral pulmonary soft tissue nodules, the majority of which demonstrate interval enlargement when compared to the prior exam. CEA up 18.2 from 13.    He was restarted on FOLFIRI and AVastin and received 6 months of chemo and then was switched to maintenance chemotherapy with 5FU and Avastin. His CT scans from early May 2015 revealed progression in lung lesions.  He is now on FOLFIRI and Cyramza.  CT from 4/19/16 reveals "Multiple stable metastatic pulmonary nodules in this patient with history of rectal cancer.  Stable postoperative changes are present from prior partial colectomy.Interval increase in right-sided hydronephrosis despite appropriate positioning " "of a double-J ureteral sent.  There is associated enlargement and decreased perfusion to the right kidney consistent with obstruction. These findings are concerning for a nonfunctioning ureteral stent. Cholelithiasis."  CT from 6/13/16 reveal stable disease and hydronephrosis has resolved.  CT from 8/5/16 reveals "Stable pulmonary metastatic disease.Increased prominence of periureteral fat stranding/inflammatory change. Stable mild right hydronephrosis."  CT from 1/6/17 "1. Stable appearance of pulmonary metastatic disease in this patient with history of colon cancer. Pulmonary index lesions are stable in size compared to prior examination. No evidence of new metastatic disease. 2. Right-sided double-J ureteral stent with stable appearing inflammatory change about the right renal collecting system and right ureter. 3. Additional stable incidental findings as discussed above. "     CT scans reveal "In this patient with a history of rectal cancer, index lesions in the right and left lung are stable in size as detailed above. No new pulmonary nodules or masses are identified. There is no evidence of abnormal soft tissue opacity in the rectosigmoid region to suggest residual/recurrent disease. Interval increase in the degree of right hydronephrosis with a stable double-J right ureteral stent in place. Interval development of perihepatic ascites. Stable fat and bowel containing ventral hernia"     CT scans 7/21/17 reveal "Stable appearance of multiple pulmonary lesions, compatible with metastatic disease, in this patient with given history of rectal cancer.  Index lesions below. Surgical changes from low anterior resection of rectal mass, without evidence of local recurrence. Stable right-sided moderate hydroureteronephrosis, with periureteral stranding ; appropriately positioned double-J ureteral stent.  Findings are concerning for UTI. There is thinning of the right renal parenchyma, concerning for chronic obstruction " "or inflammation. Stable appearing soft tissue collection with dystrophic calcifications in the presacral space, which may be postsurgical or post radiation in etiology.  This finding may cause obstruction of the right ureter."                 HPI  Mr. Medrano returns for follow up and for FOLFIRI. He underwent colonoscopy on 10/3 and was noted to hemorrhagic mucosa, path negative and internal hemorrhoids which were banded.  He notes that his bleeding in stool, last episode was on 10/21/17, he has had ascitic fluid removed on 10/20/17. Cytology is pending,  He does not feel that the fluid is built up.  His main issue is fatigue.he did loose 14 lbs after paracentesis.    Review of Systems   Constitutional: Positive for fatigue. Negative for appetite change and unexpected weight change.   HENT: Negative for mouth sores.    Eyes: Negative for visual disturbance.   Respiratory: Negative for cough and shortness of breath.    Cardiovascular: Negative for chest pain.   Gastrointestinal: Positive for abdominal distention. Negative for abdominal pain and diarrhea.   Genitourinary: Negative for frequency.   Musculoskeletal: Negative for back pain.   Skin: Negative for rash.   Neurological: Negative for headaches.   Hematological: Negative for adenopathy.   Psychiatric/Behavioral: The patient is not nervous/anxious.    All other systems reviewed and are negative.      Objective:      Physical Exam   Constitutional: He is oriented to person, place, and time. He appears well-developed and well-nourished.   HENT:   Mouth/Throat: No oropharyngeal exudate.   Cardiovascular: Normal rate and normal heart sounds.    Pulmonary/Chest: Effort normal and breath sounds normal. He has no wheezes.   Abdominal: Soft. Bowel sounds are normal. There is no tenderness.   Musculoskeletal: He exhibits no edema or tenderness.   Lymphadenopathy:     He has no cervical adenopathy.   Neurological: He is alert and oriented to person, place, and time. " Coordination normal.   Skin: Skin is warm and dry. No rash noted.   Psychiatric: He has a normal mood and affect. Judgment and thought content normal.   Vitals reviewed.        LABS:  WBC   Date Value Ref Range Status   10/24/2017 12.76 (H) 3.90 - 12.70 K/uL Final     Hemoglobin   Date Value Ref Range Status   10/24/2017 11.3 (L) 14.0 - 18.0 g/dL Final     Hematocrit   Date Value Ref Range Status   10/24/2017 35.0 (L) 40.0 - 54.0 % Final     Platelets   Date Value Ref Range Status   10/24/2017 162 150 - 350 K/uL Final     Comment:     PLATELET COUNT OBTAINED BY CITRATED TUBE  PLATELETS APPEAR ADEQUATE ON PERIPHERAL SMEAR FROM CITRATE TUBE       Gran #   Date Value Ref Range Status   10/24/2017 8.4 (H) 1.8 - 7.7 K/uL Final     Comment:     The ANC is based on a white cell differential from an   automated cell counter. It has not been microscopically   reviewed for the presence of abnormal cells. Clinical   correlation is required.         Chemistry        Component Value Date/Time     10/24/2017 1509    K 4.0 10/24/2017 1509     10/24/2017 1509    CO2 26 10/24/2017 1509    BUN 29 (H) 10/24/2017 1509    CREATININE 1.7 (H) 10/24/2017 1509     10/24/2017 1509        Component Value Date/Time    CALCIUM 8.9 10/24/2017 1509    ALKPHOS 195 (H) 10/24/2017 1509    AST 22 10/24/2017 1509    ALT 11 10/24/2017 1509    BILITOT 0.5 10/24/2017 1509    ESTGFRAFRICA 46.5 (A) 10/24/2017 1509    EGFRNONAA 40.3 (A) 10/24/2017 1509          Assessment:       1. Rectal cancer    2. Secondary adenocarcinoma of right lung    3. Chemotherapy induced neutropenia    4. Essential hypertension    5. Type 2 diabetes mellitus without complication, without long-term current use of insulin    6. Acute renal failure, unspecified acute renal failure type        Plan:        1,2,3. He will proceed with FOLFIRI and will return in 2 weeks for next cycle.  Neulasta on day 3.  4. Blood pressure is low, due to weight loss. Advised him to  hold off all BP meds. Call pharmacy to hold off on meds.  5. Stable on meds.  6. IV fluids 1 liter today with chemo. Advised oral hydration.    Above care plan was discussed with patient and accompanying wife and all questions were addressed to their satisfaction            Distress Screening Results: Psychosocial Distress screening score of Distress Score: 0 noted and reviewed. No intervention indicated.

## 2017-10-25 ENCOUNTER — TELEPHONE (OUTPATIENT)
Dept: HEMATOLOGY/ONCOLOGY | Facility: CLINIC | Age: 67
End: 2017-10-25

## 2017-10-25 RX ORDER — LISINOPRIL 40 MG/1
40 TABLET ORAL 2 TIMES DAILY
Qty: 180 TABLET | Refills: 0 | Status: ON HOLD | OUTPATIENT
Start: 2017-10-25 | End: 2018-01-17 | Stop reason: HOSPADM

## 2017-10-25 RX ORDER — GLIPIZIDE 5 MG/1
TABLET ORAL
Qty: 270 TABLET | Refills: 4 | Status: SHIPPED | OUTPATIENT
Start: 2017-10-25 | End: 2018-01-09 | Stop reason: SDUPTHER

## 2017-10-25 RX ORDER — AMLODIPINE BESYLATE 5 MG/1
TABLET ORAL
Qty: 180 TABLET | Refills: 0 | Status: SHIPPED | OUTPATIENT
Start: 2017-10-25 | End: 2018-01-09 | Stop reason: SDUPTHER

## 2017-10-25 RX ORDER — METOPROLOL SUCCINATE 100 MG/1
100 TABLET, EXTENDED RELEASE ORAL 2 TIMES DAILY
Qty: 180 TABLET | Refills: 3 | Status: ON HOLD | OUTPATIENT
Start: 2017-10-25 | End: 2018-02-10 | Stop reason: HOSPADM

## 2017-10-25 NOTE — TELEPHONE ENCOUNTER
"Spoke with wife.   Informed wife dr valles needs him to push plenty fluids, as his creatinine is slightly elevated at 1.7. Wife voiced understanding.  Nurse spoke with patient as well. He understands he needs to drink plenty extra fluids---  Patient states he would like to delay treatment until next week, as "i want to gain my strength back. I feel so weak."   Nurse offered patient to come in today to only get fluids and he states he would rather not.  Patient would like to know if dr valles is OK with him delaying until next week.     message routed to dr valles (are you OK with this?)  "

## 2017-10-25 NOTE — TELEPHONE ENCOUNTER
Spoke with patient--informed him dr valles is OK with him delaying treatment.  Nurse will have  move appointments to next week.     Message routed to bria (please move what is presently scheduled to next week instead)

## 2017-10-25 NOTE — TELEPHONE ENCOUNTER
----- Message from Jesse Swan sent at 10/25/2017  8:31 AM CDT -----  Contact: Spouse  Will like to know can her  r/s chemo appt due to him not feeling well    Contact::[phone] or [phone]

## 2017-10-26 ENCOUNTER — PATIENT MESSAGE (OUTPATIENT)
Dept: HEMATOLOGY/ONCOLOGY | Facility: CLINIC | Age: 67
End: 2017-10-26

## 2017-10-27 ENCOUNTER — TELEPHONE (OUTPATIENT)
Dept: HEMATOLOGY/ONCOLOGY | Facility: CLINIC | Age: 67
End: 2017-10-27

## 2017-10-27 NOTE — TELEPHONE ENCOUNTER
----- Message from Shabnam Malik sent at 10/27/2017  9:48 AM CDT -----  Contact: karyn Angeles would like to speak with a nurse regarding an e-mail that he sent yesterday.    Please contact Karthik back at [phone]    Thank you

## 2017-10-27 NOTE — TELEPHONE ENCOUNTER
Patient is calling to apologize to nurse for his Mychart message. Nurse informed patient she did not take his message personally.   Nurse reviewed next week's appointments.  Patient requested lab to be moved to Monday instead of Tuesday.  Patient thanked nurse.

## 2017-10-31 ENCOUNTER — OFFICE VISIT (OUTPATIENT)
Dept: HEMATOLOGY/ONCOLOGY | Facility: CLINIC | Age: 67
End: 2017-10-31
Payer: COMMERCIAL

## 2017-10-31 VITALS
DIASTOLIC BLOOD PRESSURE: 65 MMHG | RESPIRATION RATE: 20 BRPM | WEIGHT: 222.44 LBS | HEIGHT: 70 IN | OXYGEN SATURATION: 96 % | BODY MASS INDEX: 31.85 KG/M2 | TEMPERATURE: 99 F | HEART RATE: 118 BPM | SYSTOLIC BLOOD PRESSURE: 122 MMHG

## 2017-10-31 DIAGNOSIS — C20 RECTAL CANCER: Primary | ICD-10-CM

## 2017-10-31 DIAGNOSIS — N17.9 ACUTE RENAL FAILURE, UNSPECIFIED ACUTE RENAL FAILURE TYPE: ICD-10-CM

## 2017-10-31 DIAGNOSIS — D69.59 CHEMOTHERAPY-INDUCED THROMBOCYTOPENIA: ICD-10-CM

## 2017-10-31 DIAGNOSIS — T45.1X5A CHEMOTHERAPY INDUCED NEUTROPENIA: ICD-10-CM

## 2017-10-31 DIAGNOSIS — E11.9 TYPE 2 DIABETES MELLITUS WITHOUT COMPLICATION, WITHOUT LONG-TERM CURRENT USE OF INSULIN: ICD-10-CM

## 2017-10-31 DIAGNOSIS — D70.1 CHEMOTHERAPY INDUCED NEUTROPENIA: ICD-10-CM

## 2017-10-31 DIAGNOSIS — T45.1X5A CHEMOTHERAPY-INDUCED THROMBOCYTOPENIA: ICD-10-CM

## 2017-10-31 DIAGNOSIS — C78.01 SECONDARY ADENOCARCINOMA OF RIGHT LUNG: ICD-10-CM

## 2017-10-31 PROCEDURE — 99215 OFFICE O/P EST HI 40 MIN: CPT | Mod: S$GLB,,, | Performed by: INTERNAL MEDICINE

## 2017-10-31 PROCEDURE — 99999 PR PBB SHADOW E&M-EST. PATIENT-LVL IV: CPT | Mod: PBBFAC,,, | Performed by: INTERNAL MEDICINE

## 2017-10-31 RX ORDER — FLUOROURACIL 50 MG/ML
400 INJECTION, SOLUTION INTRAVENOUS
Status: CANCELLED | OUTPATIENT
Start: 2017-10-31

## 2017-10-31 NOTE — PROGRESS NOTES
"Subjective:       Patient ID: Karthik Medrano is a 67 y.o. male.    Chief Complaint: Rectal Cancer; Fatigue; rectal bleeding on saturday (spotting); freq sludgey stools; urinating OK; HUGHES; and Peripheral Neuropathy  Oncologic History:  67 year old male with history of rectal cancer diagnosed 1/11/10 on colonoscopy (moderately differentiated adenocarcinoma) and based on imaging, was T3NxM0. Patient underwent Neoadjuvant chemort with infusional 5FU. This was completed 3/19/10 with a total of 50.4Gy given to the pelvis. He went to surgery 5/13/10. T3N0M0. Post operative he received no adjuvant treatment and has been followed regularly. He went to his PCP 6/24/13 who ordered a CXR which found pulmonary nodules. On 6/27/13, CT chest confirmed these findings. CEA drawn when initially diagnosed was 2.6ng/ml and 6/27/13 was 30.8 ng/ml. . Pulmonary biopsy was performed 7/18/13 and this confirmed metastatic adenocarcinoma. 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 Avastin was added for second cycle.    Patient completed 12 cycles of FOLFOX avastin. He did not receive Avastin for cycle 3, 4, or 5 (due to uncontrolled blood pressure).    He was on maintenance infusional 5FU and Avastin. Repeat scans 4/14/14 showed numerous bilateral pulmonary soft tissue nodules, the majority of which demonstrate interval enlargement when compared to the prior exam. CEA up 18.2 from 13.    He was restarted on FOLFIRI and AVastin and received 6 months of chemo and then was switched to maintenance chemotherapy with 5FU and Avastin. His CT scans from early May 2015 revealed progression in lung lesions.  He is now on FOLFIRI and Cyramza.  CT from 4/19/16 reveals "Multiple stable metastatic pulmonary nodules in this patient with history of rectal cancer.  Stable postoperative changes are present from prior partial colectomy.Interval increase in right-sided hydronephrosis despite appropriate positioning of a double-J " "ureteral sent.  There is associated enlargement and decreased perfusion to the right kidney consistent with obstruction. These findings are concerning for a nonfunctioning ureteral stent. Cholelithiasis."  CT from 6/13/16 reveal stable disease and hydronephrosis has resolved.  CT from 8/5/16 reveals "Stable pulmonary metastatic disease.Increased prominence of periureteral fat stranding/inflammatory change. Stable mild right hydronephrosis."  CT from 1/6/17 "1. Stable appearance of pulmonary metastatic disease in this patient with history of colon cancer. Pulmonary index lesions are stable in size compared to prior examination. No evidence of new metastatic disease. 2. Right-sided double-J ureteral stent with stable appearing inflammatory change about the right renal collecting system and right ureter. 3. Additional stable incidental findings as discussed above. "     CT scans reveal "In this patient with a history of rectal cancer, index lesions in the right and left lung are stable in size as detailed above. No new pulmonary nodules or masses are identified. There is no evidence of abnormal soft tissue opacity in the rectosigmoid region to suggest residual/recurrent disease. Interval increase in the degree of right hydronephrosis with a stable double-J right ureteral stent in place. Interval development of perihepatic ascites. Stable fat and bowel containing ventral hernia"     CT scans 7/21/17 reveal "Stable appearance of multiple pulmonary lesions, compatible with metastatic disease, in this patient with given history of rectal cancer.  Index lesions below. Surgical changes from low anterior resection of rectal mass, without evidence of local recurrence. Stable right-sided moderate hydroureteronephrosis, with periureteral stranding ; appropriately positioned double-J ureteral stent.  Findings are concerning for UTI. There is thinning of the right renal parenchyma, concerning for chronic obstruction or " "inflammation. Stable appearing soft tissue collection with dystrophic calcifications in the presacral space, which may be postsurgical or post radiation in etiology.  This finding may cause obstruction of the right ureter."                    HPI Mr. Medrano returns for follow up and for FOLFIRI. He feels weak. He is not eating well. He notes some rectal bleed. He stopped Lasix over the weekend but took it today.    Review of Systems   Constitutional: Positive for fatigue. Negative for appetite change and unexpected weight change.   HENT: Negative for mouth sores.    Eyes: Negative for visual disturbance.   Respiratory: Negative for cough and shortness of breath.    Cardiovascular: Negative for chest pain.   Gastrointestinal: Positive for diarrhea. Negative for abdominal pain.   Genitourinary: Negative for frequency.   Musculoskeletal: Positive for back pain.   Skin: Negative for rash.   Neurological: Positive for numbness. Negative for headaches.   Hematological: Negative for adenopathy.   Psychiatric/Behavioral: The patient is not nervous/anxious.    All other systems reviewed and are negative.      Objective:      Physical Exam   Constitutional: He is oriented to person, place, and time. He appears well-developed and well-nourished.   HENT:   Mouth/Throat: No oropharyngeal exudate.   Cardiovascular: Normal rate and normal heart sounds.    Pulmonary/Chest: Effort normal and breath sounds normal. He has no wheezes.   Abdominal: Soft. Bowel sounds are normal. He exhibits distension. There is no tenderness.   Musculoskeletal: He exhibits edema. He exhibits no tenderness.   Lymphadenopathy:     He has no cervical adenopathy.   Neurological: He is alert and oriented to person, place, and time. Coordination normal.   Skin: Skin is warm and dry. No rash noted.   Psychiatric: He has a normal mood and affect. Judgment and thought content normal.   Vitals reviewed.        LABS:  WBC   Date Value Ref Range Status   10/30/2017 " 9.51 3.90 - 12.70 K/uL Final     Hemoglobin   Date Value Ref Range Status   10/30/2017 11.1 (L) 14.0 - 18.0 g/dL Final     Hematocrit   Date Value Ref Range Status   10/30/2017 35.8 (L) 40.0 - 54.0 % Final     Platelets   Date Value Ref Range Status   10/30/2017 101 (L) 150 - 350 K/uL Final     Gran #   Date Value Ref Range Status   10/30/2017 6.8 1.8 - 7.7 K/uL Final     Comment:     The ANC is based on a white cell differential from an   automated cell counter. It has not been microscopically   reviewed for the presence of abnormal cells. Clinical   correlation is required.         Chemistry        Component Value Date/Time     (L) 10/31/2017 1519    K 3.9 10/31/2017 1519    CL 99 10/31/2017 1519    CO2 23 10/31/2017 1519    BUN 30 (H) 10/31/2017 1519    CREATININE 1.5 (H) 10/31/2017 1519     (H) 10/31/2017 1519        Component Value Date/Time    CALCIUM 8.6 (L) 10/31/2017 1519    ALKPHOS 234 (H) 10/31/2017 1519    AST 18 10/31/2017 1519    ALT 10 10/31/2017 1519    BILITOT 0.6 10/31/2017 1519    ESTGFRAFRICA 54.1 (A) 10/31/2017 1519    EGFRNONAA 46.8 (A) 10/31/2017 1519          Assessment:       1. Rectal cancer    2. Secondary adenocarcinoma of right lung    3. Chemotherapy induced neutropenia    4. Chemotherapy-induced thrombocytopenia    5. Type 2 diabetes mellitus without complication, without long-term current use of insulin    6. Acute renal failure, unspecified acute renal failure type        Plan:        1,2,3,4. He will proceed with FOLFIRI and will return in 2 weeks for next cycle.  Neulasta on day 3.   5. Stable on meds  6. He will receive IV fluids.    Distress Screening Results: Psychosocial Distress screening score of Distress Score: 0 noted and reviewed. No intervention indicated.     STAFF NOTE:  Above care plan was discussed with patient and accompanying wife and all questions were addressed to their satisfaction

## 2017-11-01 ENCOUNTER — INFUSION (OUTPATIENT)
Dept: INFUSION THERAPY | Facility: HOSPITAL | Age: 67
End: 2017-11-01
Attending: INTERNAL MEDICINE
Payer: COMMERCIAL

## 2017-11-01 VITALS
RESPIRATION RATE: 18 BRPM | SYSTOLIC BLOOD PRESSURE: 149 MMHG | DIASTOLIC BLOOD PRESSURE: 72 MMHG | HEART RATE: 115 BPM | TEMPERATURE: 98 F

## 2017-11-01 DIAGNOSIS — T45.1X5A CHEMOTHERAPY INDUCED NEUTROPENIA: Primary | ICD-10-CM

## 2017-11-01 DIAGNOSIS — Z51.12 ENCOUNTER FOR ANTINEOPLASTIC CHEMOTHERAPY AND IMMUNOTHERAPY: ICD-10-CM

## 2017-11-01 DIAGNOSIS — D70.1 CHEMOTHERAPY INDUCED NEUTROPENIA: Primary | ICD-10-CM

## 2017-11-01 DIAGNOSIS — Z51.11 ENCOUNTER FOR ANTINEOPLASTIC CHEMOTHERAPY AND IMMUNOTHERAPY: ICD-10-CM

## 2017-11-01 PROCEDURE — 96367 TX/PROPH/DG ADDL SEQ IV INF: CPT

## 2017-11-01 PROCEDURE — 96411 CHEMO IV PUSH ADDL DRUG: CPT

## 2017-11-01 PROCEDURE — 63600175 PHARM REV CODE 636 W HCPCS: Performed by: INTERNAL MEDICINE

## 2017-11-01 PROCEDURE — 96375 TX/PRO/DX INJ NEW DRUG ADDON: CPT

## 2017-11-01 PROCEDURE — 96416 CHEMO PROLONG INFUSE W/PUMP: CPT

## 2017-11-01 PROCEDURE — 96368 THER/DIAG CONCURRENT INF: CPT

## 2017-11-01 PROCEDURE — 96413 CHEMO IV INFUSION 1 HR: CPT

## 2017-11-01 PROCEDURE — 96361 HYDRATE IV INFUSION ADD-ON: CPT

## 2017-11-01 PROCEDURE — 25000003 PHARM REV CODE 250: Performed by: INTERNAL MEDICINE

## 2017-11-01 RX ORDER — FLUOROURACIL 50 MG/ML
400 INJECTION, SOLUTION INTRAVENOUS
Status: COMPLETED | OUTPATIENT
Start: 2017-11-01 | End: 2017-11-01

## 2017-11-01 RX ORDER — ATROPINE SULFATE 0.4 MG/ML
0.4 INJECTION, SOLUTION ENDOTRACHEAL; INTRAMEDULLARY; INTRAMUSCULAR; INTRAVENOUS; SUBCUTANEOUS DAILY PRN
Status: DISCONTINUED | OUTPATIENT
Start: 2017-11-01 | End: 2017-11-01 | Stop reason: HOSPADM

## 2017-11-01 RX ADMIN — ATROPINE SULFATE 0.4 MG: 0.4 INJECTION, SOLUTION INTRAMUSCULAR; INTRAVENOUS; SUBCUTANEOUS at 05:11

## 2017-11-01 RX ADMIN — IRINOTECAN HYDROCHLORIDE 402 MG: 100 INJECTION, SOLUTION INTRAVENOUS at 05:11

## 2017-11-01 RX ADMIN — SODIUM CHLORIDE: 9 INJECTION, SOLUTION INTRAVENOUS at 05:11

## 2017-11-01 RX ADMIN — FLUOROURACIL 890 MG: 50 INJECTION, SOLUTION INTRAVENOUS at 06:11

## 2017-11-01 RX ADMIN — SODIUM CHLORIDE: 900 INJECTION, SOLUTION INTRAVENOUS at 04:11

## 2017-11-01 RX ADMIN — LEUCOVORIN CALCIUM 45 MG: 350 INJECTION, POWDER, LYOPHILIZED, FOR SOLUTION INTRAMUSCULAR; INTRAVENOUS at 05:11

## 2017-11-01 RX ADMIN — PALONOSETRON HYDROCHLORIDE: 0.25 INJECTION INTRAVENOUS at 04:11

## 2017-11-01 RX ADMIN — FLUOROURACIL 5350 MG: 50 INJECTION, SOLUTION INTRAVENOUS at 06:11

## 2017-11-01 RX ADMIN — SODIUM CHLORIDE 1000 ML: 0.9 INJECTION, SOLUTION INTRAVENOUS at 03:11

## 2017-11-02 NOTE — PLAN OF CARE
Problem: Patient Care Overview  Goal: Plan of Care Review  Outcome: Ongoing (interventions implemented as appropriate)  Pt received tx without complications. VSS. CADD pump connected to patient with settings verified by 2nd nurse. AVS given to patient.

## 2017-11-03 ENCOUNTER — INFUSION (OUTPATIENT)
Dept: INFUSION THERAPY | Facility: HOSPITAL | Age: 67
End: 2017-11-03
Attending: INTERNAL MEDICINE
Payer: COMMERCIAL

## 2017-11-03 VITALS
DIASTOLIC BLOOD PRESSURE: 59 MMHG | TEMPERATURE: 98 F | HEART RATE: 67 BPM | SYSTOLIC BLOOD PRESSURE: 124 MMHG | RESPIRATION RATE: 18 BRPM

## 2017-11-03 DIAGNOSIS — Z51.12 ENCOUNTER FOR ANTINEOPLASTIC CHEMOTHERAPY AND IMMUNOTHERAPY: ICD-10-CM

## 2017-11-03 DIAGNOSIS — D70.1 CHEMOTHERAPY INDUCED NEUTROPENIA: Primary | ICD-10-CM

## 2017-11-03 DIAGNOSIS — C18.9 MALIGNANT NEOPLASM OF COLON, UNSPECIFIED PART OF COLON: Primary | ICD-10-CM

## 2017-11-03 DIAGNOSIS — T45.1X5A CHEMOTHERAPY INDUCED NEUTROPENIA: Primary | ICD-10-CM

## 2017-11-03 DIAGNOSIS — Z51.11 ENCOUNTER FOR ANTINEOPLASTIC CHEMOTHERAPY AND IMMUNOTHERAPY: ICD-10-CM

## 2017-11-03 PROCEDURE — 96377 APPLICATON ON-BODY INJECTOR: CPT

## 2017-11-03 PROCEDURE — A4216 STERILE WATER/SALINE, 10 ML: HCPCS | Performed by: INTERNAL MEDICINE

## 2017-11-03 PROCEDURE — 63600175 PHARM REV CODE 636 W HCPCS: Performed by: INTERNAL MEDICINE

## 2017-11-03 PROCEDURE — 25000003 PHARM REV CODE 250: Performed by: INTERNAL MEDICINE

## 2017-11-03 RX ORDER — SODIUM CHLORIDE 0.9 % (FLUSH) 0.9 %
10 SYRINGE (ML) INJECTION
Status: DISCONTINUED | OUTPATIENT
Start: 2017-11-03 | End: 2017-11-03 | Stop reason: HOSPADM

## 2017-11-03 RX ORDER — HEPARIN 100 UNIT/ML
500 SYRINGE INTRAVENOUS
Status: DISCONTINUED | OUTPATIENT
Start: 2017-11-03 | End: 2017-11-03 | Stop reason: HOSPADM

## 2017-11-03 RX ORDER — ONDANSETRON 8 MG/1
8 TABLET, ORALLY DISINTEGRATING ORAL EVERY 8 HOURS PRN
Qty: 45 TABLET | Refills: 2 | Status: SHIPPED | OUTPATIENT
Start: 2017-11-03 | End: 2017-12-07 | Stop reason: SDUPTHER

## 2017-11-03 RX ADMIN — PEGFILGRASTIM 6 MG: KIT SUBCUTANEOUS at 05:11

## 2017-11-03 RX ADMIN — SODIUM CHLORIDE, PRESERVATIVE FREE 10 ML: 5 INJECTION INTRAVENOUS at 05:11

## 2017-11-03 RX ADMIN — HEPARIN 500 UNITS: 100 SYRINGE at 05:11

## 2017-11-08 ENCOUNTER — PATIENT MESSAGE (OUTPATIENT)
Dept: HEMATOLOGY/ONCOLOGY | Facility: CLINIC | Age: 67
End: 2017-11-08

## 2017-11-09 ENCOUNTER — TELEPHONE (OUTPATIENT)
Dept: HEMATOLOGY/ONCOLOGY | Facility: CLINIC | Age: 67
End: 2017-11-09

## 2017-11-09 NOTE — TELEPHONE ENCOUNTER
----- Message from Jesse Swan sent at 11/9/2017  3:25 PM CST -----  Contact: Kim-Jolene  Will like a call from Candie regarding pt not feeling well and upcoming appts     Contact::[phone]

## 2017-11-09 NOTE — TELEPHONE ENCOUNTER
----- Message from Jesse Swan sent at 11/9/2017  4:14 PM CST -----  Contact: Spouse  Spouse is returning Candie's call     Contact::[phone]

## 2017-11-09 NOTE — TELEPHONE ENCOUNTER
Spoke with patient's wife. She is requesting para to be performed tomorrow, as patient is getting slightly uncomfortable.   Patient is willing to come any time or go to [facility] or main [place].  Nurse informed wife she would contact her back in the morning, as IR is closed right now.  Wife voiced understanding.      Message routed to bria (needs para tomorrow---please call wife at [phone])

## 2017-11-09 NOTE — TELEPHONE ENCOUNTER
Perez,    Please reschedule patient's appointments per his request---  Also, please schedule patient for para asap.  ~david

## 2017-11-10 ENCOUNTER — HOSPITAL ENCOUNTER (OUTPATIENT)
Dept: INTERVENTIONAL RADIOLOGY/VASCULAR | Facility: HOSPITAL | Age: 67
Discharge: HOME | End: 2017-11-10
Attending: INTERNAL MEDICINE
Payer: COMMERCIAL

## 2017-11-10 VITALS
RESPIRATION RATE: 18 BRPM | HEART RATE: 109 BPM | DIASTOLIC BLOOD PRESSURE: 78 MMHG | SYSTOLIC BLOOD PRESSURE: 127 MMHG | OXYGEN SATURATION: 98 %

## 2017-11-10 DIAGNOSIS — R18.8 OTHER ASCITES: ICD-10-CM

## 2017-11-10 PROCEDURE — 63600175 PHARM REV CODE 636 W HCPCS: Performed by: INTERNAL MEDICINE

## 2017-11-10 PROCEDURE — 49083 ABD PARACENTESIS W/IMAGING: CPT | Mod: ,,, | Performed by: RADIOLOGY

## 2017-11-10 PROCEDURE — C1729 CATH, DRAINAGE: HCPCS

## 2017-11-10 PROCEDURE — A7048 VACUUM DRAIN BOTTLE/TUBE KIT: HCPCS

## 2017-11-10 PROCEDURE — P9047 ALBUMIN (HUMAN), 25%, 50ML: HCPCS | Performed by: INTERNAL MEDICINE

## 2017-11-10 RX ORDER — ALBUMIN HUMAN 250 G/1000ML
25 SOLUTION INTRAVENOUS ONCE
Status: COMPLETED | OUTPATIENT
Start: 2017-11-10 | End: 2017-11-10

## 2017-11-10 RX ORDER — HEPARIN 100 UNIT/ML
5 SYRINGE INTRAVENOUS ONCE
Status: DISCONTINUED | OUTPATIENT
Start: 2017-11-10 | End: 2017-11-11 | Stop reason: HOSPADM

## 2017-11-10 RX ADMIN — ALBUMIN (HUMAN) 25 G: 25 SOLUTION INTRAVENOUS at 04:11

## 2017-11-10 RX ADMIN — ALBUMIN (HUMAN) 25 G: 25 SOLUTION INTRAVENOUS at 03:11

## 2017-11-10 NOTE — PROCEDURES
Radiology Post-Procedure Note    Pre Op Diagnosis: Ascites  Post Op Diagnosis: Same    Procedure: Paracentesis    Procedure performed by: Ildefonso BAKER, Nick BAKER    Written Informed Consent Obtained: Yes  Specimen Removed: YES 6.3 L  Estimated Blood Loss: Minimal     Findings:   Successful paracentesis.  Albumin administered PRN per protocol.    Patient tolerated procedure well.    Jose Romero MD  Radiology

## 2017-11-10 NOTE — H&P
Radiology History & Physical      SUBJECTIVE:     Chief Complaint: ascites     History of Present Illness:  Karthik Medrano is a 67 y.o. male who presents for paracentesis  Past Medical History:   Diagnosis Date    Cancer     rectal cancer    Diabetes mellitus     GERD (gastroesophageal reflux disease)     Hypertension      Past Surgical History:   Procedure Laterality Date    COLON SURGERY      COLONOSCOPY N/A 10/3/2017    Procedure: COLONOSCOPY;  Surgeon: Sandoval Bowling MD;  Location: [address];  Service: Endoscopy;  Laterality: N/A;       Home Meds:   Prior to Admission medications    Medication Sig Start Date End Date Taking? Authorizing Provider   amLODIPine (NORVASC) 5 MG tablet TAKE 1 TABLET(5 MG) BY MOUTH TWICE DAILY 10/25/17   Lisa Abernathy MD   ascorbic acid (VITAMIN C) 1000 MG tablet Take 1,000 mg by mouth once daily.    Historical Provider, MD   atorvastatin (LIPITOR) 10 MG tablet Take 1 tablet (10 mg total) by mouth once daily. 4/21/16 4/21/17  Pinky Thornton MD   cholecalciferol, vitamin D3, (VITAMIN D3) 2,000 unit Cap Take 1 capsule by mouth once daily.    Historical Provider, MD   cholestyramine, bulk, Powd 4 g by Misc.(Non-Drug; Combo Route) route once daily at 6am. 7/3/17   Pinky Thornton MD   co-enzyme Q-10 30 mg capsule Take 30 mg by mouth 3 (three) times daily.    Historical Provider, MD   diphenoxylate-atropine 2.5-0.025 mg (LOMOTIL) 2.5-0.025 mg per tablet Take 1 tablet by mouth 4 (four) times daily as needed for Diarrhea. 11/25/16   Lisa Abernathy MD   ferrous gluconate (FERGON) 324 MG tablet Take 324 mg by mouth daily with breakfast.    Historical Provider, MD   furosemide (LASIX) 20 MG tablet Take 2 tablets (40 mg total) by mouth daily as needed. 9/19/17   Lisa Abernathy MD   gabapentin (NEURONTIN) 300 MG capsule TAKE ONE CAPSULE BY MOUTH EVERY EVENING 4/5/16   Lisa Abernathy MD   GARLIC EXTRACT ORAL Take by mouth.    Historical Provider, MD    glipiZIDE (GLUCOTROL) 5 MG tablet TAKE 1 TABLET BY MOUTH THREE TIMES DAILY 10/25/17   Lisa Abernathy MD   lisinopril (PRINIVIL,ZESTRIL) 40 MG tablet Take 1 tablet (40 mg total) by mouth 2 (two) times daily. 10/25/17   Lisa Abernathy MD   metoprolol succinate (TOPROL-XL) 100 MG 24 hr tablet Take 1 tablet (100 mg total) by mouth 2 (two) times daily. 10/25/17   Lisa Abernathy MD   multivitamin with iron-mineral TbSR Take 1 tablet by mouth once daily. 8/31/14   Pinky Thornton MD   NYSTATIN (DUKE'S SOLUTION) Take 10 mLs by mouth 4 (four) times daily. Mix equal amounts of benadryl, maalox, 2% viscous lidocaine and nystatin    Swish and swallow 10 ml 4 times a day 3/28/17   Nevaeh Dc NP   ondansetron (ZOFRAN-ODT) 8 MG TbDL Take 1 tablet (8 mg total) by mouth every 8 (eight) hours as needed (Nausea). 11/3/17   David Solis MD   opium tincture 10 mg/mL (morphine) Tinc Take 0.6 mLs (6 mg total) by mouth 4 (four) times daily. 6/13/17   Lisa Abernathy MD   oxycodone-acetaminophen (PERCOCET) 5-325 mg per tablet TK 1 T PO Q 6 H PRN P 8/31/16   Pedro Dumont MD   paregoric 2 mg/5 mL Liqd Take 5 mLs by mouth 3 (three) times daily. 11/25/16   Lisa Abernathy MD   tamsulosin (FLOMAX) 0.4 mg Cp24 Take 1 capsule (0.4 mg total) by mouth once daily. 9/19/17   Lisa Abernathy MD     Anticoagulants/Antiplatelets: no anticoagulation    Allergies: Review of patient's allergies indicates:  No Known Allergies  Sedation History:  no adverse reactions    Review of Systems:   Hematological: no known coagulopathies  Respiratory: no shortness of breath  Cardiovascular: no chest pain  Gastrointestinal: no abdominal pain  Genito-Urinary: no dysuria  Musculoskeletal: negative  Neurological: no TIA or stroke symptoms         OBJECTIVE:     Vital Signs (Most Recent)  Pulse: 109 (11/10/17 1356)  Resp: 18 (11/10/17 1356)  BP: 127/78 (11/10/17 1356)  SpO2: 98 % (11/10/17 1356)    Physical Exam:  ASA: 2  Mallampati: 2    General:  no acute distress  Mental Status: alert and oriented to person, place and time  HEENT: normocephalic, atraumatic  Chest: unlabored breathing  Heart: regular heart rate  Abdomen: nondistended  Extremity: moves all extremities    Laboratory  Lab Results   Component Value Date    INR 1.0 10/20/2017       Lab Results   Component Value Date    WBC 9.51 10/30/2017    HGB 11.1 (L) 10/30/2017    HCT 35.8 (L) 10/30/2017    MCV 88 10/30/2017     (L) 10/30/2017      Lab Results   Component Value Date     (H) 10/31/2017     (L) 10/31/2017    K 3.9 10/31/2017    CL 99 10/31/2017    CO2 23 10/31/2017    BUN 30 (H) 10/31/2017    CREATININE 1.5 (H) 10/31/2017    CALCIUM 8.6 (L) 10/31/2017    MG 2.0 03/06/2016    ALT 10 10/31/2017    AST 18 10/31/2017    ALBUMIN 2.3 (L) 10/31/2017    BILITOT 0.6 10/31/2017    BILIDIR 0.1 01/13/2009       ASSESSMENT/PLAN:     Sedation Plan: none  Patient will undergo paracentesis.    Jose Romero MD  Radiology

## 2017-11-15 DIAGNOSIS — C20 RECTAL CANCER: Primary | ICD-10-CM

## 2017-11-15 RX ORDER — DIPHENOXYLATE HYDROCHLORIDE AND ATROPINE SULFATE 2.5; .025 MG/1; MG/1
1 TABLET ORAL 4 TIMES DAILY PRN
Qty: 100 TABLET | Refills: 1 | Status: SHIPPED | OUTPATIENT
Start: 2017-11-15 | End: 2017-11-25

## 2017-11-19 ENCOUNTER — PATIENT MESSAGE (OUTPATIENT)
Dept: HEMATOLOGY/ONCOLOGY | Facility: CLINIC | Age: 67
End: 2017-11-19

## 2017-11-20 DIAGNOSIS — R18.8 OTHER ASCITES: Primary | ICD-10-CM

## 2017-11-21 ENCOUNTER — HOSPITAL ENCOUNTER (OUTPATIENT)
Dept: INTERVENTIONAL RADIOLOGY/VASCULAR | Facility: HOSPITAL | Age: 67
Discharge: HOME | End: 2017-11-21
Attending: INTERNAL MEDICINE
Payer: COMMERCIAL

## 2017-11-21 VITALS
OXYGEN SATURATION: 97 % | HEART RATE: 77 BPM | RESPIRATION RATE: 20 BRPM | SYSTOLIC BLOOD PRESSURE: 128 MMHG | DIASTOLIC BLOOD PRESSURE: 67 MMHG

## 2017-11-21 DIAGNOSIS — R18.8 OTHER ASCITES: ICD-10-CM

## 2017-11-21 PROCEDURE — 63600175 PHARM REV CODE 636 W HCPCS: Performed by: INTERNAL MEDICINE

## 2017-11-21 PROCEDURE — P9047 ALBUMIN (HUMAN), 25%, 50ML: HCPCS | Performed by: INTERNAL MEDICINE

## 2017-11-21 PROCEDURE — C1729 CATH, DRAINAGE: HCPCS

## 2017-11-21 PROCEDURE — 49083 ABD PARACENTESIS W/IMAGING: CPT | Mod: ,,, | Performed by: RADIOLOGY

## 2017-11-21 RX ORDER — ALBUMIN HUMAN 250 G/1000ML
25 SOLUTION INTRAVENOUS
Status: DISCONTINUED | OUTPATIENT
Start: 2017-11-21 | End: 2017-11-22 | Stop reason: HOSPADM

## 2017-11-21 RX ADMIN — ALBUMIN (HUMAN) 25 G: 25 SOLUTION INTRAVENOUS at 01:11

## 2017-11-21 NOTE — PROGRESS NOTES
Pt to rm 124. Dr Spencer to bedside. eh Catheter placed to LLQ with use of ultrasound guidance. No complaints or signs of distress. Will continue to monitor.

## 2017-11-21 NOTE — H&P
Radiology History & Physical      SUBJECTIVE:     Chief Complaint: Ascites    History of Present Illness:  Karthik Medrano is a 67 y.o. male who presents for therapeutic paracentesis.  Past Medical History:   Diagnosis Date    Cancer     rectal cancer    Diabetes mellitus     GERD (gastroesophageal reflux disease)     Hypertension      Past Surgical History:   Procedure Laterality Date    COLON SURGERY      COLONOSCOPY N/A 10/3/2017    Procedure: COLONOSCOPY;  Surgeon: Sandoval Bowling MD;  Location: [address];  Service: Endoscopy;  Laterality: N/A;       Home Meds:   Prior to Admission medications    Medication Sig Start Date End Date Taking? Authorizing Provider   amLODIPine (NORVASC) 5 MG tablet TAKE 1 TABLET(5 MG) BY MOUTH TWICE DAILY 10/25/17   Lisa Abernathy MD   ascorbic acid (VITAMIN C) 1000 MG tablet Take 1,000 mg by mouth once daily.    Historical Provider, MD   atorvastatin (LIPITOR) 10 MG tablet Take 1 tablet (10 mg total) by mouth once daily. 4/21/16 4/21/17  Pinky Thornton MD   cholecalciferol, vitamin D3, (VITAMIN D3) 2,000 unit Cap Take 1 capsule by mouth once daily.    Historical Provider, MD   cholestyramine, bulk, Powd 4 g by Misc.(Non-Drug; Combo Route) route once daily at 6am. 7/3/17   Pinky Thornton MD   co-enzyme Q-10 30 mg capsule Take 30 mg by mouth 3 (three) times daily.    Historical Provider, MD   diphenoxylate-atropine 2.5-0.025 mg (LOMOTIL) 2.5-0.025 mg per tablet Take 1 tablet by mouth 4 (four) times daily as needed for Diarrhea. 11/25/16   Lisa Abernathy MD   diphenoxylate-atropine 2.5-0.025 mg (LOMOTIL) 2.5-0.025 mg per tablet Take 1 tablet by mouth 4 (four) times daily as needed for Diarrhea. 11/15/17 11/25/17  Pinky Thornton MD   ferrous gluconate (FERGON) 324 MG tablet Take 324 mg by mouth daily with breakfast.    Historical Provider, MD   furosemide (LASIX) 20 MG tablet Take 2 tablets (40 mg total) by mouth daily as needed. 9/19/17    Lisa Abernathy MD   gabapentin (NEURONTIN) 300 MG capsule TAKE ONE CAPSULE BY MOUTH EVERY EVENING 4/5/16   Lisa Abernathy MD   GARLIC EXTRACT ORAL Take by mouth.    Historical Provider, MD   glipiZIDE (GLUCOTROL) 5 MG tablet TAKE 1 TABLET BY MOUTH THREE TIMES DAILY 10/25/17   Lisa Abernathy MD   lisinopril (PRINIVIL,ZESTRIL) 40 MG tablet Take 1 tablet (40 mg total) by mouth 2 (two) times daily. 10/25/17   Lisa Abernathy MD   metoprolol succinate (TOPROL-XL) 100 MG 24 hr tablet Take 1 tablet (100 mg total) by mouth 2 (two) times daily. 10/25/17   Lisa Abernathy MD   multivitamin with iron-mineral TbSR Take 1 tablet by mouth once daily. 8/31/14   Pinky Thornton MD   NYSTATIN (DUKE'S SOLUTION) Take 10 mLs by mouth 4 (four) times daily. Mix equal amounts of benadryl, maalox, 2% viscous lidocaine and nystatin    Swish and swallow 10 ml 4 times a day 3/28/17   Nevaeh Dc NP   ondansetron (ZOFRAN-ODT) 8 MG TbDL Take 1 tablet (8 mg total) by mouth every 8 (eight) hours as needed (Nausea). 11/3/17   David Solis MD   opium tincture 10 mg/mL (morphine) Tinc Take 0.6 mLs (6 mg total) by mouth 4 (four) times daily. 6/13/17   Lisa Abernathy MD   oxycodone-acetaminophen (PERCOCET) 5-325 mg per tablet TK 1 T PO Q 6 H PRN P 8/31/16   Historical Provider, MD   paregoric 2 mg/5 mL Liqd Take 5 mLs by mouth 3 (three) times daily. 11/25/16   Lisa Abernathy MD   tamsulosin (FLOMAX) 0.4 mg Cp24 Take 1 capsule (0.4 mg total) by mouth once daily. 9/19/17   Lisa Abernathy MD     Anticoagulants/Antiplatelets: no anticoagulation    Allergies: Review of patient's allergies indicates:  No Known Allergies  Sedation History:  no adverse reactions    Review of Systems:   Hematological: no known coagulopathies  Respiratory: no shortness of breath  Cardiovascular: no chest pain  Gastrointestinal: no abdominal pain  Genito-Urinary: no dysuria  Musculoskeletal: negative  Neurological: no TIA or stroke symptoms         OBJECTIVE:      Vital Signs (Most Recent)       Physical Exam:  ASA: 3  Mallampati: 2    General: no acute distress  Mental Status: alert and oriented to person, place and time  HEENT: normocephalic, atraumatic  Chest: unlabored breathing  Abdomen: distended  Extremity: moves all extremities    Laboratory  Lab Results   Component Value Date    INR 1.2 11/21/2017       Lab Results   Component Value Date    WBC 9.51 10/30/2017    HGB 11.1 (L) 10/30/2017    HCT 35.8 (L) 10/30/2017    MCV 88 10/30/2017     (L) 10/30/2017      Lab Results   Component Value Date     (H) 10/31/2017     (L) 10/31/2017    K 3.9 10/31/2017    CL 99 10/31/2017    CO2 23 10/31/2017    BUN 30 (H) 10/31/2017    CREATININE 1.5 (H) 10/31/2017    CALCIUM 8.6 (L) 10/31/2017    MG 2.0 03/06/2016    ALT 10 10/31/2017    AST 18 10/31/2017    ALBUMIN 2.3 (L) 10/31/2017    BILITOT 0.6 10/31/2017    BILIDIR 0.1 01/13/2009       ASSESSMENT/PLAN:     Sedation Plan: Local  Patient will undergo therapeutic paracentesis.    Casey Spencer M.D.  PGY-2 Radiology  Pager# [phone]

## 2017-11-21 NOTE — PROGRESS NOTES
Paracentesis complete. Pt tolerated well. Total 4,200 ml peritoneal fluid removed. 100 ml Albumin 25% IV administered.   Pt verbalized instructions on care for puncture site to Q.  AVS reviewd and provided.

## 2017-11-21 NOTE — DISCHARGE INSTRUCTIONS
Interventional Radiology [phone]  Mon-Fri  8A-4P  24hr Radiology Resident on call [phone]

## 2017-11-21 NOTE — PROCEDURES
Radiology Post-Procedure Note    Pre Op Diagnosis: Ascites  Post Op Diagnosis: Same    Procedure: Paracentesis    Procedure performed by: Casey Spencer MD and Houston Rodney MD     Following informed consent, the patient underwent sterile preparation and 1% lidocaine for local anesthesia. Ultrasound guidance was used to insert a 5-Bulgarian Yueh catheter into the peritoneal space. 4.2L of yellow-colored fluid was successfully removed with no complications.    Written Informed Consent Obtained: Yes  Specimen Removed: NO  Estimated Blood Loss: Minimal    Findings:   Successful paracentesis.  Albumin administered PRN per protocol.    Patient tolerated procedure well.    Casey Spencer M.D.  PGY-2 Radiology  Pager# [phone]

## 2017-11-28 ENCOUNTER — LAB VISIT (OUTPATIENT)
Dept: LAB | Facility: HOSPITAL | Age: 67
End: 2017-11-28
Attending: INTERNAL MEDICINE
Payer: COMMERCIAL

## 2017-11-28 ENCOUNTER — TELEPHONE (OUTPATIENT)
Dept: HEMATOLOGY/ONCOLOGY | Facility: CLINIC | Age: 67
End: 2017-11-28

## 2017-11-28 ENCOUNTER — OFFICE VISIT (OUTPATIENT)
Dept: HEMATOLOGY/ONCOLOGY | Facility: CLINIC | Age: 67
End: 2017-11-28
Payer: COMMERCIAL

## 2017-11-28 VITALS
OXYGEN SATURATION: 98 % | WEIGHT: 193.56 LBS | TEMPERATURE: 98 F | RESPIRATION RATE: 19 BRPM | HEART RATE: 94 BPM | BODY MASS INDEX: 27.77 KG/M2 | SYSTOLIC BLOOD PRESSURE: 137 MMHG | DIASTOLIC BLOOD PRESSURE: 61 MMHG

## 2017-11-28 DIAGNOSIS — C20 RECTAL CANCER: Primary | ICD-10-CM

## 2017-11-28 DIAGNOSIS — E87.6 HYPOKALEMIA: ICD-10-CM

## 2017-11-28 DIAGNOSIS — D69.59 CHEMOTHERAPY-INDUCED THROMBOCYTOPENIA: ICD-10-CM

## 2017-11-28 DIAGNOSIS — C20 RECTAL CANCER: ICD-10-CM

## 2017-11-28 DIAGNOSIS — D70.1 CHEMOTHERAPY INDUCED NEUTROPENIA: ICD-10-CM

## 2017-11-28 DIAGNOSIS — I10 ESSENTIAL HYPERTENSION: ICD-10-CM

## 2017-11-28 DIAGNOSIS — D63.0 ANEMIA IN NEOPLASTIC DISEASE: ICD-10-CM

## 2017-11-28 DIAGNOSIS — T45.1X5A CHEMOTHERAPY-INDUCED THROMBOCYTOPENIA: ICD-10-CM

## 2017-11-28 DIAGNOSIS — T45.1X5A CHEMOTHERAPY INDUCED NEUTROPENIA: ICD-10-CM

## 2017-11-28 DIAGNOSIS — I50.33 ACUTE ON CHRONIC DIASTOLIC CONGESTIVE HEART FAILURE: ICD-10-CM

## 2017-11-28 DIAGNOSIS — C78.01 SECONDARY ADENOCARCINOMA OF RIGHT LUNG: ICD-10-CM

## 2017-11-28 LAB
ABO + RH BLD: NORMAL
ALBUMIN SERPL BCP-MCNC: 2 G/DL
ALP SERPL-CCNC: 553 U/L
ALT SERPL W/O P-5'-P-CCNC: 12 U/L
ANION GAP SERPL CALC-SCNC: 10 MMOL/L
AST SERPL-CCNC: 17 U/L
BILIRUB SERPL-MCNC: 0.4 MG/DL
BLD GP AB SCN CELLS X3 SERPL QL: NORMAL
BUN SERPL-MCNC: 47 MG/DL
CALCIUM SERPL-MCNC: 8.5 MG/DL
CHLORIDE SERPL-SCNC: 103 MMOL/L
CO2 SERPL-SCNC: 23 MMOL/L
CREAT SERPL-MCNC: 1.3 MG/DL
ERYTHROCYTE [DISTWIDTH] IN BLOOD BY AUTOMATED COUNT: 18.6 %
EST. GFR  (AFRICAN AMERICAN): >60 ML/MIN/1.73 M^2
EST. GFR  (NON AFRICAN AMERICAN): 55.7 ML/MIN/1.73 M^2
GLUCOSE SERPL-MCNC: 200 MG/DL
HCT VFR BLD AUTO: 26.7 %
HGB BLD-MCNC: 8.3 G/DL
IMM GRANULOCYTES # BLD AUTO: 0.11 K/UL
MCH RBC QN AUTO: 25.9 PG
MCHC RBC AUTO-ENTMCNC: 31.1 G/DL
MCV RBC AUTO: 83 FL
NEUTROPHILS # BLD AUTO: 7.4 K/UL
PLATELET # BLD AUTO: 216 K/UL
PMV BLD AUTO: 9.6 FL
POTASSIUM SERPL-SCNC: 3.1 MMOL/L
PROT SERPL-MCNC: 6.5 G/DL
RBC # BLD AUTO: 3.2 M/UL
SODIUM SERPL-SCNC: 136 MMOL/L
WBC # BLD AUTO: 9.65 K/UL

## 2017-11-28 PROCEDURE — 99999 PR PBB SHADOW E&M-EST. PATIENT-LVL V: CPT | Mod: PBBFAC,,, | Performed by: INTERNAL MEDICINE

## 2017-11-28 PROCEDURE — 36415 COLL VENOUS BLD VENIPUNCTURE: CPT

## 2017-11-28 PROCEDURE — 80053 COMPREHEN METABOLIC PANEL: CPT

## 2017-11-28 PROCEDURE — 86900 BLOOD TYPING SEROLOGIC ABO: CPT

## 2017-11-28 PROCEDURE — 85027 COMPLETE CBC AUTOMATED: CPT

## 2017-11-28 PROCEDURE — 86901 BLOOD TYPING SEROLOGIC RH(D): CPT

## 2017-11-28 PROCEDURE — 99215 OFFICE O/P EST HI 40 MIN: CPT | Mod: S$GLB,,, | Performed by: INTERNAL MEDICINE

## 2017-11-28 RX ORDER — DIPHENHYDRAMINE HCL 25 MG
25 CAPSULE ORAL
Status: CANCELLED | OUTPATIENT
Start: 2017-11-28

## 2017-11-28 RX ORDER — HYDROCODONE BITARTRATE AND ACETAMINOPHEN 500; 5 MG/1; MG/1
TABLET ORAL ONCE
Status: CANCELLED | OUTPATIENT
Start: 2017-11-28 | End: 2017-11-28

## 2017-11-28 RX ORDER — POTASSIUM CHLORIDE 20 MEQ/1
20 TABLET, EXTENDED RELEASE ORAL DAILY
Qty: 30 TABLET | Refills: 11 | Status: SHIPPED | OUTPATIENT
Start: 2017-11-28 | End: 2017-12-07

## 2017-11-28 RX ORDER — FUROSEMIDE 10 MG/ML
40 INJECTION INTRAMUSCULAR; INTRAVENOUS ONCE
Status: CANCELLED | OUTPATIENT
Start: 2017-11-29

## 2017-11-28 RX ORDER — ACETAMINOPHEN 325 MG/1
650 TABLET ORAL
Status: CANCELLED | OUTPATIENT
Start: 2017-11-28

## 2017-11-28 NOTE — PROGRESS NOTES
"PATIENT: Karthik Medrano  MRN: [id number]  DATE: 11/28/2017    Subjective:     Chief complaint:  Chief Complaint   Patient presents with    Follow-up       Oncologic History:  67 year old male with history of rectal cancer diagnosed 1/11/10 on colonoscopy (moderately differentiated adenocarcinoma) and based on imaging, was T3NxM0. Patient underwent Neoadjuvant chemort with infusional 5FU. This was completed 3/19/10 with a total of 50.4Gy given to the pelvis. He went to surgery 5/13/10. T3N0M0. Post operative he received no adjuvant treatment and has been followed regularly. He went to his PCP 6/24/13 who ordered a CXR which found pulmonary nodules. On 6/27/13, CT chest confirmed these findings. CEA drawn when initially diagnosed was 2.6ng/ml and 6/27/13 was 30.8 ng/ml. . Pulmonary biopsy was performed 7/18/13 and this confirmed metastatic adenocarcinoma. 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 Avastin was added for second cycle.    Patient completed 12 cycles of FOLFOX avastin. He did not receive Avastin for cycle 3, 4, or 5 (due to uncontrolled blood pressure).    He was on maintenance infusional 5FU and Avastin. Repeat scans 4/14/14 showed numerous bilateral pulmonary soft tissue nodules, the majority of which demonstrate interval enlargement when compared to the prior exam. CEA up 18.2 from 13.    He was restarted on FOLFIRI and AVastin and received 6 months of chemo and then was switched to maintenance chemotherapy with 5FU and Avastin. His CT scans from early May 2015 revealed progression in lung lesions.  He is now on FOLFIRI and Cyramza.  CT from 4/19/16 reveals "Multiple stable metastatic pulmonary nodules in this patient with history of rectal cancer.  Stable postoperative changes are present from prior partial colectomy.Interval increase in right-sided hydronephrosis despite appropriate positioning of a double-J ureteral sent.  There is associated enlargement and decreased " "perfusion to the right kidney consistent with obstruction. These findings are concerning for a nonfunctioning ureteral stent. Cholelithiasis."  CT from 6/13/16 reveal stable disease and hydronephrosis has resolved.  CT from 8/5/16 reveals "Stable pulmonary metastatic disease.Increased prominence of periureteral fat stranding/inflammatory change. Stable mild right hydronephrosis."  CT from 1/6/17 "1. Stable appearance of pulmonary metastatic disease in this patient with history of colon cancer. Pulmonary index lesions are stable in size compared to prior examination. No evidence of new metastatic disease. 2. Right-sided double-J ureteral stent with stable appearing inflammatory change about the right renal collecting system and right ureter. 3. Additional stable incidental findings as discussed above. "     CT scans reveal "In this patient with a history of rectal cancer, index lesions in the right and left lung are stable in size as detailed above. No new pulmonary nodules or masses are identified. There is no evidence of abnormal soft tissue opacity in the rectosigmoid region to suggest residual/recurrent disease. Interval increase in the degree of right hydronephrosis with a stable double-J right ureteral stent in place. Interval development of perihepatic ascites. Stable fat and bowel containing ventral hernia"     CT scans 7/21/17 reveal "Stable appearance of multiple pulmonary lesions, compatible with metastatic disease, in this patient with given history of rectal cancer.  Index lesions below. Surgical changes from low anterior resection of rectal mass, without evidence of local recurrence. Stable right-sided moderate hydroureteronephrosis, with periureteral stranding ; appropriately positioned double-J ureteral stent.  Findings are concerning for UTI. There is thinning of the right renal parenchyma, concerning for chronic obstruction or inflammation. Stable appearing soft tissue collection with dystrophic " "calcifications in the presacral space, which may be postsurgical or post radiation in etiology.  This finding may cause obstruction of the right ureter."     CT scans 9/2/17 reveal "1. Status post low anterior resection for colon cancer. 2. Pulmonary metastatic disease without significant interval change. See summary below. 3. Continued moderate volume of ascites, possibly malignant ascites. 4. Chronic obstruction of right distal right ureter despite presence of ureter stent. Distal right ureter may be involved by soft tissue finding in presacral area, favored to represent postsurgical or post radiation change. 5. Cholelithiasis. 6. Ventral hernias containing ascites and small bowel without obstruction."      Interval History: Mr. Medrano returns for follow up and for FOLFIRI. His treatment has been delayed per his request. Since last visit, he underwent paracentesis on 11/21/17 in which 4200 cc of clear yellow fluid was removed. He feels weak today. He gets SOB. He is unable to walk without the use of a walker. He cannot stand independently for very long. He is needing assistance with ADL's for the past week. He has no appetite but is forcing himself to eat. He is having mild incontinence. He is in the chair or bed more than 50% of the day. He has occasional nausea but no vomiting. He has discomfort in his rectal area. He is not needing pain medication at this time.  His bowels move less frequently as well.  No blood in stool noted.     ECOG Performance Status:   ECOG SCORE    3 - Capable of only limited selfcare, confined to bed or chair more than 50% of waking hours         PMFSH: all information reviewed and updated as relevant to today's visit    Review of Systems:   Review of Systems   Constitutional: Positive for activity change, appetite change and fatigue. Negative for fever.   HENT: Negative for mouth sores, nosebleeds and sore throat.    Eyes: Negative for visual disturbance.   Respiratory: Positive for " shortness of breath. Negative for cough.    Cardiovascular: Negative for chest pain, palpitations and leg swelling.   Gastrointestinal: Positive for nausea. Negative for abdominal pain, constipation, diarrhea and vomiting.   Genitourinary: Negative for difficulty urinating and frequency.   Musculoskeletal: Negative for arthralgias and back pain.   Skin: Negative for rash.   Neurological: Negative for dizziness, numbness and headaches.   Hematological: Negative for adenopathy. Does not bruise/bleed easily.   Psychiatric/Behavioral: Negative for confusion and sleep disturbance. The patient is not nervous/anxious.    All other systems reviewed and are negative.        Objective:      Vitals:   Vitals:    11/28/17 1457   BP: 137/61   BP Location: Right arm   Patient Position: Sitting   Pulse: 94   Resp: 19   Temp: 97.5 °F (36.4 °C)   TempSrc: Oral   SpO2: 98%   Weight: 87.8 kg (193 lb 9 oz)     BMI: Body mass index is 27.77 kg/m².      Physical Exam:   Physical Exam   Constitutional: He is oriented to person, place, and time. No distress.   Wheelchair- appears weak   HENT:   Right Ear: External ear normal.   Left Ear: External ear normal.   Mouth/Throat: No oropharyngeal exudate.   Eyes: Conjunctivae and lids are normal. Pupils are equal, round, and reactive to light. No scleral icterus.   Neck: Trachea normal and normal range of motion. Neck supple. No thyromegaly present.   Cardiovascular: Normal rate, regular rhythm, normal heart sounds and normal pulses.    Pulmonary/Chest: Effort normal.   Slightly decreased posteriorly; unlabored.    Abdominal: Soft. Normal appearance and bowel sounds are normal. He exhibits distension (minimally distended but soft). He exhibits no mass. There is no hepatosplenomegaly or splenomegaly. There is no tenderness.   Musculoskeletal: Normal range of motion.   Lymphadenopathy:        Head (right side): No submental and no submandibular adenopathy present.        Head (left side): No  submental and no submandibular adenopathy present.     He has no cervical adenopathy.     He has no axillary adenopathy.        Right: No supraclavicular adenopathy present.        Left: No supraclavicular adenopathy present.   Neurological: He is alert and oriented to person, place, and time. No sensory deficit.   Skin: Skin is warm, dry and intact. No bruising and no rash noted. Nails show no clubbing.   Psychiatric: He has a normal mood and affect. His speech is normal and behavior is normal. Cognition and memory are normal.   Vitals reviewed.        Laboratory Data:  WBC   Date Value Ref Range Status   11/28/2017 9.65 3.90 - 12.70 K/uL Final     Hemoglobin   Date Value Ref Range Status   11/28/2017 8.3 (L) 14.0 - 18.0 g/dL Final     Hematocrit   Date Value Ref Range Status   11/28/2017 26.7 (L) 40.0 - 54.0 % Final     Platelets   Date Value Ref Range Status   11/28/2017 216 150 - 350 K/uL Final     Gran #   Date Value Ref Range Status   11/28/2017 7.4 1.8 - 7.7 K/uL Final     Comment:     The ANC is based on a white cell differential from an   automated cell counter. It has not been microscopically   reviewed for the presence of abnormal cells. Clinical   correlation is required.         Chemistry        Component Value Date/Time     11/28/2017 1449    K 3.1 (L) 11/28/2017 1449     11/28/2017 1449    CO2 23 11/28/2017 1449    BUN 47 (H) 11/28/2017 1449    CREATININE 1.3 11/28/2017 1449     (H) 11/28/2017 1449        Component Value Date/Time    CALCIUM 8.5 (L) 11/28/2017 1449    ALKPHOS 553 (H) 11/28/2017 1449    AST 17 11/28/2017 1449    ALT 12 11/28/2017 1449    BILITOT 0.4 11/28/2017 1449    ESTGFRAFRICA >60.0 11/28/2017 1449    EGFRNONAA 55.7 (A) 11/28/2017 1449              Assessment/Plan:     1. Rectal cancer    2. Secondary adenocarcinoma of right lung    3. Chemotherapy induced neutropenia    4. Chemotherapy-induced thrombocytopenia    5. Anemia in neoplastic disease    6. Essential  hypertension    7. Acute on chronic diastolic congestive heart failure    8. Hypokalemia        Plan:  1,2,3,4,5. Hold  FOLFIRI due to poor performance status. Type and Match and transfuse 2 u prbc's this week. Will transfuse slowly and administer IV Lasix between units. Orders entered. Plan for paracentesis as needed.   Patient understands that his performance status is too low for chemotherapy. Discussed possible oral chemotherapy with [facility]. He understands that this is not a guarantee to help with his quality/extension of life and indeed may make it worse/shorter. Plan to obtain  CT scans and then decide plan of care-- Hospice vs oral chemotherapy.   CT chest/abd/pelvis this week. RTC in 1 week to see Dr. Abernathy with CBC, CMP and to discuss scan results.   6,7. BP controlled. No evidence of decompensation  8. Start potassium chloride 20meq daily - rx sent to pharmacy.     Med and Orders:  Orders Placed This Encounter    CT Chest With Contrast    CT Abdomen Pelvis With Contrast    Type & Screen - Lab Collect    potassium chloride SA (K-DUR,KLOR-CON) 20 MEQ tablet    Prepare RBC 2 Units; symptomatic           More than 30 mins were spent during this encounter, greater than 50% was spent in direct counseling and/or coordination of care.   Patient was also seen and examined by Dr. Abernathy who agrees with above plan. Patient is in agreement with the proposed treatment plan. All questions were answered to the patient's satisfaction. Pt knows to call clinic if anything is needed before the next clinic visit.      Nevaeh Dc, FNP-C  Hematology and Medical Oncology    Distress Screening Results: Psychosocial Distress screening score of Distress Score: 6 noted and reviewed. No intervention indicated.     STAFF NOTE:  I have reviewed the notes, assessments, and/or procedures performed by the nurse practitioner, as above.  I have personally interviewed the patient at the beside, and rounded with the nurse  practitioner. I formulated the plan of care.  I concur with her documentation of Karthik Medrano.

## 2017-11-28 NOTE — TELEPHONE ENCOUNTER
spoke with pt wife on today in regards to schedule ct-scans on Friday and 1wk f/u visit on Thursday, wife has confirm.

## 2017-11-29 ENCOUNTER — INFUSION (OUTPATIENT)
Dept: INFUSION THERAPY | Facility: HOSPITAL | Age: 67
End: 2017-11-29
Attending: INTERNAL MEDICINE
Payer: COMMERCIAL

## 2017-11-29 VITALS
DIASTOLIC BLOOD PRESSURE: 72 MMHG | SYSTOLIC BLOOD PRESSURE: 149 MMHG | RESPIRATION RATE: 18 BRPM | TEMPERATURE: 98 F | HEART RATE: 104 BPM

## 2017-11-29 DIAGNOSIS — C20 RECTAL CANCER: ICD-10-CM

## 2017-11-29 DIAGNOSIS — D63.0 ANEMIA IN NEOPLASTIC DISEASE: Primary | ICD-10-CM

## 2017-11-29 PROCEDURE — 86920 COMPATIBILITY TEST SPIN: CPT

## 2017-11-29 PROCEDURE — 63600175 PHARM REV CODE 636 W HCPCS: Performed by: NURSE PRACTITIONER

## 2017-11-29 PROCEDURE — 36430 TRANSFUSION BLD/BLD COMPNT: CPT

## 2017-11-29 PROCEDURE — 96375 TX/PRO/DX INJ NEW DRUG ADDON: CPT

## 2017-11-29 PROCEDURE — P9040 RBC LEUKOREDUCED IRRADIATED: HCPCS

## 2017-11-29 PROCEDURE — 25000003 PHARM REV CODE 250: Performed by: INTERNAL MEDICINE

## 2017-11-29 PROCEDURE — 96374 THER/PROPH/DIAG INJ IV PUSH: CPT

## 2017-11-29 PROCEDURE — 63600175 PHARM REV CODE 636 W HCPCS: Performed by: INTERNAL MEDICINE

## 2017-11-29 PROCEDURE — 25000003 PHARM REV CODE 250: Performed by: NURSE PRACTITIONER

## 2017-11-29 PROCEDURE — A4216 STERILE WATER/SALINE, 10 ML: HCPCS | Performed by: INTERNAL MEDICINE

## 2017-11-29 RX ORDER — FUROSEMIDE 10 MG/ML
40 INJECTION INTRAMUSCULAR; INTRAVENOUS ONCE
Status: COMPLETED | OUTPATIENT
Start: 2017-11-29 | End: 2017-11-29

## 2017-11-29 RX ORDER — SODIUM CHLORIDE 0.9 % (FLUSH) 0.9 %
10 SYRINGE (ML) INJECTION
Status: COMPLETED | OUTPATIENT
Start: 2017-11-29 | End: 2017-11-29

## 2017-11-29 RX ORDER — HEPARIN 100 UNIT/ML
500 SYRINGE INTRAVENOUS
Status: CANCELLED | OUTPATIENT
Start: 2017-11-29

## 2017-11-29 RX ORDER — HEPARIN 100 UNIT/ML
500 SYRINGE INTRAVENOUS
Status: COMPLETED | OUTPATIENT
Start: 2017-11-29 | End: 2017-11-29

## 2017-11-29 RX ORDER — DIPHENHYDRAMINE HCL 25 MG
25 CAPSULE ORAL
Status: COMPLETED | OUTPATIENT
Start: 2017-11-29 | End: 2017-11-29

## 2017-11-29 RX ORDER — SODIUM CHLORIDE 0.9 % (FLUSH) 0.9 %
10 SYRINGE (ML) INJECTION
Status: CANCELLED | OUTPATIENT
Start: 2017-11-29

## 2017-11-29 RX ORDER — HYDROCODONE BITARTRATE AND ACETAMINOPHEN 500; 5 MG/1; MG/1
TABLET ORAL ONCE
Status: COMPLETED | OUTPATIENT
Start: 2017-11-29 | End: 2017-11-29

## 2017-11-29 RX ORDER — ACETAMINOPHEN 325 MG/1
650 TABLET ORAL
Status: COMPLETED | OUTPATIENT
Start: 2017-11-29 | End: 2017-11-29

## 2017-11-29 RX ADMIN — DIPHENHYDRAMINE HYDROCHLORIDE 25 MG: 25 CAPSULE ORAL at 11:11

## 2017-11-29 RX ADMIN — ACETAMINOPHEN 650 MG: 325 TABLET ORAL at 11:11

## 2017-11-29 RX ADMIN — HEPARIN 500 UNITS: 100 SYRINGE at 04:11

## 2017-11-29 RX ADMIN — SODIUM CHLORIDE: 900 INJECTION, SOLUTION INTRAVENOUS at 11:11

## 2017-11-29 RX ADMIN — SODIUM CHLORIDE, PRESERVATIVE FREE 10 ML: 5 INJECTION INTRAVENOUS at 04:11

## 2017-11-29 RX ADMIN — FUROSEMIDE 40 MG: 10 INJECTION, SOLUTION INTRAMUSCULAR; INTRAVENOUS at 02:11

## 2017-11-29 NOTE — PLAN OF CARE
Problem: Fall Risk (Adult)  Goal: Identify Related Risk Factors and Signs and Symptoms  Related risk factors and signs and symptoms are identified upon initiation of Human Response Clinical Practice Guideline (CPG)  Outcome: Ongoing (interventions implemented as appropriate)  Pt here for 2 units PRBC, reports feeling tired/weak, reports several recent falls, most recent one week prior at home, slipped on floor during night, no injury sustained

## 2017-12-01 ENCOUNTER — HOSPITAL ENCOUNTER (OUTPATIENT)
Dept: RADIOLOGY | Facility: HOSPITAL | Age: 67
Discharge: HOME | End: 2017-12-01
Attending: NURSE PRACTITIONER
Payer: COMMERCIAL

## 2017-12-01 DIAGNOSIS — C20 RECTAL CANCER: ICD-10-CM

## 2017-12-01 PROCEDURE — 74177 CT ABD & PELVIS W/CONTRAST: CPT | Mod: TC

## 2017-12-01 PROCEDURE — 25500020 PHARM REV CODE 255: Performed by: NURSE PRACTITIONER

## 2017-12-01 PROCEDURE — 71260 CT THORAX DX C+: CPT | Mod: TC

## 2017-12-01 PROCEDURE — 71260 CT THORAX DX C+: CPT | Mod: 26,,, | Performed by: RADIOLOGY

## 2017-12-01 PROCEDURE — 74177 CT ABD & PELVIS W/CONTRAST: CPT | Mod: 26,,, | Performed by: RADIOLOGY

## 2017-12-01 RX ADMIN — IOHEXOL 15 ML: 350 INJECTION, SOLUTION INTRAVENOUS at 03:12

## 2017-12-01 RX ADMIN — IOHEXOL 15 ML: 350 INJECTION, SOLUTION INTRAVENOUS at 02:12

## 2017-12-01 RX ADMIN — IOHEXOL 100 ML: 350 INJECTION, SOLUTION INTRAVENOUS at 06:12

## 2017-12-02 NOTE — PROGRESS NOTES
Pt arrived to CT. Pt's port accessed using sterile technique for scan.  Blood return noted and flushed.  Once CT scan complete port heparinized per protocol and de-accessed.  NAD noted.

## 2017-12-03 ENCOUNTER — PATIENT MESSAGE (OUTPATIENT)
Dept: HEMATOLOGY/ONCOLOGY | Facility: CLINIC | Age: 67
End: 2017-12-03

## 2017-12-04 ENCOUNTER — TELEPHONE (OUTPATIENT)
Dept: HEMATOLOGY/ONCOLOGY | Facility: CLINIC | Age: 67
End: 2017-12-04

## 2017-12-04 NOTE — TELEPHONE ENCOUNTER
spoke with Mr Medrano this morning @ 9:24 am in regards to schedule paracentesis schedule on 12/5/17 @ 11:00, pt has confirm.

## 2017-12-05 ENCOUNTER — HOSPITAL ENCOUNTER (OUTPATIENT)
Dept: INTERVENTIONAL RADIOLOGY/VASCULAR | Facility: HOSPITAL | Age: 67
Discharge: HOME | End: 2017-12-05
Attending: INTERNAL MEDICINE
Payer: COMMERCIAL

## 2017-12-05 VITALS
DIASTOLIC BLOOD PRESSURE: 76 MMHG | OXYGEN SATURATION: 96 % | SYSTOLIC BLOOD PRESSURE: 123 MMHG | HEART RATE: 97 BPM | RESPIRATION RATE: 16 BRPM

## 2017-12-05 DIAGNOSIS — R18.8 OTHER ASCITES: ICD-10-CM

## 2017-12-05 PROCEDURE — 49083 ABD PARACENTESIS W/IMAGING: CPT | Mod: ,,, | Performed by: RADIOLOGY

## 2017-12-05 PROCEDURE — C1729 CATH, DRAINAGE: HCPCS

## 2017-12-05 NOTE — DISCHARGE INSTRUCTIONS
"For scheduling: Call Lizzette at [phone]    For questions or concerns call: NANDA MON-FRI 8 AM- 5PM: [phone].   **After hours and weekends: Call [phone] and ask for "Radiology Resident on call".    For immediate concerns that are not emergent, you may call our radiology clinic at: [phone]    "

## 2017-12-05 NOTE — H&P
Radiology History & Physical      SUBJECTIVE:     Chief Complaint: Ascites    History of Present Illness:  Karthik Medrano is a 67 y.o. male who presents for diagnostic/therapeutic paracentesis.  Past Medical History:   Diagnosis Date    Cancer     rectal cancer    Diabetes mellitus     GERD (gastroesophageal reflux disease)     Hypertension      Past Surgical History:   Procedure Laterality Date    COLON SURGERY      COLONOSCOPY N/A 10/3/2017    Procedure: COLONOSCOPY;  Surgeon: Sandoval Bowling MD;  Location: [address];  Service: Endoscopy;  Laterality: N/A;       Home Meds:   Prior to Admission medications    Medication Sig Start Date End Date Taking? Authorizing Provider   amLODIPine (NORVASC) 5 MG tablet TAKE 1 TABLET(5 MG) BY MOUTH TWICE DAILY 10/25/17   Lisa Abernathy MD   ascorbic acid (VITAMIN C) 1000 MG tablet Take 1,000 mg by mouth once daily.    Historical Provider, MD   atorvastatin (LIPITOR) 10 MG tablet Take 1 tablet (10 mg total) by mouth once daily. 4/21/16 4/21/17  Pinky Thornton MD   cholecalciferol, vitamin D3, (VITAMIN D3) 2,000 unit Cap Take 1 capsule by mouth once daily.    Historical Provider, MD   cholestyramine, bulk, Powd 4 g by Misc.(Non-Drug; Combo Route) route once daily at 6am. 7/3/17   Pinky Thornton MD   co-enzyme Q-10 30 mg capsule Take 30 mg by mouth 3 (three) times daily.    Historical Provider, MD   diphenoxylate-atropine 2.5-0.025 mg (LOMOTIL) 2.5-0.025 mg per tablet Take 1 tablet by mouth 4 (four) times daily as needed for Diarrhea. 11/25/16   Lisa Abernathy MD   ferrous gluconate (FERGON) 324 MG tablet Take 324 mg by mouth daily with breakfast.    Historical Provider, MD   furosemide (LASIX) 20 MG tablet Take 2 tablets (40 mg total) by mouth daily as needed. 9/19/17   Lisa Abernathy MD   gabapentin (NEURONTIN) 300 MG capsule TAKE ONE CAPSULE BY MOUTH EVERY EVENING 4/5/16   Lisa Abernathy MD   GARLIC EXTRACT ORAL Take by mouth.    Historical  MD Julia   glipiZIDE (GLUCOTROL) 5 MG tablet TAKE 1 TABLET BY MOUTH THREE TIMES DAILY 10/25/17   Lisa Abernathy MD   lisinopril (PRINIVIL,ZESTRIL) 40 MG tablet Take 1 tablet (40 mg total) by mouth 2 (two) times daily. 10/25/17   Lisa Abernathy MD   metoprolol succinate (TOPROL-XL) 100 MG 24 hr tablet Take 1 tablet (100 mg total) by mouth 2 (two) times daily. 10/25/17   Lisa Abernathy MD   multivitamin with iron-mineral TbSR Take 1 tablet by mouth once daily. 8/31/14   Pinky Thornton MD   NYSTATIN (DUKE'S SOLUTION) Take 10 mLs by mouth 4 (four) times daily. Mix equal amounts of benadryl, maalox, 2% viscous lidocaine and nystatin    Swish and swallow 10 ml 4 times a day 3/28/17   Nevaeh Dc NP   ondansetron (ZOFRAN-ODT) 8 MG TbDL Take 1 tablet (8 mg total) by mouth every 8 (eight) hours as needed (Nausea). 11/3/17   David Solis MD   opium tincture 10 mg/mL (morphine) Tinc Take 0.6 mLs (6 mg total) by mouth 4 (four) times daily. 6/13/17   Lisa Abernathy MD   oxycodone-acetaminophen (PERCOCET) 5-325 mg per tablet TK 1 T PO Q 6 H PRN P 8/31/16   Historical Provider, MD   paregoric 2 mg/5 mL Liqd Take 5 mLs by mouth 3 (three) times daily. 11/25/16   Lisa Abernathy MD   potassium chloride SA (K-DUR,KLOR-CON) 20 MEQ tablet Take 1 tablet (20 mEq total) by mouth once daily. 11/28/17 11/28/18  Nevaeh Dc NP   tamsulosin (FLOMAX) 0.4 mg Cp24 Take 1 capsule (0.4 mg total) by mouth once daily. 9/19/17   Lisa Abernathy MD     Anticoagulants/Antiplatelets: no anticoagulation    Allergies: Review of patient's allergies indicates:  No Known Allergies  Sedation History:  no adverse reactions    Review of Systems:   Hematological: no known coagulopathies  Respiratory: no shortness of breath  Cardiovascular: no chest pain  Gastrointestinal: no abdominal pain  Genito-Urinary: no dysuria  Musculoskeletal: negative  Neurological: no TIA or stroke symptoms         OBJECTIVE:     Vital Signs (Most  Recent)  Pulse: 93 (12/05/17 1335)  Resp: 16 (12/05/17 1335)  BP: 129/72 (12/05/17 1335)  SpO2: 96 % (12/05/17 1335)    Physical Exam:  ASA: 3  Mallampati: 2    General: no acute distress  Mental Status: alert and oriented to person, place and time  HEENT: normocephalic, atraumatic  Chest: unlabored breathing  Heart: regular heart rate  Abdomen: distended  Extremity: moves all extremities    Laboratory  Lab Results   Component Value Date    INR 1.2 11/21/2017       Lab Results   Component Value Date    WBC 9.65 11/28/2017    HGB 8.3 (L) 11/28/2017    HCT 26.7 (L) 11/28/2017    MCV 83 11/28/2017     11/28/2017      Lab Results   Component Value Date     (H) 11/28/2017     11/28/2017    K 3.1 (L) 11/28/2017     11/28/2017    CO2 23 11/28/2017    BUN 47 (H) 11/28/2017    CREATININE 1.3 11/28/2017    CALCIUM 8.5 (L) 11/28/2017    MG 2.0 03/06/2016    ALT 12 11/28/2017    AST 17 11/28/2017    ALBUMIN 2.0 (L) 11/28/2017    BILITOT 0.4 11/28/2017    BILIDIR 0.1 01/13/2009       ASSESSMENT/PLAN:     Sedation Plan: Local  Patient will undergo diagnostic/therapeutic paracentesis.    Casey Spencer M.D.  PGY-2 Radiology  Pager# [phone]

## 2017-12-05 NOTE — PROCEDURES
Radiology Post-Procedure Note    Pre Op Diagnosis: Ascites  Post Op Diagnosis: Same    Procedure: Paracentesis    Procedure performed by: Casey Spencer MD and Houston Rodney MD     Following informed consent, the patient underwent sterile preparation and 1% lidocaine for local anesthesia. Ultrasound guidance was used to insert a 5-Greek Yueh catheter into the peritoneal space. 3.2L of clear, yellow-colored fluid was successfully removed with no complications.    Written Informed Consent Obtained: Yes  Specimen Removed: YES; 20 cc's of ascites obtained for laboratory analysis  Estimated Blood Loss: Minimal    Findings:   Successful paracentesis.  Albumin administered PRN per protocol.    Patient tolerated procedure well.    Casey Spencer M.D.  PGY-2 Radiology  Pager# [phone]

## 2017-12-05 NOTE — PROGRESS NOTES
Paracentesis complete. 3.2 mLs peritoneal fluid drained. Pt tolerated well. Dressing toLLQclean, dry, and intact. Discharge instructions and handouts provided. Pt verbalized understanding. Pt transported to [facility] via wheelchair.

## 2017-12-07 ENCOUNTER — OFFICE VISIT (OUTPATIENT)
Dept: HEMATOLOGY/ONCOLOGY | Facility: CLINIC | Age: 67
End: 2017-12-07
Payer: COMMERCIAL

## 2017-12-07 ENCOUNTER — LAB VISIT (OUTPATIENT)
Dept: LAB | Facility: HOSPITAL | Age: 67
End: 2017-12-07
Attending: INTERNAL MEDICINE
Payer: COMMERCIAL

## 2017-12-07 VITALS
OXYGEN SATURATION: 99 % | HEART RATE: 97 BPM | TEMPERATURE: 97 F | BODY MASS INDEX: 27.81 KG/M2 | SYSTOLIC BLOOD PRESSURE: 109 MMHG | HEIGHT: 70 IN | WEIGHT: 194.25 LBS | RESPIRATION RATE: 20 BRPM | DIASTOLIC BLOOD PRESSURE: 55 MMHG

## 2017-12-07 DIAGNOSIS — K12.30 MUCOSITIS: ICD-10-CM

## 2017-12-07 DIAGNOSIS — C20 RECTAL CANCER: Primary | ICD-10-CM

## 2017-12-07 DIAGNOSIS — C18.9 MALIGNANT NEOPLASM OF COLON, UNSPECIFIED PART OF COLON: Primary | ICD-10-CM

## 2017-12-07 DIAGNOSIS — C78.01 SECONDARY ADENOCARCINOMA OF RIGHT LUNG: ICD-10-CM

## 2017-12-07 DIAGNOSIS — C20 RECTAL CANCER: ICD-10-CM

## 2017-12-07 LAB
ALBUMIN SERPL BCP-MCNC: 2 G/DL
ALP SERPL-CCNC: 527 U/L
ALT SERPL W/O P-5'-P-CCNC: 9 U/L
ANION GAP SERPL CALC-SCNC: 8 MMOL/L
AST SERPL-CCNC: 11 U/L
BASOPHILS # BLD AUTO: 0.05 K/UL
BASOPHILS NFR BLD: 0.8 %
BILIRUB SERPL-MCNC: 0.3 MG/DL
BUN SERPL-MCNC: 28 MG/DL
CALCIUM SERPL-MCNC: 8.9 MG/DL
CHLORIDE SERPL-SCNC: 105 MMOL/L
CO2 SERPL-SCNC: 24 MMOL/L
CREAT SERPL-MCNC: 1.1 MG/DL
DIFFERENTIAL METHOD: ABNORMAL
EOSINOPHIL # BLD AUTO: 0.1 K/UL
EOSINOPHIL NFR BLD: 1.7 %
ERYTHROCYTE [DISTWIDTH] IN BLOOD BY AUTOMATED COUNT: 17.9 %
EST. GFR  (AFRICAN AMERICAN): >60 ML/MIN/1.73 M^2
EST. GFR  (NON AFRICAN AMERICAN): >60 ML/MIN/1.73 M^2
GLUCOSE SERPL-MCNC: 178 MG/DL
HCT VFR BLD AUTO: 29.1 %
HGB BLD-MCNC: 8.9 G/DL
IMM GRANULOCYTES # BLD AUTO: 0.03 K/UL
IMM GRANULOCYTES NFR BLD AUTO: 0.5 %
LYMPHOCYTES # BLD AUTO: 1.1 K/UL
LYMPHOCYTES NFR BLD: 17.4 %
MCH RBC QN AUTO: 25.5 PG
MCHC RBC AUTO-ENTMCNC: 30.6 G/DL
MCV RBC AUTO: 83 FL
MONOCYTES # BLD AUTO: 0.5 K/UL
MONOCYTES NFR BLD: 7.5 %
NEUTROPHILS # BLD AUTO: 4.6 K/UL
NEUTROPHILS NFR BLD: 72.1 %
NRBC BLD-RTO: 0 /100 WBC
PLATELET # BLD AUTO: 178 K/UL
PMV BLD AUTO: 9.3 FL
POTASSIUM SERPL-SCNC: 3.9 MMOL/L
PROT SERPL-MCNC: 6.4 G/DL
RBC # BLD AUTO: 3.49 M/UL
SODIUM SERPL-SCNC: 137 MMOL/L
WBC # BLD AUTO: 6.43 K/UL

## 2017-12-07 PROCEDURE — 85025 COMPLETE CBC W/AUTO DIFF WBC: CPT

## 2017-12-07 PROCEDURE — 99214 OFFICE O/P EST MOD 30 MIN: CPT | Mod: S$GLB,,, | Performed by: INTERNAL MEDICINE

## 2017-12-07 PROCEDURE — 99999 PR PBB SHADOW E&M-EST. PATIENT-LVL IV: CPT | Mod: PBBFAC,,, | Performed by: INTERNAL MEDICINE

## 2017-12-07 PROCEDURE — 36415 COLL VENOUS BLD VENIPUNCTURE: CPT

## 2017-12-07 PROCEDURE — 80053 COMPREHEN METABOLIC PANEL: CPT

## 2017-12-07 RX ORDER — ONDANSETRON 8 MG/1
8 TABLET, ORALLY DISINTEGRATING ORAL EVERY 8 HOURS PRN
Qty: 60 TABLET | Refills: 6 | Status: SHIPPED | OUTPATIENT
Start: 2017-12-07

## 2017-12-07 RX ORDER — PROMETHAZINE HYDROCHLORIDE 25 MG/1
25 TABLET ORAL EVERY 6 HOURS PRN
Qty: 60 TABLET | Refills: 7 | Status: SHIPPED | OUTPATIENT
Start: 2017-12-07 | End: 2017-12-14

## 2017-12-07 NOTE — PROGRESS NOTES
"Subjective:       Patient ID: Karthik Medrano is a 67 y.o. male.    Chief Complaint: Rectal Cancer; 6-7 stools per day; para 12/5 3.2L removed, no SOB since para; and Leg Swelling  Oncologic History:  67 year old male with history of rectal cancer diagnosed 1/11/10 on colonoscopy (moderately differentiated adenocarcinoma) and based on imaging, was T3NxM0. Patient underwent Neoadjuvant chemort with infusional 5FU. This was completed 3/19/10 with a total of 50.4Gy given to the pelvis. He went to surgery 5/13/10. T3N0M0. Post operative he received no adjuvant treatment and has been followed regularly. He went to his PCP 6/24/13 who ordered a CXR which found pulmonary nodules. On 6/27/13, CT chest confirmed these findings. CEA drawn when initially diagnosed was 2.6ng/ml and 6/27/13 was 30.8 ng/ml. . Pulmonary biopsy was performed 7/18/13 and this confirmed metastatic adenocarcinoma. 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 Avastin was added for second cycle.    Patient completed 12 cycles of FOLFOX avastin. He did not receive Avastin for cycle 3, 4, or 5 (due to uncontrolled blood pressure).    He was on maintenance infusional 5FU and Avastin. Repeat scans 4/14/14 showed numerous bilateral pulmonary soft tissue nodules, the majority of which demonstrate interval enlargement when compared to the prior exam. CEA up 18.2 from 13.    He was restarted on FOLFIRI and AVastin and received 6 months of chemo and then was switched to maintenance chemotherapy with 5FU and Avastin. His CT scans from early May 2015 revealed progression in lung lesions.  He is now on FOLFIRI and Cyramza.  CT from 4/19/16 reveals "Multiple stable metastatic pulmonary nodules in this patient with history of rectal cancer.  Stable postoperative changes are present from prior partial colectomy.Interval increase in right-sided hydronephrosis despite appropriate positioning of a double-J ureteral sent.  There is associated " "enlargement and decreased perfusion to the right kidney consistent with obstruction. These findings are concerning for a nonfunctioning ureteral stent. Cholelithiasis."  CT from 6/13/16 reveal stable disease and hydronephrosis has resolved.  CT from 8/5/16 reveals "Stable pulmonary metastatic disease.Increased prominence of periureteral fat stranding/inflammatory change. Stable mild right hydronephrosis."  CT from 1/6/17 "1. Stable appearance of pulmonary metastatic disease in this patient with history of colon cancer. Pulmonary index lesions are stable in size compared to prior examination. No evidence of new metastatic disease. 2. Right-sided double-J ureteral stent with stable appearing inflammatory change about the right renal collecting system and right ureter. 3. Additional stable incidental findings as discussed above. "     CT scans reveal "In this patient with a history of rectal cancer, index lesions in the right and left lung are stable in size as detailed above. No new pulmonary nodules or masses are identified. There is no evidence of abnormal soft tissue opacity in the rectosigmoid region to suggest residual/recurrent disease. Interval increase in the degree of right hydronephrosis with a stable double-J right ureteral stent in place. Interval development of perihepatic ascites. Stable fat and bowel containing ventral hernia"     CT scans 7/21/17 reveal "Stable appearance of multiple pulmonary lesions, compatible with metastatic disease, in this patient with given history of rectal cancer.  Index lesions below. Surgical changes from low anterior resection of rectal mass, without evidence of local recurrence. Stable right-sided moderate hydroureteronephrosis, with periureteral stranding ; appropriately positioned double-J ureteral stent.  Findings are concerning for UTI. There is thinning of the right renal parenchyma, concerning for chronic obstruction or inflammation. Stable appearing soft tissue " "collection with dystrophic calcifications in the presacral space, which may be postsurgical or post radiation in etiology.  This finding may cause obstruction of the right ureter."     CT scans 9/2/17 reveal "1. Status post low anterior resection for colon cancer. 2. Pulmonary metastatic disease without significant interval change. See summary below. 3. Continued moderate volume of ascites, possibly malignant ascites. 4. Chronic obstruction of right distal right ureter despite presence of ureter stent. Distal right ureter may be involved by soft tissue finding in presacral area, favored to represent postsurgical or post radiation change. 5. Cholelithiasis. 6. Ventral hernias containing ascites and small bowel without obstruction."       HPI Mr. Medrano returns for follow up and for FOLFIRI and Cyramza..  He underwent therapeutic paracentesis on 12/5/17 and 3200 cc of clear yellow fluid was removed  CT scans from 12/1/17 revealed "Redemonstration of multiple pulmonary nodules in this patient with history of colon cancer noting interval increase in size in comparison to prior.Slight interval improvement in the previously noted right-sided hydronephrosis with a double-J stent in place. Postsurgical changes of low anterior resection for rectal mass without evidence of local recurrence. Continued moderate volume ascites within the abdomen. Mild cardiomegaly and coronary artery atherosclerosis. Hepatosplenomegaly. Cholelithiasis without evidence of cholecystitis.Two stable ventral hernias.RECIST SUMMARY: Date of prior examination for comparison CT chest abdomen and pelvis 9/2/2017 Lesion 1: Left lung 4.3  cm Series 2 Image 37 Prior measurement 3.9 cm. Lesion 2: Right lung 2.9  cm Series 2 Image 31 Prior measurement 2.7 cm"      Review of Systems   Constitutional: Positive for appetite change, fatigue and unexpected weight change.   HENT: Negative for mouth sores.    Eyes: Negative for visual disturbance.   Respiratory: " Positive for shortness of breath. Negative for cough.    Cardiovascular: Negative for chest pain.   Gastrointestinal: Positive for abdominal distention and abdominal pain. Negative for diarrhea.   Genitourinary: Negative for frequency.   Musculoskeletal: Negative for back pain.   Skin: Negative for rash.   Neurological: Negative for headaches.   Hematological: Negative for adenopathy.   Psychiatric/Behavioral: The patient is not nervous/anxious.    All other systems reviewed and are negative.      Objective:      Physical Exam   Constitutional: He is oriented to person, place, and time. He appears cachectic. He appears toxic. He has a sickly appearance.   HENT:   Mouth/Throat: No oropharyngeal exudate.   Cardiovascular: Normal rate and normal heart sounds.    Pulmonary/Chest: Effort normal and breath sounds normal. He has no wheezes.   Abdominal: Soft. Bowel sounds are normal. He exhibits distension. There is no tenderness.   Musculoskeletal: He exhibits no edema or tenderness.   Lymphadenopathy:     He has no cervical adenopathy.   Neurological: He is alert and oriented to person, place, and time. Coordination normal.   Skin: Skin is warm and dry. No rash noted.   Psychiatric: He has a normal mood and affect. Judgment and thought content normal.   Vitals reviewed.        LABS:  WBC   Date Value Ref Range Status   12/07/2017 6.43 3.90 - 12.70 K/uL Final     Hemoglobin   Date Value Ref Range Status   12/07/2017 8.9 (L) 14.0 - 18.0 g/dL Final     Hematocrit   Date Value Ref Range Status   12/07/2017 29.1 (L) 40.0 - 54.0 % Final     Platelets   Date Value Ref Range Status   12/07/2017 178 150 - 350 K/uL Final     Gran #   Date Value Ref Range Status   12/07/2017 4.6 1.8 - 7.7 K/uL Final     Gran%   Date Value Ref Range Status   12/07/2017 72.1 38.0 - 73.0 % Final       Chemistry        Component Value Date/Time     12/07/2017 1518    K 3.9 12/07/2017 1518     12/07/2017 1518    CO2 24 12/07/2017 1518    BUN  28 (H) 12/07/2017 1518    CREATININE 1.1 12/07/2017 1518     (H) 12/07/2017 1518        Component Value Date/Time    CALCIUM 8.9 12/07/2017 1518    ALKPHOS 527 (H) 12/07/2017 1518    AST 11 12/07/2017 1518    ALT 9 (L) 12/07/2017 1518    BILITOT 0.3 12/07/2017 1518    ESTGFRAFRICA >60.0 12/07/2017 1518    EGFRNONAA >60.0 12/07/2017 1518          Assessment:       1. Malignant neoplasm of colon, unspecified part of colon    2. Secondary adenocarcinoma of right lung    3. Mucositis        Plan:         1,2,3. Hold chemo. Reviewed CT scans which reveal increase in size of lung lesions. His performance status is borderline but he is still interested in trying some chemo. However he wants to wait and try during the holidays. Will plan on treatment after [place].  Will dose reduce to 80%.    Above care plan was discussed with patient and accompanying wife and son  and all questions were addressed to their satisfaction

## 2017-12-07 NOTE — Clinical Note
Schedule CBC,CMP and see me the week of 12/25/17 and for FOLFIRI and Cyramza. DC pump on day 3. Neulasta on day 3

## 2017-12-14 ENCOUNTER — PATIENT MESSAGE (OUTPATIENT)
Dept: HEMATOLOGY/ONCOLOGY | Facility: CLINIC | Age: 67
End: 2017-12-14

## 2017-12-14 DIAGNOSIS — K21.9 GASTROESOPHAGEAL REFLUX DISEASE, ESOPHAGITIS PRESENCE NOT SPECIFIED: ICD-10-CM

## 2017-12-14 DIAGNOSIS — K21.9 GASTROESOPHAGEAL REFLUX DISEASE, ESOPHAGITIS PRESENCE NOT SPECIFIED: Primary | ICD-10-CM

## 2017-12-14 RX ORDER — ESOMEPRAZOLE MAGNESIUM 40 MG/1
CAPSULE, DELAYED RELEASE ORAL
Qty: 90 CAPSULE | Refills: 1 | Status: SHIPPED | OUTPATIENT
Start: 2017-12-14

## 2017-12-14 RX ORDER — ESOMEPRAZOLE MAGNESIUM 40 MG/1
40 CAPSULE, DELAYED RELEASE ORAL DAILY
Qty: 30 CAPSULE | Refills: 1 | Status: SHIPPED | OUTPATIENT
Start: 2017-12-14 | End: 2017-12-14 | Stop reason: SDUPTHER

## 2017-12-14 NOTE — TELEPHONE ENCOUNTER
Spoke with patient.  He states he is experiencing GERD, for which he had to take 3 times this week so far--40mg once daily.  Patient is requesting new prescription for nexium.    He notes mild nausea as well---although no emesis at all, so he is not taking antiemetics.     Patient denies SOB and denies feeling the need for paracentesis yet. He will stay in touch with nurse about this.      Nurse asked patient about rapid HR. He denies tachycardia.   States he has been taking toprol 100mg bid since 2013 more often than not. Though not today.  Patient's BP has been ranging around 130/70.    Nurse informed patient she would contact him back after speaking with nila.  Patient voiced understanding.       Message routed to pj (essentially he just needs refill of nexium)

## 2017-12-19 ENCOUNTER — TELEPHONE (OUTPATIENT)
Dept: HEMATOLOGY/ONCOLOGY | Facility: CLINIC | Age: 67
End: 2017-12-19

## 2017-12-19 ENCOUNTER — PATIENT MESSAGE (OUTPATIENT)
Dept: HEMATOLOGY/ONCOLOGY | Facility: CLINIC | Age: 67
End: 2017-12-19

## 2017-12-20 ENCOUNTER — TELEPHONE (OUTPATIENT)
Dept: INTERVENTIONAL RADIOLOGY/VASCULAR | Facility: HOSPITAL | Age: 67
End: 2017-12-20

## 2017-12-20 NOTE — TELEPHONE ENCOUNTER
Phone call (message )   Arrival time-    1p      Allergies reviewed  Directions given   Labs (current  )

## 2017-12-21 ENCOUNTER — HOSPITAL ENCOUNTER (OUTPATIENT)
Dept: INTERVENTIONAL RADIOLOGY/VASCULAR | Facility: HOSPITAL | Age: 67
Discharge: HOME | End: 2017-12-21
Attending: INTERNAL MEDICINE
Payer: COMMERCIAL

## 2017-12-21 VITALS
HEART RATE: 94 BPM | OXYGEN SATURATION: 98 % | SYSTOLIC BLOOD PRESSURE: 122 MMHG | RESPIRATION RATE: 18 BRPM | DIASTOLIC BLOOD PRESSURE: 58 MMHG

## 2017-12-21 DIAGNOSIS — R18.8 OTHER ASCITES: ICD-10-CM

## 2017-12-21 PROCEDURE — 49083 ABD PARACENTESIS W/IMAGING: CPT | Mod: ,,, | Performed by: RADIOLOGY

## 2017-12-21 PROCEDURE — C1729 CATH, DRAINAGE: HCPCS

## 2017-12-21 PROCEDURE — A7048 VACUUM DRAIN BOTTLE/TUBE KIT: HCPCS

## 2017-12-21 NOTE — PROCEDURES
Radiology Post-Procedure Note    Pre Op Diagnosis: Ascites  Post Op Diagnosis: Same    Procedure: Paracentesis    Procedure performed by: Alexys Davenport MD and Bhanu Griffin MD    Written Informed Consent Obtained: Yes  Specimen Removed: YES ascites  Estimated Blood Loss: Minimal    Findings:   Successful paracentesis.  Albumin administered PRN per protocol.    Patient tolerated procedure well.    Bhanu Griffin MD  PGY-5  Department of Radiology  [phone]

## 2017-12-21 NOTE — PROGRESS NOTES
Paracentesis complete. 3200 mLs peritoneal fluid drained. Pt tolerated well. Dressing to LLQ clean, dry, and intact. Discharge instructions reviewed and handouts provided, pt refused handpouts.

## 2017-12-21 NOTE — H&P
Radiology History & Physical      SUBJECTIVE:     Chief Complaint: Rectal cancer and ascites    History of Present Illness:    Karthik Medrano is a 67 y.o. male who presents for paracentesis.    Past Medical History:   Diagnosis Date    Cancer     rectal cancer    Diabetes mellitus     GERD (gastroesophageal reflux disease)     Hypertension      Past Surgical History:   Procedure Laterality Date    COLON SURGERY      COLONOSCOPY N/A 10/3/2017    Procedure: COLONOSCOPY;  Surgeon: Sandoval Bowling MD;  Location: [address];  Service: Endoscopy;  Laterality: N/A;       Home Meds:   Prior to Admission medications    Medication Sig Start Date End Date Taking? Authorizing Provider   amLODIPine (NORVASC) 5 MG tablet TAKE 1 TABLET(5 MG) BY MOUTH TWICE DAILY 10/25/17   Lisa Abernathy MD   ascorbic acid (VITAMIN C) 1000 MG tablet Take 1,000 mg by mouth once daily.    Historical Provider, MD   cholecalciferol, vitamin D3, (VITAMIN D3) 2,000 unit Cap Take 1 capsule by mouth once daily.    Historical Provider, MD   cholestyramine, bulk, Powd 4 g by Misc.(Non-Drug; Combo Route) route once daily at 6am. 7/3/17   Pinky Thornton MD   co-enzyme Q-10 30 mg capsule Take 30 mg by mouth 3 (three) times daily.    Historical Provider, MD   diphenoxylate-atropine 2.5-0.025 mg (LOMOTIL) 2.5-0.025 mg per tablet Take 1 tablet by mouth 4 (four) times daily as needed for Diarrhea. 11/25/16   Lisa Abernathy MD   esomeprazole (NEXIUM) 40 MG capsule TAKE 1 CAPSULE(40 MG) BY MOUTH EVERY DAY 12/14/17   Nevaeh Dc NP   furosemide (LASIX) 20 MG tablet Take 2 tablets (40 mg total) by mouth daily as needed. 9/19/17   Lisa Abernathy MD   glipiZIDE (GLUCOTROL) 5 MG tablet TAKE 1 TABLET BY MOUTH THREE TIMES DAILY 10/25/17   Lisa Abernathy MD   lisinopril (PRINIVIL,ZESTRIL) 40 MG tablet Take 1 tablet (40 mg total) by mouth 2 (two) times daily. 10/25/17   Lisa Abernathy MD   metoprolol succinate (TOPROL-XL) 100 MG 24 hr tablet  Take 1 tablet (100 mg total) by mouth 2 (two) times daily. 10/25/17   Lisa Abernathy MD   multivitamin with iron-mineral TbSR Take 1 tablet by mouth once daily. 8/31/14   Pinky Thornton MD   NYSTATIN (DUKE'S SOLUTION) Take 10 mLs by mouth 4 (four) times daily. Mix equal amounts of benadryl, maalox, 2% viscous lidocaine and nystatin    Swish and swallow 10 ml 4 times a day 3/28/17   Nevaeh Dc NP   NYSTATIN (DUKE'S SOLUTION) Mix equal amounts of benadryl, maalox, 2% viscous lidocaine and nystatin    Swish and swallow 10 ml 4 times a day 12/7/17   Lisa Abernathy MD   ondansetron (ZOFRAN-ODT) 8 MG TbDL Take 1 tablet (8 mg total) by mouth every 8 (eight) hours as needed (Nausea). 12/7/17   Lisa Abernathy MD   opium tincture 10 mg/mL (morphine) Tinc Take 0.6 mLs (6 mg total) by mouth 4 (four) times daily. 6/13/17   Lisa Abernathy MD   oxycodone-acetaminophen (PERCOCET) 5-325 mg per tablet TK 1 T PO Q 6 H PRN P 8/31/16   Pedro Dumont MD   paregoric 2 mg/5 mL Liqd Take 5 mLs by mouth 3 (three) times daily. 11/25/16   Lisa Abernathy MD   tamsulosin (FLOMAX) 0.4 mg Cp24 Take 1 capsule (0.4 mg total) by mouth once daily. 9/19/17   Lisa Abernathy MD     Anticoagulants/Antiplatelets: no anticoagulation    Allergies: Review of patient's allergies indicates:  No Known Allergies  Sedation History:  no adverse reactions    Review of Systems:   Hematological: no known coagulopathies  Respiratory: no shortness of breath  Cardiovascular: no chest pain  Gastrointestinal: no abdominal pain  Genito-Urinary: no dysuria  Musculoskeletal: negative  Neurological: no TIA or stroke symptoms         OBJECTIVE:     Vital Signs (Most Recent)       Physical Exam:      General: no acute distress  Mental Status: alert and oriented to person, place and time  HEENT: normocephalic, atraumatic  Chest: unlabored breathing  Heart: regular heart rate  Abdomen: distended  Extremity: moves all extremities    Laboratory  Lab Results    Component Value Date    INR 1.2 11/21/2017       Lab Results   Component Value Date    WBC 6.43 12/07/2017    HGB 8.9 (L) 12/07/2017    HCT 29.1 (L) 12/07/2017    MCV 83 12/07/2017     12/07/2017      Lab Results   Component Value Date     (H) 12/07/2017     12/07/2017    K 3.9 12/07/2017     12/07/2017    CO2 24 12/07/2017    BUN 28 (H) 12/07/2017    CREATININE 1.1 12/07/2017    CALCIUM 8.9 12/07/2017    MG 2.0 03/06/2016    ALT 9 (L) 12/07/2017    AST 11 12/07/2017    ALBUMIN 2.0 (L) 12/07/2017    BILITOT 0.3 12/07/2017    BILIDIR 0.1 01/13/2009       ASSESSMENT/PLAN:     Sedation Plan: local  Patient will undergo paracentesis.    Bhanu Griffin MD  PGY-5  Department of Radiology  [phone]

## 2017-12-26 ENCOUNTER — LAB VISIT (OUTPATIENT)
Dept: LAB | Facility: HOSPITAL | Age: 67
End: 2017-12-26
Attending: INTERNAL MEDICINE
Payer: COMMERCIAL

## 2017-12-26 DIAGNOSIS — C20 RECTAL CANCER: ICD-10-CM

## 2017-12-26 LAB
ALBUMIN SERPL BCP-MCNC: 2.1 G/DL
ALP SERPL-CCNC: 221 U/L
ALT SERPL W/O P-5'-P-CCNC: 7 U/L
ANION GAP SERPL CALC-SCNC: 8 MMOL/L
AST SERPL-CCNC: 9 U/L
BILIRUB SERPL-MCNC: 0.3 MG/DL
BUN SERPL-MCNC: 26 MG/DL
CALCIUM SERPL-MCNC: 8.7 MG/DL
CHLORIDE SERPL-SCNC: 112 MMOL/L
CO2 SERPL-SCNC: 21 MMOL/L
CREAT SERPL-MCNC: 1.7 MG/DL
ERYTHROCYTE [DISTWIDTH] IN BLOOD BY AUTOMATED COUNT: 19.1 %
EST. GFR  (AFRICAN AMERICAN): 46.5 ML/MIN/1.73 M^2
EST. GFR  (NON AFRICAN AMERICAN): 40.3 ML/MIN/1.73 M^2
GLUCOSE SERPL-MCNC: 231 MG/DL
HCT VFR BLD AUTO: 23.5 %
HGB BLD-MCNC: 7.1 G/DL
IMM GRANULOCYTES # BLD AUTO: 0.04 K/UL
MCH RBC QN AUTO: 25.3 PG
MCHC RBC AUTO-ENTMCNC: 30.2 G/DL
MCV RBC AUTO: 84 FL
NEUTROPHILS # BLD AUTO: 4.2 K/UL
PLATELET # BLD AUTO: 148 K/UL
PMV BLD AUTO: 8.8 FL
POTASSIUM SERPL-SCNC: 4.2 MMOL/L
PROT SERPL-MCNC: 6.6 G/DL
RBC # BLD AUTO: 2.81 M/UL
SODIUM SERPL-SCNC: 141 MMOL/L
WBC # BLD AUTO: 5.74 K/UL

## 2017-12-26 PROCEDURE — 80053 COMPREHEN METABOLIC PANEL: CPT

## 2017-12-26 PROCEDURE — 36415 COLL VENOUS BLD VENIPUNCTURE: CPT

## 2017-12-26 PROCEDURE — 85027 COMPLETE CBC AUTOMATED: CPT

## 2017-12-27 ENCOUNTER — LAB VISIT (OUTPATIENT)
Dept: LAB | Facility: HOSPITAL | Age: 67
End: 2017-12-27
Attending: INTERNAL MEDICINE
Payer: COMMERCIAL

## 2017-12-27 ENCOUNTER — INFUSION (OUTPATIENT)
Dept: INFUSION THERAPY | Facility: HOSPITAL | Age: 67
End: 2017-12-27
Attending: INTERNAL MEDICINE
Payer: COMMERCIAL

## 2017-12-27 ENCOUNTER — OFFICE VISIT (OUTPATIENT)
Dept: HEMATOLOGY/ONCOLOGY | Facility: CLINIC | Age: 67
End: 2017-12-27
Payer: COMMERCIAL

## 2017-12-27 VITALS
RESPIRATION RATE: 23 BRPM | WEIGHT: 198.63 LBS | HEIGHT: 70 IN | TEMPERATURE: 98 F | BODY MASS INDEX: 28.44 KG/M2 | HEART RATE: 109 BPM | SYSTOLIC BLOOD PRESSURE: 124 MMHG | OXYGEN SATURATION: 99 % | DIASTOLIC BLOOD PRESSURE: 60 MMHG

## 2017-12-27 VITALS
DIASTOLIC BLOOD PRESSURE: 63 MMHG | SYSTOLIC BLOOD PRESSURE: 131 MMHG | TEMPERATURE: 98 F | RESPIRATION RATE: 18 BRPM | HEART RATE: 90 BPM

## 2017-12-27 DIAGNOSIS — D70.1 CHEMOTHERAPY INDUCED NEUTROPENIA: ICD-10-CM

## 2017-12-27 DIAGNOSIS — T45.1X5A CHEMOTHERAPY INDUCED NEUTROPENIA: ICD-10-CM

## 2017-12-27 DIAGNOSIS — D64.81 ANTINEOPLASTIC CHEMOTHERAPY INDUCED ANEMIA: ICD-10-CM

## 2017-12-27 DIAGNOSIS — Z51.11 ENCOUNTER FOR ANTINEOPLASTIC CHEMOTHERAPY AND IMMUNOTHERAPY: ICD-10-CM

## 2017-12-27 DIAGNOSIS — T45.1X5A ANTINEOPLASTIC CHEMOTHERAPY INDUCED ANEMIA: ICD-10-CM

## 2017-12-27 DIAGNOSIS — N17.9 ACUTE RENAL FAILURE, UNSPECIFIED ACUTE RENAL FAILURE TYPE: ICD-10-CM

## 2017-12-27 DIAGNOSIS — D64.81 ANTINEOPLASTIC CHEMOTHERAPY INDUCED ANEMIA: Primary | ICD-10-CM

## 2017-12-27 DIAGNOSIS — T45.1X5A ANTINEOPLASTIC CHEMOTHERAPY INDUCED ANEMIA: Primary | ICD-10-CM

## 2017-12-27 DIAGNOSIS — Z51.12 ENCOUNTER FOR ANTINEOPLASTIC CHEMOTHERAPY AND IMMUNOTHERAPY: ICD-10-CM

## 2017-12-27 DIAGNOSIS — C20 RECTAL CANCER: Primary | ICD-10-CM

## 2017-12-27 DIAGNOSIS — C78.01 SECONDARY ADENOCARCINOMA OF RIGHT LUNG: ICD-10-CM

## 2017-12-27 LAB
ABO + RH BLD: NORMAL
BLD GP AB SCN CELLS X3 SERPL QL: NORMAL
BLD PROD TYP BPU: NORMAL
BLD PROD TYP BPU: NORMAL
BLOOD UNIT EXPIRATION DATE: NORMAL
BLOOD UNIT EXPIRATION DATE: NORMAL
BLOOD UNIT TYPE CODE: 5100
BLOOD UNIT TYPE CODE: 5100
BLOOD UNIT TYPE: NORMAL
BLOOD UNIT TYPE: NORMAL
CODING SYSTEM: NORMAL
CODING SYSTEM: NORMAL
DISPENSE STATUS: NORMAL
DISPENSE STATUS: NORMAL
NUM UNITS TRANS PACKED RBC: NORMAL
NUM UNITS TRANS PACKED RBC: NORMAL

## 2017-12-27 PROCEDURE — 27201040 HC RC 50 FILTER

## 2017-12-27 PROCEDURE — 96417 CHEMO IV INFUS EACH ADDL SEQ: CPT

## 2017-12-27 PROCEDURE — 99215 OFFICE O/P EST HI 40 MIN: CPT | Mod: S$GLB,,, | Performed by: INTERNAL MEDICINE

## 2017-12-27 PROCEDURE — 25000003 PHARM REV CODE 250: Performed by: INTERNAL MEDICINE

## 2017-12-27 PROCEDURE — P9038 RBC IRRADIATED: HCPCS

## 2017-12-27 PROCEDURE — 86920 COMPATIBILITY TEST SPIN: CPT

## 2017-12-27 PROCEDURE — 96413 CHEMO IV INFUSION 1 HR: CPT

## 2017-12-27 PROCEDURE — 86850 RBC ANTIBODY SCREEN: CPT

## 2017-12-27 PROCEDURE — 96415 CHEMO IV INFUSION ADDL HR: CPT

## 2017-12-27 PROCEDURE — 96367 TX/PROPH/DG ADDL SEQ IV INF: CPT

## 2017-12-27 PROCEDURE — 36430 TRANSFUSION BLD/BLD COMPNT: CPT

## 2017-12-27 PROCEDURE — 96416 CHEMO PROLONG INFUSE W/PUMP: CPT

## 2017-12-27 PROCEDURE — 36415 COLL VENOUS BLD VENIPUNCTURE: CPT

## 2017-12-27 PROCEDURE — 63600175 PHARM REV CODE 636 W HCPCS: Performed by: INTERNAL MEDICINE

## 2017-12-27 PROCEDURE — 96375 TX/PRO/DX INJ NEW DRUG ADDON: CPT

## 2017-12-27 PROCEDURE — 99999 PR PBB SHADOW E&M-EST. PATIENT-LVL IV: CPT | Mod: PBBFAC,,, | Performed by: INTERNAL MEDICINE

## 2017-12-27 RX ORDER — HEPARIN 100 UNIT/ML
500 SYRINGE INTRAVENOUS
Status: CANCELLED | OUTPATIENT
Start: 2018-01-03

## 2017-12-27 RX ORDER — ATROPINE SULFATE 0.4 MG/ML
0.4 INJECTION, SOLUTION ENDOTRACHEAL; INTRAMEDULLARY; INTRAMUSCULAR; INTRAVENOUS; SUBCUTANEOUS DAILY PRN
Status: DISCONTINUED | OUTPATIENT
Start: 2017-12-27 | End: 2017-12-27 | Stop reason: HOSPADM

## 2017-12-27 RX ORDER — ACETAMINOPHEN 325 MG/1
650 TABLET ORAL
Status: CANCELLED | OUTPATIENT
Start: 2017-12-27

## 2017-12-27 RX ORDER — SODIUM CHLORIDE 0.9 % (FLUSH) 0.9 %
10 SYRINGE (ML) INJECTION
Status: CANCELLED | OUTPATIENT
Start: 2018-01-03

## 2017-12-27 RX ORDER — ATROPINE SULFATE 0.4 MG/ML
0.4 INJECTION, SOLUTION ENDOTRACHEAL; INTRAMEDULLARY; INTRAMUSCULAR; INTRAVENOUS; SUBCUTANEOUS DAILY PRN
Status: CANCELLED | OUTPATIENT
Start: 2017-12-27

## 2017-12-27 RX ORDER — ACETAMINOPHEN 325 MG/1
650 TABLET ORAL
Status: DISCONTINUED | OUTPATIENT
Start: 2017-12-27 | End: 2017-12-27 | Stop reason: HOSPADM

## 2017-12-27 RX ORDER — HYDROCODONE BITARTRATE AND ACETAMINOPHEN 500; 5 MG/1; MG/1
TABLET ORAL ONCE
Status: COMPLETED | OUTPATIENT
Start: 2017-12-27 | End: 2017-12-27

## 2017-12-27 RX ORDER — HYDROCODONE BITARTRATE AND ACETAMINOPHEN 500; 5 MG/1; MG/1
TABLET ORAL ONCE
Status: CANCELLED | OUTPATIENT
Start: 2017-12-27 | End: 2017-12-27

## 2017-12-27 RX ADMIN — FLUOROURACIL 4220 MG: 50 INJECTION, SOLUTION INTRAVENOUS at 04:12

## 2017-12-27 RX ADMIN — IRINOTECAN HYDROCHLORIDE 264 MG: 20 INJECTION, SOLUTION INTRAVENOUS at 03:12

## 2017-12-27 RX ADMIN — SODIUM CHLORIDE 721 MG: 0.9 INJECTION, SOLUTION INTRAVENOUS at 02:12

## 2017-12-27 RX ADMIN — HYDROCORTISONE SODIUM SUCCINATE 50 MG: 100 INJECTION, POWDER, FOR SOLUTION INTRAMUSCULAR; INTRAVENOUS at 09:12

## 2017-12-27 RX ADMIN — SODIUM CHLORIDE: 900 INJECTION, SOLUTION INTRAVENOUS at 01:12

## 2017-12-27 RX ADMIN — SODIUM CHLORIDE: 900 INJECTION, SOLUTION INTRAVENOUS at 09:12

## 2017-12-27 RX ADMIN — DEXAMETHASONE SODIUM PHOSPHATE: 4 INJECTION, SOLUTION INTRAMUSCULAR; INTRAVENOUS at 01:12

## 2017-12-27 RX ADMIN — ATROPINE SULFATE 0.4 MG: 0.4 INJECTION, SOLUTION INTRAMUSCULAR; INTRAVENOUS; SUBCUTANEOUS at 01:12

## 2017-12-27 NOTE — PLAN OF CARE
Problem: Patient Care Overview  Goal: Plan of Care Review  Outcome: Ongoing (interventions implemented as appropriate)  Pt tolertaed 2 units prc's and folfiri infusion without issue, cadd pump infusing to left arm port at 5.2 ml/hr without issue prior to d/c, pt to rtc 12/29/17 aroung 1500, pt verbalizes understanding

## 2017-12-27 NOTE — PLAN OF CARE
Problem: Chemotherapy Effects (Adult)  Goal: Signs and Symptoms of Listed Potential Problems Will be Absent, Minimized or Managed (Chemotherapy Effects)  Signs and symptoms of listed potential problems will be absent, minimized or managed by discharge/transition of care (reference Chemotherapy Effects (Adult) CPG).   Outcome: Ongoing (interventions implemented as appropriate)  Pt here for 2 units prc's and cyramza and folfiri infusion, labs, hx, meds, allergies reviewed, assisted to chair from wheelchair, blanket provided, continue to monitor

## 2017-12-27 NOTE — NURSING
1100 pt has 1 liter fluids ordered prior to chemo, pt to receive 2 units prc's prior to chemo. Per dr. valles ok to not give fluids

## 2017-12-27 NOTE — PROGRESS NOTES
"Subjective:       Patient ID: Karthik Medrano is a 67 y.o. male.    Chief Complaint: Malignant neoplasm of colon, unspecified part of colon; Fatigue; Shortness of Breath; freq BMs; and urination increased  Oncologic History:  67 year old male with history of rectal cancer diagnosed 1/11/10 on colonoscopy (moderately differentiated adenocarcinoma) and based on imaging, was T3NxM0. Patient underwent Neoadjuvant chemort with infusional 5FU. This was completed 3/19/10 with a total of 50.4Gy given to the pelvis. He went to surgery 5/13/10. T3N0M0. Post operative he received no adjuvant treatment and has been followed regularly. He went to his PCP 6/24/13 who ordered a CXR which found pulmonary nodules. On 6/27/13, CT chest confirmed these findings. CEA drawn when initially diagnosed was 2.6ng/ml and 6/27/13 was 30.8 ng/ml. . Pulmonary biopsy was performed 7/18/13 and this confirmed metastatic adenocarcinoma. 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 Avastin was added for second cycle.    Patient completed 12 cycles of FOLFOX avastin. He did not receive Avastin for cycle 3, 4, or 5 (due to uncontrolled blood pressure).    He was on maintenance infusional 5FU and Avastin. Repeat scans 4/14/14 showed numerous bilateral pulmonary soft tissue nodules, the majority of which demonstrate interval enlargement when compared to the prior exam. CEA up 18.2 from 13.    He was restarted on FOLFIRI and AVastin and received 6 months of chemo and then was switched to maintenance chemotherapy with 5FU and Avastin. His CT scans from early May 2015 revealed progression in lung lesions.  He is now on FOLFIRI and Cyramza.  CT from 4/19/16 reveals "Multiple stable metastatic pulmonary nodules in this patient with history of rectal cancer.  Stable postoperative changes are present from prior partial colectomy.Interval increase in right-sided hydronephrosis despite appropriate positioning of a double-J ureteral " "sent.  There is associated enlargement and decreased perfusion to the right kidney consistent with obstruction. These findings are concerning for a nonfunctioning ureteral stent. Cholelithiasis."  CT from 6/13/16 reveal stable disease and hydronephrosis has resolved.  CT from 8/5/16 reveals "Stable pulmonary metastatic disease.Increased prominence of periureteral fat stranding/inflammatory change. Stable mild right hydronephrosis."  CT from 1/6/17 "1. Stable appearance of pulmonary metastatic disease in this patient with history of colon cancer. Pulmonary index lesions are stable in size compared to prior examination. No evidence of new metastatic disease. 2. Right-sided double-J ureteral stent with stable appearing inflammatory change about the right renal collecting system and right ureter. 3. Additional stable incidental findings as discussed above. "     CT scans reveal "In this patient with a history of rectal cancer, index lesions in the right and left lung are stable in size as detailed above. No new pulmonary nodules or masses are identified. There is no evidence of abnormal soft tissue opacity in the rectosigmoid region to suggest residual/recurrent disease. Interval increase in the degree of right hydronephrosis with a stable double-J right ureteral stent in place. Interval development of perihepatic ascites. Stable fat and bowel containing ventral hernia"     CT scans 7/21/17 reveal "Stable appearance of multiple pulmonary lesions, compatible with metastatic disease, in this patient with given history of rectal cancer.  Index lesions below. Surgical changes from low anterior resection of rectal mass, without evidence of local recurrence. Stable right-sided moderate hydroureteronephrosis, with periureteral stranding ; appropriately positioned double-J ureteral stent.  Findings are concerning for UTI. There is thinning of the right renal parenchyma, concerning for chronic obstruction or inflammation. Stable " "appearing soft tissue collection with dystrophic calcifications in the presacral space, which may be postsurgical or post radiation in etiology.  This finding may cause obstruction of the right ureter."     CT scans 9/2/17 reveal "1. Status post low anterior resection for colon cancer. 2. Pulmonary metastatic disease without significant interval change. See summary below. 3. Continued moderate volume of ascites, possibly malignant ascites. 4. Chronic obstruction of right distal right ureter despite presence of ureter stent. Distal right ureter may be involved by soft tissue finding in presacral area, favored to represent postsurgical or post radiation change. 5. Cholelithiasis. 6. Ventral hernias containing ascites and small bowel without obstruction."       HPI Mr. Medrano returns for follow up and for FOLFIRI and Cyramza.  He underwent IR paracentesis on 12/21/17 and 3400 cc of clear yellow fluid was removed. He notes fatigue and shortness of breath.  He denies any nausea, vomiting, diarrhea, constipation, abdominal pain, weight loss or loss of appetite, chest pain, leg swelling, pain, headache, dizziness, or mood changes. His ECOG PS is 2.  He is accompanied by his wife. He denies any blood loss in stool or urine.              Review of Systems   Constitutional: Positive for fatigue. Negative for appetite change and unexpected weight change.   HENT: Negative for mouth sores.    Eyes: Negative for visual disturbance.   Respiratory: Positive for shortness of breath. Negative for cough.    Cardiovascular: Negative for chest pain.   Gastrointestinal: Negative for abdominal pain and diarrhea.   Genitourinary: Negative for frequency.   Musculoskeletal: Negative for back pain.   Skin: Negative for rash.   Neurological: Negative for headaches.   Hematological: Negative for adenopathy.   Psychiatric/Behavioral: The patient is not nervous/anxious.    All other systems reviewed and are negative.      PMFSH: all information " reviewed and updated as relevant to today's visit  Objective:      Physical Exam   Constitutional: He is oriented to person, place, and time. He appears cachectic.   HENT:   Mouth/Throat: No oropharyngeal exudate.   Cardiovascular: Normal rate and normal heart sounds.    Pulmonary/Chest: Effort normal and breath sounds normal. He has no wheezes.   Abdominal: Soft. Bowel sounds are normal. There is no tenderness.   Musculoskeletal: He exhibits no edema or tenderness.   Lymphadenopathy:     He has no cervical adenopathy.   Neurological: He is alert and oriented to person, place, and time. Coordination normal.   Skin: Skin is warm and dry. No rash noted.   Psychiatric: He has a normal mood and affect. Judgment and thought content normal.   Vitals reviewed.      LABS:  WBC   Date Value Ref Range Status   12/26/2017 5.74 3.90 - 12.70 K/uL Final     Hemoglobin   Date Value Ref Range Status   12/26/2017 7.1 (L) 14.0 - 18.0 g/dL Final     Hematocrit   Date Value Ref Range Status   12/26/2017 23.5 (L) 40.0 - 54.0 % Final     Platelets   Date Value Ref Range Status   12/26/2017 148 (L) 150 - 350 K/uL Final     Gran #   Date Value Ref Range Status   12/26/2017 4.2 1.8 - 7.7 K/uL Final     Comment:     The ANC is based on a white cell differential from an   automated cell counter. It has not been microscopically   reviewed for the presence of abnormal cells. Clinical   correlation is required.         Chemistry        Component Value Date/Time     12/26/2017 1529    K 4.2 12/26/2017 1529     (H) 12/26/2017 1529    CO2 21 (L) 12/26/2017 1529    BUN 26 (H) 12/26/2017 1529    CREATININE 1.7 (H) 12/26/2017 1529     (H) 12/26/2017 1529        Component Value Date/Time    CALCIUM 8.7 12/26/2017 1529    ALKPHOS 221 (H) 12/26/2017 1529    AST 9 (L) 12/26/2017 1529    ALT 7 (L) 12/26/2017 1529    BILITOT 0.3 12/26/2017 1529    ESTGFRAFRICA 46.5 (A) 12/26/2017 1529    EGFRNONAA 40.3 (A) 12/26/2017 1529           Assessment:       1. Rectal cancer    2. Secondary adenocarcinoma of right lung    3. Chemotherapy induced neutropenia    4. Antineoplastic chemotherapy induced anemia    5. Acute renal failure, unspecified acute renal failure type        Plan:         1,2,3,4,5. He will proceed with 2 units PRBC and dose reduced FOLFIRI and Cyramza. He understands that if he declines with reduced dose chemo, we may have to consider hospice.  He will also receive neulasta OBI  Above care plan was discussed with patient and accompanying wife and all questions were addressed to their satisfaction

## 2017-12-27 NOTE — Clinical Note
Schedule CBC,CMP, and see me in 2 weeks and for FOLFIRI and Cyramza. DC pump on day 3. Neulasta on day 3

## 2017-12-29 ENCOUNTER — INFUSION (OUTPATIENT)
Dept: INFUSION THERAPY | Facility: HOSPITAL | Age: 67
End: 2017-12-29
Attending: INTERNAL MEDICINE
Payer: COMMERCIAL

## 2017-12-29 VITALS
RESPIRATION RATE: 18 BRPM | DIASTOLIC BLOOD PRESSURE: 84 MMHG | SYSTOLIC BLOOD PRESSURE: 118 MMHG | TEMPERATURE: 98 F | HEART RATE: 64 BPM

## 2017-12-29 DIAGNOSIS — Z51.11 ENCOUNTER FOR ANTINEOPLASTIC CHEMOTHERAPY AND IMMUNOTHERAPY: ICD-10-CM

## 2017-12-29 DIAGNOSIS — Z51.12 ENCOUNTER FOR ANTINEOPLASTIC CHEMOTHERAPY AND IMMUNOTHERAPY: ICD-10-CM

## 2017-12-29 DIAGNOSIS — T45.1X5A CHEMOTHERAPY INDUCED NEUTROPENIA: Primary | ICD-10-CM

## 2017-12-29 DIAGNOSIS — R64 MALIGNANT CACHEXIA: Primary | ICD-10-CM

## 2017-12-29 DIAGNOSIS — D70.1 CHEMOTHERAPY INDUCED NEUTROPENIA: Primary | ICD-10-CM

## 2017-12-29 PROCEDURE — 25000003 PHARM REV CODE 250: Performed by: INTERNAL MEDICINE

## 2017-12-29 PROCEDURE — 96523 IRRIG DRUG DELIVERY DEVICE: CPT

## 2017-12-29 PROCEDURE — 96377 APPLICATON ON-BODY INJECTOR: CPT

## 2017-12-29 PROCEDURE — 63600175 PHARM REV CODE 636 W HCPCS: Performed by: INTERNAL MEDICINE

## 2017-12-29 PROCEDURE — A4216 STERILE WATER/SALINE, 10 ML: HCPCS | Performed by: INTERNAL MEDICINE

## 2017-12-29 RX ORDER — HEPARIN 100 UNIT/ML
500 SYRINGE INTRAVENOUS
Status: DISCONTINUED | OUTPATIENT
Start: 2017-12-29 | End: 2017-12-29 | Stop reason: HOSPADM

## 2017-12-29 RX ORDER — SODIUM CHLORIDE 0.9 % (FLUSH) 0.9 %
10 SYRINGE (ML) INJECTION
Status: DISCONTINUED | OUTPATIENT
Start: 2017-12-29 | End: 2017-12-29 | Stop reason: HOSPADM

## 2017-12-29 RX ADMIN — PEGFILGRASTIM 6 MG: KIT SUBCUTANEOUS at 05:12

## 2017-12-29 RX ADMIN — HEPARIN 500 UNITS: 100 SYRINGE at 05:12

## 2017-12-29 RX ADMIN — SODIUM CHLORIDE, PRESERVATIVE FREE 10 ML: 5 INJECTION INTRAVENOUS at 05:12

## 2017-12-29 NOTE — NURSING
Patient arrived for pump d/c. Tolerated well. Port flushed, heparin locked, and deaccessed. OBI place on right upper arm. Verbalized use of obi. Verbalized understanding to call MD for any questions/concerns. AVS given. Discharged home, escorted in wheelchair by family.

## 2018-01-02 ENCOUNTER — PATIENT MESSAGE (OUTPATIENT)
Dept: HEMATOLOGY/ONCOLOGY | Facility: CLINIC | Age: 68
End: 2018-01-02

## 2018-01-03 DIAGNOSIS — N40.0 PROSTATE HYPERPLASIA WITHOUT URINARY OBSTRUCTION: ICD-10-CM

## 2018-01-03 DIAGNOSIS — R19.7 DIARRHEA, UNSPECIFIED TYPE: ICD-10-CM

## 2018-01-03 DIAGNOSIS — I10 ESSENTIAL HYPERTENSION: ICD-10-CM

## 2018-01-03 DIAGNOSIS — E11.65 TYPE 2 DIABETES MELLITUS WITH HYPERGLYCEMIA, WITHOUT LONG-TERM CURRENT USE OF INSULIN: Primary | ICD-10-CM

## 2018-01-03 RX ORDER — GLIPIZIDE 5 MG/1
TABLET ORAL
Qty: 270 TABLET | Refills: 0 | Status: SHIPPED | OUTPATIENT
Start: 2018-01-03

## 2018-01-03 RX ORDER — DIPHENOXYLATE HYDROCHLORIDE AND ATROPINE SULFATE 2.5; .025 MG/1; MG/1
TABLET ORAL
Qty: 100 TABLET | Refills: 0 | Status: SHIPPED | OUTPATIENT
Start: 2018-01-03

## 2018-01-03 RX ORDER — TAMSULOSIN HYDROCHLORIDE 0.4 MG/1
CAPSULE ORAL
Qty: 90 CAPSULE | Refills: 0 | Status: SHIPPED | OUTPATIENT
Start: 2018-01-03 | End: 2018-01-09 | Stop reason: DRUGHIGH

## 2018-01-03 RX ORDER — AMLODIPINE BESYLATE 5 MG/1
TABLET ORAL
Qty: 180 TABLET | Refills: 0 | Status: ON HOLD | OUTPATIENT
Start: 2018-01-03 | End: 2018-01-17 | Stop reason: HOSPADM

## 2018-01-04 DIAGNOSIS — R18.8 OTHER ASCITES: Primary | ICD-10-CM

## 2018-01-05 ENCOUNTER — HOSPITAL ENCOUNTER (OUTPATIENT)
Dept: INTERVENTIONAL RADIOLOGY/VASCULAR | Facility: HOSPITAL | Age: 68
Discharge: HOME | End: 2018-01-05
Attending: INTERNAL MEDICINE
Payer: COMMERCIAL

## 2018-01-05 VITALS
DIASTOLIC BLOOD PRESSURE: 71 MMHG | OXYGEN SATURATION: 100 % | SYSTOLIC BLOOD PRESSURE: 112 MMHG | HEART RATE: 127 BPM | RESPIRATION RATE: 16 BRPM

## 2018-01-05 DIAGNOSIS — R18.8 OTHER ASCITES: ICD-10-CM

## 2018-01-05 PROCEDURE — 63600175 PHARM REV CODE 636 W HCPCS: Mod: JG | Performed by: INTERNAL MEDICINE

## 2018-01-05 PROCEDURE — A7048 VACUUM DRAIN BOTTLE/TUBE KIT: HCPCS

## 2018-01-05 PROCEDURE — P9047 ALBUMIN (HUMAN), 25%, 50ML: HCPCS | Mod: JG | Performed by: INTERNAL MEDICINE

## 2018-01-05 PROCEDURE — 49083 ABD PARACENTESIS W/IMAGING: CPT

## 2018-01-05 PROCEDURE — 49083 ABD PARACENTESIS W/IMAGING: CPT | Mod: ,,, | Performed by: FAMILY MEDICINE

## 2018-01-05 RX ORDER — ALBUMIN HUMAN 250 G/1000ML
25 SOLUTION INTRAVENOUS
Status: DISCONTINUED | OUTPATIENT
Start: 2018-01-05 | End: 2018-01-06 | Stop reason: HOSPADM

## 2018-01-05 RX ADMIN — ALBUMIN (HUMAN) 12.5 G: 25 SOLUTION INTRAVENOUS at 12:01

## 2018-01-05 RX ADMIN — ALBUMIN (HUMAN) 25 G: 25 SOLUTION INTRAVENOUS at 12:01

## 2018-01-05 NOTE — DISCHARGE INSTRUCTIONS
Interventional Radiology [phone]  Mon-Fri  8A-4P  24hr Radiology Resident on call [phone]

## 2018-01-05 NOTE — H&P
Radiology History & Physical      SUBJECTIVE:     Chief Complaint: abdominal distention    History of Present Illness:  Karthik Medrano is a 67 y.o. male who presents for ultrasound guided paracentesis  Past Medical History:   Diagnosis Date    Cancer     rectal cancer    Diabetes mellitus     GERD (gastroesophageal reflux disease)     Hypertension      Past Surgical History:   Procedure Laterality Date    COLON SURGERY      COLONOSCOPY N/A 10/3/2017    Procedure: COLONOSCOPY;  Surgeon: Sandoval Bowling MD;  Location: [address];  Service: Endoscopy;  Laterality: N/A;       Home Meds:   Prior to Admission medications    Medication Sig Start Date End Date Taking? Authorizing Provider   amLODIPine (NORVASC) 5 MG tablet TAKE 1 TABLET(5 MG) BY MOUTH TWICE DAILY 10/25/17   Lisa Abernathy MD   amLODIPine (NORVASC) 5 MG tablet TAKE 1 TABLET BY MOUTH TWICE DAILY 1/3/18   Lisa Abernathy MD   ascorbic acid (VITAMIN C) 1000 MG tablet Take 1,000 mg by mouth once daily.    Historical Provider, MD   cholecalciferol, vitamin D3, (VITAMIN D3) 2,000 unit Cap Take 1 capsule by mouth once daily.    Historical Provider, MD   cholestyramine, bulk, Powd 4 g by Misc.(Non-Drug; Combo Route) route once daily at 6am. 7/3/17   Pinky Thornton MD   co-enzyme Q-10 30 mg capsule Take 30 mg by mouth 3 (three) times daily.    Historical Provider, MD   diphenoxylate-atropine 2.5-0.025 mg (LOMOTIL) 2.5-0.025 mg per tablet TAKE 1 TABLET BY MOUTH FOUR TIMES DAILY AS NEEDED FOR DIARRHEA 1/3/18   Pinky Thornton MD   esomeprazole (NEXIUM) 40 MG capsule TAKE 1 CAPSULE(40 MG) BY MOUTH EVERY DAY 12/14/17   Nevaeh Dc NP   furosemide (LASIX) 20 MG tablet Take 2 tablets (40 mg total) by mouth daily as needed. 9/19/17   Lisa Abernathy MD   glipiZIDE (GLUCOTROL) 5 MG tablet TAKE 1 TABLET BY MOUTH THREE TIMES DAILY 10/25/17   Lisa Abernathy MD   glipiZIDE (GLUCOTROL) 5 MG tablet TAKE 1 TABLET BY MOUTH THREE TIMES DAILY  1/3/18   Lisa Abernathy MD   lisinopril (PRINIVIL,ZESTRIL) 40 MG tablet Take 1 tablet (40 mg total) by mouth 2 (two) times daily. 10/25/17   Lisa Abernathy MD   metoprolol succinate (TOPROL-XL) 100 MG 24 hr tablet Take 1 tablet (100 mg total) by mouth 2 (two) times daily. 10/25/17   Lisa Abernathy MD   multivitamin with iron-mineral TbSR Take 1 tablet by mouth once daily. 8/31/14   Pinky Thornton MD   NYSTATIN ([employer]S SOLUTION) Mix equal amounts of benadryl, maalox, 2% viscous lidocaine and nystatin    Swish and swallow 10 ml 4 times a day 12/7/17   Lisa Abernathy MD   ondansetron (ZOFRAN-ODT) 8 MG TbDL Take 1 tablet (8 mg total) by mouth every 8 (eight) hours as needed (Nausea). 12/7/17   Lisa Abernathy MD   opium tincture 10 mg/mL (morphine) Tinc Take 0.6 mLs (6 mg total) by mouth 4 (four) times daily. 6/13/17   Lisa Abernathy MD   oxycodone-acetaminophen (PERCOCET) 5-325 mg per tablet TK 1 T PO Q 6 H PRN P 8/31/16   Pedro Dumont MD   paregoric 2 mg/5 mL Liqd TAKE 5 ML BY MOUTH THREE TIMES DAILY 1/3/18   Lisa Abernathy MD   tamsulosin (FLOMAX) 0.4 mg Cp24 Take 1 capsule (0.4 mg total) by mouth once daily. 9/19/17   Lisa Abernathy MD   tamsulosin (FLOMAX) 0.4 mg Cp24 TAKE 1 CAPSULE(0.4 MG) BY MOUTH EVERY DAY 1/3/18   Lisa Abernathy MD     Anticoagulants/Antiplatelets: no anticoagulation    Allergies: Review of patient's allergies indicates:  No Known Allergies  Sedation History:  no adverse reactions    Review of Systems:   Hematological: no known coagulopathies  Respiratory: no shortness of breath  Cardiovascular: no chest pain  Gastrointestinal: no abdominal pain  Genito-Urinary: no dysuria  Musculoskeletal: negative  Neurological: no TIA or stroke symptoms         OBJECTIVE:     Vital Signs (Most Recent)  Pulse: 86 (01/05/18 1127)  Resp: 16 (01/05/18 1127)  BP: (!) 142/76 (01/05/18 1127)  SpO2: 100 % (01/05/18 1127)    Physical Exam:  ASA: 2  Mallampati: n/a    General: no acute  distress  Mental Status: alert and oriented to person, place and time  HEENT: normocephalic, atraumatic  Chest: unlabored breathing  Heart: regular heart rate  Abdomen: nondistended  Extremity: moves all extremities    Laboratory  Lab Results   Component Value Date    INR 1.1 01/05/2018       Lab Results   Component Value Date    WBC 5.74 12/26/2017    HGB 7.1 (L) 12/26/2017    HCT 23.5 (L) 12/26/2017    MCV 84 12/26/2017     (L) 12/26/2017      Lab Results   Component Value Date     (H) 12/26/2017     12/26/2017    K 4.2 12/26/2017     (H) 12/26/2017    CO2 21 (L) 12/26/2017    BUN 26 (H) 12/26/2017    CREATININE 1.7 (H) 12/26/2017    CALCIUM 8.7 12/26/2017    MG 2.0 03/06/2016    ALT 7 (L) 12/26/2017    AST 9 (L) 12/26/2017    ALBUMIN 2.1 (L) 12/26/2017    BILITOT 0.3 12/26/2017    BILIDIR 0.1 01/13/2009       ASSESSMENT/PLAN:     Sedation Plan: local  Patient will undergo ultrasound guided paracentesis.    JABARI Mortensen, FNP  Interventional Radiology  [phone] [employer]

## 2018-01-05 NOTE — PROCEDURES
Radiology Post-Procedure Note    Pre Op Diagnosis: Ascites  Post Op Diagnosis: Same    Procedure: Ultrasound Guided Paracentesis    Procedure performed by: Kaylie CAMERON, Candie     Written Informed Consent Obtained: Yes  Specimen Removed: YES clear yellow  Estimated Blood Loss: Minimal    Findings:   Successful paracentesis.  Albumin administered PRN per protocol.    Patient tolerated procedure well.    Candie Lott, APRN, FNP  Interventional Radiology  [phone] [employer]

## 2018-01-05 NOTE — PROGRESS NOTES
Paracentesis complete. Pt tolerated well. Total 5,500 ml peritoneal fluid removed. 150 ml Albumin 25% IV administered.   Pt verbalized instructions on care for puncture site to Q.  AVS reviewd and provided

## 2018-01-08 ENCOUNTER — LAB VISIT (OUTPATIENT)
Dept: LAB | Facility: HOSPITAL | Age: 68
End: 2018-01-08
Attending: INTERNAL MEDICINE
Payer: COMMERCIAL

## 2018-01-08 DIAGNOSIS — C20 RECTAL CANCER: ICD-10-CM

## 2018-01-08 LAB
ALBUMIN SERPL BCP-MCNC: 2.6 G/DL
ALP SERPL-CCNC: 191 U/L
ALT SERPL W/O P-5'-P-CCNC: 9 U/L
ANION GAP SERPL CALC-SCNC: 8 MMOL/L
AST SERPL-CCNC: 13 U/L
BILIRUB SERPL-MCNC: 0.5 MG/DL
BUN SERPL-MCNC: 24 MG/DL
CALCIUM SERPL-MCNC: 8.8 MG/DL
CHLORIDE SERPL-SCNC: 107 MMOL/L
CO2 SERPL-SCNC: 23 MMOL/L
CREAT SERPL-MCNC: 1.6 MG/DL
CREAT UR-MCNC: 81 MG/DL
ERYTHROCYTE [DISTWIDTH] IN BLOOD BY AUTOMATED COUNT: 21.5 %
EST. GFR  (AFRICAN AMERICAN): 50.1 ML/MIN/1.73 M^2
EST. GFR  (NON AFRICAN AMERICAN): 43.3 ML/MIN/1.73 M^2
GLUCOSE SERPL-MCNC: 130 MG/DL
HCT VFR BLD AUTO: 32 %
HGB BLD-MCNC: 10 G/DL
IMM GRANULOCYTES # BLD AUTO: 0.07 K/UL
MCH RBC QN AUTO: 26.5 PG
MCHC RBC AUTO-ENTMCNC: 31.3 G/DL
MCV RBC AUTO: 85 FL
NEUTROPHILS # BLD AUTO: 8.5 K/UL
PLATELET # BLD AUTO: 121 K/UL
PMV BLD AUTO: 9.3 FL
POTASSIUM SERPL-SCNC: 3.3 MMOL/L
PROT SERPL-MCNC: 6.6 G/DL
PROT UR-MCNC: 54 MG/DL
PROT/CREAT RATIO, UR: 0.67
RBC # BLD AUTO: 3.77 M/UL
SODIUM SERPL-SCNC: 138 MMOL/L
WBC # BLD AUTO: 10.65 K/UL

## 2018-01-08 PROCEDURE — 80053 COMPREHEN METABOLIC PANEL: CPT

## 2018-01-08 PROCEDURE — 36415 COLL VENOUS BLD VENIPUNCTURE: CPT

## 2018-01-08 PROCEDURE — 85027 COMPLETE CBC AUTOMATED: CPT

## 2018-01-08 PROCEDURE — 84156 ASSAY OF PROTEIN URINE: CPT

## 2018-01-08 NOTE — PROGRESS NOTES
"PATIENT: Karthik Medrano  MRN: [id number]  DATE: 1/9/2018    Subjective:     Chief complaint:  Chief Complaint   Patient presents with    Rectal Cancer    Fatigue    no n/v    2-4 loose stools/ day over the wknd    last para 1/5, 5.5l removed       Oncologic History:  67 year old male with history of rectal cancer diagnosed 1/11/10 on colonoscopy (moderately differentiated adenocarcinoma) and based on imaging, was T3NxM0. Patient underwent Neoadjuvant chemort with infusional 5FU. This was completed 3/19/10 with a total of 50.4Gy given to the pelvis. He went to surgery 5/13/10. T3N0M0. Post operative he received no adjuvant treatment and has been followed regularly. He went to his PCP 6/24/13 who ordered a CXR which found pulmonary nodules. On 6/27/13, CT chest confirmed these findings. CEA drawn when initially diagnosed was 2.6ng/ml and 6/27/13 was 30.8 ng/ml. . Pulmonary biopsy was performed 7/18/13 and this confirmed metastatic adenocarcinoma. 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 Avastin was added for second cycle.    Patient completed 12 cycles of FOLFOX avastin. He did not receive Avastin for cycle 3, 4, or 5 (due to uncontrolled blood pressure).    He was on maintenance infusional 5FU and Avastin. Repeat scans 4/14/14 showed numerous bilateral pulmonary soft tissue nodules, the majority of which demonstrate interval enlargement when compared to the prior exam. CEA up 18.2 from 13.    He was restarted on FOLFIRI and AVastin and received 6 months of chemo and then was switched to maintenance chemotherapy with 5FU and Avastin. His CT scans from early May 2015 revealed progression in lung lesions.  He is now on FOLFIRI and Cyramza.  CT from 4/19/16 reveals "Multiple stable metastatic pulmonary nodules in this patient with history of rectal cancer.  Stable postoperative changes are present from prior partial colectomy.Interval increase in right-sided hydronephrosis despite " "appropriate positioning of a double-J ureteral sent.  There is associated enlargement and decreased perfusion to the right kidney consistent with obstruction. These findings are concerning for a nonfunctioning ureteral stent. Cholelithiasis."  CT from 6/13/16 reveal stable disease and hydronephrosis has resolved.  CT from 8/5/16 reveals "Stable pulmonary metastatic disease.Increased prominence of periureteral fat stranding/inflammatory change. Stable mild right hydronephrosis."  CT from 1/6/17 "1. Stable appearance of pulmonary metastatic disease in this patient with history of colon cancer. Pulmonary index lesions are stable in size compared to prior examination. No evidence of new metastatic disease. 2. Right-sided double-J ureteral stent with stable appearing inflammatory change about the right renal collecting system and right ureter. 3. Additional stable incidental findings as discussed above. "     CT scans reveal "In this patient with a history of rectal cancer, index lesions in the right and left lung are stable in size as detailed above. No new pulmonary nodules or masses are identified. There is no evidence of abnormal soft tissue opacity in the rectosigmoid region to suggest residual/recurrent disease. Interval increase in the degree of right hydronephrosis with a stable double-J right ureteral stent in place. Interval development of perihepatic ascites. Stable fat and bowel containing ventral hernia"     CT scans 7/21/17 reveal "Stable appearance of multiple pulmonary lesions, compatible with metastatic disease, in this patient with given history of rectal cancer.  Index lesions below. Surgical changes from low anterior resection of rectal mass, without evidence of local recurrence. Stable right-sided moderate hydroureteronephrosis, with periureteral stranding ; appropriately positioned double-J ureteral stent.  Findings are concerning for UTI. There is thinning of the right renal parenchyma, concerning " "for chronic obstruction or inflammation. Stable appearing soft tissue collection with dystrophic calcifications in the presacral space, which may be postsurgical or post radiation in etiology.  This finding may cause obstruction of the right ureter."     CT scans 9/2/17 reveal "1. Status post low anterior resection for colon cancer. 2. Pulmonary metastatic disease without significant interval change. See summary below. 3. Continued moderate volume of ascites, possibly malignant ascites. 4. Chronic obstruction of right distal right ureter despite presence of ureter stent. Distal right ureter may be involved by soft tissue finding in presacral area, favored to represent postsurgical or post radiation change. 5. Cholelithiasis. 6. Ventral hernias containing ascites and small bowel without obstruction."           On 12/27/17 --dose reduced FOLFIRI and Cyramza. He understands that if he declines with reduced dose chemo, we may have to consider hospice.       Interval History: Mr. Medrano returns for follow up and for FOLFIRI and Cyramza. He underwent IR paracentesis on 1/5/18 and 5,500 ml peritoneal fluid removed. He feels weak today and is requesting a week off from chemo. He has occasional dizziness. He reports slightly decreased urine output. He reports 2- 4 loose stools 2 days ago. He denies any blood loss in stool or urine. He denies any nausea, vomiting, diarrhea, constipation, abdominal pain, weight loss or loss of appetite, chest pain, leg swelling, shortness of breath, pain, headache, dizziness, or mood changes.   He is accompanied by his wife.        ECOG Performance Status:   ECOG SCORE    3 - Capable of only limited selfcare, confined to bed or chair more than 50% of waking hours         PMFSH: all information reviewed and updated as relevant to today's visit    Review of Systems:   Review of Systems   Constitutional: Positive for fatigue. Negative for activity change, appetite change and fever.   HENT: " "Negative for mouth sores, nosebleeds and sore throat.    Eyes: Negative for visual disturbance.   Respiratory: Negative for cough and shortness of breath.    Cardiovascular: Negative for chest pain, palpitations and leg swelling.   Gastrointestinal: Negative for abdominal pain, constipation, diarrhea, nausea and vomiting.   Genitourinary: Negative for difficulty urinating and frequency.   Musculoskeletal: Negative for arthralgias and back pain.   Skin: Negative for rash.   Neurological: Positive for dizziness (on occasion) and weakness. Negative for numbness and headaches.   Hematological: Negative for adenopathy. Does not bruise/bleed easily.   Psychiatric/Behavioral: Negative for confusion and sleep disturbance. The patient is not nervous/anxious.    All other systems reviewed and are negative.        Objective:      Vitals:   Vitals:    01/09/18 0842   BP: 106/61   Pulse: (!) 126   Resp: (!) 24   Temp: 97.5 °F (36.4 °C)   TempSrc: Oral   SpO2: 98%   Weight: 84.3 kg (185 lb 13.6 oz)   Height: 5' 10" (1.778 m)     BMI: Body mass index is 26.67 kg/m².      Physical Exam:   Physical Exam   Constitutional: He is oriented to person, place, and time. He appears well-developed and well-nourished. No distress.   HENT:   Right Ear: External ear normal.   Left Ear: External ear normal.   Mouth/Throat: No oropharyngeal exudate.   Eyes: Conjunctivae and lids are normal. Pupils are equal, round, and reactive to light. No scleral icterus.   Neck: Trachea normal and normal range of motion. Neck supple. No thyromegaly present.   Cardiovascular: Regular rhythm, normal heart sounds and normal pulses.    Tachycardic   Pulmonary/Chest: Effort normal. He has no wheezes. He has no rales.   Slightly Decreased throughout; No crackles   Abdominal: Soft. Normal appearance and bowel sounds are normal. He exhibits no distension and no mass. There is no hepatosplenomegaly or splenomegaly. There is no tenderness.   Musculoskeletal: Normal " range of motion. He exhibits no edema.   Lymphadenopathy:        Head (right side): No submental and no submandibular adenopathy present.        Head (left side): No submental and no submandibular adenopathy present.     He has no cervical adenopathy.     He has no axillary adenopathy.        Right: No supraclavicular adenopathy present.        Left: No supraclavicular adenopathy present.   Neurological: He is alert and oriented to person, place, and time. He has normal reflexes. No sensory deficit.   Skin: Skin is warm, dry and intact. No bruising and no rash noted. Nails show no clubbing.   Poor turgor   Psychiatric: He has a normal mood and affect. His speech is normal and behavior is normal. Cognition and memory are normal.   Vitals reviewed.        Laboratory Data:  WBC   Date Value Ref Range Status   01/08/2018 10.65 3.90 - 12.70 K/uL Final     Hemoglobin   Date Value Ref Range Status   01/08/2018 10.0 (L) 14.0 - 18.0 g/dL Final     Hematocrit   Date Value Ref Range Status   01/08/2018 32.0 (L) 40.0 - 54.0 % Final     Platelets   Date Value Ref Range Status   01/08/2018 121 (L) 150 - 350 K/uL Final     Gran #   Date Value Ref Range Status   01/08/2018 8.5 (H) 1.8 - 7.7 K/uL Final     Comment:     The ANC is based on a white cell differential from an   automated cell counter. It has not been microscopically   reviewed for the presence of abnormal cells. Clinical   correlation is required.         Chemistry        Component Value Date/Time     01/08/2018 1612    K 3.3 (L) 01/08/2018 1612     01/08/2018 1612    CO2 23 01/08/2018 1612    BUN 24 (H) 01/08/2018 1612    CREATININE 1.6 (H) 01/08/2018 1612     (H) 01/08/2018 1612        Component Value Date/Time    CALCIUM 8.8 01/08/2018 1612    ALKPHOS 191 (H) 01/08/2018 1612    AST 13 01/08/2018 1612    ALT 9 (L) 01/08/2018 1612    BILITOT 0.5 01/08/2018 1612    ESTGFRAFRICA 50.1 (A) 01/08/2018 1612    EGFRNONAA 43.3 (A) 01/08/2018 1612               Assessment/Plan:     1. Rectal cancer    2. Secondary adenocarcinoma of right lung    3. Anemia in neoplastic disease    4. Chemotherapy induced neutropenia    5. Chemotherapy-induced thrombocytopenia    6. Dehydration    7. Hypokalemia    8. Acute on chronic diastolic congestive heart failure    9. Type 2 diabetes mellitus without complication, without long-term current use of insulin        Plan:    1,2,3,4,5,6,7. Labs reviewed. Hgb stable, neutropenia resolved. Platelets slightly low. Kidney function elevated. Patient is weak. Patient is requesting to hold treatment for a week.  Plan for IV hydration and potassium supplementation. NS 1 liter with KCL 20meq IV over 2 hours tomorrow (per patient request- they are unable to do today due to appointment at [facility]). Order for WC.   Schedule CBC,CMP, and see Dr. Abernathy  in 1 week and for FOLFIRI and Cyramza. DC pump on day 3. Neulasta OBI  on day 3. Patient would like the latest appointment possible.   8. Stable, no evidence of decompensation.   9. Stable. Continue to monitor.       Med and Orders:  Orders Placed This Encounter    WHEELCHAIR FOR HOME USE    CBC Oncology    Comprehensive metabolic panel       Follow Up:  Return in about 1 week (around 1/16/2018).      More than 30 mins were spent during this encounter, greater than 50% was spent in direct counseling and/or coordination of care.   Patient was also seen and examined by Dr. Abernathy who agrees with above plan. Patient is in agreement with the proposed treatment plan. All questions were answered to the patient's satisfaction. Pt knows to call clinic if anything is needed before the next clinic visit.      ALIYAH FinnP-SHANON  Hematology and Medical Oncology    Distress Screening Results: Psychosocial Distress screening score of Distress Score: 0 noted and reviewed. No intervention indicated.     STAFF NOTE:  I have reviewed the notes, assessments, and/or procedures performed by the nurse  practitioner, as above.  I have personally interviewed the patient at the beside, and rounded with the nurse practitioner. I formulated the plan of care.  I concur with her documentation of Karthik Medrano.

## 2018-01-09 ENCOUNTER — OFFICE VISIT (OUTPATIENT)
Dept: HEMATOLOGY/ONCOLOGY | Facility: CLINIC | Age: 68
End: 2018-01-09
Payer: COMMERCIAL

## 2018-01-09 VITALS
RESPIRATION RATE: 24 BRPM | TEMPERATURE: 98 F | HEART RATE: 126 BPM | WEIGHT: 185.88 LBS | DIASTOLIC BLOOD PRESSURE: 61 MMHG | SYSTOLIC BLOOD PRESSURE: 106 MMHG | BODY MASS INDEX: 26.61 KG/M2 | HEIGHT: 70 IN | OXYGEN SATURATION: 98 %

## 2018-01-09 DIAGNOSIS — E86.0 DEHYDRATION: ICD-10-CM

## 2018-01-09 DIAGNOSIS — I50.33 ACUTE ON CHRONIC DIASTOLIC CONGESTIVE HEART FAILURE: ICD-10-CM

## 2018-01-09 DIAGNOSIS — E87.6 HYPOKALEMIA: ICD-10-CM

## 2018-01-09 DIAGNOSIS — D70.1 CHEMOTHERAPY INDUCED NEUTROPENIA: ICD-10-CM

## 2018-01-09 DIAGNOSIS — T45.1X5A CHEMOTHERAPY-INDUCED THROMBOCYTOPENIA: ICD-10-CM

## 2018-01-09 DIAGNOSIS — C78.01 SECONDARY ADENOCARCINOMA OF RIGHT LUNG: ICD-10-CM

## 2018-01-09 DIAGNOSIS — C20 RECTAL CANCER: Primary | ICD-10-CM

## 2018-01-09 DIAGNOSIS — E11.9 TYPE 2 DIABETES MELLITUS WITHOUT COMPLICATION, WITHOUT LONG-TERM CURRENT USE OF INSULIN: ICD-10-CM

## 2018-01-09 DIAGNOSIS — D63.0 ANEMIA IN NEOPLASTIC DISEASE: ICD-10-CM

## 2018-01-09 DIAGNOSIS — T45.1X5A CHEMOTHERAPY INDUCED NEUTROPENIA: ICD-10-CM

## 2018-01-09 DIAGNOSIS — D69.59 CHEMOTHERAPY-INDUCED THROMBOCYTOPENIA: ICD-10-CM

## 2018-01-09 PROCEDURE — 99215 OFFICE O/P EST HI 40 MIN: CPT | Mod: S$GLB,,, | Performed by: INTERNAL MEDICINE

## 2018-01-09 PROCEDURE — 99999 PR PBB SHADOW E&M-EST. PATIENT-LVL IV: CPT | Mod: PBBFAC,,, | Performed by: INTERNAL MEDICINE

## 2018-01-09 NOTE — Clinical Note
Schedule CBC,CMP, and see Dr. Abernathy  in 1 week and for FOLFIRI and Cyramza. DC pump on day 3. Neulasta OBI  on day 3. Patient would like the latest appointment possible.

## 2018-01-09 NOTE — PROGRESS NOTES
Patient, Karthik Medrano (MRN #[id number]), presented with a recent Platelet count less than 150 K/uL consistent with the definition of thrombocytopenia (ICD10 - D69.6).    Platelets   Date Value Ref Range Status   01/08/2018 121 (L) 150 - 350 K/uL Final     The patient's thrombocytopenia was monitored, evaluated, addressed and/or treated. This addendum to the medical record is made on 01/09/2018.

## 2018-01-10 ENCOUNTER — TELEPHONE (OUTPATIENT)
Dept: HEMATOLOGY/ONCOLOGY | Facility: CLINIC | Age: 68
End: 2018-01-10

## 2018-01-10 ENCOUNTER — INFUSION (OUTPATIENT)
Dept: INFUSION THERAPY | Facility: HOSPITAL | Age: 68
End: 2018-01-10
Attending: INTERNAL MEDICINE
Payer: COMMERCIAL

## 2018-01-10 VITALS — RESPIRATION RATE: 18 BRPM | DIASTOLIC BLOOD PRESSURE: 70 MMHG | HEART RATE: 76 BPM | SYSTOLIC BLOOD PRESSURE: 100 MMHG

## 2018-01-10 DIAGNOSIS — E86.0 DEHYDRATION: Primary | ICD-10-CM

## 2018-01-10 PROCEDURE — 96361 HYDRATE IV INFUSION ADD-ON: CPT

## 2018-01-10 PROCEDURE — 63600175 PHARM REV CODE 636 W HCPCS: Performed by: INTERNAL MEDICINE

## 2018-01-10 PROCEDURE — 96360 HYDRATION IV INFUSION INIT: CPT

## 2018-01-10 RX ORDER — SODIUM CHLORIDE AND POTASSIUM CHLORIDE 150; 900 MG/100ML; MG/100ML
INJECTION, SOLUTION INTRAVENOUS
Status: COMPLETED | OUTPATIENT
Start: 2018-01-10 | End: 2018-01-10

## 2018-01-10 RX ADMIN — SODIUM CHLORIDE AND POTASSIUM CHLORIDE: .9; .15 SOLUTION INTRAVENOUS at 02:01

## 2018-01-10 NOTE — PLAN OF CARE
Problem: Patient Care Overview  Goal: Plan of Care Review  Outcome: Ongoing (interventions implemented as appropriate)  Pt tolerated 1L IVF with KCl without complications. VSS. No s/s of reaction. AVS given to patient.

## 2018-01-10 NOTE — TELEPHONE ENCOUNTER
----- Message from Nevaeh Dc NP sent at 1/9/2018  9:54 AM CST -----  Schedule CBC,CMP, and see Dr. Abernathy  in 1 week and for FOLFIRI and Cyramza. DC pump on day 3. Neulasta OBI  on day 3. Patient would like the latest appointment possible.

## 2018-01-12 ENCOUNTER — TELEPHONE (OUTPATIENT)
Dept: HEMATOLOGY/ONCOLOGY | Facility: CLINIC | Age: 68
End: 2018-01-12

## 2018-01-12 NOTE — TELEPHONE ENCOUNTER
Received consult from Dr. Abernathy re: arranging for a lightweight wheelchair for patient. Patient has [employer] [place] and Medicare insurance. Called [employer] [phone], and spoke with Aleshia who confirmed that they are providers for both of [facility]'s insurances and can provide the lightweight wheelchair for patient. Faxed to [facility]'s attention at [phone] the order, patient's demographic and insurance information, list of diagnoses (SnapShot information), and physician's current note. Called company back and confirmed with  Aleshia that she received the fax. She stated that she will process the referral and call patient re: wheelchair delivery arrangements. Called patient at his home number and discussed arrangments with him and he stated agreement. Patient reported no other needs or concerns at this time.

## 2018-01-16 ENCOUNTER — OFFICE VISIT (OUTPATIENT)
Dept: HEMATOLOGY/ONCOLOGY | Facility: CLINIC | Age: 68
End: 2018-01-16
Payer: COMMERCIAL

## 2018-01-16 ENCOUNTER — HOSPITAL ENCOUNTER (OUTPATIENT)
Facility: HOSPITAL | Age: 68
Discharge: HOME | End: 2018-01-17
Attending: EMERGENCY MEDICINE | Admitting: EMERGENCY MEDICINE
Payer: COMMERCIAL

## 2018-01-16 VITALS
HEIGHT: 70 IN | OXYGEN SATURATION: 99 % | DIASTOLIC BLOOD PRESSURE: 71 MMHG | WEIGHT: 199.06 LBS | HEART RATE: 148 BPM | RESPIRATION RATE: 22 BRPM | TEMPERATURE: 98 F | SYSTOLIC BLOOD PRESSURE: 121 MMHG | BODY MASS INDEX: 28.5 KG/M2

## 2018-01-16 DIAGNOSIS — C20 RECTAL CANCER: Primary | ICD-10-CM

## 2018-01-16 DIAGNOSIS — N18.3 ACUTE RENAL FAILURE SUPERIMPOSED ON STAGE 3 CHRONIC KIDNEY DISEASE, UNSPECIFIED ACUTE RENAL FAILURE TYPE: ICD-10-CM

## 2018-01-16 DIAGNOSIS — N17.9 ACUTE RENAL FAILURE SUPERIMPOSED ON STAGE 3 CHRONIC KIDNEY DISEASE, UNSPECIFIED ACUTE RENAL FAILURE TYPE: ICD-10-CM

## 2018-01-16 DIAGNOSIS — R62.7 FAILURE TO THRIVE IN ADULT: ICD-10-CM

## 2018-01-16 DIAGNOSIS — T45.1X5A CHEMOTHERAPY-INDUCED THROMBOCYTOPENIA: ICD-10-CM

## 2018-01-16 DIAGNOSIS — R06.02 SOB (SHORTNESS OF BREATH): ICD-10-CM

## 2018-01-16 DIAGNOSIS — C78.00 RECTAL CANCER METASTASIZED TO LUNG: ICD-10-CM

## 2018-01-16 DIAGNOSIS — R00.0 TACHYCARDIA: ICD-10-CM

## 2018-01-16 DIAGNOSIS — E11.9 TYPE 2 DIABETES MELLITUS WITHOUT COMPLICATION, WITHOUT LONG-TERM CURRENT USE OF INSULIN: ICD-10-CM

## 2018-01-16 DIAGNOSIS — D69.59 CHEMOTHERAPY-INDUCED THROMBOCYTOPENIA: ICD-10-CM

## 2018-01-16 DIAGNOSIS — C78.01 SECONDARY ADENOCARCINOMA OF RIGHT LUNG: ICD-10-CM

## 2018-01-16 DIAGNOSIS — C20 RECTAL CANCER METASTASIZED TO LUNG: ICD-10-CM

## 2018-01-16 DIAGNOSIS — N40.0 PROSTATE HYPERPLASIA WITHOUT URINARY OBSTRUCTION: ICD-10-CM

## 2018-01-16 DIAGNOSIS — I48.92 ATRIAL FLUTTER: ICD-10-CM

## 2018-01-16 PROBLEM — N18.30 ACUTE RENAL FAILURE SUPERIMPOSED ON STAGE 3 CHRONIC KIDNEY DISEASE: Status: ACTIVE | Noted: 2018-01-16

## 2018-01-16 PROBLEM — N18.30 CKD (CHRONIC KIDNEY DISEASE), STAGE III: Status: ACTIVE | Noted: 2018-01-16

## 2018-01-16 LAB
ALBUMIN SERPL BCP-MCNC: 2.5 G/DL
ALP SERPL-CCNC: 165 U/L
ALT SERPL W/O P-5'-P-CCNC: 12 U/L
ANION GAP SERPL CALC-SCNC: 9 MMOL/L
ANISOCYTOSIS BLD QL SMEAR: ABNORMAL
AST SERPL-CCNC: 18 U/L
BACTERIA #/AREA URNS AUTO: ABNORMAL /HPF
BASOPHILS # BLD AUTO: 0.04 K/UL
BASOPHILS NFR BLD: 0.6 %
BILIRUB SERPL-MCNC: 0.5 MG/DL
BILIRUB UR QL STRIP: NEGATIVE
BUN SERPL-MCNC: 33 MG/DL
CALCIUM SERPL-MCNC: 8.8 MG/DL
CHLORIDE SERPL-SCNC: 109 MMOL/L
CLARITY UR REFRACT.AUTO: ABNORMAL
CO2 SERPL-SCNC: 22 MMOL/L
COLOR UR AUTO: ABNORMAL
CREAT SERPL-MCNC: 1.8 MG/DL
DIFFERENTIAL METHOD: ABNORMAL
EOSINOPHIL # BLD AUTO: 0.1 K/UL
EOSINOPHIL NFR BLD: 1.6 %
ERYTHROCYTE [DISTWIDTH] IN BLOOD BY AUTOMATED COUNT: 23 %
EST. GFR  (AFRICAN AMERICAN): 43.4 ML/MIN/1.73 M^2
EST. GFR  (NON AFRICAN AMERICAN): 37.6 ML/MIN/1.73 M^2
ESTIMATED AVG GLUCOSE: 111 MG/DL
GLUCOSE SERPL-MCNC: 91 MG/DL
GLUCOSE UR QL STRIP: NEGATIVE
HBA1C MFR BLD HPLC: 5.5 %
HCT VFR BLD AUTO: 31.3 %
HGB BLD-MCNC: 9.6 G/DL
HGB UR QL STRIP: ABNORMAL
HYALINE CASTS UR QL AUTO: 0 /LPF
IMM GRANULOCYTES # BLD AUTO: 0.03 K/UL
IMM GRANULOCYTES NFR BLD AUTO: 0.4 %
INR PPP: 1.1
INR PPP: 1.1
KETONES UR QL STRIP: NEGATIVE
LEUKOCYTE ESTERASE UR QL STRIP: ABNORMAL
LYMPHOCYTES # BLD AUTO: 1.2 K/UL
LYMPHOCYTES NFR BLD: 17.2 %
MAGNESIUM SERPL-MCNC: 1.8 MG/DL
MCH RBC QN AUTO: 27.2 PG
MCHC RBC AUTO-ENTMCNC: 30.7 G/DL
MCV RBC AUTO: 89 FL
MICROSCOPIC COMMENT: ABNORMAL
MONOCYTES # BLD AUTO: 0.7 K/UL
MONOCYTES NFR BLD: 10.8 %
NEUTROPHILS # BLD AUTO: 4.8 K/UL
NEUTROPHILS NFR BLD: 69.4 %
NITRITE UR QL STRIP: NEGATIVE
NRBC BLD-RTO: 0 /100 WBC
OVALOCYTES BLD QL SMEAR: ABNORMAL
PH UR STRIP: 7 [PH] (ref 5–8)
PHOSPHATE SERPL-MCNC: 3.7 MG/DL
PLATELET # BLD AUTO: 77 K/UL
PMV BLD AUTO: 9.6 FL
POIKILOCYTOSIS BLD QL SMEAR: SLIGHT
POTASSIUM SERPL-SCNC: 3.8 MMOL/L
PROT SERPL-MCNC: 6.6 G/DL
PROT UR QL STRIP: ABNORMAL
PROTHROMBIN TIME: 11 SEC
PROTHROMBIN TIME: 11.1 SEC
RBC # BLD AUTO: 3.53 M/UL
RBC #/AREA URNS AUTO: 29 /HPF (ref 0–4)
SODIUM SERPL-SCNC: 140 MMOL/L
SP GR UR STRIP: 1.01 (ref 1–1.03)
SQUAMOUS #/AREA URNS AUTO: 1 /HPF
URN SPEC COLLECT METH UR: ABNORMAL
UROBILINOGEN UR STRIP-ACNC: NEGATIVE EU/DL
WBC # BLD AUTO: 6.85 K/UL
WBC #/AREA URNS AUTO: >100 /HPF (ref 0–5)
WBC CLUMPS UR QL AUTO: ABNORMAL

## 2018-01-16 PROCEDURE — 96361 HYDRATE IV INFUSION ADD-ON: CPT

## 2018-01-16 PROCEDURE — 84100 ASSAY OF PHOSPHORUS: CPT

## 2018-01-16 PROCEDURE — 96374 THER/PROPH/DIAG INJ IV PUSH: CPT

## 2018-01-16 PROCEDURE — 83036 HEMOGLOBIN GLYCOSYLATED A1C: CPT

## 2018-01-16 PROCEDURE — 63600175 PHARM REV CODE 636 W HCPCS: Performed by: STUDENT IN AN ORGANIZED HEALTH CARE EDUCATION/TRAINING PROGRAM

## 2018-01-16 PROCEDURE — 99999 PR PBB SHADOW E&M-EST. PATIENT-LVL IV: CPT | Mod: PBBFAC,,, | Performed by: INTERNAL MEDICINE

## 2018-01-16 PROCEDURE — 93010 ELECTROCARDIOGRAM REPORT: CPT | Mod: ,,, | Performed by: INTERNAL MEDICINE

## 2018-01-16 PROCEDURE — 99215 OFFICE O/P EST HI 40 MIN: CPT | Mod: S$GLB,,, | Performed by: INTERNAL MEDICINE

## 2018-01-16 PROCEDURE — 94761 N-INVAS EAR/PLS OXIMETRY MLT: CPT

## 2018-01-16 PROCEDURE — 85025 COMPLETE CBC W/AUTO DIFF WBC: CPT

## 2018-01-16 PROCEDURE — G0378 HOSPITAL OBSERVATION PER HR: HCPCS

## 2018-01-16 PROCEDURE — 85610 PROTHROMBIN TIME: CPT | Mod: 91

## 2018-01-16 PROCEDURE — 25000003 PHARM REV CODE 250: Performed by: STUDENT IN AN ORGANIZED HEALTH CARE EDUCATION/TRAINING PROGRAM

## 2018-01-16 PROCEDURE — 96372 THER/PROPH/DIAG INJ SC/IM: CPT | Mod: 59

## 2018-01-16 PROCEDURE — 25000003 PHARM REV CODE 250: Performed by: EMERGENCY MEDICINE

## 2018-01-16 PROCEDURE — 83735 ASSAY OF MAGNESIUM: CPT

## 2018-01-16 PROCEDURE — 81001 URINALYSIS AUTO W/SCOPE: CPT

## 2018-01-16 PROCEDURE — 85610 PROTHROMBIN TIME: CPT

## 2018-01-16 PROCEDURE — 80053 COMPREHEN METABOLIC PANEL: CPT

## 2018-01-16 PROCEDURE — 93005 ELECTROCARDIOGRAM TRACING: CPT

## 2018-01-16 PROCEDURE — 87040 BLOOD CULTURE FOR BACTERIA: CPT | Mod: 59

## 2018-01-16 PROCEDURE — 99284 EMERGENCY DEPT VISIT MOD MDM: CPT | Mod: 25

## 2018-01-16 RX ORDER — ACETAMINOPHEN 325 MG/1
650 TABLET ORAL EVERY 4 HOURS PRN
Status: DISCONTINUED | OUTPATIENT
Start: 2018-01-16 | End: 2018-01-17 | Stop reason: HOSPADM

## 2018-01-16 RX ORDER — ONDANSETRON 8 MG/1
8 TABLET, ORALLY DISINTEGRATING ORAL EVERY 8 HOURS PRN
Status: DISCONTINUED | OUTPATIENT
Start: 2018-01-16 | End: 2018-01-17 | Stop reason: HOSPADM

## 2018-01-16 RX ORDER — IBUPROFEN 200 MG
16 TABLET ORAL
Status: DISCONTINUED | OUTPATIENT
Start: 2018-01-16 | End: 2018-01-17 | Stop reason: HOSPADM

## 2018-01-16 RX ORDER — SODIUM CHLORIDE 0.9 % (FLUSH) 0.9 %
5 SYRINGE (ML) INJECTION
Status: DISCONTINUED | OUTPATIENT
Start: 2018-01-16 | End: 2018-01-17 | Stop reason: HOSPADM

## 2018-01-16 RX ORDER — INSULIN ASPART 100 [IU]/ML
0-5 INJECTION, SOLUTION INTRAVENOUS; SUBCUTANEOUS
Status: DISCONTINUED | OUTPATIENT
Start: 2018-01-16 | End: 2018-01-17 | Stop reason: HOSPADM

## 2018-01-16 RX ORDER — IBUPROFEN 200 MG
24 TABLET ORAL
Status: DISCONTINUED | OUTPATIENT
Start: 2018-01-16 | End: 2018-01-17 | Stop reason: HOSPADM

## 2018-01-16 RX ORDER — POTASSIUM CHLORIDE 750 MG/1
30 CAPSULE, EXTENDED RELEASE ORAL ONCE
Status: COMPLETED | OUTPATIENT
Start: 2018-01-16 | End: 2018-01-16

## 2018-01-16 RX ORDER — LANOLIN ALCOHOL/MO/W.PET/CERES
400 CREAM (GRAM) TOPICAL ONCE
Status: COMPLETED | OUTPATIENT
Start: 2018-01-16 | End: 2018-01-16

## 2018-01-16 RX ORDER — ONDANSETRON 2 MG/ML
4 INJECTION INTRAMUSCULAR; INTRAVENOUS EVERY 8 HOURS PRN
Status: DISCONTINUED | OUTPATIENT
Start: 2018-01-16 | End: 2018-01-17 | Stop reason: HOSPADM

## 2018-01-16 RX ORDER — SODIUM CHLORIDE AND POTASSIUM CHLORIDE 150; 900 MG/100ML; MG/100ML
INJECTION, SOLUTION INTRAVENOUS CONTINUOUS
Status: DISCONTINUED | OUTPATIENT
Start: 2018-01-16 | End: 2018-01-17 | Stop reason: HOSPADM

## 2018-01-16 RX ORDER — GLUCAGON 1 MG
1 KIT INJECTION
Status: DISCONTINUED | OUTPATIENT
Start: 2018-01-16 | End: 2018-01-17 | Stop reason: HOSPADM

## 2018-01-16 RX ORDER — HEPARIN SODIUM 5000 [USP'U]/ML
5000 INJECTION, SOLUTION INTRAVENOUS; SUBCUTANEOUS EVERY 12 HOURS
Status: DISCONTINUED | OUTPATIENT
Start: 2018-01-16 | End: 2018-01-17

## 2018-01-16 RX ORDER — METOPROLOL TARTRATE 1 MG/ML
5 INJECTION, SOLUTION INTRAVENOUS
Status: COMPLETED | OUTPATIENT
Start: 2018-01-16 | End: 2018-01-16

## 2018-01-16 RX ORDER — METOPROLOL SUCCINATE 100 MG/1
100 TABLET, EXTENDED RELEASE ORAL 2 TIMES DAILY
Status: DISCONTINUED | OUTPATIENT
Start: 2018-01-16 | End: 2018-01-17 | Stop reason: HOSPADM

## 2018-01-16 RX ADMIN — POTASSIUM CHLORIDE AND SODIUM CHLORIDE: 900; 150 INJECTION, SOLUTION INTRAVENOUS at 09:01

## 2018-01-16 RX ADMIN — POTASSIUM CHLORIDE 30 MEQ: 750 CAPSULE, EXTENDED RELEASE ORAL at 09:01

## 2018-01-16 RX ADMIN — HEPARIN SODIUM 5000 UNITS: 5000 INJECTION, SOLUTION INTRAVENOUS; SUBCUTANEOUS at 09:01

## 2018-01-16 RX ADMIN — POTASSIUM CHLORIDE AND SODIUM CHLORIDE: 900; 150 INJECTION, SOLUTION INTRAVENOUS at 10:01

## 2018-01-16 RX ADMIN — MAGNESIUM OXIDE TAB 400 MG (241.3 MG ELEMENTAL MG) 400 MG: 400 (241.3 MG) TAB at 09:01

## 2018-01-16 RX ADMIN — METOPROLOL TARTRATE 5 MG: 5 INJECTION, SOLUTION INTRAVENOUS at 05:01

## 2018-01-16 RX ADMIN — METOPROLOL SUCCINATE 100 MG: 100 TABLET, EXTENDED RELEASE ORAL at 09:01

## 2018-01-16 NOTE — PROVIDER PROGRESS NOTES - EMERGENCY DEPT.
Encounter Date: 1/16/2018    ED Physician Progress Notes         EKG - STEMI Decision  Initial Reading: No STEMI present.    I, Karolina Henry, am scribing for, and in the presence of, Dr. Blair. I performed the above scribed service and the documentation accurately describes the services I performed. I attest to the accuracy of the note.

## 2018-01-16 NOTE — ED PROVIDER NOTES
Encounter Date: 1/16/2018    SCRIBE #1 NOTE: I, Karolina Henry, am scribing for, and in the presence of,  Dr. Blair. I have scribed the entire note.       History     Chief Complaint   Patient presents with    Tachycardia     sent from chemo hr 140-150, hx colorectal cancer, last chemo dec 27     Time seen by provider: 4:46 PM    This is a 67 y.o. male with medical problems including HTN, diabetes, and rectal cancer on chemotherapy who presents with complaint of acute palpitations noted while at oncologist's office prior to arrival. Pt reports no increased SOB but has increased leg swelling and abdominal distension. Pt was scheduled to have chemotherapy tomorrow.       The history is provided by the patient and medical records.     Review of patient's allergies indicates:  No Known Allergies  Past Medical History:   Diagnosis Date    Cancer     rectal cancer    Diabetes mellitus     GERD (gastroesophageal reflux disease)     Hypertension      Past Surgical History:   Procedure Laterality Date    COLON SURGERY      COLONOSCOPY N/A 10/3/2017    Procedure: COLONOSCOPY;  Surgeon: Sandoval Bowling MD;  Location: [address];  Service: Endoscopy;  Laterality: N/A;     Family History   Problem Relation Age of Onset    Cancer Mother     Breast cancer Mother     Tuberculosis Mother     Cancer Father      neuroendocrine      Social History   Substance Use Topics    Smoking status: Never Smoker    Smokeless tobacco: Never Used    Alcohol use No     Review of Systems   Constitutional: Negative for fever.   HENT: Negative for nosebleeds.    Eyes: Negative for visual disturbance.   Respiratory: Negative for shortness of breath.    Cardiovascular: Positive for palpitations and leg swelling.   Gastrointestinal: Negative for abdominal pain.   Genitourinary: Negative for dysuria.   Musculoskeletal: Negative for back pain.   Skin: Negative for rash.   Neurological: Negative for headaches.       Physical Exam      Initial Vitals [01/16/18 1629]   BP Pulse Resp Temp SpO2   119/74 (!) 147 20 98 °F (36.7 °C) 99 %      MAP       89         Physical Exam    Nursing note and vitals reviewed.  Constitutional: He appears well-developed and well-nourished. He is not diaphoretic. No distress.   HENT:   Head: Normocephalic and atraumatic.   Mouth/Throat: Oropharynx is clear and moist.   Eyes: Conjunctivae and EOM are normal. Pupils are equal, round, and reactive to light.   Neck: Normal range of motion. Neck supple. No JVD present.   Cardiovascular: Normal heart sounds. An irregular rhythm present. Tachycardia present.    No murmur heard.  Pulmonary/Chest: Breath sounds normal. No respiratory distress. He has no wheezes. He has no rhonchi. He has no rales.   Abdominal: Bowel sounds are normal. He exhibits no mass. There is no tenderness. There is no rebound and no guarding.   Increased abdominal girth ascites.    Musculoskeletal: Normal range of motion. He exhibits edema (Lower legs). He exhibits no tenderness.   Extremities are atraumatic.   Neurological: He is alert and oriented to person, place, and time. No cranial nerve deficit or sensory deficit.   Skin: Skin is warm and dry. No rash noted.   Psychiatric: He has a normal mood and affect.         ED Course   Procedures  Labs Reviewed - No data to display  EKG Readings: (Independently Interpreted)   Atrial flutter at 147 BPM. Variable block. Left axis. No ischemia.           Medical Decision Making:   History:   Old Medical Records: I decided to obtain old medical records.  Differential Diagnosis:   Differential includes arrhythmia, electrolyte abnormality, and CHF.   Independently Interpreted Test(s):   I have ordered and independently interpreted EKG Reading(s) - see prior notes  Clinical Tests:   Lab Tests: Ordered and Reviewed  Radiological Study: Reviewed and Ordered  Medical Tests: Ordered and Reviewed  Other:   I have discussed this case with another health care  provider.       <> Summary of the Discussion: Cardiology             Scribe Attestation:   Scribe #1: I performed the above scribed service and the documentation accurately describes the services I performed. I attest to the accuracy of the note.    Attending Attestation:           Physician Attestation for Scribe:      Comments: I, Dr. Blair, personally performed the services described in this documentation. All medical record entries made by the scribe were at my direction and in my presence.  I have reviewed the chart and agree that the record reflects my personal performance and is accurate and complete. Heraclio Blair DO  9:15 AM 01/18/2018      Attending ED Notes:   5:32 PM   I spoke with cardiology fellow.           ED Course      Clinical Impression:   The encounter diagnosis was Tachycardia.                           Heraclio Blair DO  01/18/18 0915

## 2018-01-16 NOTE — PROGRESS NOTES
"Subjective:       Patient ID: Karthik Medrano is a 67 y.o. male.    Chief Complaint: Rectal Cancer; freq bms---every hour or two; Ascites; and Tachycardia  Oncologic History:  67 year old male with history of rectal cancer diagnosed 1/11/10 on colonoscopy (moderately differentiated adenocarcinoma) and based on imaging, was T3NxM0. Patient underwent Neoadjuvant chemort with infusional 5FU. This was completed 3/19/10 with a total of 50.4Gy given to the pelvis. He went to surgery 5/13/10. T3N0M0. Post operative he received no adjuvant treatment and has been followed regularly. He went to his PCP 6/24/13 who ordered a CXR which found pulmonary nodules. On 6/27/13, CT chest confirmed these findings. CEA drawn when initially diagnosed was 2.6ng/ml and 6/27/13 was 30.8 ng/ml. . Pulmonary biopsy was performed 7/18/13 and this confirmed metastatic adenocarcinoma. 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 Avastin was added for second cycle.    Patient completed 12 cycles of FOLFOX avastin. He did not receive Avastin for cycle 3, 4, or 5 (due to uncontrolled blood pressure).    He was on maintenance infusional 5FU and Avastin. Repeat scans 4/14/14 showed numerous bilateral pulmonary soft tissue nodules, the majority of which demonstrate interval enlargement when compared to the prior exam. CEA up 18.2 from 13.    He was restarted on FOLFIRI and AVastin and received 6 months of chemo and then was switched to maintenance chemotherapy with 5FU and Avastin. His CT scans from early May 2015 revealed progression in lung lesions.  He is now on FOLFIRI and Cyramza.  CT from 4/19/16 reveals "Multiple stable metastatic pulmonary nodules in this patient with history of rectal cancer.  Stable postoperative changes are present from prior partial colectomy.Interval increase in right-sided hydronephrosis despite appropriate positioning of a double-J ureteral sent.  There is associated enlargement and decreased " "perfusion to the right kidney consistent with obstruction. These findings are concerning for a nonfunctioning ureteral stent. Cholelithiasis."  CT from 6/13/16 reveal stable disease and hydronephrosis has resolved.  CT from 8/5/16 reveals "Stable pulmonary metastatic disease.Increased prominence of periureteral fat stranding/inflammatory change. Stable mild right hydronephrosis."  CT from 1/6/17 "1. Stable appearance of pulmonary metastatic disease in this patient with history of colon cancer. Pulmonary index lesions are stable in size compared to prior examination. No evidence of new metastatic disease. 2. Right-sided double-J ureteral stent with stable appearing inflammatory change about the right renal collecting system and right ureter. 3. Additional stable incidental findings as discussed above. "     CT scans reveal "In this patient with a history of rectal cancer, index lesions in the right and left lung are stable in size as detailed above. No new pulmonary nodules or masses are identified. There is no evidence of abnormal soft tissue opacity in the rectosigmoid region to suggest residual/recurrent disease. Interval increase in the degree of right hydronephrosis with a stable double-J right ureteral stent in place. Interval development of perihepatic ascites. Stable fat and bowel containing ventral hernia"     CT scans 7/21/17 reveal "Stable appearance of multiple pulmonary lesions, compatible with metastatic disease, in this patient with given history of rectal cancer.  Index lesions below. Surgical changes from low anterior resection of rectal mass, without evidence of local recurrence. Stable right-sided moderate hydroureteronephrosis, with periureteral stranding ; appropriately positioned double-J ureteral stent.  Findings are concerning for UTI. There is thinning of the right renal parenchyma, concerning for chronic obstruction or inflammation. Stable appearing soft tissue collection with dystrophic " "calcifications in the presacral space, which may be postsurgical or post radiation in etiology.  This finding may cause obstruction of the right ureter."     CT scans 9/2/17 reveal "1. Status post low anterior resection for colon cancer. 2. Pulmonary metastatic disease without significant interval change. See summary below. 3. Continued moderate volume of ascites, possibly malignant ascites. 4. Chronic obstruction of right distal right ureter despite presence of ureter stent. Distal right ureter may be involved by soft tissue finding in presacral area, favored to represent postsurgical or post radiation change. 5. Cholelithiasis. 6. Ventral hernias containing ascites and small bowel without obstruction."           On 12/27/17 --dose reduced FOLFIRI and Cyramza. He understands that if he declines with reduced dose chemo, we may have to consider hospice.           HPI Mr. Medrano returns for follow up and for FOLFIRI and Cyramza.  He has been having paracentesis every 10 days. He is very weak and short ob breath. He has gained 14 lbs in 1 week mostly from fluid accumulation in his legs and abdomen.      Review of Systems   Constitutional: Positive for fatigue and unexpected weight change. Negative for appetite change.   HENT: Negative for mouth sores.    Eyes: Negative for visual disturbance.   Respiratory: Positive for cough and shortness of breath.    Cardiovascular: Negative for chest pain.   Gastrointestinal: Positive for abdominal distention. Negative for abdominal pain and diarrhea.   Genitourinary: Negative for frequency.   Musculoskeletal: Negative for back pain.   Skin: Negative for rash.   Neurological: Negative for headaches.   Hematological: Negative for adenopathy.   Psychiatric/Behavioral: The patient is not nervous/anxious.    All other systems reviewed and are negative.      PMFSH: all information reviewed and updated as relevant to today's visit  Objective:      Physical Exam   Constitutional: He is " oriented to person, place, and time. He appears well-developed. He has a sickly appearance. He appears distressed.   HENT:   Mouth/Throat: No oropharyngeal exudate.   Cardiovascular: Normal heart sounds.  Tachycardia present.    Pulse is weak.  Bilateral pitting pedal edema.     Pulmonary/Chest: Effort normal and breath sounds normal. He has no wheezes.   Abdominal: Soft. Bowel sounds are normal. He exhibits distension, fluid wave and ascites. There is no tenderness.   Musculoskeletal: He exhibits no edema or tenderness.   Lymphadenopathy:     He has no cervical adenopathy.   Neurological: He is alert and oriented to person, place, and time. Coordination normal.   Skin: Skin is warm. No rash noted. He is diaphoretic.   Psychiatric: He has a normal mood and affect. Judgment and thought content normal.   Vitals reviewed.        LABS:  WBC   Date Value Ref Range Status   01/15/2018 5.81 3.90 - 12.70 K/uL Final     Hemoglobin   Date Value Ref Range Status   01/15/2018 9.9 (L) 14.0 - 18.0 g/dL Final     Hematocrit   Date Value Ref Range Status   01/15/2018 32.6 (L) 40.0 - 54.0 % Final     Platelets   Date Value Ref Range Status   01/15/2018 108 (L) 150 - 350 K/uL Final     Gran #   Date Value Ref Range Status   01/15/2018 4.1 1.8 - 7.7 K/uL Final     Comment:     The ANC is based on a white cell differential from an   automated cell counter. It has not been microscopically   reviewed for the presence of abnormal cells. Clinical   correlation is required.         Chemistry        Component Value Date/Time     01/15/2018 1715    K 4.2 01/15/2018 1715     01/15/2018 1715    CO2 21 (L) 01/15/2018 1715    BUN 30 (H) 01/15/2018 1715    CREATININE 1.6 (H) 01/15/2018 1715     (H) 01/15/2018 1715        Component Value Date/Time    CALCIUM 9.0 01/15/2018 1715    ALKPHOS 168 (H) 01/15/2018 1715    AST 16 01/15/2018 1715    ALT 12 01/15/2018 1715    BILITOT 0.5 01/15/2018 1715    ESTGFRAFRICA 50.1 (A) 01/15/2018  1715    EGFRNONAA 43.3 (A) 01/15/2018 1715          Assessment:       1. Rectal cancer    2. Secondary adenocarcinoma of right lung    3. Failure to thrive in adult    4. Prostate hyperplasia without urinary obstruction    5. Chemotherapy-induced thrombocytopenia    6. Acute renal failure superimposed on stage 3 chronic kidney disease, unspecified acute renal failure type    7. Type 2 diabetes mellitus without complication, without long-term current use of insulin    8. Tachycardia        Plan:        Mr. Medrano has significant decline in performance status. He is unable to tolerate IV chemotherapy. We have discussed hospice and he notes that he is not there psychologically. He has been having ascitis with paracentesis being done atleast every 10 days,. Today he is noted to be very weak, with thready tachycardia. Felt to be unsafe to be sent home. Discussed going to ER for further evaluation of Cardiac versus pulmonary versus general deconditioning from his malignancy.  Will discontinue IV chemotherapy at this time and will plan on Regorafenib if his performance status improves any, although it seems unlikely at this time.    Above care plan was discussed with patient and accompanying wife and all questions were addressed to their satisfaction

## 2018-01-17 ENCOUNTER — TELEPHONE (OUTPATIENT)
Dept: PHARMACY | Facility: CLINIC | Age: 68
End: 2018-01-17

## 2018-01-17 ENCOUNTER — TELEPHONE (OUTPATIENT)
Dept: HEMATOLOGY/ONCOLOGY | Facility: CLINIC | Age: 68
End: 2018-01-17

## 2018-01-17 VITALS
BODY MASS INDEX: 28.63 KG/M2 | HEART RATE: 114 BPM | OXYGEN SATURATION: 99 % | DIASTOLIC BLOOD PRESSURE: 68 MMHG | SYSTOLIC BLOOD PRESSURE: 108 MMHG | HEIGHT: 70 IN | RESPIRATION RATE: 18 BRPM | TEMPERATURE: 98 F | WEIGHT: 200 LBS

## 2018-01-17 DIAGNOSIS — C78.00 RECTAL CANCER METASTASIZED TO LUNG: ICD-10-CM

## 2018-01-17 DIAGNOSIS — C20 RECTAL CANCER: Primary | ICD-10-CM

## 2018-01-17 DIAGNOSIS — C20 RECTAL CANCER METASTASIZED TO LUNG: ICD-10-CM

## 2018-01-17 PROBLEM — N39.0 URINARY TRACT INFECTION WITHOUT HEMATURIA: Status: ACTIVE | Noted: 2018-01-17

## 2018-01-17 LAB
ANION GAP SERPL CALC-SCNC: 7 MMOL/L
BASOPHILS # BLD AUTO: 0.06 K/UL
BASOPHILS NFR BLD: 1 %
BUN SERPL-MCNC: 33 MG/DL
CALCIUM SERPL-MCNC: 8.7 MG/DL
CHLORIDE SERPL-SCNC: 113 MMOL/L
CO2 SERPL-SCNC: 20 MMOL/L
CREAT SERPL-MCNC: 1.6 MG/DL
DIFFERENTIAL METHOD: ABNORMAL
EOSINOPHIL # BLD AUTO: 0.1 K/UL
EOSINOPHIL NFR BLD: 2.1 %
ERYTHROCYTE [DISTWIDTH] IN BLOOD BY AUTOMATED COUNT: 22.6 %
EST. GFR  (AFRICAN AMERICAN): 50.1 ML/MIN/1.73 M^2
EST. GFR  (NON AFRICAN AMERICAN): 43.3 ML/MIN/1.73 M^2
GLUCOSE SERPL-MCNC: 93 MG/DL
HCT VFR BLD AUTO: 29.6 %
HGB BLD-MCNC: 9 G/DL
IMM GRANULOCYTES # BLD AUTO: 0.01 K/UL
IMM GRANULOCYTES NFR BLD AUTO: 0.2 %
LYMPHOCYTES # BLD AUTO: 1.2 K/UL
LYMPHOCYTES NFR BLD: 19.1 %
MAGNESIUM SERPL-MCNC: 1.8 MG/DL
MCH RBC QN AUTO: 27.2 PG
MCHC RBC AUTO-ENTMCNC: 30.4 G/DL
MCV RBC AUTO: 89 FL
MONOCYTES # BLD AUTO: 0.7 K/UL
MONOCYTES NFR BLD: 11.9 %
NEUTROPHILS # BLD AUTO: 4 K/UL
NEUTROPHILS NFR BLD: 65.7 %
NRBC BLD-RTO: 0 /100 WBC
PHOSPHATE SERPL-MCNC: 4 MG/DL
PLATELET # BLD AUTO: 41 K/UL
PLATELET BLD QL SMEAR: ABNORMAL
PMV BLD AUTO: 10 FL
POCT GLUCOSE: 133 MG/DL (ref 70–110)
POTASSIUM SERPL-SCNC: 4.2 MMOL/L
RBC # BLD AUTO: 3.31 M/UL
SODIUM SERPL-SCNC: 140 MMOL/L
WBC # BLD AUTO: 6.06 K/UL

## 2018-01-17 PROCEDURE — 83735 ASSAY OF MAGNESIUM: CPT

## 2018-01-17 PROCEDURE — 25000003 PHARM REV CODE 250: Performed by: STUDENT IN AN ORGANIZED HEALTH CARE EDUCATION/TRAINING PROGRAM

## 2018-01-17 PROCEDURE — 36415 COLL VENOUS BLD VENIPUNCTURE: CPT

## 2018-01-17 PROCEDURE — 97166 OT EVAL MOD COMPLEX 45 MIN: CPT

## 2018-01-17 PROCEDURE — 80048 BASIC METABOLIC PNL TOTAL CA: CPT

## 2018-01-17 PROCEDURE — G0378 HOSPITAL OBSERVATION PER HR: HCPCS

## 2018-01-17 PROCEDURE — G8988 SELF CARE GOAL STATUS: HCPCS | Mod: CK

## 2018-01-17 PROCEDURE — 84100 ASSAY OF PHOSPHORUS: CPT

## 2018-01-17 PROCEDURE — G8987 SELF CARE CURRENT STATUS: HCPCS | Mod: CK

## 2018-01-17 PROCEDURE — G8989 SELF CARE D/C STATUS: HCPCS | Mod: CK

## 2018-01-17 PROCEDURE — 63600175 PHARM REV CODE 636 W HCPCS: Performed by: STUDENT IN AN ORGANIZED HEALTH CARE EDUCATION/TRAINING PROGRAM

## 2018-01-17 PROCEDURE — 97535 SELF CARE MNGMENT TRAINING: CPT

## 2018-01-17 PROCEDURE — 85025 COMPLETE CBC W/AUTO DIFF WBC: CPT

## 2018-01-17 PROCEDURE — 99217 PR OBSERVATION CARE DISCHARGE: CPT | Mod: ,,, | Performed by: INTERNAL MEDICINE

## 2018-01-17 RX ORDER — AMOXICILLIN AND CLAVULANATE POTASSIUM 875; 125 MG/1; MG/1
1 TABLET, FILM COATED ORAL EVERY 12 HOURS
Qty: 14 TABLET | Refills: 0 | Status: SHIPPED | OUTPATIENT
Start: 2018-01-17 | End: 2018-01-24

## 2018-01-17 RX ORDER — AMOXICILLIN AND CLAVULANATE POTASSIUM 875; 125 MG/1; MG/1
1 TABLET, FILM COATED ORAL EVERY 12 HOURS
Status: DISCONTINUED | OUTPATIENT
Start: 2018-01-17 | End: 2018-01-17 | Stop reason: HOSPADM

## 2018-01-17 RX ORDER — MAGNESIUM SULFATE HEPTAHYDRATE 40 MG/ML
2 INJECTION, SOLUTION INTRAVENOUS ONCE
Status: COMPLETED | OUTPATIENT
Start: 2018-01-17 | End: 2018-01-17

## 2018-01-17 RX ADMIN — MAGNESIUM SULFATE IN WATER 2 G: 40 INJECTION, SOLUTION INTRAVENOUS at 09:01

## 2018-01-17 RX ADMIN — METOPROLOL SUCCINATE 100 MG: 100 TABLET, EXTENDED RELEASE ORAL at 09:01

## 2018-01-17 RX ADMIN — AMOXICILLIN AND CLAVULANATE POTASSIUM 1 TABLET: 875; 125 TABLET, FILM COATED ORAL at 10:01

## 2018-01-17 NOTE — ASSESSMENT & PLAN NOTE
-Cr baseline appears 1.3-1.5, Cr on admit 1.8. Suspect 2/2 to decreased PO intake   -will give small fluid bolus  -follow up UA  -avoid nephrotoxic agents

## 2018-01-17 NOTE — H&P
[facility]  Hematology/Oncology  H&P    Patient Name: Karthik Medrano  MRN: [id number]  Admission Date: 1/16/2018  Code Status: Full Code   Attending Provider: Pratima Ndiaye MD  Primary Care Physician: Pinky Thornton MD  Principal Problem:Atrial flutter with rapid ventricular response    Subjective:     HPI: 68 yo male with metastatic rectal cancer (on FOLFIRI/Cyramza) c/b repeated paracentesis (q2 wks), HTN, DM who is admitted for aflutter. Pt was sent from H/O clinic for further evaluation of tachycardia. In ED, pt was found to be in aflutter with variable AV block. Pt was given 1x dose of lopressor with improvement of HR. Pt states that he was taking toprol 100 mg BID since 2013 but had stopped taking it 3 mo ago for low BP. He states that his SBP was normally in 140s but dropped to 120s and asymtomatic. Currently denies CP, palpitations, SOB, cough, fevers, abdominal pain, n/v/changes in BM. He reports that he has LE swelling and abdominal distention a/w his ascites. Also reports of weakness and decreased PO intake.      Oncology Treatment Plan:   OP RAMUCIRUMAB + FOLFIRI    Medications:  Continuous Infusions:  Scheduled Meds:   heparin (porcine)  5,000 Units Subcutaneous Q12H    metoprolol succinate  100 mg Oral BID     PRN Meds:acetaminophen, dextrose 50%, dextrose 50%, glucagon (human recombinant), glucose, glucose, insulin aspart, ondansetron, ondansetron, sodium chloride 0.9%     Review of patient's allergies indicates:  No Known Allergies     Past Medical History:   Diagnosis Date    Diabetes mellitus     GERD (gastroesophageal reflux disease)     Hypertension     Rectal cancer metastasized to lung 01/11/2010     Past Surgical History:   Procedure Laterality Date    COLON SURGERY      COLONOSCOPY N/A 10/3/2017    Procedure: COLONOSCOPY;  Surgeon: Sandoval Bowling MD;  Location: [address];  Service: Endoscopy;  Laterality: N/A;     Family History     Problem  Relation (Age of Onset)    Breast cancer Mother    Cancer Mother, Father    Tuberculosis Mother        Social History Main Topics    Smoking status: Never Smoker    Smokeless tobacco: Never Used    Alcohol use No    Drug use: Unknown    Sexual activity: Not on file       Review of Systems   Constitutional: Negative for chills and fever.   HENT: Negative for sore throat.    Eyes: Negative for visual disturbance.   Respiratory: Negative for cough and shortness of breath.    Cardiovascular: Positive for leg swelling. Negative for chest pain and palpitations.   Gastrointestinal: Positive for abdominal distention. Negative for abdominal pain, blood in stool, constipation, nausea and vomiting.   Genitourinary: Negative for dysuria.   Musculoskeletal: Negative for arthralgias and myalgias.   Skin: Negative for rash.   Neurological: Positive for weakness. Negative for dizziness and headaches.   Psychiatric/Behavioral: Negative for agitation.     Objective:     Vital Signs (Most Recent):  Temp: 98 °F (36.7 °C) (01/16/18 1629)  Pulse: (!) 134 (01/16/18 1927)  Resp: 20 (01/16/18 1802)  BP: 118/79 (01/16/18 1900)  SpO2: 99 % (01/16/18 1927) Vital Signs (24h Range):  Temp:  [97.5 °F (36.4 °C)-98 °F (36.7 °C)] 98 °F (36.7 °C)  Pulse:  [111-148] 134  Resp:  [16-25] 20  SpO2:  [98 %-100 %] 99 %  BP: (105-121)/(60-80) 118/79     Weight: 90.7 kg (200 lb)  Body mass index is 28.7 kg/m².  Body surface area is 2.12 meters squared.    No intake or output data in the 24 hours ending 01/16/18 2032    Physical Exam   Constitutional: He is oriented to person, place, and time. No distress.   Pale     HENT:   Mouth/Throat: Oropharynx is clear and moist. No oropharyngeal exudate.   Eyes: No scleral icterus.   Cardiovascular: An irregularly irregular rhythm present. Tachycardia present.    Pulmonary/Chest: Effort normal and breath sounds normal. No respiratory distress.   Abdominal: Soft. Bowel sounds are normal. He exhibits distension.  There is no tenderness.   +ascites   Musculoskeletal: He exhibits edema (3+ LE pitting edema). He exhibits no tenderness.   Lymphadenopathy:     He has no cervical adenopathy.   Neurological: He is alert and oriented to person, place, and time.   Skin: Skin is warm and dry. No rash noted. There is pallor.   Psychiatric: He has a normal mood and affect.       Significant Labs:   CBC:   Recent Labs  Lab 01/15/18  1715 01/16/18  1709   WBC 5.81 6.85   HGB 9.9* 9.6*   HCT 32.6* 31.3*   * 77*    and CMP:   Recent Labs  Lab 01/15/18  1715 01/16/18  1709    140   K 4.2 3.8    109   CO2 21* 22*   * 91   BUN 30* 33*   CREATININE 1.6* 1.8*   CALCIUM 9.0 8.8   PROT 7.0 6.6   ALBUMIN 2.7* 2.5*   BILITOT 0.5 0.5   ALKPHOS 168* 165*   AST 16 18   ALT 12 12   ANIONGAP 10 9   EGFRNONAA 43.3* 37.6*       Diagnostic Results:  I have reviewed and interpreted all pertinent imaging results/findings within the past 24 hours.    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 Pt will need risk/benefit discussion in regards to AC  -tele  -keep Mag >2, K >4  -will give small 500 cc bolus with KCl as pt states that he has not had good PO intake        Rectal cancer metastasized to lung    -dx in 1/11/10 on cscope  -mets to lung  -currently on reduced dose FOLFIRI/Cyramza; however on hold as pt has had significant decline in performance status with plans of regorafenib if improvement on performance status  -last received FOLFIR 12/27  -requiring therapeutic bruce q10 days or so, last had para 1/5 with removal of 5L          Acute renal failure superimposed on stage 3 chronic kidney disease    -Cr baseline appears 1.3-1.5, Cr on admit 1.8. Suspect 2/2 to decreased PO intake   -will give small fluid bolus  -follow up UA  -avoid nephrotoxic  agents        Type 2 diabetes mellitus with stage 3 chronic kidney disease, without long-term current use of insulin    Will hold oral meds, SSI for now            DVT ppx: mechanical  Diet: cardiac/diabetic    NITIN Navas  Hematology/Oncology  [facility]

## 2018-01-17 NOTE — ASSESSMENT & PLAN NOTE
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 Pt will need risk/benefit discussion in regards to AC  -tele  -keep Mag >2, K >4  -will give small 500 cc bolus with KCl as pt states that he has not had good PO intake

## 2018-01-17 NOTE — PROGRESS NOTES
[facility]  Hematology/Oncology  Progress Note    Patient Name: Karthik Medrano  Admission Date: 1/16/2018  Hospital Length of Stay: 0 days  Code Status: Full Code     Subjective:     HPI:  68 yo male with metastatic rectal cancer (on FOLFIRI/Cyramza) c/b repeated paracentesis (q2 wks), HTN, DM who is admitted for aflutter. Pt was sent from H/O clinic for further evaluation of tachycardia. In ED, pt was found to be in aflutter with variable AV block. Pt was given 1x dose of lopressor with improvement of HR. Pt states that he was taking toprol 100 mg BID since 2013 but had stopped taking it 3 mo ago for low BP. He states that his SBP was normally in 140s but dropped to 120s and asymtomatic. Currently denies CP, palpitations, SOB, cough, fevers, abdominal pain, n/v/changes in BM. He reports that he has LE swelling and abdominal distention a/w his ascites. Also reports of weakness and decreased PO intake.     Interval History: NAEON. Continues to be asymptomatic. HR stabilized after restating home metoprolol. UA strongly suggestive of UTI. Plan for discharge today.    Oncology Treatment Plan:   OP RAMUCIRUMAB + FOLFIRI    Medications:  Continuous Infusions:   0/9% NACL & POTASSIUM CHLORIDE 20 MEQ/L Stopped (01/17/18 0202)     Scheduled Meds:   amoxicillin-clavulanate 875-125mg  1 tablet Oral Q12H    magnesium sulfate IVPB  2 g Intravenous Once    metoprolol succinate  100 mg Oral BID     PRN Meds:acetaminophen, dextrose 50%, dextrose 50%, glucagon (human recombinant), glucose, glucose, insulin aspart, ondansetron, ondansetron, sodium chloride 0.9%     Review of Systems   Constitutional: Negative for chills and fever.   HENT: Negative for sore throat.    Eyes: Negative for visual disturbance.   Respiratory: Negative for cough and shortness of breath.    Cardiovascular: Positive for leg swelling. Negative for chest pain and palpitations.   Gastrointestinal: Positive for abdominal distention.  Negative for abdominal pain, blood in stool, constipation, nausea and vomiting.   Genitourinary: Negative for dysuria.   Musculoskeletal: Negative for arthralgias and myalgias.   Skin: Negative for rash.   Neurological: Negative for dizziness and headaches.   Psychiatric/Behavioral: Negative for agitation.     Objective:     Vital Signs (Most Recent):  Temp: 98 °F (36.7 °C) (01/17/18 0759)  Pulse: (!) 114 (01/17/18 0759)  Resp: 18 (01/17/18 0522)  BP: 108/68 (01/17/18 0759)  SpO2: 99 % (01/17/18 0759) Vital Signs (24h Range):  Temp:  [97.3 °F (36.3 °C)-98.3 °F (36.8 °C)] 98 °F (36.7 °C)  Pulse:  [] 114  Resp:  [16-25] 18  SpO2:  [97 %-100 %] 99 %  BP: (105-121)/(56-80) 108/68     Weight: 90.7 kg (200 lb)  Body mass index is 28.7 kg/m².  Body surface area is 2.12 meters squared.    No intake or output data in the 24 hours ending 01/17/18 1037    Physical Exam   Constitutional: He is oriented to person, place, and time. No distress.   HENT:   Mouth/Throat: Oropharynx is clear and moist. No oropharyngeal exudate.   Eyes: No scleral icterus.   Cardiovascular: Normal rate, regular rhythm and normal heart sounds.    Pulmonary/Chest: Effort normal and breath sounds normal. No respiratory distress.   Abdominal: Soft. Bowel sounds are normal. He exhibits distension and ascites. There is no tenderness.   Musculoskeletal: He exhibits edema (3+ LE pitting edema). He exhibits no tenderness.   Lymphadenopathy:     He has no cervical adenopathy.   Neurological: He is alert and oriented to person, place, and time.   Skin: Skin is warm and dry. No rash noted. There is pallor.   Psychiatric: He has a normal mood and affect.       Significant Labs:   CBC:   Recent Labs  Lab 01/15/18  1715 01/16/18  1709 01/17/18  0353   WBC 5.81 6.85 6.06   HGB 9.9* 9.6* 9.0*   HCT 32.6* 31.3* 29.6*   * 77* 41*   , CMP:   Recent Labs  Lab 01/15/18  1715 01/16/18  1709 01/17/18  0353    140 140   K 4.2 3.8 4.2    109 113*   CO2  21* 22* 20*   * 91 93   BUN 30* 33* 33*   CREATININE 1.6* 1.8* 1.6*   CALCIUM 9.0 8.8 8.7   PROT 7.0 6.6  --    ALBUMIN 2.7* 2.5*  --    BILITOT 0.5 0.5  --    ALKPHOS 168* 165*  --    AST 16 18  --    ALT 12 12  --    ANIONGAP 10 9 7*   EGFRNONAA 43.3* 37.6* 43.3*   , Coagulation:   Recent Labs  Lab 01/16/18  1709 01/16/18 1952   INR 1.1 1.1   , Urine Studies:   Recent Labs  Lab 01/16/18 1949   COLORU Ernestina   APPEARANCEUA Cloudy*   PHUR 7.0   SPECGRAV 1.010   PROTEINUA 1+*   GLUCUA Negative   KETONESU Negative   BILIRUBINUA Negative   OCCULTUA 2+*   NITRITE Negative   UROBILINOGEN Negative   LEUKOCYTESUR 3+*   RBCUA 29*   WBCUA >100*   BACTERIA Many*   SQUAMEPITHEL 1   HYALINECASTS 0    and All pertinent labs from the last 24 hours have been reviewed.      Diagnostic Results:  I have reviewed all pertinent imaging results/findings within the past 24 hours.    Assessment/Plan:     * Atrial flutter with rapid ventricular response    - Likely to be secondary to beta blocker non-compliance.  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  - Restarted on home dose of metoprolol (Toprol-XL 100mg BID).  - HR stabilized today (90s-100s).  - Stable for discharge.        Acute renal failure superimposed on stage 3 chronic kidney disease    - Cr baseline appears 1.3-1.5, Cr on admit 1.8. Suspect 2/2 to decreased PO intake   - Received IV fluids on admission.  - Cr improved to 1.6 today.        Rectal cancer metastasized to lung    - Diagnosed in 1/11/10 on colonoscopy.  - Currently on reduced dose FOLFIRI/Cyramza; however on hold as pt has had significant decline in performance status with plans of regorafenib if improvement on performance status.  - Last received FOLFIR 12/27.  - Requiring therapeutic bruce q10 days or so, last had para 1/5 with removal of 5L.        Urinary tract infection without hematuria    - UA strongly suggestive of UTI (>100 WBC, many bacteria).  -  Discharged on 7-day course of Augmentin.        Type 2 diabetes mellitus with stage 3 chronic kidney disease, without long-term current use of insulin    - SSI as inpatient.  - Continue home medications on discharge.            Dispo: Discharge home today.      Em Harris MD  Hematology/Oncology  [facility]  Pager # [phone]  [employer]Link # [phone]    Attending Note  I have personally taken the history and examined this patient and agree with the resident's note as stated above.  A flutter controlled back on medication  Reviewed home medications

## 2018-01-17 NOTE — ASSESSMENT & PLAN NOTE
-dx in 1/11/10 on cscope  -mets to lung  -currently on reduced dose FOLFIRI/Cyramza; however on hold as pt has had significant decline in performance status with plans of regorafenib if improvement on performance status  -last received FOLFIR 12/27  -requiring therapeutic bruce q10 days or so, last had para 1/5 with removal of 5L

## 2018-01-17 NOTE — PLAN OF CARE
Problem: Occupational Therapy Goal  Goal: Occupational Therapy Goal  Outcome: Outcome(s) achieved Date Met: 01/17/18  Pt has no acute OT needs, but would benefit from HHOT to improve independence in self care. OT to d/c.    MARA Wan  1/17/2018  Pager: [phone]

## 2018-01-17 NOTE — NURSING
3675- Patient arrived to room via stretcher ambulated with standby assist to room. Patient and wife @ bedside. Oriented patient to room and plan of care. No s/sx of distress. Patient with siderails x3; call light within reach; patient wife to remain @ bedside. Will continue to monitor.

## 2018-01-17 NOTE — SUBJECTIVE & OBJECTIVE
Interval History: NAEON. Continues to be asymptomatic. HR stabilized after restating home metoprolol. UA strongly suggestive of UTI. Plan for discharge today.    Oncology Treatment Plan:   OP RAMUCIRUMAB + FOLFIRI    Medications:  Continuous Infusions:   0/9% NACL & POTASSIUM CHLORIDE 20 MEQ/L Stopped (01/17/18 0202)     Scheduled Meds:   amoxicillin-clavulanate 875-125mg  1 tablet Oral Q12H    magnesium sulfate IVPB  2 g Intravenous Once    metoprolol succinate  100 mg Oral BID     PRN Meds:acetaminophen, dextrose 50%, dextrose 50%, glucagon (human recombinant), glucose, glucose, insulin aspart, ondansetron, ondansetron, sodium chloride 0.9%     Review of Systems   Constitutional: Negative for chills and fever.   HENT: Negative for sore throat.    Eyes: Negative for visual disturbance.   Respiratory: Negative for cough and shortness of breath.    Cardiovascular: Positive for leg swelling. Negative for chest pain and palpitations.   Gastrointestinal: Positive for abdominal distention. Negative for abdominal pain, blood in stool, constipation, nausea and vomiting.   Genitourinary: Negative for dysuria.   Musculoskeletal: Negative for arthralgias and myalgias.   Skin: Negative for rash.   Neurological: Negative for dizziness and headaches.   Psychiatric/Behavioral: Negative for agitation.     Objective:     Vital Signs (Most Recent):  Temp: 98 °F (36.7 °C) (01/17/18 0759)  Pulse: (!) 114 (01/17/18 0759)  Resp: 18 (01/17/18 0522)  BP: 108/68 (01/17/18 0759)  SpO2: 99 % (01/17/18 0759) Vital Signs (24h Range):  Temp:  [97.3 °F (36.3 °C)-98.3 °F (36.8 °C)] 98 °F (36.7 °C)  Pulse:  [] 114  Resp:  [16-25] 18  SpO2:  [97 %-100 %] 99 %  BP: (105-121)/(56-80) 108/68     Weight: 90.7 kg (200 lb)  Body mass index is 28.7 kg/m².  Body surface area is 2.12 meters squared.    No intake or output data in the 24 hours ending 01/17/18 1037    Physical Exam   Constitutional: He is oriented to person, place, and time. No  distress.   HENT:   Mouth/Throat: Oropharynx is clear and moist. No oropharyngeal exudate.   Eyes: No scleral icterus.   Cardiovascular: Normal rate, regular rhythm and normal heart sounds.    Pulmonary/Chest: Effort normal and breath sounds normal. No respiratory distress.   Abdominal: Soft. Bowel sounds are normal. He exhibits distension and ascites. There is no tenderness.   Musculoskeletal: He exhibits edema (3+ LE pitting edema). He exhibits no tenderness.   Lymphadenopathy:     He has no cervical adenopathy.   Neurological: He is alert and oriented to person, place, and time.   Skin: Skin is warm and dry. No rash noted. There is pallor.   Psychiatric: He has a normal mood and affect.       Significant Labs:   CBC:   Recent Labs  Lab 01/15/18  1715 01/16/18  1709 01/17/18  0353   WBC 5.81 6.85 6.06   HGB 9.9* 9.6* 9.0*   HCT 32.6* 31.3* 29.6*   * 77* 41*   , CMP:   Recent Labs  Lab 01/15/18  1715 01/16/18  1709 01/17/18  0353    140 140   K 4.2 3.8 4.2    109 113*   CO2 21* 22* 20*   * 91 93   BUN 30* 33* 33*   CREATININE 1.6* 1.8* 1.6*   CALCIUM 9.0 8.8 8.7   PROT 7.0 6.6  --    ALBUMIN 2.7* 2.5*  --    BILITOT 0.5 0.5  --    ALKPHOS 168* 165*  --    AST 16 18  --    ALT 12 12  --    ANIONGAP 10 9 7*   EGFRNONAA 43.3* 37.6* 43.3*   , Coagulation:   Recent Labs  Lab 01/16/18 1709 01/16/18 1952   INR 1.1 1.1   , Urine Studies:   Recent Labs  Lab 01/16/18 1949   COLORU Ernestina   APPEARANCEUA Cloudy*   PHUR 7.0   SPECGRAV 1.010   PROTEINUA 1+*   GLUCUA Negative   KETONESU Negative   BILIRUBINUA Negative   OCCULTUA 2+*   NITRITE Negative   UROBILINOGEN Negative   LEUKOCYTESUR 3+*   RBCUA 29*   WBCUA >100*   BACTERIA Many*   SQUAMEPITHEL 1   HYALINECASTS 0    and All pertinent labs from the last 24 hours have been reviewed.      Diagnostic Results:  I have reviewed all pertinent imaging results/findings within the past 24 hours.

## 2018-01-17 NOTE — PT/OT/SLP EVAL
Occupational Therapy   Evaluation, Treatment and Discharge Note    Name: Kartihk Medrano  MRN: [id number]  Admitting Diagnosis:  Atrial flutter with rapid ventricular response    Recommendations:     Discharge Recommendations: home health OT  Discharge Equipment Recommendations:   (HH therapist to determine bathroom DME needs)  Barriers to discharge:  None    History:     Occupational Profile:  Living Environment: Pt lives with wife and daughter in a 3SH with elevator access to all floors. Pt has a tub/shower combo.  Previous level of function: Varying levels of assistance with tub transfer (but not bathing/showering), with dressing, and with toileting.  Roles and Routines: , Father  Equipment Owned:  walker, rolling  Assistance upon Discharge: available    Past Medical History:   Diagnosis Date    Diabetes mellitus     GERD (gastroesophageal reflux disease)     Hypertension     Rectal cancer metastasized to lung 01/11/2010       Past Surgical History:   Procedure Laterality Date    COLON SURGERY      COLONOSCOPY N/A 10/3/2017    Procedure: COLONOSCOPY;  Surgeon: Sandoval Bowling MD;  Location: [address];  Service: Endoscopy;  Laterality: N/A;       Subjective     Chief Complaint: being here  Patient/Family stated goals: go home  Communicated with: RN prior to session.  Pain/Comfort:  · Pain Rating 1: 0/10  · Pain Rating Post-Intervention 1: 0/10    Objective:     Patient found with: peripheral IV    General Precautions: Standard, fall   Orthopedic Precautions:N/A   Braces: N/A     Occupational Performance:    Bed Mobility:    · Patient completed Supine to Sit with supervision    Functional Mobility/Transfers:  · Patient completed Sit <> Stand Transfer with stand by assistance  with  no assistive device   · Patient completed Bed <> Chair Transfer using Stand Pivot technique with stand by assistance with no assistive device  · Patient completed Toilet Transfer Stand Pivot technique with stand by  assistance with  no AD    Activities of Daily Living:  · Grooming: stand by assistance hand washing at sink  · LB Dressing: maximal assistance socks  · Toileting: supervision able to manage all clothing including briefs, able to gather TP and wipe while seated    Cognitive/Visual Perceptual:  Cognitive/Psychosocial Skills:     -       Oriented to: Person, Place, Time and Situation   -       Follows Commands/attention:Follows multistep  commands  -       Communication: clear/fluent  -       Memory: No Deficits noted  -       Safety awareness/insight to disability: intact   -       Mood/Affect/Coping skills/emotional control: Appropriate to situation  Visual/Perceptual:      -Intact    Physical Exam:  Balance:    -       Sitting = Good; Standing = Fair  Postural examination/scapula alignment:    -       Rounded shoulders  Dominant hand:    -       R  Upper Extremity Range of Motion:     -       Right Upper Extremity: WFL  -       Left Upper Extremity: WFL  Upper Extremity Strength:    -       Right Upper Extremity: WFL  -       Left Upper Extremity: WFL   Strength:    -       Right Upper Extremity: WFL  -       Left Upper Extremity: WFL  Fine Motor Coordination:    -       Intact  Gross motor coordination:   WFL    Patient left sitting EOB with all lines intact and call button in reach    AMPA 6 Click:  [facility] Total Score: 19    Treatment & Education:  Pt ed re OT role and POC. Pt performed supine to sit with S. Pt performed strength and ROM testing. Pt required Max A to don socks. Pt performed sit to stand t/f with SBA and ambulated with SBA and no AD to the bathroom. Pt performed toilet t/f with SBA, and toileting with S. Pt stood at sink with SBA and washed hands. Pt ambulated with SBA and no AD back to the room and sat EOB.  Pt ed re safety, d/c recommendation, and home assessment  Education:    Assessment:     Karthik Medrano is a 67 y.o. male with a medical diagnosis of Atrial flutter with rapid ventricular  "response. At this time, patient is functioning at their prior level of function and does not require further acute OT services.     Clinical Decision Makin.  OT Mod:  "Pt evaluation falls under moderate complexity for evaluation coding due to identification of 3-5 performance deficits noted as stated above. Eval required Min/Mod assistance to complete on this date and detailed assessment(s) were utilized. Moreover, an expanded review of history and occupational profile obtained with additional review of cognitive, physical and psychosocial hx."     Plan:     During this hospitalization, patient does not require further acute OT services.  Please re-consult if situation changes.    · Plan of Care Reviewed with: patient    This Plan of care has been discussed with the patient who was involved in its development and understands and is in agreement with the identified goals and treatment plan    GOALS:    Occupational Therapy Goals     Not on file          Multidisciplinary Problems (Resolved)        Problem: Occupational Therapy Goal    Goal Priority Disciplines Outcome Interventions   Occupational Therapy Goal   (Resolved)     OT, PT/OT Outcome(s) achieved                    Time Tracking:     OT Date of Treatment: 18  OT Start Time: 1021  OT Stop Time: 1044  OT Total Time (min): 23 min    Billable Minutes:Evaluation 15 minutes  Self Care/Home Management 8 minutes    MARA Wan  2018  Pager: [phone]    "

## 2018-01-17 NOTE — ED NOTES
Pt placed on continuous cardiac and pulse ox monitoring with non-invasive blood pressure to cycle every 10 minutes. Atrial flutter with a heart rate in the 140'snoted; VS otherwise stable.  Pt reports mild weakness; denies other needs or complaints at this time.  Bed locked in lowest position; side rails up and locked x 2; call light and personal belongings within reach.  Family member at bedside; will continue to monitor pt.

## 2018-01-17 NOTE — HPI
66 yo male with metastatic rectal cancer (on FOLFIRI/Cyramza) c/b repeated paracentesis (q2 wks), HTN, DM who is admitted for aflutter. Pt was sent from H/O clinic for further evaluation of tachycardia. In ED, pt was found to be in aflutter with variable AV block. Pt was given 1x dose of lopressor with improvement of HR. Pt states that he was taking toprol 100 mg BID since 2013 but had stopped taking it 3 mo ago for low BP. He states that his SBP was normally in 140s but dropped to 120s and asymtomatic. Currently denies CP, palpitations, SOB, cough, fevers, abdominal pain, n/v/changes in BM. He reports that he has LE swelling and abdominal distention a/w his ascites. Also reports of weakness and decreased PO intake.

## 2018-01-17 NOTE — ED NOTES
LOC: The patient is awake, alert, and oriented to place, time, situation. Affect is appropriate.  Speech is appropriate and clear.     APPEARANCE: Patient resting comfortably in no acute distress.  Patient is clean and well groomed.    SKIN: The skin is warm and dry; color consistent with ethnicity.  Patient has normal skin turgor and moist mucus membranes.  Skin intact; no breakdown or bruising noted.     MUSCULOSKELETAL: Patient moving upper and lower extremities without difficulty.  Denies weakness.     RESPIRATORY: Airway is open and patent. Respirations spontaneous, even, easy, and non-labored.  Patient has a normal effort and rate.  No accessory muscle use noted. Denies cough.     CARDIAC:  Atrial flutter with a heart rate in the 140's noted.  No peripheral edema noted. No complaints of chest pain. Complains of mild weakness.     ABDOMEN: Soft and non tender to palpation.  No distention noted.     NEUROLOGIC: Eyes open spontaneously.  Behavior appropriate to situation.  Follows commands; facial expression symmetrical.  Purposeful motor response noted; normal sensation in all extremities.

## 2018-01-17 NOTE — TELEPHONE ENCOUNTER
called pt this morning but receive no answer, a detail message was left on v/m to contact office to discuss appointment.

## 2018-01-17 NOTE — PLAN OF CARE
Problem: Fall Risk (Adult)  Goal: Absence of Falls  Patient will demonstrate the desired outcomes by discharge/transition of care.   Outcome: Ongoing (interventions implemented as appropriate)   01/17/18 0440   Fall Risk (Adult)   Absence of Falls making progress toward outcome   Patient remains free of falls during shift; call light within reach; nonskid socks on; siderailsx3; frequent checks maintained; no s/sx of distress. Will continue to monitor.

## 2018-01-17 NOTE — ED TRIAGE NOTES
Pt states he was at his oncologist office for a routine check-up when he was found to have an elevated HR.  Pt sent directly to ER for further evaluation.

## 2018-01-17 NOTE — ASSESSMENT & PLAN NOTE
- Likely to be secondary to beta blocker non-compliance.  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  - Restarted on home dose of metoprolol (Toprol-XL 100mg BID).  - HR stabilized today (90s-100s).  - Stable for discharge.

## 2018-01-17 NOTE — SUBJECTIVE & OBJECTIVE
Oncology Treatment Plan:   OP RAMUCIRUMAB + FOLFIRI    Medications:  Continuous Infusions:  Scheduled Meds:   heparin (porcine)  5,000 Units Subcutaneous Q12H    metoprolol succinate  100 mg Oral BID     PRN Meds:acetaminophen, dextrose 50%, dextrose 50%, glucagon (human recombinant), glucose, glucose, insulin aspart, ondansetron, ondansetron, sodium chloride 0.9%     Review of patient's allergies indicates:  No Known Allergies     Past Medical History:   Diagnosis Date    Diabetes mellitus     GERD (gastroesophageal reflux disease)     Hypertension     Rectal cancer metastasized to lung 01/11/2010     Past Surgical History:   Procedure Laterality Date    COLON SURGERY      COLONOSCOPY N/A 10/3/2017    Procedure: COLONOSCOPY;  Surgeon: Sandoval Bowling MD;  Location: [address]);  Service: Endoscopy;  Laterality: N/A;     Family History     Problem Relation (Age of Onset)    Breast cancer Mother    Cancer Mother, Father    Tuberculosis Mother        Social History Main Topics    Smoking status: Never Smoker    Smokeless tobacco: Never Used    Alcohol use No    Drug use: Unknown    Sexual activity: Not on file       Review of Systems   Constitutional: Negative for chills and fever.   HENT: Negative for sore throat.    Eyes: Negative for visual disturbance.   Respiratory: Negative for cough and shortness of breath.    Cardiovascular: Positive for leg swelling. Negative for chest pain and palpitations.   Gastrointestinal: Positive for abdominal distention. Negative for abdominal pain, blood in stool, constipation, nausea and vomiting.   Genitourinary: Negative for dysuria.   Musculoskeletal: Negative for arthralgias and myalgias.   Skin: Negative for rash.   Neurological: Positive for weakness. Negative for dizziness and headaches.   Psychiatric/Behavioral: Negative for agitation.     Objective:     Vital Signs (Most Recent):  Temp: 98 °F (36.7 °C) (01/16/18 1629)  Pulse: (!) 134 (01/16/18  1927)  Resp: 20 (01/16/18 1802)  BP: 118/79 (01/16/18 1900)  SpO2: 99 % (01/16/18 1927) Vital Signs (24h Range):  Temp:  [97.5 °F (36.4 °C)-98 °F (36.7 °C)] 98 °F (36.7 °C)  Pulse:  [111-148] 134  Resp:  [16-25] 20  SpO2:  [98 %-100 %] 99 %  BP: (105-121)/(60-80) 118/79     Weight: 90.7 kg (200 lb)  Body mass index is 28.7 kg/m².  Body surface area is 2.12 meters squared.    No intake or output data in the 24 hours ending 01/16/18 2032    Physical Exam   Constitutional: He is oriented to person, place, and time. No distress.   Pale     HENT:   Mouth/Throat: Oropharynx is clear and moist. No oropharyngeal exudate.   Eyes: No scleral icterus.   Cardiovascular: An irregularly irregular rhythm present. Tachycardia present.    Pulmonary/Chest: Effort normal and breath sounds normal. No respiratory distress.   Abdominal: Soft. Bowel sounds are normal. He exhibits distension. There is no tenderness.   +ascites   Musculoskeletal: He exhibits edema (3+ LE pitting edema). He exhibits no tenderness.   Lymphadenopathy:     He has no cervical adenopathy.   Neurological: He is alert and oriented to person, place, and time.   Skin: Skin is warm and dry. No rash noted. There is pallor.   Psychiatric: He has a normal mood and affect.       Significant Labs:   CBC:   Recent Labs  Lab 01/15/18  1715 01/16/18  1709   WBC 5.81 6.85   HGB 9.9* 9.6*   HCT 32.6* 31.3*   * 77*    and CMP:   Recent Labs  Lab 01/15/18  1715 01/16/18  1709    140   K 4.2 3.8    109   CO2 21* 22*   * 91   BUN 30* 33*   CREATININE 1.6* 1.8*   CALCIUM 9.0 8.8   PROT 7.0 6.6   ALBUMIN 2.7* 2.5*   BILITOT 0.5 0.5   ALKPHOS 168* 165*   AST 16 18   ALT 12 12   ANIONGAP 10 9   EGFRNONAA 43.3* 37.6*       Diagnostic Results:  I have reviewed and interpreted all pertinent imaging results/findings within the past 24 hours.

## 2018-01-17 NOTE — NURSING
Paged team- patient with 7 beat run of vtach. Patient sleeping-asymptomatic. Will await return page.     0131- spoke with MD regarding run of vtach- will await AM lab results. Will continue to monitor patient.

## 2018-01-17 NOTE — HOSPITAL COURSE
Admitted to Hem/Onc service. Metoprolol restarted at previous dose (Toprol-XL 100mg BID). HR stabilized (90s-100s). Patient is asymptomatic since before admission. US strongly suggestive of UTI. Stable for discharge today with a 7-day course of Augmentin.

## 2018-01-17 NOTE — TELEPHONE ENCOUNTER
[facility] received prescription for Stivarga 160mg daily for 3 weeks then 1 week off.  Prior authorization is required.

## 2018-01-17 NOTE — ASSESSMENT & PLAN NOTE
- UA strongly suggestive of UTI (>100 WBC, many bacteria).  - Discharged on 7-day course of Augmentin.

## 2018-01-17 NOTE — ED NOTES
Pt resting quietly on stretcher; remains on continuous cardiac and pulse ox monitoring with non-invasive blood pressure to cycle every 10 minutes.  Pt remains in atrial flutter though HR is improved to the 110's; VS otherwise stable. Bed locked in lowest position; side rails up and locked x 2; call light, bedside table, and personal belongings within reach.  Pt denies needs or complaints at this time; will continue to monitor pt.

## 2018-01-17 NOTE — DISCHARGE SUMMARY
[facility]  Hematology/Oncology  Discharge Summary      Patient Name: Karthik Medrano  MRN: [id number]  Admission Date: 1/16/2018  Hospital Length of Stay: 0 days  Discharge Date and Time:  01/17/2018 10:44 AM  Attending Physician: Pratima Ndiaye MD   Discharging Provider: Em Harris MD  Primary Care Provider: Pinky Thornton MD    HPI: 68 yo male with metastatic rectal cancer (on FOLFIRI/Cyramza) c/b repeated paracentesis (q2 wks), HTN, DM who is admitted for aflutter. Pt was sent from H/O clinic for further evaluation of tachycardia. In ED, pt was found to be in aflutter with variable AV block. Pt was given 1x dose of lopressor with improvement of HR. Pt states that he was taking toprol 100 mg BID since 2013 but had stopped taking it 3 mo ago for low BP. He states that his SBP was normally in 140s but dropped to 120s and asymtomatic. Currently denies CP, palpitations, SOB, cough, fevers, abdominal pain, n/v/changes in BM. He reports that he has LE swelling and abdominal distention a/w his ascites. Also reports of weakness and decreased PO intake.       Hospital Course: Admitted to Hem/Onc service. Metoprolol restarted at previous dose (Toprol-XL 100mg BID). HR stabilized (90s-100s). Patient is asymptomatic since before admission. US strongly suggestive of UTI. Stable for discharge today with a 7-day course of Augmentin.      * Atrial flutter with rapid ventricular response     - Likely to be secondary to beta blocker non-compliance.  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  - Restarted on home dose of metoprolol (Toprol-XL 100mg BID).  - HR stabilized today (90s-100s).  - Stable for discharge.       Acute renal failure superimposed on stage 3 chronic kidney disease     - Cr baseline appears 1.3-1.5, Cr on admit 1.8. Suspect 2/2 to decreased PO intake   - Received IV fluids on admission.  - Cr improved to 1.6 today.       Rectal cancer  metastasized to lung     - Diagnosed in 1/11/10 on colonoscopy.  - Currently on reduced dose FOLFIRI/Cyramza; however on hold as pt has had significant decline in performance status with plans of regorafenib if improvement on performance status.  - Last received FOLFIR 12/27.  - Requiring therapeutic bruce q10 days or so, last had para 1/5 with removal of 5L.       Urinary tract infection without hematuria     - UA strongly suggestive of UTI (>100 WBC, many bacteria).  - Discharged on 7-day course of Augmentin.       Type 2 diabetes mellitus with stage 3 chronic kidney disease, without long-term current use of insulin     - SSI as inpatient.  - Continue home medications on discharge.         Final Active Diagnoses:    Diagnosis Date Noted POA    PRINCIPAL PROBLEM:  Atrial flutter with rapid ventricular response [I48.92] 01/16/2018 Yes    Acute renal failure superimposed on stage 3 chronic kidney disease [N17.9, N18.3] 01/16/2018 Yes    Rectal cancer metastasized to lung [C20, C78.00] 01/11/2010 Yes    Urinary tract infection without hematuria [N39.0] 01/17/2018 Yes    Type 2 diabetes mellitus with stage 3 chronic kidney disease, without long-term current use of insulin [E11.22, N18.3] 09/09/2015 Yes    Anemia in neoplastic disease [D63.0] 01/09/2018 Yes    Chemotherapy-induced thrombocytopenia [D69.59, T45.1X5A] 10/11/2017 Yes    Renovascular hypertension [I15.0] 06/25/2013 Yes      Problems Resolved During this Admission:    Diagnosis Date Noted Date Resolved POA      Discharged Condition: stable    Disposition: Home or Self Care    Follow Up: with primary oncologist, Dr. Abernathy    Patient Instructions:     Activity as tolerated     Notify your health care provider if you experience any of the following:  temperature >100.4     Notify your health care provider if you experience any of the following:  persistent nausea and vomiting or diarrhea     Notify your health care provider if you experience any of the  following:  severe uncontrolled pain     Notify your health care provider if you experience any of the following:  difficulty breathing or increased cough     Notify your health care provider if you experience any of the following:  severe persistent headache     Notify your health care provider if you experience any of the following:  persistent dizziness, light-headedness, or visual disturbances     Notify your health care provider if you experience any of the following:  increased confusion or weakness       Medications:  Reconciled Home Medications:   Current Discharge Medication List      START taking these medications    Details   amoxicillin-clavulanate 875-125mg (AUGMENTIN) 875-125 mg per tablet Take 1 tablet by mouth every 12 (twelve) hours.  Qty: 14 tablet, Refills: 0    Associated Diagnoses: Rectal cancer         CONTINUE these medications which have NOT CHANGED    Details   ascorbic acid (VITAMIN C) 1000 MG tablet Take 1,000 mg by mouth once daily.      cholecalciferol, vitamin D3, (VITAMIN D3) 2,000 unit Cap Take 1 capsule by mouth once daily.      cholestyramine, bulk, Powd 4 g by Misc.(Non-Drug; Combo Route) route once daily at 6am.  Qty: 500 g, Refills: 3    Associated Diagnoses: Diarrhea, unspecified type      co-enzyme Q-10 30 mg capsule Take 30 mg by mouth 3 (three) times daily.      diphenoxylate-atropine 2.5-0.025 mg (LOMOTIL) 2.5-0.025 mg per tablet TAKE 1 TABLET BY MOUTH FOUR TIMES DAILY AS NEEDED FOR DIARRHEA  Qty: 100 tablet, Refills: 0    Associated Diagnoses: Diarrhea, unspecified type      esomeprazole (NEXIUM) 40 MG capsule TAKE 1 CAPSULE(40 MG) BY MOUTH EVERY DAY  Qty: 90 capsule, Refills: 1    Comments: **Patient requests 90 days supply**  Associated Diagnoses: Gastroesophageal reflux disease, esophagitis presence not specified      furosemide (LASIX) 20 MG tablet Take 2 tablets (40 mg total) by mouth daily as needed.  Qty: 90 tablet, Refills: 6    Comments: **Patient requests 90 days  supply**  Associated Diagnoses: Edema, unspecified type      glipiZIDE (GLUCOTROL) 5 MG tablet TAKE 1 TABLET BY MOUTH THREE TIMES DAILY  Qty: 270 tablet, Refills: 0    Associated Diagnoses: Type 2 diabetes mellitus with hyperglycemia, without long-term current use of insulin      metoprolol succinate (TOPROL-XL) 100 MG 24 hr tablet Take 1 tablet (100 mg total) by mouth 2 (two) times daily.  Qty: 180 tablet, Refills: 3    Comments: **Patient requests 90 days supply**  Associated Diagnoses: Essential hypertension      multivitamin with iron-mineral TbSR Take 1 tablet by mouth once daily.  Qty: 100 tablet, Refills: 2    Associated Diagnoses: Anemia      NYSTATIN (DUKE'S SOLUTION) Mix equal amounts of benadryl, maalox, 2% viscous lidocaine and nystatin    Swish and swallow 10 ml 4 times a day  Qty: 500 mL, Refills: 5    Associated Diagnoses: Mucositis      ondansetron (ZOFRAN-ODT) 8 MG TbDL Take 1 tablet (8 mg total) by mouth every 8 (eight) hours as needed (Nausea).  Qty: 60 tablet, Refills: 6    Associated Diagnoses: Malignant neoplasm of colon, unspecified part of colon      opium tincture 10 mg/mL (morphine) Tinc Take 0.6 mLs (6 mg total) by mouth 4 (four) times daily.  Qty: 473 mL, Refills: 0    Associated Diagnoses: Diarrhea, unspecified type      oxycodone-acetaminophen (PERCOCET) 5-325 mg per tablet TK 1 T PO Q 6 H PRN P  Refills: 0      paregoric 2 mg/5 mL Liqd TAKE 5 ML BY MOUTH THREE TIMES DAILY  Qty: 473 mL, Refills: 0    Associated Diagnoses: Diarrhea, unspecified type      regorafenib (STIVARGA) 40 mg Tab Take 160 mg by mouth once daily.  Qty: 120 tablet, Refills: 3    Associated Diagnoses: Rectal cancer; Secondary adenocarcinoma of right lung      tamsulosin (FLOMAX) 0.4 mg Cp24 Take 1 capsule (0.4 mg total) by mouth once daily.  Qty: 90 capsule, Refills: 6    Associated Diagnoses: Prostate hyperplasia without urinary obstruction         STOP taking these medications       amLODIPine (NORVASC) 5 MG tablet  Comments:   Reason for Stopping:         lisinopril (PRINIVIL,ZESTRIL) 40 MG tablet Comments:   Reason for Stopping:               Em Harris MD  Hematology/Oncology  [facility]

## 2018-01-17 NOTE — ASSESSMENT & PLAN NOTE
- Cr baseline appears 1.3-1.5, Cr on admit 1.8. Suspect 2/2 to decreased PO intake   - Received IV fluids on admission.  - Cr improved to 1.6 today.

## 2018-01-17 NOTE — ASSESSMENT & PLAN NOTE
- Diagnosed in 1/11/10 on colonoscopy.  - Currently on reduced dose FOLFIRI/Cyramza; however on hold as pt has had significant decline in performance status with plans of regorafenib if improvement on performance status.  - Last received FOLFIR 12/27.  - Requiring therapeutic bruce q10 days or so, last had para 1/5 with removal of 5L.

## 2018-01-18 ENCOUNTER — PATIENT MESSAGE (OUTPATIENT)
Dept: HEMATOLOGY/ONCOLOGY | Facility: CLINIC | Age: 68
End: 2018-01-18

## 2018-01-18 ENCOUNTER — TELEPHONE (OUTPATIENT)
Dept: HEMATOLOGY/ONCOLOGY | Facility: CLINIC | Age: 68
End: 2018-01-18

## 2018-01-18 NOTE — TELEPHONE ENCOUNTER
spoke with pt on today in regards to para scheduled on 1/22/18 @1:00, pt has confirm schedule time.

## 2018-01-19 ENCOUNTER — PATIENT MESSAGE (OUTPATIENT)
Dept: HEMATOLOGY/ONCOLOGY | Facility: CLINIC | Age: 68
End: 2018-01-19

## 2018-01-19 DIAGNOSIS — R18.8 OTHER ASCITES: Primary | ICD-10-CM

## 2018-01-21 LAB
BACTERIA BLD CULT: NORMAL
BACTERIA BLD CULT: NORMAL

## 2018-01-21 RX ORDER — DRONABINOL 10 MG/1
10 CAPSULE ORAL
Qty: 60 CAPSULE | Refills: 2 | Status: SHIPPED | OUTPATIENT
Start: 2018-01-21

## 2018-01-22 ENCOUNTER — TELEPHONE (OUTPATIENT)
Dept: FAMILY MEDICINE | Facility: CLINIC | Age: 68
End: 2018-01-22

## 2018-01-22 ENCOUNTER — HOSPITAL ENCOUNTER (OUTPATIENT)
Dept: INTERVENTIONAL RADIOLOGY/VASCULAR | Facility: HOSPITAL | Age: 68
Discharge: HOME | End: 2018-01-22
Attending: INTERNAL MEDICINE
Payer: COMMERCIAL

## 2018-01-22 VITALS — HEART RATE: 106 BPM | DIASTOLIC BLOOD PRESSURE: 67 MMHG | OXYGEN SATURATION: 100 % | SYSTOLIC BLOOD PRESSURE: 96 MMHG

## 2018-01-22 DIAGNOSIS — R18.8 OTHER ASCITES: ICD-10-CM

## 2018-01-22 PROCEDURE — 49083 ABD PARACENTESIS W/IMAGING: CPT

## 2018-01-22 PROCEDURE — 49083 ABD PARACENTESIS W/IMAGING: CPT | Mod: ,,, | Performed by: RADIOLOGY

## 2018-01-22 NOTE — PROGRESS NOTES
Paracentesis complete. 3800 mLs peritoneal fluid drained. Pt tolerated well. Dressing to clean, dry, and intact.  Discharge instructions and handouts provided. Pt verbalized understanding.

## 2018-01-22 NOTE — DISCHARGE INSTRUCTIONS
For scheduling: Call Lizzette at [phone]    For questions or concerns call: NANDA MON-FRI 8 AM- 5PM [phone]. Radiology resident on call [phone].    For immediate concerns that are not emergent, you may call our radiology clinic at: [phone]

## 2018-01-24 ENCOUNTER — TELEPHONE (OUTPATIENT)
Dept: HEMATOLOGY/ONCOLOGY | Facility: CLINIC | Age: 68
End: 2018-01-24

## 2018-01-25 NOTE — PROCEDURES
Radiology Post Procedure Note:     Procedure: Large Volume Paracentesis    (s): Rashaun    Blood Loss: Minimal    Specimen: 3800 cc    Findings: Under imaging guidance, a 5 F yueh placed into the peritoneal cavity and large volume fluid drained. No evidence of immediate complication.     Houston BARRON M.D. personally reviewed and agree with the above dictated note.

## 2018-01-28 DIAGNOSIS — R53.81 PHYSICAL DECONDITIONING: ICD-10-CM

## 2018-01-28 DIAGNOSIS — E44.0 MALNUTRITION OF MODERATE DEGREE: ICD-10-CM

## 2018-01-28 DIAGNOSIS — C80.1 CANCER: Primary | ICD-10-CM

## 2018-01-29 ENCOUNTER — PATIENT MESSAGE (OUTPATIENT)
Dept: HEMATOLOGY/ONCOLOGY | Facility: CLINIC | Age: 68
End: 2018-01-29

## 2018-01-29 ENCOUNTER — TELEPHONE (OUTPATIENT)
Dept: FAMILY MEDICINE | Facility: CLINIC | Age: 68
End: 2018-01-29

## 2018-01-29 ENCOUNTER — TELEPHONE (OUTPATIENT)
Dept: HEMATOLOGY/ONCOLOGY | Facility: CLINIC | Age: 68
End: 2018-01-29

## 2018-01-29 DIAGNOSIS — C20 RECTAL CANCER METASTASIZED TO LUNG: Primary | ICD-10-CM

## 2018-01-29 DIAGNOSIS — C78.00 RECTAL CANCER METASTASIZED TO LUNG: Primary | ICD-10-CM

## 2018-01-29 NOTE — TELEPHONE ENCOUNTER
Spoke with patient concerning below message states he would need to speak with he's spouse before scheduling an appt with PCP.  Patient states he will contact our office back to schedule.

## 2018-01-29 NOTE — TELEPHONE ENCOUNTER
----- Message from Mitzi Brush sent at 1/29/2018  2:38 PM CST -----  Contact: Pt  Pt calling stating that he needs an electrical hospital bed.         Pt call back number [phone]

## 2018-01-29 NOTE — TELEPHONE ENCOUNTER
----- Message from Cherry Joshua sent at 1/29/2018  9:03 AM CST -----  Contact: [facility] [phone]  This patient has not been seen since 2013 and insurance won't pay for home health until he has had a face to face with you. Please call [facility] to advise once patient has been seen in the office so they can start with his home care.

## 2018-01-30 DIAGNOSIS — I10 ESSENTIAL HYPERTENSION: ICD-10-CM

## 2018-01-30 DIAGNOSIS — C20 RECTAL CANCER: Primary | ICD-10-CM

## 2018-01-30 RX ORDER — LISINOPRIL 40 MG/1
TABLET ORAL
Qty: 180 TABLET | Refills: 0 | OUTPATIENT
Start: 2018-01-30

## 2018-01-30 NOTE — TELEPHONE ENCOUNTER
He requested a lisinopril refill. I am not sure he should be on it as he has been running low blood pressure

## 2018-01-30 NOTE — TELEPHONE ENCOUNTER
Spoke with patient's wife. Wife states patient is requesting an electric hospital bed.  Wife notes patient is not taking lisinopril presently--and hasn't for a while, but he is wanting a refill just in case he needs it.    Also, wife states patient received his stivarga today via [employer].     Nurse informed wife she would contact her back after speaking with dr valles, as he may need labs prior to starting the oral medication.    Wife voiced understanding.    Message routed to dr valles

## 2018-01-31 ENCOUNTER — PATIENT MESSAGE (OUTPATIENT)
Dept: HEMATOLOGY/ONCOLOGY | Facility: CLINIC | Age: 68
End: 2018-01-31

## 2018-01-31 DIAGNOSIS — C20 RECTAL CANCER: Primary | ICD-10-CM

## 2018-01-31 NOTE — TELEPHONE ENCOUNTER
Left  for patient with appointments date/time tomorrow morning before his paracentesis.    Informed him electric bed was ordered.      Lisinopril does not need to be refilled at this time, as his BP has been running low.

## 2018-02-01 ENCOUNTER — LAB VISIT (OUTPATIENT)
Dept: LAB | Facility: HOSPITAL | Age: 68
End: 2018-02-01
Attending: INTERNAL MEDICINE
Payer: COMMERCIAL

## 2018-02-01 ENCOUNTER — HOSPITAL ENCOUNTER (INPATIENT)
Facility: HOSPITAL | Age: 68
LOS: 9 days | Discharge: HOME HEALTH | DRG: 273 | End: 2018-02-10
Attending: EMERGENCY MEDICINE | Admitting: EMERGENCY MEDICINE
Payer: COMMERCIAL

## 2018-02-01 ENCOUNTER — OFFICE VISIT (OUTPATIENT)
Dept: HEMATOLOGY/ONCOLOGY | Facility: CLINIC | Age: 68
DRG: 273 | End: 2018-02-01
Payer: COMMERCIAL

## 2018-02-01 VITALS
HEART RATE: 152 BPM | TEMPERATURE: 98 F | DIASTOLIC BLOOD PRESSURE: 69 MMHG | RESPIRATION RATE: 18 BRPM | OXYGEN SATURATION: 100 % | HEIGHT: 70 IN | SYSTOLIC BLOOD PRESSURE: 108 MMHG

## 2018-02-01 DIAGNOSIS — C20 RECTAL CANCER: ICD-10-CM

## 2018-02-01 DIAGNOSIS — N18.30 TYPE 2 DIABETES MELLITUS WITH STAGE 3 CHRONIC KIDNEY DISEASE, WITHOUT LONG-TERM CURRENT USE OF INSULIN: ICD-10-CM

## 2018-02-01 DIAGNOSIS — R57.1 HYPOVOLEMIC SHOCK: ICD-10-CM

## 2018-02-01 DIAGNOSIS — E11.22 TYPE 2 DIABETES MELLITUS WITH STAGE 3 CHRONIC KIDNEY DISEASE, WITHOUT LONG-TERM CURRENT USE OF INSULIN: ICD-10-CM

## 2018-02-01 DIAGNOSIS — I15.0 RENOVASCULAR HYPERTENSION: ICD-10-CM

## 2018-02-01 DIAGNOSIS — R57.9 SHOCK: ICD-10-CM

## 2018-02-01 DIAGNOSIS — I48.92 ATRIAL FLUTTER: ICD-10-CM

## 2018-02-01 DIAGNOSIS — N17.0 ACUTE KIDNEY FAILURE WITH LESION OF TUBULAR NECROSIS: ICD-10-CM

## 2018-02-01 DIAGNOSIS — I48.92 ATRIAL FLUTTER WITH RAPID VENTRICULAR RESPONSE: Primary | ICD-10-CM

## 2018-02-01 DIAGNOSIS — N18.30 CKD (CHRONIC KIDNEY DISEASE), STAGE III: ICD-10-CM

## 2018-02-01 DIAGNOSIS — R00.0 TACHYCARDIA: ICD-10-CM

## 2018-02-01 DIAGNOSIS — I50.31 ACUTE DIASTOLIC CONGESTIVE HEART FAILURE: ICD-10-CM

## 2018-02-01 DIAGNOSIS — R07.9 CHEST PAIN: ICD-10-CM

## 2018-02-01 DIAGNOSIS — R18.8 OTHER ASCITES: ICD-10-CM

## 2018-02-01 DIAGNOSIS — I50.9 HEART FAILURE: ICD-10-CM

## 2018-02-01 DIAGNOSIS — D63.0 ANEMIA IN NEOPLASTIC DISEASE: ICD-10-CM

## 2018-02-01 DIAGNOSIS — I48.91 ATRIAL FIBRILLATION: ICD-10-CM

## 2018-02-01 DIAGNOSIS — I95.9 HYPOTENSION, UNSPECIFIED HYPOTENSION TYPE: ICD-10-CM

## 2018-02-01 DIAGNOSIS — I49.9 ARRHYTHMIA: ICD-10-CM

## 2018-02-01 LAB
ALBUMIN SERPL BCP-MCNC: 2.1 G/DL
ALBUMIN SERPL BCP-MCNC: 2.1 G/DL
ALP SERPL-CCNC: 419 U/L
ALP SERPL-CCNC: 424 U/L
ALT SERPL W/O P-5'-P-CCNC: 11 U/L
ALT SERPL W/O P-5'-P-CCNC: 13 U/L
ANION GAP SERPL CALC-SCNC: 8 MMOL/L
ANION GAP SERPL CALC-SCNC: 9 MMOL/L
ANISOCYTOSIS BLD QL SMEAR: SLIGHT
AST SERPL-CCNC: 16 U/L
AST SERPL-CCNC: 17 U/L
BASOPHILS # BLD AUTO: 0.03 K/UL
BASOPHILS NFR BLD: 0.4 %
BILIRUB SERPL-MCNC: 0.3 MG/DL
BILIRUB SERPL-MCNC: 0.3 MG/DL
BNP SERPL-MCNC: 849 PG/ML
BUN SERPL-MCNC: 50 MG/DL
BUN SERPL-MCNC: 55 MG/DL
CALCIUM SERPL-MCNC: 8.2 MG/DL
CALCIUM SERPL-MCNC: 8.5 MG/DL
CHLORIDE SERPL-SCNC: 110 MMOL/L
CHLORIDE SERPL-SCNC: 111 MMOL/L
CO2 SERPL-SCNC: 20 MMOL/L
CO2 SERPL-SCNC: 21 MMOL/L
CREAT SERPL-MCNC: 2.2 MG/DL
CREAT SERPL-MCNC: 2.2 MG/DL
DIFFERENTIAL METHOD: ABNORMAL
EOSINOPHIL # BLD AUTO: 0.1 K/UL
EOSINOPHIL NFR BLD: 1.7 %
ERYTHROCYTE [DISTWIDTH] IN BLOOD BY AUTOMATED COUNT: 20.4 %
ERYTHROCYTE [DISTWIDTH] IN BLOOD BY AUTOMATED COUNT: 20.6 %
EST. GFR  (AFRICAN AMERICAN): 34.1 ML/MIN/1.73 M^2
EST. GFR  (AFRICAN AMERICAN): 34.1 ML/MIN/1.73 M^2
EST. GFR  (NON AFRICAN AMERICAN): 29.5 ML/MIN/1.73 M^2
EST. GFR  (NON AFRICAN AMERICAN): 29.5 ML/MIN/1.73 M^2
GLUCOSE SERPL-MCNC: 106 MG/DL
GLUCOSE SERPL-MCNC: 112 MG/DL
HCT VFR BLD AUTO: 25.3 %
HCT VFR BLD AUTO: 27.3 %
HGB BLD-MCNC: 7.8 G/DL
HGB BLD-MCNC: 8.3 G/DL
IMM GRANULOCYTES # BLD AUTO: 0.01 K/UL
IMM GRANULOCYTES # BLD AUTO: 0.05 K/UL
IMM GRANULOCYTES NFR BLD AUTO: 0.7 %
LYMPHOCYTES # BLD AUTO: 1.2 K/UL
LYMPHOCYTES NFR BLD: 17.2 %
MAGNESIUM SERPL-MCNC: 2 MG/DL
MCH RBC QN AUTO: 27.6 PG
MCH RBC QN AUTO: 27.7 PG
MCHC RBC AUTO-ENTMCNC: 30.4 G/DL
MCHC RBC AUTO-ENTMCNC: 30.8 G/DL
MCV RBC AUTO: 90 FL
MCV RBC AUTO: 91 FL
MONOCYTES # BLD AUTO: 0.8 K/UL
MONOCYTES NFR BLD: 10.9 %
NEUTROPHILS # BLD AUTO: 4.6 K/UL
NEUTROPHILS # BLD AUTO: 4.8 K/UL
NEUTROPHILS NFR BLD: 69.1 %
NRBC BLD-RTO: 0 /100 WBC
OVALOCYTES BLD QL SMEAR: ABNORMAL
PLATELET # BLD AUTO: 161 K/UL
PLATELET # BLD AUTO: 180 K/UL
PLATELET # BLD AUTO: ABNORMAL K/UL
PLATELET BLD QL SMEAR: ABNORMAL
PMV BLD AUTO: 10.1 FL
PMV BLD AUTO: 9.9 FL
PMV BLD AUTO: ABNORMAL FL
POCT GLUCOSE: 166 MG/DL (ref 70–110)
POLYCHROMASIA BLD QL SMEAR: ABNORMAL
POTASSIUM SERPL-SCNC: 4.2 MMOL/L
POTASSIUM SERPL-SCNC: 4.2 MMOL/L
PROT SERPL-MCNC: 5.9 G/DL
PROT SERPL-MCNC: 6.1 G/DL
RBC # BLD AUTO: 2.82 M/UL
RBC # BLD AUTO: 3.01 M/UL
SODIUM SERPL-SCNC: 139 MMOL/L
SODIUM SERPL-SCNC: 140 MMOL/L
TROPONIN I SERPL DL<=0.01 NG/ML-MCNC: <0.006 NG/ML
WBC # BLD AUTO: 6.8 K/UL
WBC # BLD AUTO: 6.97 K/UL

## 2018-02-01 PROCEDURE — 36415 COLL VENOUS BLD VENIPUNCTURE: CPT

## 2018-02-01 PROCEDURE — A4216 STERILE WATER/SALINE, 10 ML: HCPCS | Performed by: NURSE PRACTITIONER

## 2018-02-01 PROCEDURE — 3008F BODY MASS INDEX DOCD: CPT | Mod: S$GLB,,, | Performed by: INTERNAL MEDICINE

## 2018-02-01 PROCEDURE — 96365 THER/PROPH/DIAG IV INF INIT: CPT

## 2018-02-01 PROCEDURE — 99215 OFFICE O/P EST HI 40 MIN: CPT | Mod: S$GLB,,, | Performed by: INTERNAL MEDICINE

## 2018-02-01 PROCEDURE — 1159F MED LIST DOCD IN RCRD: CPT | Mod: S$GLB,,, | Performed by: INTERNAL MEDICINE

## 2018-02-01 PROCEDURE — 80053 COMPREHEN METABOLIC PANEL: CPT | Mod: 91

## 2018-02-01 PROCEDURE — 85027 COMPLETE CBC AUTOMATED: CPT

## 2018-02-01 PROCEDURE — 99223 1ST HOSP IP/OBS HIGH 75: CPT | Mod: ,,, | Performed by: NURSE PRACTITIONER

## 2018-02-01 PROCEDURE — 85025 COMPLETE CBC W/AUTO DIFF WBC: CPT

## 2018-02-01 PROCEDURE — 80053 COMPREHEN METABOLIC PANEL: CPT

## 2018-02-01 PROCEDURE — 96376 TX/PRO/DX INJ SAME DRUG ADON: CPT

## 2018-02-01 PROCEDURE — 96375 TX/PRO/DX INJ NEW DRUG ADDON: CPT

## 2018-02-01 PROCEDURE — 1125F AMNT PAIN NOTED PAIN PRSNT: CPT | Mod: S$GLB,,, | Performed by: INTERNAL MEDICINE

## 2018-02-01 PROCEDURE — 93010 ELECTROCARDIOGRAM REPORT: CPT | Mod: ,,, | Performed by: INTERNAL MEDICINE

## 2018-02-01 PROCEDURE — 20600001 HC STEP DOWN PRIVATE ROOM

## 2018-02-01 PROCEDURE — 82962 GLUCOSE BLOOD TEST: CPT

## 2018-02-01 PROCEDURE — 83880 ASSAY OF NATRIURETIC PEPTIDE: CPT

## 2018-02-01 PROCEDURE — 25000003 PHARM REV CODE 250: Performed by: NURSE PRACTITIONER

## 2018-02-01 PROCEDURE — 84484 ASSAY OF TROPONIN QUANT: CPT

## 2018-02-01 PROCEDURE — 99285 EMERGENCY DEPT VISIT HI MDM: CPT | Mod: ,,, | Performed by: NURSE PRACTITIONER

## 2018-02-01 PROCEDURE — 63600175 PHARM REV CODE 636 W HCPCS: Performed by: NURSE PRACTITIONER

## 2018-02-01 PROCEDURE — 96366 THER/PROPH/DIAG IV INF ADDON: CPT

## 2018-02-01 PROCEDURE — 99284 EMERGENCY DEPT VISIT MOD MDM: CPT | Mod: ,,, | Performed by: INTERNAL MEDICINE

## 2018-02-01 PROCEDURE — 83735 ASSAY OF MAGNESIUM: CPT

## 2018-02-01 PROCEDURE — 99999 PR PBB SHADOW E&M-EST. PATIENT-LVL IV: CPT | Mod: PBBFAC,,, | Performed by: INTERNAL MEDICINE

## 2018-02-01 PROCEDURE — 99236 HOSP IP/OBS SAME DATE HI 85: CPT | Mod: ,,, | Performed by: INTERNAL MEDICINE

## 2018-02-01 PROCEDURE — 99285 EMERGENCY DEPT VISIT HI MDM: CPT | Mod: 25

## 2018-02-01 PROCEDURE — 93005 ELECTROCARDIOGRAM TRACING: CPT

## 2018-02-01 PROCEDURE — 85049 AUTOMATED PLATELET COUNT: CPT

## 2018-02-01 RX ORDER — ACETAMINOPHEN 325 MG/1
650 TABLET ORAL EVERY 4 HOURS PRN
Status: DISCONTINUED | OUTPATIENT
Start: 2018-02-01 | End: 2018-02-11 | Stop reason: HOSPADM

## 2018-02-01 RX ORDER — ASCORBIC ACID 500 MG
1000 TABLET ORAL DAILY
Status: DISCONTINUED | OUTPATIENT
Start: 2018-02-01 | End: 2018-02-02

## 2018-02-01 RX ORDER — OXYCODONE HYDROCHLORIDE 5 MG/1
10 TABLET ORAL EVERY 4 HOURS PRN
Status: DISCONTINUED | OUTPATIENT
Start: 2018-02-01 | End: 2018-02-11 | Stop reason: HOSPADM

## 2018-02-01 RX ORDER — SODIUM CHLORIDE 0.9 % (FLUSH) 0.9 %
3 SYRINGE (ML) INJECTION EVERY 8 HOURS
Status: DISCONTINUED | OUTPATIENT
Start: 2018-02-01 | End: 2018-02-11 | Stop reason: HOSPADM

## 2018-02-01 RX ORDER — IBUPROFEN 200 MG
24 TABLET ORAL
Status: DISCONTINUED | OUTPATIENT
Start: 2018-02-01 | End: 2018-02-11 | Stop reason: HOSPADM

## 2018-02-01 RX ORDER — TAMSULOSIN HYDROCHLORIDE 0.4 MG/1
1 CAPSULE ORAL DAILY
Status: DISCONTINUED | OUTPATIENT
Start: 2018-02-01 | End: 2018-02-02

## 2018-02-01 RX ORDER — MORPHINE 10 MG/ML
6 TINCTURE ORAL 4 TIMES DAILY
Status: DISCONTINUED | OUTPATIENT
Start: 2018-02-01 | End: 2018-02-02

## 2018-02-01 RX ORDER — DILTIAZEM HCL/D5W 125 MG/125
5 PLASTIC BAG, INJECTION (ML) INTRAVENOUS CONTINUOUS
Status: DISCONTINUED | OUTPATIENT
Start: 2018-02-01 | End: 2018-02-02

## 2018-02-01 RX ORDER — METOPROLOL TARTRATE 1 MG/ML
5 INJECTION, SOLUTION INTRAVENOUS EVERY 5 MIN PRN
Status: DISCONTINUED | OUTPATIENT
Start: 2018-02-01 | End: 2018-02-01

## 2018-02-01 RX ORDER — DRONABINOL 2.5 MG/1
10 CAPSULE ORAL
Status: DISCONTINUED | OUTPATIENT
Start: 2018-02-01 | End: 2018-02-02

## 2018-02-01 RX ORDER — METOPROLOL SUCCINATE 100 MG/1
100 TABLET, EXTENDED RELEASE ORAL 2 TIMES DAILY
Status: DISCONTINUED | OUTPATIENT
Start: 2018-02-01 | End: 2018-02-02

## 2018-02-01 RX ORDER — INSULIN ASPART 100 [IU]/ML
0-5 INJECTION, SOLUTION INTRAVENOUS; SUBCUTANEOUS
Status: DISCONTINUED | OUTPATIENT
Start: 2018-02-01 | End: 2018-02-02

## 2018-02-01 RX ORDER — OXYCODONE HYDROCHLORIDE 5 MG/1
5 TABLET ORAL EVERY 4 HOURS PRN
Status: DISCONTINUED | OUTPATIENT
Start: 2018-02-01 | End: 2018-02-11 | Stop reason: HOSPADM

## 2018-02-01 RX ORDER — DIPHENOXYLATE HYDROCHLORIDE AND ATROPINE SULFATE 2.5; .025 MG/1; MG/1
1 TABLET ORAL 4 TIMES DAILY PRN
Status: DISCONTINUED | OUTPATIENT
Start: 2018-02-01 | End: 2018-02-11 | Stop reason: HOSPADM

## 2018-02-01 RX ORDER — ONDANSETRON 4 MG/1
8 TABLET, ORALLY DISINTEGRATING ORAL EVERY 6 HOURS PRN
Status: DISCONTINUED | OUTPATIENT
Start: 2018-02-01 | End: 2018-02-11 | Stop reason: HOSPADM

## 2018-02-01 RX ORDER — CHOLECALCIFEROL (VITAMIN D3) 25 MCG
2000 TABLET ORAL DAILY
Status: DISCONTINUED | OUTPATIENT
Start: 2018-02-01 | End: 2018-02-02

## 2018-02-01 RX ORDER — DILTIAZEM HYDROCHLORIDE 5 MG/ML
10 INJECTION INTRAVENOUS ONCE
Status: COMPLETED | OUTPATIENT
Start: 2018-02-01 | End: 2018-02-01

## 2018-02-01 RX ORDER — FUROSEMIDE 10 MG/ML
80 INJECTION INTRAMUSCULAR; INTRAVENOUS 2 TIMES DAILY
Status: DISCONTINUED | OUTPATIENT
Start: 2018-02-02 | End: 2018-02-02

## 2018-02-01 RX ORDER — FUROSEMIDE 10 MG/ML
80 INJECTION INTRAMUSCULAR; INTRAVENOUS ONCE
Status: COMPLETED | OUTPATIENT
Start: 2018-02-01 | End: 2018-02-01

## 2018-02-01 RX ORDER — EPINEPHRINE 0.22MG
100 AEROSOL WITH ADAPTER (ML) INHALATION 3 TIMES DAILY
Status: DISCONTINUED | OUTPATIENT
Start: 2018-02-01 | End: 2018-02-02

## 2018-02-01 RX ORDER — GLUCAGON 1 MG
1 KIT INJECTION
Status: DISCONTINUED | OUTPATIENT
Start: 2018-02-01 | End: 2018-02-11 | Stop reason: HOSPADM

## 2018-02-01 RX ORDER — PANTOPRAZOLE SODIUM 40 MG/1
40 TABLET, DELAYED RELEASE ORAL DAILY
Status: DISCONTINUED | OUTPATIENT
Start: 2018-02-01 | End: 2018-02-11 | Stop reason: HOSPADM

## 2018-02-01 RX ORDER — VERAPAMIL HYDROCHLORIDE 180 MG/1
180 TABLET, FILM COATED, EXTENDED RELEASE ORAL NIGHTLY
Status: DISCONTINUED | OUTPATIENT
Start: 2018-02-02 | End: 2018-02-02

## 2018-02-01 RX ORDER — VERAPAMIL HYDROCHLORIDE 180 MG/1
180 TABLET, FILM COATED, EXTENDED RELEASE ORAL NIGHTLY
Status: DISCONTINUED | OUTPATIENT
Start: 2018-02-01 | End: 2018-02-01

## 2018-02-01 RX ORDER — IBUPROFEN 200 MG
16 TABLET ORAL
Status: DISCONTINUED | OUTPATIENT
Start: 2018-02-01 | End: 2018-02-11 | Stop reason: HOSPADM

## 2018-02-01 RX ORDER — ONDANSETRON 2 MG/ML
4 INJECTION INTRAMUSCULAR; INTRAVENOUS EVERY 6 HOURS PRN
Status: DISCONTINUED | OUTPATIENT
Start: 2018-02-01 | End: 2018-02-11 | Stop reason: HOSPADM

## 2018-02-01 RX ADMIN — Medication 3 ML: at 02:02

## 2018-02-01 RX ADMIN — METOPROLOL SUCCINATE 100 MG: 100 TABLET, EXTENDED RELEASE ORAL at 10:02

## 2018-02-01 RX ADMIN — FUROSEMIDE 80 MG: 10 INJECTION, SOLUTION INTRAMUSCULAR; INTRAVENOUS at 01:02

## 2018-02-01 RX ADMIN — MULTIPLE VITAMINS W/ MINERALS TAB 1 TABLET: TAB at 05:02

## 2018-02-01 RX ADMIN — METOPROLOL TARTRATE 5 MG: 5 INJECTION, SOLUTION INTRAVENOUS at 10:02

## 2018-02-01 RX ADMIN — TAMSULOSIN HYDROCHLORIDE 0.4 MG: 0.4 CAPSULE ORAL at 05:02

## 2018-02-01 RX ADMIN — Medication 100 MG: at 10:02

## 2018-02-01 RX ADMIN — DILTIAZEM HYDROCHLORIDE 10 MG/HR: 5 INJECTION INTRAVENOUS at 01:02

## 2018-02-01 RX ADMIN — DRONABINOL 10 MG: 2.5 CAPSULE ORAL at 10:02

## 2018-02-01 RX ADMIN — VITAMIN D, TAB 1000IU (100/BT) 2000 UNITS: 25 TAB at 05:02

## 2018-02-01 RX ADMIN — Medication 1000 MG: at 05:02

## 2018-02-01 RX ADMIN — Medication 3 ML: at 10:02

## 2018-02-01 RX ADMIN — PANTOPRAZOLE SODIUM 40 MG: 40 TABLET, DELAYED RELEASE ORAL at 05:02

## 2018-02-01 RX ADMIN — DILTIAZEM HYDROCHLORIDE 10 MG: 5 INJECTION INTRAVENOUS at 01:02

## 2018-02-01 NOTE — ED NOTES
"Pt arrives from clinic for evaluation of "fast heart rate" - pt states he was seeing doctor at clinic and they noted tachycardia - he was to have a paracentesis today - last paracentesis was 1.22.2018 where they pulled off 4.5 liters     pt has generalized edema with 4+ pitting to bilateral feet to knee and abdomen is swollen with ascites - pt states SOB only on exertion - none noted at present - hypoactive bowel sounds - lungs clear/diminished throughout - pt denies pain     place on monitor with pulse ox   "

## 2018-02-01 NOTE — CONSULTS
[facility]  Cardiology Consult Note    Attending Physician: Elier Garcia MD  Reason for Consult: Atrial flutter    HPI:   Mr. Medrano is a 67 y.o. Man with metastatic rectal cancer Dx 2010, s/p resection, then with pulmonary mets 2013 on FOLFIRI chemotherapy, recurrent ascites s/p regular paracentesis, ureteral obstructive s/p urostomy tubes, and recently diagnosed atrial flutter with variable block, DM - who presents as a transfer from clinic with shortness of breath and tachycardia.  He reports since a procedure to adjust urostomy tubes a week ago he has had worsening shortness of breath, abdominal distention, and lower extremity edema.  Lower extremity edema has been intermittent in the past for which he takes lasix PRN and has taken daily for the past week without resolution.  He gets periodic paracentesis as well for malignant ascites and is due for a tap today.  Denies any chest pain, mild orthopnea, no significant palpitations.  In regards to his atrial flutter - diagnosed two weeks ago during admission for tachycardia, treated with resumption of metoprolol tartrate 100mg BID which he was on previously.  No discussion per patient at that time of anti-coagulation. He was seen today in oncology clinic and given ongoing tachycardia, lower extremity edema, and shortness of breath he was referred to the ED.    Review of Systems   Review of Systems   Constitution: Positive for weakness, malaise/fatigue and weight gain. Negative for fever.   HENT: Negative.    Eyes: Negative.    Cardiovascular: Positive for dyspnea on exertion, irregular heartbeat, leg swelling and orthopnea. Negative for chest pain, near-syncope, palpitations, paroxysmal nocturnal dyspnea and syncope.   Respiratory: Positive for cough and shortness of breath.    Endocrine: Negative.    Hematologic/Lymphatic: Negative.    Skin: Negative.    Musculoskeletal: Negative.    Gastrointestinal: Positive for abdominal pain.   Genitourinary:  Negative.    Psychiatric/Behavioral: Negative.      PMH:     Past Medical History:   Diagnosis Date    Diabetes mellitus     GERD (gastroesophageal reflux disease)     Hypertension     Rectal cancer metastasized to lung 01/11/2010     Past Surgical History:   Procedure Laterality Date    COLON SURGERY      COLONOSCOPY N/A 10/3/2017    Procedure: COLONOSCOPY;  Surgeon: Sandoval Bowling MD;  Location: [address]);  Service: Endoscopy;  Laterality: N/A;        Allergies:     Review of patient's allergies indicates:  No Known Allergies     Medications:     No current facility-administered medications on file prior to encounter.      Current Outpatient Prescriptions on File Prior to Encounter   Medication Sig Dispense Refill    ascorbic acid (VITAMIN C) 1000 MG tablet Take 1,000 mg by mouth once daily.      cholecalciferol, vitamin D3, (VITAMIN D3) 2,000 unit Cap Take 1 capsule by mouth once daily.      cholestyramine, bulk, Powd 4 g by Misc.(Non-Drug; Combo Route) route once daily at 6am. 500 g 3    co-enzyme Q-10 30 mg capsule Take 30 mg by mouth 3 (three) times daily.      diphenoxylate-atropine 2.5-0.025 mg (LOMOTIL) 2.5-0.025 mg per tablet TAKE 1 TABLET BY MOUTH FOUR TIMES DAILY AS NEEDED FOR DIARRHEA 100 tablet 0    dronabinol (MARINOL) 10 MG capsule Take 1 capsule (10 mg total) by mouth 2 (two) times daily before meals. 60 capsule 2    esomeprazole (NEXIUM) 40 MG capsule TAKE 1 CAPSULE(40 MG) BY MOUTH EVERY DAY 90 capsule 1    furosemide (LASIX) 20 MG tablet Take 2 tablets (40 mg total) by mouth daily as needed. 90 tablet 6    glipiZIDE (GLUCOTROL) 5 MG tablet TAKE 1 TABLET BY MOUTH THREE TIMES DAILY 270 tablet 0    metoprolol succinate (TOPROL-XL) 100 MG 24 hr tablet Take 1 tablet (100 mg total) by mouth 2 (two) times daily. 180 tablet 3    multivitamin with iron-mineral TbSR Take 1 tablet by mouth once daily. 100 tablet 2    nut.tx.imp.renal fxn,lac-reduc 0.08 gram-1.8 kcal/mL Liqd Take  237 mLs by mouth 2 (two) times daily. 24 Can 12    NYSTATIN (DUKE'S SOLUTION) Mix equal amounts of benadryl, maalox, 2% viscous lidocaine and nystatin    Swish and swallow 10 ml 4 times a day 500 mL 5    ondansetron (ZOFRAN-ODT) 8 MG TbDL Take 1 tablet (8 mg total) by mouth every 8 (eight) hours as needed (Nausea). 60 tablet 6    opium tincture 10 mg/mL (morphine) Tinc Take 0.6 mLs (6 mg total) by mouth 4 (four) times daily. 473 mL 0    oxycodone-acetaminophen (PERCOCET) 5-325 mg per tablet TK 1 T PO Q 6 H PRN P  0    paregoric 2 mg/5 mL Liqd TAKE 5 ML BY MOUTH THREE TIMES DAILY 473 mL 0    regorafenib (STIVARGA) 40 mg Tab Take 120 mg by mouth once daily. Take for 21 days and then 1 week off and then repeat 84 tablet 6    tamsulosin (FLOMAX) 0.4 mg Cp24 Take 1 capsule (0.4 mg total) by mouth once daily. 90 capsule 6        Social History:     Social History   Substance Use Topics    Smoking status: Never Smoker    Smokeless tobacco: Never Used    Alcohol use No        Family History:     Family History   Problem Relation Age of Onset    Cancer Mother     Breast cancer Mother     Tuberculosis Mother     Cancer Father      neuroendocrine         Physical Exam:     Vitals:  Temp:  [98.2 °F (36.8 °C)-98.3 °F (36.8 °C)]   Pulse:  [119-152]   Resp:  [18-28]   BP: (104-114)/(61-75)   SpO2:  [98 %-100 %]   I/O's:  No intake or output data in the 24 hours ending 02/01/18 1219     Physical Exam   Constitutional: He is oriented to person, place, and time. He appears distressed (Mild shortness of breath due to abdominal distention).   HENT:   Head: Normocephalic and atraumatic.   Mouth/Throat: No oropharyngeal exudate.   Eyes: EOM are normal. No scleral icterus.   Neck: JVD present.   Cardiovascular: Exam reveals no gallop and no friction rub.    Murmur heard.  Tachycardic, regular   Abdominal: He exhibits distension. There is no tenderness. There is no rebound and no guarding.   Musculoskeletal: Normal range of  motion. He exhibits edema (pitting to knees bilaterally). He exhibits no tenderness.   Neurological: He is alert and oriented to person, place, and time.   Skin: Skin is warm. No erythema.   Psychiatric: He has a normal mood and affect.     Labs:     Recent Results (from the past 336 hour(s))   CBC auto differential    Collection Time: 02/01/18 10:10 AM   Result Value Ref Range    WBC 6.97 3.90 - 12.70 K/uL    Hemoglobin 7.8 (L) 14.0 - 18.0 g/dL    Hematocrit 25.3 (L) 40.0 - 54.0 %    Platelets SEE COMMENT 150 - 350 K/uL   CBC auto differential    Collection Time: 01/22/18 12:53 PM   Result Value Ref Range    WBC 7.46 3.90 - 12.70 K/uL    Hemoglobin 8.9 (L) 14.0 - 18.0 g/dL    Hematocrit 29.0 (L) 40.0 - 54.0 %    Platelets 61 (L) 150 - 350 K/uL     No results found for this or any previous visit (from the past 336 hour(s)).    Lab Results   Component Value Date    CHOL 226 (H) 07/07/2015    HDL 34 (L) 07/07/2015    LDLCALC Invalid, Trig>400.0 07/07/2015    TRIG 568 (H) 07/07/2015       Recent Labs  Lab 02/01/18  1010   TROPONINI <0.006     Lab Results   Component Value Date    HGBA1C 5.5 01/16/2018     Estimated Creatinine Clearance: 35.9 mL/min (based on SCr of 2.2 mg/dL (H)).  Echo:  8/2017 TTE:  Aorta: The aortic root is normal in size, measuring 3.2 cm at sinotubular junction and 3.6 cm at Sinuses of Valsalva. The proximal ascending aorta is normal in size, measuring 3.2 cm across.   Left Atrium: The left atrial volume index is severely enlarged, measuring 54.29 cc/m2.   Left Ventricle: The left ventricle is normal in size, with an end-diastolic diameter of 5.0 cm, and an end-systolic diameter of 3.7 cm. LV wall thickness is normal, with the septum measuring 1.0 cm and the posterior wall measuring 0.9 cm across. Relative   wall thickness was normal at 0.36, and the LV mass index was 88.8 g/m2 consistent with normal left ventricular mass. There are no regional wall motion abnormalities. Left ventricular systolic  function appears normal. Visually estimated ejection fraction   is 60-65%. The LV Doppler derived stroke volume equals 97.0 ccs.   Diastolic indices: E wave velocity 0.7 m/s, E/A ratio 1.3,  msec., E/e' ratio(avg) 14. Diastolic function is indeterminate.   Right Atrium: The right atrium is enlarged, measuring 6.8 cm in length and 4.5 cm in width in the apical view.   Right Ventricle: The right ventricle is normal in size measuring 3.6 cm at the base in the apical right ventricle-focused view. Global right ventricular systolic function appears normal. Tricuspid annular plane systolic excursion (TAPSE) is 3.6 cm.   Tissue Doppler-derived tricuspid annular peak systolic velocity (S prime) is 14.8 cm/s. The estimated PA systolic pressure is greater than 22 mmHg.   Aortic Valve:  The aortic valve is mildly sclerotic. The aortic valve is tri-leaflet in structure.   Mitral Valve:  The mitral valve is mildly sclerotic. There is mild mitral regurgitation.   Tricuspid Valve:  There is mild tricuspid regurgitation. Tricuspid valve is normal in structure with normal leaflet mobility.   Pulmonary Valve:  There is mild pulmonic regurgitation. Pulmonary valve is normal in structure with normal leflet mobility.   IVC: The IVC is not visualized.   Intracavitary: There is no evidence of pericardial effusion, intracavity mass, thrombi, or vegetation.   CONCLUSIONS     1 - Normal left ventricular systolic function (EF 60-65%).     2 - Indeterminate LV diastolic function.     3 - No wall motion abnormalities.     4 - Biatrial enlargement.     5 - Normal right ventricular systolic function .     6 - The estimated PA systolic pressure is greater than 22 mmHg.     7 - Mild mitral regurgitation.     8 - Mild tricuspid regurgitation.     EKG:   Typical AFL with 2:1    Assessment & Recommendations:   Mr. Medrano is a 67 y.o. Man with metastatic rectal cancer Dx 2010, s/p resection, then with pulmonary mets 2013 on FOLFIRI chemotherapy,  recurrent ascites s/p regular paracentesis, ureteral obstructive s/p urostomy tubes, and recently diagnosed atrial flutter with variable block, DM - who presents as a transfer from clinic with shortness of breath and tachycardia.    #Atrial flutter - Tachycardia today likely exacerbated by volume overloaded state however intravascular status not definite as he also has RE and has been taking lasix daily. Discussed case with IR informally who said they will not do paracentesis in the setting of INR>2 or DOAC.    -TTE color flow doppler eval volume status, diuresis if IVC enlarged  -Cont metoprolol 100 BID  -Paracentesis  -Discuss with EP to determine if any additional recommendation for rate control.      Thank you for this consult. Further recommendations are pending the attending addendum. Please do not hesitate to page with questions.     Signed:  Maikel Giraldo M.D.  Cardiology Fellow  Pager: [phone]  2/1/2018 12:19 PM    Attending Addendum:

## 2018-02-01 NOTE — ED PROVIDER NOTES
Encounter Date: 2/1/2018    SCRIBE #1 NOTE: I, Bety Muñiz, am scribing for, and in the presence of,  Dr. Garcia. I have scribed the following portions of the note - the APC attestation.       History     Chief Complaint   Patient presents with    Tachycardia     sent from hemonc clinic     Pt is a 68 yo male with medical history of HTN, diabetes, and rectal cancer with joyce to the lung presenting to the ED from heme/onc office for elevated Hr.  Pt states SOB but not new.  Pt states ascites causes SOB.  Pt states increased leg swelling and abdominal distension. Pt states he had a procedure completed at [facility] last Wednesday and swelling has increased since. Pt was scheduled to have paracentesis today. Pt denies CP, palpitations, cough, fevers, abdominal pain, n/v/changes in BM. Also reports of weakness and decreased PO intake.           Review of patient's allergies indicates:  No Known Allergies  Past Medical History:   Diagnosis Date    Diabetes mellitus     GERD (gastroesophageal reflux disease)     Hypertension     Rectal cancer metastasized to lung 01/11/2010     Past Surgical History:   Procedure Laterality Date    COLON SURGERY      COLONOSCOPY N/A 10/3/2017    Procedure: COLONOSCOPY;  Surgeon: Sandoval Bowling MD;  Location: [address]);  Service: Endoscopy;  Laterality: N/A;     Family History   Problem Relation Age of Onset    Cancer Mother     Breast cancer Mother     Tuberculosis Mother     Cancer Father      neuroendocrine      Social History   Substance Use Topics    Smoking status: Never Smoker    Smokeless tobacco: Never Used    Alcohol use No     Review of Systems   Constitutional: Positive for appetite change. Negative for activity change, chills and fever.   HENT: Negative for congestion, ear discharge, postnasal drip, sinus pain, sinus pressure, sneezing and trouble swallowing.    Eyes: Negative for photophobia and discharge.   Respiratory: Positive for cough and  shortness of breath. Negative for apnea, chest tightness and wheezing.    Cardiovascular: Positive for leg swelling. Negative for chest pain and palpitations.   Gastrointestinal: Positive for abdominal distention. Negative for abdominal pain, constipation, diarrhea, nausea and vomiting.   Genitourinary: Negative for difficulty urinating, flank pain and urgency.   Musculoskeletal: Negative for arthralgias, back pain, gait problem, joint swelling, myalgias, neck pain and neck stiffness.   Skin: Positive for pallor. Negative for rash.   Allergic/Immunologic: Negative.    Neurological: Negative for dizziness, syncope, weakness, numbness and headaches.   Psychiatric/Behavioral: Negative.        Physical Exam     Initial Vitals [02/01/18 0925]   BP Pulse Resp Temp SpO2   -- (!) 152 (!) 24 98.3 °F (36.8 °C) 100 %      MAP       --         Physical Exam    Nursing note and vitals reviewed.  Constitutional: He is not diaphoretic. He is cooperative. He has a sickly appearance. No distress.   HENT:   Head: Normocephalic and atraumatic.   Eyes: Conjunctivae, EOM and lids are normal. Pupils are equal, round, and reactive to light.   Neck: Trachea normal, normal range of motion, full passive range of motion without pain and phonation normal. Neck supple. No JVD present.   Cardiovascular: Normal heart sounds. An irregularly irregular rhythm present. Tachycardia present.    Pulses:       Radial pulses are 2+ on the right side, and 2+ on the left side.        Dorsalis pedis pulses are 2+ on the right side, and 2+ on the left side.   Pulmonary/Chest: Effort normal. He has decreased breath sounds.   Abdominal: Soft. Bowel sounds are normal. He exhibits distension, fluid wave and ascites. There is no tenderness. There is no rigidity, no rebound and no guarding.   Increased abdominal girth due to ascites.   Musculoskeletal: Normal range of motion.   (+) 4 pitting edema to bilateral lower extremities.    Neurological: He is alert and  oriented to person, place, and time. He has normal strength. GCS eye subscore is 4. GCS verbal subscore is 5. GCS motor subscore is 6.   Skin: Skin is warm, dry and intact. Capillary refill takes 2 to 3 seconds. There is pallor.        Psychiatric: He has a normal mood and affect. His speech is normal and behavior is normal. Judgment and thought content normal. Cognition and memory are normal.         ED Course   Procedures  Labs Reviewed   CBC W/ AUTO DIFFERENTIAL - Abnormal; Notable for the following:        Result Value    RBC 2.82 (*)     Hemoglobin 7.8 (*)     Hematocrit 25.3 (*)     MCHC 30.8 (*)     RDW 20.4 (*)     Immature Granulocytes 0.7 (*)     Immature Grans (Abs) 0.05 (*)     Lymph% 17.2 (*)     Platelet Estimate Clumped (*)     All other components within normal limits   COMPREHENSIVE METABOLIC PANEL - Abnormal; Notable for the following:     Chloride 111 (*)     CO2 20 (*)     Glucose 112 (*)     BUN, Bld 50 (*)     Creatinine 2.2 (*)     Calcium 8.2 (*)     Total Protein 5.9 (*)     Albumin 2.1 (*)     Alkaline Phosphatase 419 (*)     eGFR if  34.1 (*)     eGFR if non  29.5 (*)     All other components within normal limits   B-TYPE NATRIURETIC PEPTIDE - Abnormal; Notable for the following:      (*)     All other components within normal limits   TROPONIN I   MAGNESIUM   PROTIME-INR             Medical Decision Making:   History:   Old Medical Records: I decided to obtain old medical records.  Initial Assessment:   Pt is a 68 yo male with medical history of HTN, diabetes, and rectal cancer with joyce to the lung presenting to the ED from heme/onc office for tachycardia.  Pt also stated SOB but attributes this to increased ascites. Pt denies chest pain, N/V/D.      Differential Diagnosis:   Arrhythmia, tachycardia, electrolyte abnormality, and CHF.   Clinical Tests:   Lab Tests: Ordered and Reviewed  Radiological Study: Ordered and Reviewed  Medical Tests: Ordered  and Reviewed  ED Management:  Lopressor 5mg IV x 3 ordered Prn   Will get labs, CXR, and reassess after medications.   1015- Cardiology consulted.  Will see pt.   1035- Pt -Z-Pydyuhr noted.   Other:   I have discussed this case with another health care provider.       <> Summary of the Discussion: Cardiology, Electrophysiology     Imaging Results          X-Ray Chest 1 View (Final result)  Result time 02/01/18 10:40:41    Final result by Fara Fletcher MD (02/01/18 10:40:41)                 Impression:     Poor inspiration with volume loss bilaterally. Continued bilateral pulmonary nodules which are not as well-demonstrated due to underpenetration. The mass at the right upper lobe measures a larger as compared to the previous study.      Electronically signed by: Dr. FARA FLETCHER MD  Date:     02/01/18  Time:    10:40              Narrative:    Comparison: 01/16/2018    Results: Single view. Left-sided PICC is again seen with its tip near the junction of the SVC and right atrium. Somewhat poor inspiration, accentuating the cardiac silhouette and pulmonary vasculature. The heart appears mildly large but unchanged in size and configuration. Tortuous thoracic aorta with atherosclerotic calcification in the wall of the aortic arch. Overall volume loss of both lungs. Multiple pulmonary nodules are again seen bilaterally. These are not as well demonstrated on the current examination due to underpenetration. The mass at the right upper lobe measures approximately 38 mm in greatest dimension on today's study as compared to 33 mm on the previous examination. No definite pleural fluid or pneumothorax. The skeletal structures appear intact.                               APC / Resident Notes:   1200- Updated by cardiology.  Pt to be admitted under their service.  Family updated with plan.      I discussed the care of this patient with my supervising MD.         Scribe Attestation:   Scribe #1: I performed the above scribed  service and the documentation accurately describes the services I performed. I attest to the accuracy of the note.    Attending Attestation:     Physician Attestation Statement for NP/PA:   I discussed this assessment and plan of this patient with the NP/PA, but I did not personally examine the patient. The face to face encounter was performed by the NP/PA.                  ED Course      Clinical Impression:   The primary encounter diagnosis was Atrial flutter with rapid ventricular response. Diagnoses of Tachycardia, Chest pain, and Arrhythmia were also pertinent to this visit.    Disposition:   Disposition: Admitted  Condition: Serious           Katie Kaye NP  02/01/18 1745

## 2018-02-01 NOTE — HPI
Mr. Medrano is a 67 y.o. man with metastatic rectal cancer Dx 2010, s/p resection, then with pulmonary mets 2013 on FOLFIRI chemotherapy, recurrent ascites s/p regular paracentesis, ureteral obstructive s/p urostomy tubes, and recently diagnosed atrial flutter with variable block, DM - who presents as a transfer from clinic with shortness of breath and tachycardia.  He reports since a procedure to adjust urostomy tubes a week ago he has had worsening shortness of breath, abdominal distention, and lower extremity edema.  Lower extremity edema has been intermittent in the past for which he takes lasix PRN and has taken daily for the past week without resolution.  He gets periodic paracentesis as well for malignant ascites and is due for a tap today.  Denies any chest pain, mild orthopnea, no significant palpitations.  In regards to his atrial flutter - diagnosed two weeks ago during admission for tachycardia, treated with resumption of metoprolol tartrate 100mg BID which he was on previously.  No discussion per patient at that time of anti-coagulation. He was seen today in oncology clinic and given ongoing tachycardia, lower extremity edema, and shortness of breath he was referred to the ED.

## 2018-02-01 NOTE — PROGRESS NOTES
"Subjective:       Patient ID: Karthik Medrano is a 67 y.o. male.    Chief Complaint: Rectal Cancer  Oncologic History:  67 year old male with history of rectal cancer diagnosed 1/11/10 on colonoscopy (moderately differentiated adenocarcinoma) and based on imaging, was T3NxM0. Patient underwent Neoadjuvant chemort with infusional 5FU. This was completed 3/19/10 with a total of 50.4Gy given to the pelvis. He went to surgery 5/13/10. T3N0M0. Post operative he received no adjuvant treatment and has been followed regularly. He went to his PCP 6/24/13 who ordered a CXR which found pulmonary nodules. On 6/27/13, CT chest confirmed these findings. CEA drawn when initially diagnosed was 2.6ng/ml and 6/27/13 was 30.8 ng/ml. . Pulmonary biopsy was performed 7/18/13 and this confirmed metastatic adenocarcinoma. 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 Avastin was added for second cycle.    Patient completed 12 cycles of FOLFOX avastin. He did not receive Avastin for cycle 3, 4, or 5 (due to uncontrolled blood pressure).    He was on maintenance infusional 5FU and Avastin. Repeat scans 4/14/14 showed numerous bilateral pulmonary soft tissue nodules, the majority of which demonstrate interval enlargement when compared to the prior exam. CEA up 18.2 from 13.    He was restarted on FOLFIRI and AVastin and received 6 months of chemo and then was switched to maintenance chemotherapy with 5FU and Avastin. His CT scans from early May 2015 revealed progression in lung lesions.  He is now on FOLFIRI and Cyramza.  CT from 4/19/16 reveals "Multiple stable metastatic pulmonary nodules in this patient with history of rectal cancer.  Stable postoperative changes are present from prior partial colectomy.Interval increase in right-sided hydronephrosis despite appropriate positioning of a double-J ureteral sent.  There is associated enlargement and decreased perfusion to the right kidney consistent with " "obstruction. These findings are concerning for a nonfunctioning ureteral stent. Cholelithiasis."  CT from 6/13/16 reveal stable disease and hydronephrosis has resolved.  CT from 8/5/16 reveals "Stable pulmonary metastatic disease.Increased prominence of periureteral fat stranding/inflammatory change. Stable mild right hydronephrosis."  CT from 1/6/17 "1. Stable appearance of pulmonary metastatic disease in this patient with history of colon cancer. Pulmonary index lesions are stable in size compared to prior examination. No evidence of new metastatic disease. 2. Right-sided double-J ureteral stent with stable appearing inflammatory change about the right renal collecting system and right ureter. 3. Additional stable incidental findings as discussed above. "     CT scans reveal "In this patient with a history of rectal cancer, index lesions in the right and left lung are stable in size as detailed above. No new pulmonary nodules or masses are identified. There is no evidence of abnormal soft tissue opacity in the rectosigmoid region to suggest residual/recurrent disease. Interval increase in the degree of right hydronephrosis with a stable double-J right ureteral stent in place. Interval development of perihepatic ascites. Stable fat and bowel containing ventral hernia"     CT scans 7/21/17 reveal "Stable appearance of multiple pulmonary lesions, compatible with metastatic disease, in this patient with given history of rectal cancer.  Index lesions below. Surgical changes from low anterior resection of rectal mass, without evidence of local recurrence. Stable right-sided moderate hydroureteronephrosis, with periureteral stranding ; appropriately positioned double-J ureteral stent.  Findings are concerning for UTI. There is thinning of the right renal parenchyma, concerning for chronic obstruction or inflammation. Stable appearing soft tissue collection with dystrophic calcifications in the presacral space, which may " "be postsurgical or post radiation in etiology.  This finding may cause obstruction of the right ureter."     CT scans 9/2/17 reveal "1. Status post low anterior resection for colon cancer. 2. Pulmonary metastatic disease without significant interval change. See summary below. 3. Continued moderate volume of ascites, possibly malignant ascites. 4. Chronic obstruction of right distal right ureter despite presence of ureter stent. Distal right ureter may be involved by soft tissue finding in presacral area, favored to represent postsurgical or post radiation change. 5. Cholelithiasis. 6. Ventral hernias containing ascites and small bowel without obstruction."           On 12/27/17 --dose reduced FOLFIRI and Cyramza.           HPIHe has not had any further chemo since then due to decline in performance status. He comes in to discuss Stiv[place]a  He has been undergoing therapeutic paracentesis once every 2 weeks. He notes shortness of breath. He is tachycardia.         Review of Systems   Constitutional: Positive for appetite change, fatigue and unexpected weight change.   HENT: Negative for mouth sores.    Eyes: Negative for visual disturbance.   Respiratory: Positive for shortness of breath. Negative for cough.    Cardiovascular: Negative for chest pain.   Gastrointestinal: Positive for abdominal pain. Negative for diarrhea.   Genitourinary: Negative for frequency.   Musculoskeletal: Positive for back pain.   Skin: Negative for rash.   Neurological: Negative for headaches.   Hematological: Positive for adenopathy.   Psychiatric/Behavioral: The patient is not nervous/anxious.    All other systems reviewed and are negative.      PMFSH: all information reviewed and updated as relevant to today's visit  Objective:      Physical Exam   Constitutional: He is oriented to person, place, and time. He appears lethargic. He appears cachectic.   HENT:   Mouth/Throat: No oropharyngeal exudate.   Cardiovascular: Normal heart sounds.  " Tachycardia present.    Feeble, regular tachycardia+   Pulmonary/Chest: Effort normal and breath sounds normal. He has no wheezes.   Abdominal: Soft. Bowel sounds are normal. He exhibits ascites. There is no tenderness.   Musculoskeletal: He exhibits no edema or tenderness.   Lymphadenopathy:     He has no cervical adenopathy.   Neurological: He is oriented to person, place, and time. He appears lethargic. Coordination normal.   Skin: Skin is warm and dry. No rash noted.   Psychiatric: He has a normal mood and affect. Judgment and thought content normal.   Vitals reviewed.      Labs:  WBC   Date Value Ref Range Status   02/01/2018 6.97 3.90 - 12.70 K/uL Final     Hemoglobin   Date Value Ref Range Status   02/01/2018 7.8 (L) 14.0 - 18.0 g/dL Final     Hematocrit   Date Value Ref Range Status   02/01/2018 25.3 (L) 40.0 - 54.0 % Final     Platelets   Date Value Ref Range Status   02/01/2018 SEE COMMENT 150 - 350 K/uL Final     Comment:     Result not available.  PLATELETS ARE CLUMPED ON SMEAR; APPEARS TO BE ADEQUATE IN NUMBER;   CALLED TO ESTELA URBAN RN       Gran # (ANC)   Date Value Ref Range Status   02/01/2018 4.8 1.8 - 7.7 K/uL Final     Gran%   Date Value Ref Range Status   02/01/2018 69.1 38.0 - 73.0 % Final       Chemistry        Component Value Date/Time     02/01/2018 1010    K 4.2 02/01/2018 1010     (H) 02/01/2018 1010    CO2 20 (L) 02/01/2018 1010    BUN 50 (H) 02/01/2018 1010    CREATININE 2.2 (H) 02/01/2018 1010     (H) 02/01/2018 1010        Component Value Date/Time    CALCIUM 8.2 (L) 02/01/2018 1010    ALKPHOS 419 (H) 02/01/2018 1010    AST 17 02/01/2018 1010    ALT 13 02/01/2018 1010    BILITOT 0.3 02/01/2018 1010    ESTGFRAFRICA 34.1 (A) 02/01/2018 1010    EGFRNONAA 29.5 (A) 02/01/2018 1010          Assessment:       1. Atrial flutter with rapid ventricular response    2. Rectal cancer    3. CKD (chronic kidney disease), stage III        Plan:        1. Refer to ER as he  appears to be in Atrial Flutter. Spoke to ED attending. Hold chemo until his performance status improves/  2. Prognosis is very guarded.  3. Worsened today.      Above care plan was discussed with patient and accompanying wife and son and all questions were addressed to their satisfaction

## 2018-02-01 NOTE — CONSULTS
[facility]  Cardiac Electrophysiology  Consult Note    Admission Date: 2/1/2018  Code Status: Full Code   Attending Provider: Elier Garcia MD  Consulting Provider: Hardy Trujillo MD  Principal Problem:Atrial flutter with rapid ventricular response    Inpatient consult to Electrophysiology  Consult performed by: HARDY TRUJILLO  Consult ordered by: LILY LOONEY  Reason for consult: AFlutter        Subjective:     Chief Complaint:  A Flutter    HPI:   Mr. Medrano is a 67 y.o. man with metastatic rectal cancer Dx 2010, s/p resection, then with pulmonary mets 2013 on FOLFIRI chemotherapy, recurrent ascites s/p regular paracentesis, ureteral obstructive s/p urostomy tubes, and recently diagnosed atrial flutter with variable block, DM - who presents as a transfer from clinic with shortness of breath and tachycardia.  He reports since a procedure to adjust urostomy tubes a week ago he has had worsening shortness of breath, abdominal distention, and lower extremity edema.  Lower extremity edema has been intermittent in the past for which he takes lasix PRN and has taken daily for the past week without resolution.  He gets periodic paracentesis as well for malignant ascites and is due for a tap today.  Denies any chest pain, mild orthopnea, no significant palpitations.  In regards to his atrial flutter - diagnosed two weeks ago during admission for tachycardia, treated with resumption of metoprolol tartrate 100mg BID which he was on previously.  No discussion per patient at that time of anti-coagulation. He was seen today in oncology clinic and given ongoing tachycardia, lower extremity edema, and shortness of breath he was referred to the ED.      Past Medical History:   Diagnosis Date    Diabetes mellitus     GERD (gastroesophageal reflux disease)     Hypertension     Rectal cancer metastasized to lung 01/11/2010       Past Surgical History:   Procedure Laterality Date    COLON SURGERY       COLONOSCOPY N/A 10/3/2017    Procedure: COLONOSCOPY;  Surgeon: Sandoval Bowling MD;  Location: [facility] ([address] [place] [address]);  Service: Endoscopy;  Laterality: N/A;       Review of patient's allergies indicates:  No Known Allergies    No current facility-administered medications on file prior to encounter.      Current Outpatient Prescriptions on File Prior to Encounter   Medication Sig    ascorbic acid (VITAMIN C) 1000 MG tablet Take 1,000 mg by mouth once daily.    cholecalciferol, vitamin D3, (VITAMIN D3) 2,000 unit Cap Take 1 capsule by mouth once daily.    cholestyramine, bulk, Powd 4 g by Misc.(Non-Drug; Combo Route) route once daily at 6am.    co-enzyme Q-10 30 mg capsule Take 30 mg by mouth 3 (three) times daily.    dronabinol (MARINOL) 10 MG capsule Take 1 capsule (10 mg total) by mouth 2 (two) times daily before meals.    esomeprazole (NEXIUM) 40 MG capsule TAKE 1 CAPSULE(40 MG) BY MOUTH EVERY DAY    furosemide (LASIX) 20 MG tablet Take 2 tablets (40 mg total) by mouth daily as needed.    glipiZIDE (GLUCOTROL) 5 MG tablet TAKE 1 TABLET BY MOUTH THREE TIMES DAILY    metoprolol succinate (TOPROL-XL) 100 MG 24 hr tablet Take 1 tablet (100 mg total) by mouth 2 (two) times daily.    multivitamin with iron-mineral TbSR Take 1 tablet by mouth once daily.    nut.tx.imp.renal fxn,lac-reduc 0.08 gram-1.8 kcal/mL Liqd Take 237 mLs by mouth 2 (two) times daily.    opium tincture 10 mg/mL (morphine) Tinc Take 0.6 mLs (6 mg total) by mouth 4 (four) times daily.    oxycodone-acetaminophen (PERCOCET) 5-325 mg per tablet TK 1 T PO Q 6 H PRN P    paregoric 2 mg/5 mL Liqd TAKE 5 ML BY MOUTH THREE TIMES DAILY    regorafenib (STIVARGA) 40 mg Tab Take 120 mg by mouth once daily. Take for 21 days and then 1 week off and then repeat    tamsulosin (FLOMAX) 0.4 mg Cp24 Take 1 capsule (0.4 mg total) by mouth once daily.    diphenoxylate-atropine 2.5-0.025 mg (LOMOTIL) 2.5-0.025 mg per tablet TAKE 1 TABLET BY MOUTH FOUR  TIMES DAILY AS NEEDED FOR DIARRHEA    NYSTATIN (DUKE'S SOLUTION) Mix equal amounts of benadryl, maalox, 2% viscous lidocaine and nystatin    Swish and swallow 10 ml 4 times a day    ondansetron (ZOFRAN-ODT) 8 MG TbDL Take 1 tablet (8 mg total) by mouth every 8 (eight) hours as needed (Nausea).     Family History     Problem Relation (Age of Onset)    Breast cancer Mother    Cancer Mother, Father    Tuberculosis Mother        Social History Main Topics    Smoking status: Never Smoker    Smokeless tobacco: Never Used    Alcohol use No    Drug use: Unknown    Sexual activity: Not on file     Review of Systems   All other systems reviewed and are negative.    Objective:     Vital Signs (Most Recent):  Temp: 98.3 °F (36.8 °C) (02/01/18 0925)  Pulse: (!) 132 (02/01/18 1336)  Resp: (!) 22 (02/01/18 1336)  BP: 110/80 (02/01/18 1336)  SpO2: 99 % (02/01/18 1336) Vital Signs (24h Range):  Temp:  [98.2 °F (36.8 °C)-98.3 °F (36.8 °C)] 98.3 °F (36.8 °C)  Pulse:  [119-152] 132  Resp:  [18-28] 22  SpO2:  [98 %-100 %] 99 %  BP: (104-114)/(61-80) 110/80     Weight: 90.7 kg (200 lb)  Body mass index is 28.7 kg/m².    SpO2: 99 %  O2 Device (Oxygen Therapy): room air    Physical Exam  GEN: Alert and oriented in NAD  NECK: + JVD appreciated to ear  CVS: irreg irreg, s1/s2, no MRG appreciated  PULM:rales appreciated  ABD: NT/ND BS +  Extremities: warm and dry, palpable pulses, 3+ edema  NEURO: Alert and oriented x 3  PSYCH: appropriate affect.         Significant Labs:   Recent Lab Results       02/01/18  1010 02/01/18  0818      Immature Granulocytes 0.7(H)      Immature Grans (Abs) 0.05(H) 0.01     Albumin 2.1(L) 2.1(L)     Alkaline Phosphatase 419(H) 424(H)     ALT 13 11     Anion Gap 9 8     Aniso Slight      AST 17 16     Baso # 0.03      Basophil% 0.4      Total Bilirubin 0.3  Comment:  For infants and newborns, interpretation of results should be based  on gestational age, weight and in agreement with  clinical  observations.  Premature Infant recommended reference ranges:  Up to 24 hours.............<8.0 mg/dL  Up to 48 hours............<12.0 mg/dL  3-5 days..................<15.0 mg/dL  6-29 days.................<15.0 mg/dL   0.3  Comment:  For infants and newborns, interpretation of results should be based  on gestational age, weight and in agreement with clinical  observations.  Premature Infant recommended reference ranges:  Up to 24 hours.............<8.0 mg/dL  Up to 48 hours............<12.0 mg/dL  3-5 days..................<15.0 mg/dL  6-29 days.................<15.0 mg/dL         Comment:  Values of less than 100 pg/ml are consistent with non-CHF populations.(H)      BUN, Bld 50(H) 55(H)     Calcium 8.2(L) 8.5(L)     Chloride 111(H) 110     CO2 20(L) 21(L)     Creatinine 2.2(H) 2.2(H)     Differential Method Automated      eGFR if  34.1(A) 34.1(A)     eGFR if non  29.5  Comment:  Calculation used to obtain the estimated glomerular filtration  rate (eGFR) is the CKD-EPI equation.   (A) 29.5  Comment:  Calculation used to obtain the estimated glomerular filtration  rate (eGFR) is the CKD-EPI equation.   (A)     Eos # 0.1      Eosinophil% 1.7      Glucose 112(H) 106     Gran # (ANC) 4.8 4.6  Comment:  The ANC is based on a white cell differential from an   automated cell counter. It has not been microscopically   reviewed for the presence of abnormal cells. Clinical   correlation is required.       Gran% 69.1      Hematocrit 25.3(L) 27.3(L)     Hemoglobin 7.8(L) 8.3(L)     Lymph # 1.2      Lymph% 17.2(L)      MCH 27.7 27.6     MCHC 30.8(L) 30.4(L)     MCV 90 91     Mono # 0.8      Mono% 10.9      MPV SEE COMMENT  Comment:  Result not available. 9.9     nRBC 0      Ovalocytes Occasional      Platelet Estimate Clumped(A)      Platelets SEE COMMENT  Comment:  Result not available.  PLATELETS ARE CLUMPED ON SMEAR; APPEARS TO BE ADEQUATE IN NUMBER;   CALLED TO ESTELA  WILMAR RN   161     Poly Occasional      Potassium 4.2 4.2     Total Protein 5.9(L) 6.1     RBC 2.82(L) 3.01(L)     RDW 20.4(H) 20.6(H)     Sodium 140 139     Troponin I <0.006  Comment:  The reference interval for Troponin I represents the 99th percentile   cutoff   for our facility and is consistent with 3rd generation assay   performance.        WBC 6.97 6.80           Significant Imaging: X-Ray: CXR: X-Ray Chest 1 View (CXR):   Results for orders placed or performed during the hospital encounter of 02/01/18   X-Ray Chest 1 View    Narrative    Comparison: 01/16/2018    Results: Single view. Left-sided PICC is again seen with its tip near the junction of the SVC and right atrium. Somewhat poor inspiration, accentuating the cardiac silhouette and pulmonary vasculature. The heart appears mildly large but unchanged in size and configuration. Tortuous thoracic aorta with atherosclerotic calcification in the wall of the aortic arch. Overall volume loss of both lungs. Multiple pulmonary nodules are again seen bilaterally. These are not as well demonstrated on the current examination due to underpenetration. The mass at the right upper lobe measures approximately 38 mm in greatest dimension on today's study as compared to 33 mm on the previous examination. No definite pleural fluid or pneumothorax. The skeletal structures appear intact.    Impression     Poor inspiration with volume loss bilaterally. Continued bilateral pulmonary nodules which are not as well-demonstrated due to underpenetration. The mass at the right upper lobe measures a larger as compared to the previous study.      Electronically signed by: Dr. FARA FLETCHER MD  Date:     02/01/18  Time:    10:40     EKG: typical atrial flutter    Assessment and Plan:     * Atrial flutter with rapid ventricular response    Mr. Medrano is a 67 y.o. Man with metastatic rectal cancer Dx 2010, s/p resection, then with pulmonary mets 2013 on FOLFIRI chemotherapy, recurrent  ascites s/p regular paracentesis, ureteral obstructive s/p urostomy tubes, and recently diagnosed atrial flutter with variable block, DM - who presents as a transfer from clinic with shortness of breath and tachycardia.     Has been in flutter for almost 2 weeks now. Today's episode likely exacerbated by volume overloaded state. Ideally he would need anticoagulation given his [id number] of 3 but given his need for frequent procedures (paracentesis every 2 weeks and urostomy tubes every 3 months) patient and wife are hesitant despite the stroke risk which was explained in detail. Case was discussed with IR informally in the ED who said they will not do paracentesis in the setting of INR>2 or DOAC. For this patient rate control strategy may be best. Would continue with metoprolol 100 mg BID. Continue with diltiazem gtt at 10 and will eventually convert to oral diltiazem. If these do not rate control him we could try digoxin but would be hesitant given his renal function at present. In addition to this he would benefit from diuresis but may be difficult given low albumin of 2.             Thank you for your consult. I will follow-up with patient. Please contact us if you have any additional questions.    Leland Trujillo MD  Cardiac Electrophysiology  [facility]

## 2018-02-01 NOTE — PROVIDER PROGRESS NOTES - EMERGENCY DEPT.
Encounter Date: 2/1/2018    ED Physician Progress Notes         EKG - STEMI Decision  Initial Reading: No STEMI present (Afib RVR).  Response: patient moved to monitored bed.

## 2018-02-01 NOTE — ASSESSMENT & PLAN NOTE
Mr. Medrano is a 67 y.o. Man with metastatic rectal cancer Dx 2010, s/p resection, then with pulmonary mets 2013 on FOLFIRI chemotherapy, recurrent ascites s/p regular paracentesis, ureteral obstructive s/p urostomy tubes, and recently diagnosed atrial flutter with variable block, DM - who presents as a transfer from clinic with shortness of breath and tachycardia.     Has been in flutter for almost 2 weeks now. Today's episode likely exacerbated by volume overloaded state. Ideally he would need anticoagulation given his [id number] of 3 but given his need for frequent procedures (paracentesis every 2 weeks and urostomy tubes every 3 months) patient and wife are hesitant despite the stroke risk which was explained in detail. Case was discussed with IR informally in the ED who said they will not do paracentesis in the setting of INR>2 or DOAC. For this patient rate control strategy may be best. Would continue with metoprolol 100 mg BID. Continue with diltiazem gtt at 10 and will eventually convert to oral diltiazem. If these do not rate control him we could try digoxin but would be hesitant given his renal function at present. In addition to this he would benefit from diuresis but may be difficult given low albumin of 2.

## 2018-02-02 ENCOUNTER — DOCUMENTATION ONLY (OUTPATIENT)
Dept: CARDIOLOGY | Facility: CLINIC | Age: 68
End: 2018-02-02

## 2018-02-02 DIAGNOSIS — E11.9 TYPE 2 DIABETES MELLITUS WITHOUT COMPLICATION: ICD-10-CM

## 2018-02-02 PROBLEM — A41.9 SEPTIC SHOCK: Status: ACTIVE | Noted: 2018-02-02

## 2018-02-02 PROBLEM — R65.21 SEPTIC SHOCK: Status: ACTIVE | Noted: 2018-02-02

## 2018-02-02 PROBLEM — R60.1 ANASARCA: Status: ACTIVE | Noted: 2018-02-02

## 2018-02-02 LAB
ALBUMIN SERPL BCP-MCNC: 2.1 G/DL
ALBUMIN SERPL BCP-MCNC: 2.9 G/DL
ALLENS TEST: ABNORMAL
ALP SERPL-CCNC: 364 U/L
ALP SERPL-CCNC: 681 U/L
ALT SERPL W/O P-5'-P-CCNC: 83 U/L
ALT SERPL W/O P-5'-P-CCNC: 9 U/L
ANION GAP SERPL CALC-SCNC: 12 MMOL/L
ANION GAP SERPL CALC-SCNC: 16 MMOL/L
ANION GAP SERPL CALC-SCNC: 16 MMOL/L
ANISOCYTOSIS BLD QL SMEAR: SLIGHT
APTT BLDCRRT: 23.5 SEC
AST SERPL-CCNC: 14 U/L
AST SERPL-CCNC: 245 U/L
BASOPHILS # BLD AUTO: 0.05 K/UL
BASOPHILS # BLD AUTO: 0.14 K/UL
BASOPHILS NFR BLD: 0.7 %
BASOPHILS NFR BLD: 0.7 %
BILIRUB SERPL-MCNC: 0.4 MG/DL
BILIRUB SERPL-MCNC: 1.1 MG/DL
BNP SERPL-MCNC: 790 PG/ML
BUN SERPL-MCNC: 56 MG/DL
BUN SERPL-MCNC: 57 MG/DL
BUN SERPL-MCNC: 62 MG/DL
CALCIUM SERPL-MCNC: 8.1 MG/DL
CALCIUM SERPL-MCNC: 8.4 MG/DL
CALCIUM SERPL-MCNC: 8.7 MG/DL
CHLORIDE SERPL-SCNC: 106 MMOL/L
CHLORIDE SERPL-SCNC: 108 MMOL/L
CHLORIDE SERPL-SCNC: 109 MMOL/L
CO2 SERPL-SCNC: 11 MMOL/L
CO2 SERPL-SCNC: 12 MMOL/L
CO2 SERPL-SCNC: 18 MMOL/L
CREAT SERPL-MCNC: 2.2 MG/DL
CREAT SERPL-MCNC: 2.8 MG/DL
CREAT SERPL-MCNC: 2.9 MG/DL
DELSYS: ABNORMAL
DIFFERENTIAL METHOD: ABNORMAL
DIFFERENTIAL METHOD: ABNORMAL
EOSINOPHIL # BLD AUTO: 0.2 K/UL
EOSINOPHIL # BLD AUTO: 0.3 K/UL
EOSINOPHIL NFR BLD: 1.4 %
EOSINOPHIL NFR BLD: 2.5 %
EP: 5
ERYTHROCYTE [DISTWIDTH] IN BLOOD BY AUTOMATED COUNT: 20.5 %
ERYTHROCYTE [DISTWIDTH] IN BLOOD BY AUTOMATED COUNT: 20.5 %
ERYTHROCYTE [SEDIMENTATION RATE] IN BLOOD BY WESTERGREN METHOD: 14 MM/H
EST. GFR  (AFRICAN AMERICAN): 24.4 ML/MIN/1.73 M^2
EST. GFR  (AFRICAN AMERICAN): 25.5 ML/MIN/1.73 M^2
EST. GFR  (AFRICAN AMERICAN): 34.1 ML/MIN/1.73 M^2
EST. GFR  (NON AFRICAN AMERICAN): 21.1 ML/MIN/1.73 M^2
EST. GFR  (NON AFRICAN AMERICAN): 22 ML/MIN/1.73 M^2
EST. GFR  (NON AFRICAN AMERICAN): 29.5 ML/MIN/1.73 M^2
ESTIMATED PA SYSTOLIC PRESSURE: 54.24
FIO2: 100
FLOW: 15
GLUCOSE SERPL-MCNC: 128 MG/DL
GLUCOSE SERPL-MCNC: 267 MG/DL
GLUCOSE SERPL-MCNC: 347 MG/DL
HCO3 UR-SCNC: 14.2 MMOL/L (ref 24–28)
HCO3 UR-SCNC: 17.4 MMOL/L (ref 24–28)
HCO3 UR-SCNC: 19 MMOL/L (ref 24–28)
HCT VFR BLD AUTO: 26.5 %
HCT VFR BLD AUTO: 29.9 %
HCT VFR BLD CALC: 31 %PCV (ref 36–54)
HGB BLD-MCNC: 8 G/DL
HGB BLD-MCNC: 9 G/DL
HYPOCHROMIA BLD QL SMEAR: ABNORMAL
IMM GRANULOCYTES # BLD AUTO: 0.07 K/UL
IMM GRANULOCYTES # BLD AUTO: 0.29 K/UL
IMM GRANULOCYTES NFR BLD AUTO: 0.9 %
IMM GRANULOCYTES NFR BLD AUTO: 1.5 %
INR PPP: 1
INR PPP: 1.1
IP: 15
LACTATE SERPL-SCNC: 3.5 MMOL/L
LACTATE SERPL-SCNC: 5 MMOL/L
LYMPHOCYTES # BLD AUTO: 1.8 K/UL
LYMPHOCYTES # BLD AUTO: 6.7 K/UL
LYMPHOCYTES NFR BLD: 23.5 %
LYMPHOCYTES NFR BLD: 35.4 %
MAGNESIUM SERPL-MCNC: 1.8 MG/DL
MAGNESIUM SERPL-MCNC: 2.1 MG/DL
MAGNESIUM SERPL-MCNC: 2.1 MG/DL
MCH RBC QN AUTO: 27.4 PG
MCH RBC QN AUTO: 28 PG
MCHC RBC AUTO-ENTMCNC: 30.1 G/DL
MCHC RBC AUTO-ENTMCNC: 30.2 G/DL
MCV RBC AUTO: 91 FL
MCV RBC AUTO: 93 FL
MIN VOL: 13.5
MITRAL VALVE REGURGITATION: ABNORMAL
MODE: ABNORMAL
MODE: ABNORMAL
MONOCYTES # BLD AUTO: 0.7 K/UL
MONOCYTES # BLD AUTO: 1.3 K/UL
MONOCYTES NFR BLD: 6.6 %
MONOCYTES NFR BLD: 9.6 %
NEUTROPHILS # BLD AUTO: 10.3 K/UL
NEUTROPHILS # BLD AUTO: 4.8 K/UL
NEUTROPHILS NFR BLD: 54.4 %
NEUTROPHILS NFR BLD: 62.8 %
NRBC BLD-RTO: 0 /100 WBC
NRBC BLD-RTO: 0 /100 WBC
PCO2 BLDA: 31.8 MMHG (ref 35–45)
PCO2 BLDA: 34.6 MMHG (ref 35–45)
PCO2 BLDA: 47.6 MMHG (ref 35–45)
PH SMN: 7.17 [PH] (ref 7.35–7.45)
PH SMN: 7.22 [PH] (ref 7.35–7.45)
PH SMN: 7.38 [PH] (ref 7.35–7.45)
PHOSPHATE SERPL-MCNC: 6.6 MG/DL
PLATELET # BLD AUTO: 161 K/UL
PLATELET # BLD AUTO: 65 K/UL
PLATELET BLD QL SMEAR: ABNORMAL
PMV BLD AUTO: 10 FL
PMV BLD AUTO: 10.3 FL
PO2 BLDA: 149 MMHG (ref 80–100)
PO2 BLDA: 290 MMHG (ref 80–100)
PO2 BLDA: 40 MMHG (ref 40–60)
POC BE: -11 MMOL/L
POC BE: -13 MMOL/L
POC BE: -6 MMOL/L
POC IONIZED CALCIUM: 1.2 MMOL/L (ref 1.06–1.42)
POC SATURATED O2: 100 % (ref 95–100)
POC SATURATED O2: 61 % (ref 95–100)
POC SATURATED O2: 99 % (ref 95–100)
POC TCO2: 15 MMOL/L (ref 23–27)
POC TCO2: 19 MMOL/L (ref 24–29)
POC TCO2: 20 MMOL/L (ref 23–27)
POCT GLUCOSE: 116 MG/DL (ref 70–110)
POCT GLUCOSE: 153 MG/DL (ref 70–110)
POCT GLUCOSE: 177 MG/DL (ref 70–110)
POCT GLUCOSE: 228 MG/DL (ref 70–110)
POLYCHROMASIA BLD QL SMEAR: ABNORMAL
POTASSIUM BLD-SCNC: 4.9 MMOL/L (ref 3.5–5.1)
POTASSIUM SERPL-SCNC: 4.6 MMOL/L
POTASSIUM SERPL-SCNC: 5 MMOL/L
POTASSIUM SERPL-SCNC: 5.6 MMOL/L
PROT SERPL-MCNC: 6 G/DL
PROT SERPL-MCNC: 6.1 G/DL
PROTHROMBIN TIME: 10.8 SEC
PROTHROMBIN TIME: 11.3 SEC
RBC # BLD AUTO: 2.92 M/UL
RBC # BLD AUTO: 3.21 M/UL
RETIRED EF AND QEF - SEE NOTES: 40 (ref 55–65)
SAMPLE: ABNORMAL
SITE: ABNORMAL
SODIUM BLD-SCNC: 140 MMOL/L (ref 136–145)
SODIUM SERPL-SCNC: 134 MMOL/L
SODIUM SERPL-SCNC: 135 MMOL/L
SODIUM SERPL-SCNC: 139 MMOL/L
SP02: 100
SP02: 96
SPONT RATE: 19
TRICUSPID VALVE REGURGITATION: ABNORMAL
TROPONIN I SERPL DL<=0.01 NG/ML-MCNC: <0.006 NG/ML
WBC # BLD AUTO: 18.88 K/UL
WBC # BLD AUTO: 7.57 K/UL

## 2018-02-02 PROCEDURE — 85610 PROTHROMBIN TIME: CPT | Mod: 91

## 2018-02-02 PROCEDURE — 63600175 PHARM REV CODE 636 W HCPCS: Performed by: INTERNAL MEDICINE

## 2018-02-02 PROCEDURE — 25000003 PHARM REV CODE 250: Performed by: STUDENT IN AN ORGANIZED HEALTH CARE EDUCATION/TRAINING PROGRAM

## 2018-02-02 PROCEDURE — 25000003 PHARM REV CODE 250: Performed by: NURSE PRACTITIONER

## 2018-02-02 PROCEDURE — 25000003 PHARM REV CODE 250: Performed by: HOSPITALIST

## 2018-02-02 PROCEDURE — 36620 INSERTION CATHETER ARTERY: CPT | Mod: ,,, | Performed by: STUDENT IN AN ORGANIZED HEALTH CARE EDUCATION/TRAINING PROGRAM

## 2018-02-02 PROCEDURE — 80053 COMPREHEN METABOLIC PANEL: CPT

## 2018-02-02 PROCEDURE — 84100 ASSAY OF PHOSPHORUS: CPT

## 2018-02-02 PROCEDURE — 27100171 HC OXYGEN HIGH FLOW UP TO 24 HOURS

## 2018-02-02 PROCEDURE — 63600175 PHARM REV CODE 636 W HCPCS: Performed by: NURSE PRACTITIONER

## 2018-02-02 PROCEDURE — 82330 ASSAY OF CALCIUM: CPT

## 2018-02-02 PROCEDURE — 99233 SBSQ HOSP IP/OBS HIGH 50: CPT | Mod: ,,, | Performed by: NURSE PRACTITIONER

## 2018-02-02 PROCEDURE — A4216 STERILE WATER/SALINE, 10 ML: HCPCS | Performed by: NURSE PRACTITIONER

## 2018-02-02 PROCEDURE — 80053 COMPREHEN METABOLIC PANEL: CPT | Mod: 91

## 2018-02-02 PROCEDURE — 27100092 HC HIGH FLOW DELIVERY CANNULA

## 2018-02-02 PROCEDURE — 93005 ELECTROCARDIOGRAM TRACING: CPT

## 2018-02-02 PROCEDURE — 25000003 PHARM REV CODE 250

## 2018-02-02 PROCEDURE — 63600175 PHARM REV CODE 636 W HCPCS: Mod: JG | Performed by: HOSPITALIST

## 2018-02-02 PROCEDURE — 93010 ELECTROCARDIOGRAM REPORT: CPT | Mod: ,,, | Performed by: INTERNAL MEDICINE

## 2018-02-02 PROCEDURE — 83605 ASSAY OF LACTIC ACID: CPT

## 2018-02-02 PROCEDURE — 83735 ASSAY OF MAGNESIUM: CPT | Mod: 91

## 2018-02-02 PROCEDURE — 83880 ASSAY OF NATRIURETIC PEPTIDE: CPT

## 2018-02-02 PROCEDURE — 85610 PROTHROMBIN TIME: CPT

## 2018-02-02 PROCEDURE — 36415 COLL VENOUS BLD VENIPUNCTURE: CPT

## 2018-02-02 PROCEDURE — 80048 BASIC METABOLIC PNL TOTAL CA: CPT

## 2018-02-02 PROCEDURE — 85730 THROMBOPLASTIN TIME PARTIAL: CPT

## 2018-02-02 PROCEDURE — P9047 ALBUMIN (HUMAN), 25%, 50ML: HCPCS | Mod: JG | Performed by: HOSPITALIST

## 2018-02-02 PROCEDURE — 93306 TTE W/DOPPLER COMPLETE: CPT | Mod: 26,,, | Performed by: INTERNAL MEDICINE

## 2018-02-02 PROCEDURE — 37799 UNLISTED PX VASCULAR SURGERY: CPT

## 2018-02-02 PROCEDURE — 99900035 HC TECH TIME PER 15 MIN (STAT)

## 2018-02-02 PROCEDURE — 85025 COMPLETE CBC W/AUTO DIFF WBC: CPT

## 2018-02-02 PROCEDURE — 63600175 PHARM REV CODE 636 W HCPCS: Performed by: STUDENT IN AN ORGANIZED HEALTH CARE EDUCATION/TRAINING PROGRAM

## 2018-02-02 PROCEDURE — 84132 ASSAY OF SERUM POTASSIUM: CPT

## 2018-02-02 PROCEDURE — 84484 ASSAY OF TROPONIN QUANT: CPT

## 2018-02-02 PROCEDURE — 84295 ASSAY OF SERUM SODIUM: CPT

## 2018-02-02 PROCEDURE — 94660 CPAP INITIATION&MGMT: CPT

## 2018-02-02 PROCEDURE — P9047 ALBUMIN (HUMAN), 25%, 50ML: HCPCS | Mod: JG | Performed by: NURSE PRACTITIONER

## 2018-02-02 PROCEDURE — 99222 1ST HOSP IP/OBS MODERATE 55: CPT | Mod: ,,, | Performed by: INTERNAL MEDICINE

## 2018-02-02 PROCEDURE — 85014 HEMATOCRIT: CPT

## 2018-02-02 PROCEDURE — 0W9G3ZZ DRAINAGE OF PERITONEAL CAVITY, PERCUTANEOUS APPROACH: ICD-10-PCS | Performed by: RADIOLOGY

## 2018-02-02 PROCEDURE — C8929 TTE W OR WO FOL WCON,DOPPLER: HCPCS

## 2018-02-02 PROCEDURE — 27000221 HC OXYGEN, UP TO 24 HOURS

## 2018-02-02 PROCEDURE — 99233 SBSQ HOSP IP/OBS HIGH 50: CPT | Mod: ,,, | Performed by: INTERNAL MEDICINE

## 2018-02-02 PROCEDURE — 27000190 HC CPAP FULL FACE MASK W/VALVE

## 2018-02-02 PROCEDURE — 83735 ASSAY OF MAGNESIUM: CPT

## 2018-02-02 PROCEDURE — 83605 ASSAY OF LACTIC ACID: CPT | Mod: 91

## 2018-02-02 PROCEDURE — 82803 BLOOD GASES ANY COMBINATION: CPT

## 2018-02-02 PROCEDURE — 20000000 HC ICU ROOM

## 2018-02-02 RX ORDER — VASOPRESSIN 20 [USP'U]/ML
INJECTION, SOLUTION INTRAMUSCULAR; SUBCUTANEOUS
Status: COMPLETED
Start: 2018-02-02 | End: 2018-02-02

## 2018-02-02 RX ORDER — ALBUMIN HUMAN 250 G/1000ML
25 SOLUTION INTRAVENOUS ONCE
Status: DISCONTINUED | OUTPATIENT
Start: 2018-02-02 | End: 2018-02-02

## 2018-02-02 RX ORDER — ALBUMIN HUMAN 250 G/1000ML
25 SOLUTION INTRAVENOUS ONCE
Status: COMPLETED | OUTPATIENT
Start: 2018-02-02 | End: 2018-02-02

## 2018-02-02 RX ORDER — ALPRAZOLAM 1 MG/1
1 TABLET ORAL ONCE
Status: DISCONTINUED | OUTPATIENT
Start: 2018-02-02 | End: 2018-02-02

## 2018-02-02 RX ORDER — INSULIN ASPART 100 [IU]/ML
0-5 INJECTION, SOLUTION INTRAVENOUS; SUBCUTANEOUS EVERY 6 HOURS
Status: DISCONTINUED | OUTPATIENT
Start: 2018-02-02 | End: 2018-02-06

## 2018-02-02 RX ORDER — NOREPINEPHRINE BITARTRATE/D5W 4MG/250ML
0.02 PLASTIC BAG, INJECTION (ML) INTRAVENOUS CONTINUOUS
Status: DISCONTINUED | OUTPATIENT
Start: 2018-02-02 | End: 2018-02-02

## 2018-02-02 RX ORDER — VERAPAMIL HYDROCHLORIDE 180 MG/1
180 TABLET, FILM COATED, EXTENDED RELEASE ORAL NIGHTLY
Status: DISCONTINUED | OUTPATIENT
Start: 2018-02-02 | End: 2018-02-02

## 2018-02-02 RX ORDER — MAGNESIUM SULFATE HEPTAHYDRATE 40 MG/ML
2 INJECTION, SOLUTION INTRAVENOUS ONCE
Status: COMPLETED | OUTPATIENT
Start: 2018-02-02 | End: 2018-02-02

## 2018-02-02 RX ORDER — FUROSEMIDE 10 MG/ML
80 INJECTION INTRAMUSCULAR; INTRAVENOUS ONCE
Status: COMPLETED | OUTPATIENT
Start: 2018-02-02 | End: 2018-02-02

## 2018-02-02 RX ORDER — PHENYLEPHRINE HCL IN 0.9% NACL 1 MG/10 ML
SYRINGE (ML) INTRAVENOUS
Status: COMPLETED
Start: 2018-02-02 | End: 2018-02-02

## 2018-02-02 RX ORDER — ALBUMIN HUMAN 250 G/1000ML
25 SOLUTION INTRAVENOUS
Status: DISCONTINUED | OUTPATIENT
Start: 2018-02-02 | End: 2018-02-02

## 2018-02-02 RX ORDER — NOREPINEPHRINE BITARTRATE/D5W 4MG/250ML
PLASTIC BAG, INJECTION (ML) INTRAVENOUS
Status: COMPLETED
Start: 2018-02-02 | End: 2018-02-02

## 2018-02-02 RX ORDER — MIDODRINE HYDROCHLORIDE 2.5 MG/1
2.5 TABLET ORAL 3 TIMES DAILY
Status: DISCONTINUED | OUTPATIENT
Start: 2018-02-02 | End: 2018-02-02

## 2018-02-02 RX ADMIN — ALBUMIN (HUMAN) 25 G: 25 SOLUTION INTRAVENOUS at 02:02

## 2018-02-02 RX ADMIN — DEXTROSE MONOHYDRATE 0.78 MCG/KG/MIN: 5 INJECTION, SOLUTION INTRAVENOUS at 09:02

## 2018-02-02 RX ADMIN — VANCOMYCIN HYDROCHLORIDE 1500 MG: 1 INJECTION, POWDER, LYOPHILIZED, FOR SOLUTION INTRAVENOUS at 05:02

## 2018-02-02 RX ADMIN — Medication 3 ML: at 09:02

## 2018-02-02 RX ADMIN — FUROSEMIDE 10 MG/HR: 10 INJECTION, SOLUTION INTRAMUSCULAR; INTRAVENOUS at 08:02

## 2018-02-02 RX ADMIN — TAMSULOSIN HYDROCHLORIDE 0.4 MG: 0.4 CAPSULE ORAL at 08:02

## 2018-02-02 RX ADMIN — PANTOPRAZOLE SODIUM 40 MG: 40 TABLET, DELAYED RELEASE ORAL at 08:02

## 2018-02-02 RX ADMIN — ONDANSETRON 4 MG: 2 INJECTION INTRAMUSCULAR; INTRAVENOUS at 09:02

## 2018-02-02 RX ADMIN — FUROSEMIDE 80 MG: 10 INJECTION, SOLUTION INTRAMUSCULAR; INTRAVENOUS at 08:02

## 2018-02-02 RX ADMIN — ALBUMIN (HUMAN) 12.5 G: 25 SOLUTION INTRAVENOUS at 11:02

## 2018-02-02 RX ADMIN — DIPHENOXYLATE HYDROCHLORIDE AND ATROPINE SULFATE 1 TABLET: 2.5; .025 TABLET ORAL at 08:02

## 2018-02-02 RX ADMIN — VERAPAMIL HYDROCHLORIDE 180 MG: 180 TABLET, FILM COATED, EXTENDED RELEASE ORAL at 11:02

## 2018-02-02 RX ADMIN — INSULIN ASPART 4 UNITS: 100 INJECTION, SOLUTION INTRAVENOUS; SUBCUTANEOUS at 07:02

## 2018-02-02 RX ADMIN — DRONABINOL 10 MG: 2.5 CAPSULE ORAL at 05:02

## 2018-02-02 RX ADMIN — METOPROLOL SUCCINATE 100 MG: 100 TABLET, EXTENDED RELEASE ORAL at 08:02

## 2018-02-02 RX ADMIN — MIDODRINE HYDROCHLORIDE 2.5 MG: 2.5 TABLET ORAL at 02:02

## 2018-02-02 RX ADMIN — VASOPRESSIN 0.04 UNITS/MIN: 20 INJECTION INTRAVENOUS at 10:02

## 2018-02-02 RX ADMIN — MAGNESIUM SULFATE IN WATER 2 G: 40 INJECTION, SOLUTION INTRAVENOUS at 03:02

## 2018-02-02 RX ADMIN — DILTIAZEM HYDROCHLORIDE 5 MG/HR: 5 INJECTION INTRAVENOUS at 03:02

## 2018-02-02 RX ADMIN — Medication 1000 MG: at 08:02

## 2018-02-02 RX ADMIN — MULTIPLE VITAMINS W/ MINERALS TAB 1 TABLET: TAB at 08:02

## 2018-02-02 RX ADMIN — VASOPRESSIN 0.04 UNITS/MIN: 20 INJECTION INTRAVENOUS at 04:02

## 2018-02-02 RX ADMIN — PIPERACILLIN AND TAZOBACTAM 4.5 G: 4; .5 INJECTION, POWDER, LYOPHILIZED, FOR SOLUTION INTRAVENOUS; PARENTERAL at 08:02

## 2018-02-02 RX ADMIN — Medication 3 ML: at 02:02

## 2018-02-02 RX ADMIN — ACETAMINOPHEN 650 MG: 325 TABLET, FILM COATED ORAL at 02:02

## 2018-02-02 RX ADMIN — FUROSEMIDE 80 MG: 10 INJECTION, SOLUTION INTRAMUSCULAR; INTRAVENOUS at 04:02

## 2018-02-02 RX ADMIN — Medication 100 MG: at 05:02

## 2018-02-02 RX ADMIN — VITAMIN D, TAB 1000IU (100/BT) 2000 UNITS: 25 TAB at 08:02

## 2018-02-02 RX ADMIN — VASOPRESSIN 0.4 UNITS: 20 INJECTION INTRAVENOUS at 04:02

## 2018-02-02 RX ADMIN — FUROSEMIDE 80 MG: 10 INJECTION, SOLUTION INTRAMUSCULAR; INTRAVENOUS at 10:02

## 2018-02-02 RX ADMIN — DEXTROSE MONOHYDRATE 0.02 MCG/KG/MIN: 5 INJECTION, SOLUTION INTRAVENOUS at 04:02

## 2018-02-02 RX ADMIN — VASOPRESSIN 0.04 UNITS/MIN: 20 INJECTION INTRAVENOUS at 06:02

## 2018-02-02 RX ADMIN — Medication 1.6 MCG/KG/MIN: at 04:02

## 2018-02-02 RX ADMIN — ALBUMIN (HUMAN) 25 G: 12.5 SOLUTION INTRAVENOUS at 02:02

## 2018-02-02 RX ADMIN — Medication 1 MG: at 03:02

## 2018-02-02 RX ADMIN — ALBUMIN (HUMAN) 25 G: 25 SOLUTION INTRAVENOUS at 10:02

## 2018-02-02 NOTE — PROGRESS NOTES
Patient HR 90's-100's. Systolic . Metoprolol 100 mg due at this time. MIGUEL Harley MD notified. Okay to give metoprolol at this time. Will continue to monitor

## 2018-02-02 NOTE — SUBJECTIVE & OBJECTIVE
Interval History: Feels ok ready to get paracentesis     Review of Systems   All other systems reviewed and are negative.    Objective:     Vital Signs (Most Recent):  Temp: 98.2 °F (36.8 °C) (02/02/18 0800)  Pulse: 108 (02/02/18 0954)  Resp: 18 (02/02/18 0954)  BP: 109/68 (02/02/18 0954)  SpO2: 98 % (02/02/18 0954) Vital Signs (24h Range):  Temp:  [95.4 °F (35.2 °C)-98.7 °F (37.1 °C)] 98.2 °F (36.8 °C)  Pulse:  [] 108  Resp:  [18-35] 18  SpO2:  [69 %-100 %] 98 %  BP: ()/(53-80) 109/68     Weight: 98.3 kg (216 lb 11.4 oz)  Body mass index is 31.09 kg/m².     SpO2: 98 %  O2 Device (Oxygen Therapy): room air    Physical Exam  GEN: Alert and oriented in NAD  NECK: + JVD appreciated to ear  CVS: irreg irreg, s1/s2, no MRG  PULM: CTAB no rales  ABD: NT/ND BS +  Extremities: warm and dry, palpable pulses, 3+ edema  NEURO: Alert and oriented x 3  PSYCH: appropriate affect.         Significant Labs: All pertinent lab results from the last 24 hours have been reviewed.    Significant Imaging: Tele afib

## 2018-02-02 NOTE — PROGRESS NOTES
"Pt reported feeling "bad" bp 70/40 pulse ox 97% RA.  reported to SCOTTY Schmitz NP orders given to administer 25% solution.  "

## 2018-02-02 NOTE — PLAN OF CARE
Problem: Diabetes, Type 2 (Adult)  Goal: Signs and Symptoms of Listed Potential Problems Will be Absent, Minimized or Managed (Diabetes, Type 2)  Signs and symptoms of listed potential problems will be absent, minimized or managed by discharge/transition of care (reference Diabetes, Type 2 (Adult) CPG).   Outcome: Ongoing (interventions implemented as appropriate)  Patient remains free from falls and injuries through out shift. Patient AAO and VSS. Patient denies chest pain and SOB. BG managed through meals and insulin; . Patient continues on Cardizem @ 5mg/hr per MD order (dose lowered after decrease in BP). Patient had 15 beat run of vtach over night (see clinical note). Patient's family at bedside. Plan of care reviewed with patient. Patient verbalizes understanding of plan.  Will continue to monitor.

## 2018-02-02 NOTE — EICU
Called to bedside for new patient arrival.  Noted dyspneic.  HR 86 - noted a fib with occasional PVCs.  100% NRB in place.  MD Cain at bedside.  1532 - miguel ángel 1 mg given IVP  1534 - B/P 63/46 - levophed drip initiated and increased to 0.5 mcg/kg/min  1535 - B/P 86/47.  HR remains a fib with occ PVCs  1545 - gallo cath placed with clear yellow urine return noted.  1546 - b/p 77/57 - levophed increased to 0.7 mcg/kg/min per MD Cain verbal order  1550 - b/p 77/49 - levophed increased to 0.9 mcg/kg/min per MD Cain verbal order  1548 - lasix 80 mg given IVP per MD Cain verbal order  1549 - unable to assess NIBP.  Levophed increased to 1.2 mcg/kg/min.  HR remains a-fib rate now 59.  Occasional PVCs.  Occasional trigeminal PVC runs.  1602 - unable to assess b/p.  Levophed increased to 1.6 mcg/kg/min.  Attempts to access (R) radial unsuccessful.    1608 - NIBP 92/55.  Vasopressin initiated at 0/04 mcg/kg/min.    1616 - b/p 95/52.  Remains a-fib HR 59.  Occasional to frequent PVCs noted.  Occasional multifocal PVCs.  1620 - 6b VT noted.  MD Cain at bedside aware.  1634 - art line placed to (R) femoral per MD Cain. Good waveform noted to transducer. B/P 115/54. NIBP 98/53.

## 2018-02-02 NOTE — PROGRESS NOTES
Paracentesis complete. 5100 mLs peritoneal fluid drained. Pt tolerated well. Dressing to llq clean, dry, and intact. Albumin 25% given 150 mLs. Report called to floor nurse.

## 2018-02-02 NOTE — PROCEDURES
"Karthik Medrano is a 67 y.o. male patient.    Temp: 96.8 °F (36 °C) (02/02/18 1207)  Pulse: 60 (02/02/18 1630)  Resp: (!) 28 (02/02/18 1630)  BP: (!) 100/52 (02/02/18 1630)  SpO2: 95 % (02/02/18 1630)  Weight: 98.3 kg (216 lb 11.4 oz) (02/02/18 0715)  Height: 5' 10" (177.8 cm) (02/01/18 2033)       Arterial Line  Date/Time: 2/2/2018 5:26 PM  Performed by: MOY CAIN  Authorized by: MOY CAIN   Pre-op Diagnosis: shock  Post-operative diagnosis: shock  Consent Done: Emergent Situation  Preparation: Patient was prepped and draped in the usual sterile fashion.  Indications: multiple ABGs and hemodynamic monitoring  Location: right femoral  Patient sedated: no  Needle gauge: 20  Seldinger technique: Seldinger technique used  Number of attempts: 2  Complications: No  Specimens: No  Implants: No  Post-procedure: line sutured  Patient tolerance: Patient tolerated the procedure well with no immediate complications          Moy Cain  2/2/2018  "

## 2018-02-02 NOTE — PROGRESS NOTES
[facility]  Cardiac Electrophysiology  Progress Note    Admission Date: 2/1/2018  Code Status: Full Code   Attending Physician: Taina French MD   Expected Discharge Date:   Principal Problem:Atrial flutter with rapid ventricular response    Subjective:     Interval History: Feels ok ready to get paracentesis     Review of Systems   All other systems reviewed and are negative.    Objective:     Vital Signs (Most Recent):  Temp: 98.2 °F (36.8 °C) (02/02/18 0800)  Pulse: 108 (02/02/18 0954)  Resp: 18 (02/02/18 0954)  BP: 109/68 (02/02/18 0954)  SpO2: 98 % (02/02/18 0954) Vital Signs (24h Range):  Temp:  [95.4 °F (35.2 °C)-98.7 °F (37.1 °C)] 98.2 °F (36.8 °C)  Pulse:  [] 108  Resp:  [18-35] 18  SpO2:  [69 %-100 %] 98 %  BP: ()/(53-80) 109/68     Weight: 98.3 kg (216 lb 11.4 oz)  Body mass index is 31.09 kg/m².     SpO2: 98 %  O2 Device (Oxygen Therapy): room air    Physical Exam  GEN: Alert and oriented in NAD  NECK: + JVD appreciated to ear  CVS: irreg irreg, s1/s2, no MRG  PULM: CTAB no rales  ABD: NT/ND BS +  Extremities: warm and dry, palpable pulses, 3+ edema  NEURO: Alert and oriented x 3  PSYCH: appropriate affect.         Significant Labs: All pertinent lab results from the last 24 hours have been reviewed.    Significant Imaging: Tele afib    Assessment and Plan:     * Atrial flutter with rapid ventricular response    Mr. Medrano is a 67 y.o. Man with metastatic rectal cancer Dx 2010, s/p resection, then with pulmonary mets 2013 on FOLFIRI chemotherapy, recurrent ascites s/p regular paracentesis, ureteral obstructive s/p urostomy tubes, and recently diagnosed atrial flutter with variable block, DM - who presents as a transfer from clinic with shortness of breath and tachycardia.     Has been in flutter for almost 2 weeks now. Today's episode likely exacerbated by volume overloaded state. Ideally he would need anticoagulation given his [id number] of 3 but given his need for  frequent procedures (paracentesis every 2 weeks and urostomy tubes every 3 months) patient and wife are hesitant despite the stroke risk which was explained in detail. Case was discussed with IR informally in the ED who said they will not do paracentesis in the setting of INR>2 or DOAC. For this patient rate control strategy may be best. Would continue with metoprolol 100 mg BID. Can increase Verapamil to 240 mg daily. If these do not rate control him we could try digoxin but would be hesitant given his renal function at present. In addition to this he would benefit from diuresis but may be difficult given low albumin of 2.             Leland Trujillo MD  Cardiac Electrophysiology  [facility]

## 2018-02-02 NOTE — SUBJECTIVE & OBJECTIVE
Past Medical History:   Diagnosis Date    CHF (congestive heart failure)     Diabetes mellitus     Encounter for blood transfusion     GERD (gastroesophageal reflux disease)     Hypertension     Rectal cancer metastasized to lung 01/11/2010       Past Surgical History:   Procedure Laterality Date    ABDOMINAL SURGERY      COLON SURGERY      COLONOSCOPY N/A 10/3/2017    Procedure: COLONOSCOPY;  Surgeon: Sandoval Bowling MD;  Location: [address]);  Service: Endoscopy;  Laterality: N/A;    CYSTOSCOPY W/ URETERAL STENT PLACEMENT      FRACTURE SURGERY      SKIN BIOPSY         Review of patient's allergies indicates:  No Known Allergies    No current facility-administered medications on file prior to encounter.      Current Outpatient Prescriptions on File Prior to Encounter   Medication Sig    ascorbic acid (VITAMIN C) 1000 MG tablet Take 1,000 mg by mouth once daily.    cholecalciferol, vitamin D3, (VITAMIN D3) 2,000 unit Cap Take 1 capsule by mouth once daily.    cholestyramine, bulk, Powd 4 g by Misc.(Non-Drug; Combo Route) route once daily at 6am.    co-enzyme Q-10 30 mg capsule Take 30 mg by mouth 3 (three) times daily.    dronabinol (MARINOL) 10 MG capsule Take 1 capsule (10 mg total) by mouth 2 (two) times daily before meals.    esomeprazole (NEXIUM) 40 MG capsule TAKE 1 CAPSULE(40 MG) BY MOUTH EVERY DAY    furosemide (LASIX) 20 MG tablet Take 2 tablets (40 mg total) by mouth daily as needed.    glipiZIDE (GLUCOTROL) 5 MG tablet TAKE 1 TABLET BY MOUTH THREE TIMES DAILY    metoprolol succinate (TOPROL-XL) 100 MG 24 hr tablet Take 1 tablet (100 mg total) by mouth 2 (two) times daily.    multivitamin with iron-mineral TbSR Take 1 tablet by mouth once daily.    nut.tx.imp.renal fxn,lac-reduc 0.08 gram-1.8 kcal/mL Liqd Take 237 mLs by mouth 2 (two) times daily.    opium tincture 10 mg/mL (morphine) Tinc Take 0.6 mLs (6 mg total) by mouth 4 (four) times daily.    oxycodone-acetaminophen  (PERCOCET) 5-325 mg per tablet TK 1 T PO Q 6 H PRN P    paregoric 2 mg/5 mL Liqd TAKE 5 ML BY MOUTH THREE TIMES DAILY    regorafenib (STIVARGA) 40 mg Tab Take 120 mg by mouth once daily. Take for 21 days and then 1 week off and then repeat    tamsulosin (FLOMAX) 0.4 mg Cp24 Take 1 capsule (0.4 mg total) by mouth once daily.    diphenoxylate-atropine 2.5-0.025 mg (LOMOTIL) 2.5-0.025 mg per tablet TAKE 1 TABLET BY MOUTH FOUR TIMES DAILY AS NEEDED FOR DIARRHEA    NYSTATIN (DUKE'S SOLUTION) Mix equal amounts of benadryl, maalox, 2% viscous lidocaine and nystatin    Swish and swallow 10 ml 4 times a day    ondansetron (ZOFRAN-ODT) 8 MG TbDL Take 1 tablet (8 mg total) by mouth every 8 (eight) hours as needed (Nausea).     Family History     Problem Relation (Age of Onset)    Breast cancer Mother    Cancer Mother, Father    Tuberculosis Mother        Social History Main Topics    Smoking status: Never Smoker    Smokeless tobacco: Never Used    Alcohol use No    Drug use: Unknown    Sexual activity: Not on file     Review of Systems     Objective:     Vital Signs (Most Recent):  Temp: 96.8 °F (36 °C) (02/02/18 1207)  Pulse: 83 (02/02/18 1500)  Resp: 18 (02/02/18 1207)  BP: (!) 72/49 (02/02/18 1420)  SpO2: 98 % (02/02/18 1207) Vital Signs (24h Range):  Temp:  [95.4 °F (35.2 °C)-98.7 °F (37.1 °C)] 96.8 °F (36 °C)  Pulse:  [] 83  Resp:  [18-35] 18  SpO2:  [69 %-100 %] 98 %  BP: ()/(49-79) 72/49     Weight: 98.3 kg (216 lb 11.4 oz)  Body mass index is 31.09 kg/m².    SpO2: 98 %  O2 Device (Oxygen Therapy): room air      Intake/Output Summary (Last 24 hours) at 02/02/18 1543  Last data filed at 02/02/18 0500   Gross per 24 hour   Intake                0 ml   Output             1900 ml   Net            -1900 ml       Lines/Drains/Airways     Central Venous Catheter Line                 Port A Cath Single Lumen -- days         Port A Cath Single Lumen 02/01/18 1005 other (see comments) 1 day          Drain                  Ureteral Drain/Stent 03/04/16 Right ureter 6 Fr. 700 days          Peripheral Intravenous Line                 Peripheral IV - Single Lumen 02/02/18 1046 Right Hand less than 1 day                Physical Exam   Constitutional: He is oriented to person, place, and time. He appears distressed.   HENT:   Head: Normocephalic and atraumatic.   Eyes: Conjunctivae are normal. No scleral icterus.   Cardiovascular: Normal rate.    irregularly irregular  Extremities initially cooler, however warmer s/p levophed and vasopressin   Difficult to palpate pulses in upper ext b/l   Pulmonary/Chest: He is in respiratory distress. He has no wheezes. He has no rales.   Abdominal: Soft. He exhibits distension. There is no tenderness. There is no rebound and no guarding.   Musculoskeletal: He exhibits edema (+3 b/l LE edema). He exhibits no tenderness.   Neurological: He is alert and oriented to person, place, and time.   Skin: No rash noted.   Wet and cold       Significant Labs:   ABG: No results for input(s): PH, PCO2, HCO3, POCSATURATED, BE in the last 48 hours., CMP   Recent Labs  Lab 02/01/18  0818 02/01/18  1010 02/02/18  0518    140 139   K 4.2 4.2 4.6    111* 109   CO2 21* 20* 18*    112* 128*   BUN 55* 50* 57*   CREATININE 2.2* 2.2* 2.2*   CALCIUM 8.5* 8.2* 8.7   PROT 6.1 5.9* 6.0   ALBUMIN 2.1* 2.1* 2.1*   BILITOT 0.3 0.3 0.4   ALKPHOS 424* 419* 364*   AST 16 17 14   ALT 11 13 9*   ANIONGAP 8 9 12   ESTGFRAFRICA 34.1* 34.1* 34.1*   EGFRNONAA 29.5* 29.5* 29.5*    and CBC   Recent Labs  Lab 02/01/18  0818 02/01/18  1010 02/01/18  1428 02/02/18  0518   WBC 6.80 6.97  --  7.57   HGB 8.3* 7.8*  --  8.0*   HCT 27.3* 25.3*  --  26.5*    SEE COMMENT 180 161       Significant Imaging: TTE: EF 40%, PA pressure 54 mmHg, moderate MR and TR

## 2018-02-02 NOTE — ASSESSMENT & PLAN NOTE
Mr. Medrano is a 67 y.o. Man with metastatic rectal cancer Dx 2010, s/p resection, then with pulmonary mets 2013 on FOLFIRI chemotherapy, recurrent ascites s/p regular paracentesis, ureteral obstructive s/p urostomy tubes, and recently diagnosed atrial flutter with variable block, DM - who presents as a transfer from clinic with shortness of breath and tachycardia.     Has been in flutter for almost 2 weeks now. Today's episode likely exacerbated by volume overloaded state. Ideally he would need anticoagulation given his [id number] of 3 but given his need for frequent procedures (paracentesis every 2 weeks and urostomy tubes every 3 months) patient and wife are hesitant despite the stroke risk which was explained in detail. Case was discussed with IR informally in the ED who said they will not do paracentesis in the setting of INR>2 or DOAC. For this patient rate control strategy may be best. Would continue with metoprolol 100 mg BID. Can increase Verapamil to 240 mg daily. If these do not rate control him we could try digoxin but would be hesitant given his renal function at present. In addition to this he would benefit from diuresis but may be difficult given low albumin of 2.

## 2018-02-02 NOTE — ASSESSMENT & PLAN NOTE
-shortness of breath and significant BLE edema   -RE on admission, states baseline SCr usually 1.6, monitor with diuresis   -continue IVP lasix diuresis for now  -strict I&Os and daily weights  -cardiac diet with fluid restriction in house  -2D echo pending  -IR consulted for paracentesis in AM

## 2018-02-02 NOTE — PROGRESS NOTES
CCU acceptance note:    Patient was hypotensive in the 70/50's after having 5L of ascitic fluid drained. In addition patient was given IV albumin to resuscitate the patient (no record of IVF given). Patient was then bought the unit, levophed was started and uptitrated. Administered through the port-a cath and the patient was then noted to respond inadequately. Patient was then started on vasopressin. I placed an Art line in the right femoral artery. MAP were 70 after being on 1.7 of levophed and 0.04 of vasopressin. In addition the patient was cold and edematous prior to initiation of pressors. The patient was also hypoxic and was placed on a non-rebreather with improvement of his O2 sats. Lactate was 5 of note. Patient UOP will be monitored and the patient's lactate will be trended. Patient's extremities are more warm after initiation of pressors. Will obtain an ABG currently.     Of note patient was in Afib with rates in the 60's during this time.     Will continue to monitor    Moy Cain MD, PGY-4

## 2018-02-02 NOTE — PLAN OF CARE
02/02/18 0955   Discharge Assessment   Assessment Type Discharge Planning Assessment   Confirmed/corrected address and phone number on facesheet? Yes   Assessment information obtained from? Patient;Medical Record   Expected Length of Stay (days) 3   Communicated expected length of stay with patient/caregiver yes   Prior to hospitilization cognitive status: Alert/Oriented   Prior to hospitalization functional status: Assistive Equipment;Needs Assistance   Current cognitive status: Alert/Oriented   Current Functional Status: Assistive Equipment;Needs Assistance   Lives With spouse;child(mildred), adult  (32 Y/o daughter with disabilities)   Able to Return to Prior Arrangements yes   Is patient able to care for self after discharge? Yes   Patient's perception of discharge disposition home or selfcare   Readmission Within The Last 30 Days no previous admission in last 30 days   Patient currently being followed by outpatient case management? No   Patient currently receives any other outside agency services? No   Equipment Currently Used at Home walker, rolling;wheelchair   Do you have any problems affording any of your prescribed medications? No   Is the patient taking medications as prescribed? yes   Does the patient have transportation home? Yes   Transportation Available family or friend will provide   Does the patient receive services at the [facility]mad[facility] Clinic? No   Discharge Plan A Home with family   Patient/Family In Agreement With Plan yes   A-Flutter. Hx Colon Ca  Cardiac [place]  Home, no needs  (wife)

## 2018-02-02 NOTE — PROGRESS NOTES
BP persists 69/49 reported to Rody Schmitz NP.  Orders received for second albumin 25% and called rapid response nurse to bedside

## 2018-02-02 NOTE — H&P
[facility] Hospital Medicine  History & Physical    Patient Name: Karthik Medrano  MRN: [id number]  Admission Date: 2/1/2018  Attending Physician: Jose French MD   Primary Care Provider: Pinky Thornton MD    [facility] Medicine Team: [facility] MED J Rody Schmitz NP     Patient information was obtained from patient, spouse/SO, past medical records and ER records.     Subjective:     Principal Problem:Atrial flutter with rapid ventricular response    Chief Complaint:   Chief Complaint   Patient presents with    Tachycardia     sent from [facility] clinic        HPI: Mr. Medrano is a 66 yo male with medical history of metastatic rectal cancer Dx 2010, s/p resection, then with pulmonary mets 2013 on FOLFIRI chemotherapy, recurrent ascites s/p regular paracentesis, ureteral obstructive s/p urostomy tubes, recently diagnosed atrial flutter with variable block, DMHTN, diabetes, and CKD III who presented to the Emergency Department  from Heme/Onc office for elevated HR and symptoms of volume overload. He reports since a procedure to adjust urostomy tubes a week ago he has had worsening shortness of breath, abdominal distention, and lower extremity edema.  Lower extremity edema has been intermittent in the past for which he takes lasix PRN and has taken daily for the past week without resolution.  He gets periodic paracentesis as well for malignant ascites and is due for a tap today. In regards to his atrial flutter - diagnosed two weeks ago during admission for tachycardia, treated with resumption of metoprolol tartrate 100 mg BID which he was on previously.  No discussion per patient at that time of anti-coagulation. Pt denies CP, palpitations, orthopnea, PND, cough, fevers, abdominal pain, N/V/changes in BM. Also reports of weakness and decreased PO intake. His last chemo treatment was 12/27 and has not  had any further chemo since then due to decline in performance status.     In the ED  CBC remarkable for anemia and clumping of platelets, chemistry remarkable for RE/metabolic acidosis, low total protein and low albumin; troponin negative, BNP ~850, CXR unremarkable for pleural effusions or edema. The patient was admitted to the Hospital Medicine Service for further evaluation and management.       Past Medical History:   Diagnosis Date    Diabetes mellitus     GERD (gastroesophageal reflux disease)     Hypertension     Rectal cancer metastasized to lung 01/11/2010       Past Surgical History:   Procedure Laterality Date    COLON SURGERY      COLONOSCOPY N/A 10/3/2017    Procedure: COLONOSCOPY;  Surgeon: Sandoval Bowling MD;  Location: [address]);  Service: Endoscopy;  Laterality: N/A;       Review of patient's allergies indicates:  No Known Allergies    No current facility-administered medications on file prior to encounter.      Current Outpatient Prescriptions on File Prior to Encounter   Medication Sig    ascorbic acid (VITAMIN C) 1000 MG tablet Take 1,000 mg by mouth once daily.    cholecalciferol, vitamin D3, (VITAMIN D3) 2,000 unit Cap Take 1 capsule by mouth once daily.    cholestyramine, bulk, Powd 4 g by Misc.(Non-Drug; Combo Route) route once daily at 6am.    co-enzyme Q-10 30 mg capsule Take 30 mg by mouth 3 (three) times daily.    dronabinol (MARINOL) 10 MG capsule Take 1 capsule (10 mg total) by mouth 2 (two) times daily before meals.    esomeprazole (NEXIUM) 40 MG capsule TAKE 1 CAPSULE(40 MG) BY MOUTH EVERY DAY    furosemide (LASIX) 20 MG tablet Take 2 tablets (40 mg total) by mouth daily as needed.    glipiZIDE (GLUCOTROL) 5 MG tablet TAKE 1 TABLET BY MOUTH THREE TIMES DAILY    metoprolol succinate (TOPROL-XL) 100 MG 24 hr tablet Take 1 tablet (100 mg total) by mouth 2 (two) times daily.    multivitamin with iron-mineral TbSR Take 1 tablet by mouth once daily.    nut.tx.imp.renal fxn,lac-reduc 0.08 gram-1.8 kcal/mL Liqd Take 237 mLs by mouth 2 (two) times  daily.    opium tincture 10 mg/mL (morphine) Tinc Take 0.6 mLs (6 mg total) by mouth 4 (four) times daily.    oxycodone-acetaminophen (PERCOCET) 5-325 mg per tablet TK 1 T PO Q 6 H PRN P    paregoric 2 mg/5 mL Liqd TAKE 5 ML BY MOUTH THREE TIMES DAILY    regorafenib (STIVARGA) 40 mg Tab Take 120 mg by mouth once daily. Take for 21 days and then 1 week off and then repeat    tamsulosin (FLOMAX) 0.4 mg Cp24 Take 1 capsule (0.4 mg total) by mouth once daily.    diphenoxylate-atropine 2.5-0.025 mg (LOMOTIL) 2.5-0.025 mg per tablet TAKE 1 TABLET BY MOUTH FOUR TIMES DAILY AS NEEDED FOR DIARRHEA    NYSTATIN (DUKE'S SOLUTION) Mix equal amounts of benadryl, maalox, 2% viscous lidocaine and nystatin    Swish and swallow 10 ml 4 times a day    ondansetron (ZOFRAN-ODT) 8 MG TbDL Take 1 tablet (8 mg total) by mouth every 8 (eight) hours as needed (Nausea).     Family History     Problem Relation (Age of Onset)    Breast cancer Mother    Cancer Mother, Father    Tuberculosis Mother        Social History Main Topics    Smoking status: Never Smoker    Smokeless tobacco: Never Used    Alcohol use No    Drug use: Unknown    Sexual activity: Not on file     Review of Systems   Constitutional: Positive for activity change, fatigue and unexpected weight change. Negative for appetite change, chills, diaphoresis and fever.   HENT: Negative for congestion, sinus pain, sinus pressure, sneezing and sore throat.    Eyes: Negative.    Respiratory: Positive for shortness of breath. Negative for apnea, cough and wheezing.    Cardiovascular: Positive for leg swelling. Negative for chest pain and palpitations.   Gastrointestinal: Positive for abdominal distention and abdominal pain. Negative for constipation, diarrhea, nausea and vomiting.   Endocrine: Negative.    Genitourinary: Negative for decreased urine volume, difficulty urinating, penile swelling, scrotal swelling and urgency.   Musculoskeletal: Positive for gait problem.  Negative for arthralgias and myalgias.   Skin: Negative.    Allergic/Immunologic: Negative.    Neurological: Negative for dizziness, syncope, weakness, light-headedness and headaches.   Hematological: Negative.    Psychiatric/Behavioral: Negative.      Objective:     Vital Signs (Most Recent):  Temp: 98.7 °F (37.1 °C) (02/01/18 1851)  Pulse: 106 (02/01/18 1923)  Resp: (!) 25 (02/01/18 1923)  BP: 99/70 (02/01/18 1911)  SpO2: 99 % (02/01/18 1923) Vital Signs (24h Range):  Temp:  [98.2 °F (36.8 °C)-98.7 °F (37.1 °C)] 98.7 °F (37.1 °C)  Pulse:  [] 106  Resp:  [18-35] 25  SpO2:  [69 %-100 %] 99 %  BP: ()/(57-80) 99/70     Weight: 90.7 kg (200 lb)  Body mass index is 28.7 kg/m².    Physical Exam   Constitutional: He is oriented to person, place, and time. He appears well-developed and well-nourished. No distress.   HENT:   Head: Normocephalic and atraumatic.   Mouth/Throat: Mucous membranes are normal. Normal dentition.   Eyes: Conjunctivae and lids are normal. Pupils are equal, round, and reactive to light. No scleral icterus.   Neck: Normal range of motion. Neck supple. No JVD present.   Cardiovascular: Normal rate, regular rhythm, normal heart sounds and intact distal pulses.  Exam reveals no gallop and no friction rub.    No murmur heard.  Pulmonary/Chest: Effort normal and breath sounds normal. No respiratory distress. He exhibits no tenderness.   Abdominal: Soft. Bowel sounds are normal. He exhibits distension (significant rounding ascites ). There is no tenderness.   Musculoskeletal: Normal range of motion. He exhibits edema (3+ pitting edema mid thighs to feet). He exhibits no deformity.   Neurological: He is alert and oriented to person, place, and time. No cranial nerve deficit.   Skin: Skin is warm and dry. Capillary refill takes 2 to 3 seconds. No rash noted. He is not diaphoretic. No erythema.   Psychiatric: He has a normal mood and affect. Judgment and thought content normal.   Nursing note and  vitals reviewed.        CRANIAL NERVES     CN III, IV, VI   Pupils are equal, round, and reactive to light.    Significant Labs:   CBC:   Recent Labs  Lab 02/01/18  0818 02/01/18  1010 02/01/18  1428   WBC 6.80 6.97  --    HGB 8.3* 7.8*  --    HCT 27.3* 25.3*  --     SEE COMMENT 180     CMP:   Recent Labs  Lab 02/01/18  0818 02/01/18  1010    140   K 4.2 4.2    111*   CO2 21* 20*    112*   BUN 55* 50*   CREATININE 2.2* 2.2*   CALCIUM 8.5* 8.2*   PROT 6.1 5.9*   ALBUMIN 2.1* 2.1*   BILITOT 0.3 0.3   ALKPHOS 424* 419*   AST 16 17   ALT 11 13   ANIONGAP 8 9   EGFRNONAA 29.5* 29.5*     Cardiac Markers:   Recent Labs  Lab 02/01/18  1010   *     Magnesium:   Recent Labs  Lab 02/01/18  1428   MG 2.0     Troponin:   Recent Labs  Lab 02/01/18  1010   TROPONINI <0.006     All pertinent labs within the past 24 hours have been reviewed.    Significant Imaging: I have reviewed all pertinent imaging results/findings within the past 24 hours.    Assessment/Plan:     * Atrial flutter with rapid ventricular response    -a flutter with 2:1 conduction on admission EKG  -CBC remarkable for anemia and clumping of platelets  -chemistry remarkable for RE/metabolic acidosis, low total protein and low albumin; troponin negative, BNP ~850 -CXR unremarkable for pleural effusions or edema\  -initial rate control attempted with IVP metoprolol however unsuccessful >>> transitioned to cardizem gtt with bolus   -monitor for response and hemodynamic stability  -EP evaluated, plans for addition of veripamil and continue home metoprolol   -wean cardizem gtt when appropriate  -no OAC due to anemic state with need for frequent paracentesis and ureter stent exchances  -continue tele monitoring  -continue diuresis, see below for details  -maintain electrolytes WNL  -outpt follow up with EP at time of discharge        Acute congestive heart failure    -shortness of breath and significant BLE edema   -RE on admission,  states baseline SCr usually 1.6, monitor with diuresis   -continue IVP lasix diuresis for now  -strict I&Os and daily weights  -cardiac diet with fluid restriction in house  -2D echo pending  -IR consulted for paracentesis in AM         Renovascular hypertension    -BP controlled, monitor with rate controlling drugs mentioned above   -continue home metoprolol         Type 2 diabetes mellitus with stage 3 chronic kidney disease, without long-term current use of insulin    -hold home regimen  -recent HgA1C 5.5  -low dose SSI in house  -AC/HS checks         CKD (chronic kidney disease), stage III    -baseline SCr 1.6 >>> admission 2.2  -monitor with diuresis         Acute renal failure superimposed on stage 3 chronic kidney disease    -see below         Rectal cancer    -follows closely with Heme/Onc, last chemo 12/27  -no acute complaints at this time, consult heme/onc in house PRN  -outpt follow up with them         Chemotherapy induced neutropenia    -WBC stable currently, monitor         Anemia in neoplastic disease    -baseline ranges 7.1-10, asymptomatic currently   -monitor         Ascites    -due for paracentesis as an outpt today  -IR consulted for AM procedure  -INR pending, no OAC initiated          VTE Risk Mitigation         Ordered     High Risk of VTE  Once      02/01/18 1231     Medium Risk of VTE  Once      02/01/18 1231     Place sequential compression device  Until discontinued      02/01/18 1231     Reason for No Pharmacological VTE Prophylaxis  Once      02/01/18 1231          Rody Schmitz, DNP, AG-ACNP, BC  Department of Hospital Medicine  [facility]  Pager [phone]  [facility] [id number]

## 2018-02-02 NOTE — PROGRESS NOTES
Patient identified by 2 identifiers. Denies previous reactions to blood transfusions and allergies reviewed.  Procedure explained & consent obtained.  18 g IV in place to Rt Hand, flushed w/ 10cc NS pre & post contrast administration.  3cc Optison administered, echo images obtained.  Pt tolerated procedure well.

## 2018-02-02 NOTE — HPI
Mr. Medrano is a 68 yo male with medical history of metastatic rectal cancer Dx 2010, s/p resection, then with pulmonary mets 2013 on FOLFIRI chemotherapy, recurrent ascites s/p regular paracentesis, ureteral obstructive s/p urostomy tubes, recently diagnosed atrial flutter with variable block, DMHTN, diabetes, and CKD III who presented to the Emergency Department  from Heme/Onc office for elevated HR and symptoms of volume overload. He reports since a procedure to adjust urostomy tubes a week ago he has had worsening shortness of breath, abdominal distention, and lower extremity edema.  Lower extremity edema has been intermittent in the past for which he takes lasix PRN and has taken daily for the past week without resolution.  He gets periodic paracentesis as well for malignant ascites and is due for a tap today. In regards to his atrial flutter - diagnosed two weeks ago during admission for tachycardia, treated with resumption of metoprolol tartrate 100 mg BID which he was on previously.  No discussion per patient at that time of anti-coagulation. Pt denies CP, palpitations, orthopnea, PND, cough, fevers, abdominal pain, N/V/changes in BM. Also reports of weakness and decreased PO intake. His last chemo treatment was 12/27 and has not  had any further chemo since then due to decline in performance status.     In the ED CBC remarkable for anemia and clumping of platelets, chemistry remarkable for RE/metabolic acidosis, low total protein and low albumin; troponin negative, BNP ~850, CXR unremarkable for pleural effusions or edema. The patient was admitted to the Hospital Medicine Service for further evaluation and management.

## 2018-02-02 NOTE — ASSESSMENT & PLAN NOTE
S/p recurrent paracenteses  S/p paracentesis w/ removal ~4.5L  Patient possibly hypotensive 2/2 third spacing s/p fluid removal

## 2018-02-02 NOTE — ASSESSMENT & PLAN NOTE
-a flutter with 2:1 conduction on admission EKG  -CBC remarkable for anemia and clumping of platelets  -chemistry remarkable for RE/metabolic acidosis, low total protein and low albumin; troponin negative, BNP ~850 -CXR unremarkable for pleural effusions or edema\  -initial rate control attempted with IVP metoprolol however unsuccessful >>> transitioned to cardizem gtt with bolus   -monitor for response and hemodynamic stability  -EP evaluated, plans for addition of veripamil and continue home metoprolol   -wean cardizem gtt when appropriate  -no OAC due to anemic state with need for frequent paracentesis and ureter stent exchances  -continue tele monitoring  -continue diuresis, see below for details  -maintain electrolytes WNL  -outpt follow up with EP at time of discharge

## 2018-02-02 NOTE — ASSESSMENT & PLAN NOTE
-due for paracentesis as an outpt today  -IR consulted for AM procedure  -INR pending, no OAC initiated

## 2018-02-02 NOTE — PROGRESS NOTES
"Patient BP 91/57. Patient states he feels "dizzy and weak." Lee Ann Harley notified. Orders to decrease Cardizem drip to 5 mg/hr. Will continue to monitor patient.  "

## 2018-02-02 NOTE — NURSING TRANSFER
Nursing Transfer Note      2/2/2018     Transfer To: 347    Transfer via stretcher    Transfer with cardiac monitoring    Transported by patient escort    Medicines sent: on cardizem infusion

## 2018-02-02 NOTE — HPI
Mr. Medrano is a 67 y.o. man with metastatic rectal cancer Dx 2010, s/p resection, then with pulmonary mets 2013 on FOLFIRI chemotherapy, recurrent ascites s/p regular paracentesis, ureteral obstructive s/p urostomy tubes, and recently diagnosed atrial flutter with variable block, DM who presented to [facility] with tachycardia and SOB. Found to be in AFL RVR. Seen by EP and planned for RFA today with Dr. Arreaga after paracentesis. ISAIAS requested before to r/o EVERETT thrombus

## 2018-02-02 NOTE — PROGRESS NOTES
Pt remained hypotensive and orders received from SCOTTY Schmitz NP to transfer pt to critical care.  last bp 58/d.  Reported called to CMICU nurse and transport performed per CN to room 3092

## 2018-02-02 NOTE — PROGRESS NOTES
Patient had a 15 beat run  of vtach.Patient asleep. MD Tevin notified. No new orders at this time. Will continue to monitor.

## 2018-02-02 NOTE — NURSING
Pt admitted to . Pt arrived to floor with telemtry from ED via stretcher per pt escort. Telemetry transferred to CSU monitor.  VSS. NAD. No complaints at this time.  Plan of care initiated. CSU orders imitated. Pt's family at bedside. Bed in lowest position, SR up x2, call bell in reach. Will continue to monitor pt.

## 2018-02-02 NOTE — H&P
Inpatient Radiology Pre-procedure Note    History of Present Illness:  Karthik Medrano is a 67 y.o. male who presents for ultrasound guided paracentesis.  Admission H&P reviewed.  Past Medical History:   Diagnosis Date    CHF (congestive heart failure)     Diabetes mellitus     Encounter for blood transfusion     GERD (gastroesophageal reflux disease)     Hypertension     Rectal cancer metastasized to lung 01/11/2010     Past Surgical History:   Procedure Laterality Date    ABDOMINAL SURGERY      COLON SURGERY      COLONOSCOPY N/A 10/3/2017    Procedure: COLONOSCOPY;  Surgeon: Sandoval Bowling MD;  Location: [address]);  Service: Endoscopy;  Laterality: N/A;    CYSTOSCOPY W/ URETERAL STENT PLACEMENT      FRACTURE SURGERY      SKIN BIOPSY         Review of Systems:   As documented in primary team H&P    Home Meds:   Prior to Admission medications    Medication Sig Start Date End Date Taking? Authorizing Provider   ascorbic acid (VITAMIN C) 1000 MG tablet Take 1,000 mg by mouth once daily.   Yes Historical Provider, MD   cholecalciferol, vitamin D3, (VITAMIN D3) 2,000 unit Cap Take 1 capsule by mouth once daily.   Yes Historical Provider, MD   cholestyramine, bulk, Powd 4 g by Misc.(Non-Drug; Combo Route) route once daily at 6am. 7/3/17  Yes Pinky Thornton MD   co-enzyme Q-10 30 mg capsule Take 30 mg by mouth 3 (three) times daily.   Yes Historical Provider, MD   dronabinol (MARINOL) 10 MG capsule Take 1 capsule (10 mg total) by mouth 2 (two) times daily before meals. 1/21/18  Yes Pinky Thornton MD   esomeprazole (NEXIUM) 40 MG capsule TAKE 1 CAPSULE(40 MG) BY MOUTH EVERY DAY 12/14/17  Yes Nevaeh Dc NP   furosemide (LASIX) 20 MG tablet Take 2 tablets (40 mg total) by mouth daily as needed. 9/19/17  Yes Lisa Abernathy MD   glipiZIDE (GLUCOTROL) 5 MG tablet TAKE 1 TABLET BY MOUTH THREE TIMES DAILY 1/3/18  Yes Lisa Abernathy MD   metoprolol succinate (TOPROL-XL) 100 MG 24  hr tablet Take 1 tablet (100 mg total) by mouth 2 (two) times daily. 10/25/17  Yes Lisa Abernathy MD   multivitamin with iron-mineral TbSR Take 1 tablet by mouth once daily. 8/31/14  Yes Pinky Thornton MD   nut.tx.imp.renal fxn,lac-reduc 0.08 gram-1.8 kcal/mL Liqd Take 237 mLs by mouth 2 (two) times daily. 1/21/18  Yes Pinky Thornton MD   opium tincture 10 mg/mL (morphine) Tinc Take 0.6 mLs (6 mg total) by mouth 4 (four) times daily. 6/13/17  Yes Lisa Abernathy MD   oxycodone-acetaminophen (PERCOCET) 5-325 mg per tablet TK 1 T PO Q 6 H PRN P 8/31/16  Yes Pedro Dumont MD   paregoric 2 mg/5 mL Liqd TAKE 5 ML BY MOUTH THREE TIMES DAILY 1/3/18  Yes Lisa Abernathy MD   regorafenib (STIVARGA) 40 mg Tab Take 120 mg by mouth once daily. Take for 21 days and then 1 week off and then repeat 1/19/18  Yes Lisa Abernathy MD   tamsulosin (FLOMAX) 0.4 mg Cp24 Take 1 capsule (0.4 mg total) by mouth once daily. 9/19/17  Yes Lisa Abernathy MD   diphenoxylate-atropine 2.5-0.025 mg (LOMOTIL) 2.5-0.025 mg per tablet TAKE 1 TABLET BY MOUTH FOUR TIMES DAILY AS NEEDED FOR DIARRHEA 1/3/18   Pinky Thornton MD   NYSTATIN (DUKE'S SOLUTION) Mix equal amounts of benadryl, maalox, 2% viscous lidocaine and nystatin    Swish and swallow 10 ml 4 times a day 12/7/17   Lisa Abernathy MD   ondansetron (ZOFRAN-ODT) 8 MG TbDL Take 1 tablet (8 mg total) by mouth every 8 (eight) hours as needed (Nausea). 12/7/17   Lisa Abernathy MD     Scheduled Meds:    ascorbic acid (vitamin C)  1,000 mg Oral Daily    coenzyme Q10  100 mg Oral TID    dronabinol  10 mg Oral BID AC    furosemide  80 mg Intravenous BID    metoprolol succinate  100 mg Oral BID    multivit-iron-FA-calcium-mins  1 tablet Oral Daily    nut.tx.imp.renal fxn,lac-reduc  237 mL Oral BID    opium tincture  6 mg Oral QID    pantoprazole  40 mg Oral Daily    regorafenib  120 mg Oral Daily    sodium chloride 0.9%  3 mL Intravenous Q8H    tamsulosin   1 capsule Oral Daily    verapamil  180 mg Oral Nightly    vitamin D  2,000 Units Oral Daily     Continuous Infusions:   PRN Meds:acetaminophen, dextrose 50%, dextrose 50%, diphenoxylate-atropine 2.5-0.025 mg, glucagon (human recombinant), glucose, glucose, insulin aspart, ondansetron, ondansetron, oxyCODONE, oxyCODONE  Anticoagulants/Antiplatelets: no anticoagulation    Allergies: Review of patient's allergies indicates:  No Known Allergies  Sedation Hx: have not been any systemic reactions    Labs:    Recent Labs  Lab 02/02/18 0518   INR 1.0       Recent Labs  Lab 02/02/18 0518   WBC 7.57   HGB 8.0*   HCT 26.5*   MCV 91         Recent Labs  Lab 02/02/18 0518   *      K 4.6      CO2 18*   BUN 57*   CREATININE 2.2*   CALCIUM 8.7   MG 1.8   ALT 9*   AST 14   ALBUMIN 2.1*   BILITOT 0.4         Vitals:  Temp: 98.2 °F (36.8 °C) (02/02/18 0800)  Pulse: 103 (02/02/18 0800)  Resp: 20 (02/02/18 0800)  BP: 103/62 (02/02/18 0800)  SpO2: 96 % (02/02/18 0454)     Physical Exam:  ASA: 2  Mallampati: n/a    General: no acute distress  Mental Status: alert and oriented to person, place and time  HEENT: normocephalic, atraumatic  Chest: unlabored breathing  Heart: regular heart rate  Abdomen: distended  Extremity: moves all extremities    Plan: ultrasound guided paracentesis  Sedation Plan: local    JABARI Mortensen, FNP  Interventional Radiology  [phone] spectralink

## 2018-02-02 NOTE — ASSESSMENT & PLAN NOTE
-follows closely with Heme/Onc, last chemo 12/27  -no acute complaints at this time, consult heme/onc in house PRN  -outpt follow up with them

## 2018-02-02 NOTE — PROGRESS NOTES
Pt arrived to IR 12 for paracentesis.  Name verified using two identifiers.  Allergies verified.  Will continue to monitor.

## 2018-02-02 NOTE — ASSESSMENT & PLAN NOTE
TTE 8/29/17 EF 60%, TTE 2/2/18 EF 40%  S/p lasix 80mg x 1  Plan to continue diuresis w/ lasix 80mg bid in AM

## 2018-02-02 NOTE — CARE UPDATE
RN Proactive Rounding Note  Time of Visit: 1420    Admit Date: 2018  LOS: 1  Code Status: Full Code   Date of Visit: 2018  : 1950  Age: 67 y.o.  Sex: male  Bed: 3092/3092 A:   MRN: [id number]  Was the patient discharged from an ICU this admission? No   Was the patient discharged from a PACU within last 24 hours? Yes  Did the patient receive conscious sedation/general anesthesia in last 24 hours? No  Was the patient in the ED within the past 24 hours? No  Was the patient started on NIPPV within the past 24 hours? No  Attending Physician: Taina French MD  [place] [facility]      ASSESSMENT:     Modified Early Warning Score (MEWS): 2  Abnormal Vital Signs: Hypotensive  Clinical Issues: Circulatory     INTERVENTIONS/ RECOMMENDATIONS:     Called to evaluate pt for hypotension. Upon assessment, pt's BP 67/49. Pt asymptomatic. Bedside RN states pt recently had a paracentesis w/ 5 L fluid removed and was on cardizem gtt. Cardizem gtt stopped. Bedside RN states pt also received PO Verapamil. IM team notified. 50 g albumin ordered. Pt's BP increased to 78/50. MAP 60. Plan discussed w/ NP and bedside RN. NP states she will consult Critical Care for Levo gtt and start pt on Midodrine; NP believes pt is hypotensive due to fluid shift and BP medication.     Discussed plan of care with RN Yes    PHYSICIAN ESCALATION:     Yes/No Yes    Orders received and case discussed with NP     Disposition: Possible transfer to CMICU    FOLLOW-UP/CONTINGENCY:       Call back the Rapid Response Nurse at x [phone]  for additional questions or concerns

## 2018-02-02 NOTE — SUBJECTIVE & OBJECTIVE
Past Medical History:   Diagnosis Date    Diabetes mellitus     GERD (gastroesophageal reflux disease)     Hypertension     Rectal cancer metastasized to lung 01/11/2010       Past Surgical History:   Procedure Laterality Date    COLON SURGERY      COLONOSCOPY N/A 10/3/2017    Procedure: COLONOSCOPY;  Surgeon: Sandoval Bowling MD;  Location: [address]);  Service: Endoscopy;  Laterality: N/A;       Review of patient's allergies indicates:  No Known Allergies    No current facility-administered medications on file prior to encounter.      Current Outpatient Prescriptions on File Prior to Encounter   Medication Sig    ascorbic acid (VITAMIN C) 1000 MG tablet Take 1,000 mg by mouth once daily.    cholecalciferol, vitamin D3, (VITAMIN D3) 2,000 unit Cap Take 1 capsule by mouth once daily.    cholestyramine, bulk, Powd 4 g by Misc.(Non-Drug; Combo Route) route once daily at 6am.    co-enzyme Q-10 30 mg capsule Take 30 mg by mouth 3 (three) times daily.    dronabinol (MARINOL) 10 MG capsule Take 1 capsule (10 mg total) by mouth 2 (two) times daily before meals.    esomeprazole (NEXIUM) 40 MG capsule TAKE 1 CAPSULE(40 MG) BY MOUTH EVERY DAY    furosemide (LASIX) 20 MG tablet Take 2 tablets (40 mg total) by mouth daily as needed.    glipiZIDE (GLUCOTROL) 5 MG tablet TAKE 1 TABLET BY MOUTH THREE TIMES DAILY    metoprolol succinate (TOPROL-XL) 100 MG 24 hr tablet Take 1 tablet (100 mg total) by mouth 2 (two) times daily.    multivitamin with iron-mineral TbSR Take 1 tablet by mouth once daily.    nut.tx.imp.renal fxn,lac-reduc 0.08 gram-1.8 kcal/mL Liqd Take 237 mLs by mouth 2 (two) times daily.    opium tincture 10 mg/mL (morphine) Tinc Take 0.6 mLs (6 mg total) by mouth 4 (four) times daily.    oxycodone-acetaminophen (PERCOCET) 5-325 mg per tablet TK 1 T PO Q 6 H PRN P    paregoric 2 mg/5 mL Liqd TAKE 5 ML BY MOUTH THREE TIMES DAILY    regorafenib (STIVARGA) 40 mg Tab Take 120 mg by mouth once  daily. Take for 21 days and then 1 week off and then repeat    tamsulosin (FLOMAX) 0.4 mg Cp24 Take 1 capsule (0.4 mg total) by mouth once daily.    diphenoxylate-atropine 2.5-0.025 mg (LOMOTIL) 2.5-0.025 mg per tablet TAKE 1 TABLET BY MOUTH FOUR TIMES DAILY AS NEEDED FOR DIARRHEA    NYSTATIN (DUKE'S SOLUTION) Mix equal amounts of benadryl, maalox, 2% viscous lidocaine and nystatin    Swish and swallow 10 ml 4 times a day    ondansetron (ZOFRAN-ODT) 8 MG TbDL Take 1 tablet (8 mg total) by mouth every 8 (eight) hours as needed (Nausea).     Family History     Problem Relation (Age of Onset)    Breast cancer Mother    Cancer Mother, Father    Tuberculosis Mother        Social History Main Topics    Smoking status: Never Smoker    Smokeless tobacco: Never Used    Alcohol use No    Drug use: Unknown    Sexual activity: Not on file     Review of Systems   Constitutional: Positive for activity change, fatigue and unexpected weight change. Negative for appetite change, chills, diaphoresis and fever.   HENT: Negative for congestion, sinus pain, sinus pressure, sneezing and sore throat.    Eyes: Negative.    Respiratory: Positive for shortness of breath. Negative for apnea, cough and wheezing.    Cardiovascular: Positive for leg swelling. Negative for chest pain and palpitations.   Gastrointestinal: Positive for abdominal distention and abdominal pain. Negative for constipation, diarrhea, nausea and vomiting.   Endocrine: Negative.    Genitourinary: Negative for decreased urine volume, difficulty urinating, penile swelling, scrotal swelling and urgency.   Musculoskeletal: Positive for gait problem. Negative for arthralgias and myalgias.   Skin: Negative.    Allergic/Immunologic: Negative.    Neurological: Negative for dizziness, syncope, weakness, light-headedness and headaches.   Hematological: Negative.    Psychiatric/Behavioral: Negative.      Objective:     Vital Signs (Most Recent):  Temp: 98.7 °F (37.1 °C)  (02/01/18 1851)  Pulse: 106 (02/01/18 1923)  Resp: (!) 25 (02/01/18 1923)  BP: 99/70 (02/01/18 1911)  SpO2: 99 % (02/01/18 1923) Vital Signs (24h Range):  Temp:  [98.2 °F (36.8 °C)-98.7 °F (37.1 °C)] 98.7 °F (37.1 °C)  Pulse:  [] 106  Resp:  [18-35] 25  SpO2:  [69 %-100 %] 99 %  BP: ()/(57-80) 99/70     Weight: 90.7 kg (200 lb)  Body mass index is 28.7 kg/m².    Physical Exam   Constitutional: He is oriented to person, place, and time. He appears well-developed and well-nourished. No distress.   HENT:   Head: Normocephalic and atraumatic.   Mouth/Throat: Mucous membranes are normal. Normal dentition.   Eyes: Conjunctivae and lids are normal. Pupils are equal, round, and reactive to light. No scleral icterus.   Neck: Normal range of motion. Neck supple. No JVD present.   Cardiovascular: Normal rate, regular rhythm, normal heart sounds and intact distal pulses.  Exam reveals no gallop and no friction rub.    No murmur heard.  Pulmonary/Chest: Effort normal and breath sounds normal. No respiratory distress. He exhibits no tenderness.   Abdominal: Soft. Bowel sounds are normal. He exhibits distension (significant rounding ascites ). There is no tenderness.   Musculoskeletal: Normal range of motion. He exhibits edema (3+ pitting edema mid thighs to feet). He exhibits no deformity.   Neurological: He is alert and oriented to person, place, and time. No cranial nerve deficit.   Skin: Skin is warm and dry. Capillary refill takes 2 to 3 seconds. No rash noted. He is not diaphoretic. No erythema.   Psychiatric: He has a normal mood and affect. Judgment and thought content normal.   Nursing note and vitals reviewed.        CRANIAL NERVES     CN III, IV, VI   Pupils are equal, round, and reactive to light.    Significant Labs:   CBC:   Recent Labs  Lab 02/01/18  0818 02/01/18  1010 02/01/18  1428   WBC 6.80 6.97  --    HGB 8.3* 7.8*  --    HCT 27.3* 25.3*  --     SEE COMMENT 180     CMP:   Recent Labs  Lab  02/01/18  0818 02/01/18  1010    140   K 4.2 4.2    111*   CO2 21* 20*    112*   BUN 55* 50*   CREATININE 2.2* 2.2*   CALCIUM 8.5* 8.2*   PROT 6.1 5.9*   ALBUMIN 2.1* 2.1*   BILITOT 0.3 0.3   ALKPHOS 424* 419*   AST 16 17   ALT 11 13   ANIONGAP 8 9   EGFRNONAA 29.5* 29.5*     Cardiac Markers:   Recent Labs  Lab 02/01/18  1010   *     Magnesium:   Recent Labs  Lab 02/01/18  1428   MG 2.0     Troponin:   Recent Labs  Lab 02/01/18  1010   TROPONINI <0.006     All pertinent labs within the past 24 hours have been reviewed.    Significant Imaging: I have reviewed all pertinent imaging results/findings within the past 24 hours.

## 2018-02-03 PROBLEM — R57.9 SHOCK: Status: ACTIVE | Noted: 2018-02-02

## 2018-02-03 LAB
ALBUMIN SERPL BCP-MCNC: 2.7 G/DL
ALBUMIN SERPL BCP-MCNC: 2.8 G/DL
ALBUMIN SERPL BCP-MCNC: 3 G/DL
ALLENS TEST: ABNORMAL
ALLENS TEST: ABNORMAL
ALP SERPL-CCNC: 668 U/L
ALT SERPL W/O P-5'-P-CCNC: 168 U/L
ANION GAP SERPL CALC-SCNC: 12 MMOL/L
ANION GAP SERPL CALC-SCNC: 12 MMOL/L
ANION GAP SERPL CALC-SCNC: 13 MMOL/L
ANISOCYTOSIS BLD QL SMEAR: ABNORMAL
AST SERPL-CCNC: 313 U/L
BACTERIA #/AREA URNS AUTO: ABNORMAL /HPF
BASO STIPL BLD QL SMEAR: ABNORMAL
BASOPHILS NFR BLD: 0 %
BILIRUB SERPL-MCNC: 1.1 MG/DL
BILIRUB UR QL STRIP: NEGATIVE
BUN SERPL-MCNC: 62 MG/DL
BUN SERPL-MCNC: 66 MG/DL
BUN SERPL-MCNC: 67 MG/DL
BURR CELLS BLD QL SMEAR: ABNORMAL
CALCIUM SERPL-MCNC: 8.1 MG/DL
CALCIUM SERPL-MCNC: 8.3 MG/DL
CALCIUM SERPL-MCNC: 8.4 MG/DL
CHLORIDE SERPL-SCNC: 104 MMOL/L
CHLORIDE SERPL-SCNC: 106 MMOL/L
CHLORIDE SERPL-SCNC: 107 MMOL/L
CLARITY UR REFRACT.AUTO: ABNORMAL
CO2 SERPL-SCNC: 16 MMOL/L
CO2 SERPL-SCNC: 19 MMOL/L
CO2 SERPL-SCNC: 22 MMOL/L
COLOR UR AUTO: YELLOW
CREAT SERPL-MCNC: 3.1 MG/DL
CREAT SERPL-MCNC: 3.4 MG/DL
CREAT SERPL-MCNC: 3.4 MG/DL
CREAT UR-MCNC: 33 MG/DL
DELSYS: ABNORMAL
DELSYS: ABNORMAL
DIFFERENTIAL METHOD: ABNORMAL
EOSINOPHIL NFR BLD: 0 %
EP: 5
ERYTHROCYTE [DISTWIDTH] IN BLOOD BY AUTOMATED COUNT: 20 %
ERYTHROCYTE [SEDIMENTATION RATE] IN BLOOD BY WESTERGREN METHOD: 14 MM/H
EST. GFR  (AFRICAN AMERICAN): 20.1 ML/MIN/1.73 M^2
EST. GFR  (AFRICAN AMERICAN): 20.1 ML/MIN/1.73 M^2
EST. GFR  (AFRICAN AMERICAN): 22.5 ML/MIN/1.73 M^2
EST. GFR  (NON AFRICAN AMERICAN): 17.4 ML/MIN/1.73 M^2
EST. GFR  (NON AFRICAN AMERICAN): 17.4 ML/MIN/1.73 M^2
EST. GFR  (NON AFRICAN AMERICAN): 19.5 ML/MIN/1.73 M^2
FIO2: 50
GLUCOSE SERPL-MCNC: 202 MG/DL
GLUCOSE SERPL-MCNC: 221 MG/DL
GLUCOSE SERPL-MCNC: 256 MG/DL
GLUCOSE UR QL STRIP: ABNORMAL
GRAM STN SPEC: NORMAL
GRAM STN SPEC: NORMAL
HCO3 UR-SCNC: 18.9 MMOL/L (ref 24–28)
HCO3 UR-SCNC: 20.1 MMOL/L (ref 24–28)
HCT VFR BLD AUTO: 31 %
HGB BLD-MCNC: 9.7 G/DL
HGB UR QL STRIP: ABNORMAL
HYALINE CASTS UR QL AUTO: 23 /LPF
HYPOCHROMIA BLD QL SMEAR: ABNORMAL
IMM GRANULOCYTES # BLD AUTO: ABNORMAL K/UL
IMM GRANULOCYTES NFR BLD AUTO: ABNORMAL %
IP: 15
KETONES UR QL STRIP: NEGATIVE
LACTATE SERPL-SCNC: 0.7 MMOL/L
LACTATE SERPL-SCNC: 0.7 MMOL/L
LACTATE SERPL-SCNC: 1.1 MMOL/L
LACTATE SERPL-SCNC: 1.4 MMOL/L
LEUKOCYTE ESTERASE UR QL STRIP: ABNORMAL
LYMPHOCYTES NFR BLD: 6 %
MAGNESIUM SERPL-MCNC: 2.1 MG/DL
MCH RBC QN AUTO: 27.8 PG
MCHC RBC AUTO-ENTMCNC: 31.3 G/DL
MCV RBC AUTO: 89 FL
MICROSCOPIC COMMENT: ABNORMAL
MODE: ABNORMAL
MONOCYTES NFR BLD: 6 %
NEUTROPHILS NFR BLD: 88 %
NITRITE UR QL STRIP: NEGATIVE
NRBC BLD-RTO: 0 /100 WBC
OVALOCYTES BLD QL SMEAR: ABNORMAL
PCO2 BLDA: 42.6 MMHG (ref 35–45)
PCO2 BLDA: 43.9 MMHG (ref 35–45)
PH SMN: 7.24 [PH] (ref 7.35–7.45)
PH SMN: 7.28 [PH] (ref 7.35–7.45)
PH UR STRIP: 5 [PH] (ref 5–8)
PHOSPHATE SERPL-MCNC: 7 MG/DL
PHOSPHATE SERPL-MCNC: 7.6 MG/DL
PHOSPHATE SERPL-MCNC: 7.7 MG/DL
PLATELET # BLD AUTO: ABNORMAL K/UL
PLATELET BLD QL SMEAR: ABNORMAL
PMV BLD AUTO: 10.8 FL
PO2 BLDA: 100 MMHG (ref 80–100)
PO2 BLDA: 190 MMHG (ref 80–100)
POC BE: -7 MMOL/L
POC BE: -8 MMOL/L
POC SATURATED O2: 100 % (ref 95–100)
POC SATURATED O2: 96 % (ref 95–100)
POC TCO2: 20 MMOL/L (ref 23–27)
POC TCO2: 21 MMOL/L (ref 23–27)
POCT GLUCOSE: 189 MG/DL (ref 70–110)
POCT GLUCOSE: 195 MG/DL (ref 70–110)
POCT GLUCOSE: 230 MG/DL (ref 70–110)
POCT GLUCOSE: 246 MG/DL (ref 70–110)
POIKILOCYTOSIS BLD QL SMEAR: SLIGHT
POLYCHROMASIA BLD QL SMEAR: ABNORMAL
POTASSIUM SERPL-SCNC: 4.8 MMOL/L
POTASSIUM SERPL-SCNC: 5.1 MMOL/L
POTASSIUM SERPL-SCNC: 5.3 MMOL/L
PROT SERPL-MCNC: 6.3 G/DL
PROT UR QL STRIP: ABNORMAL
RBC # BLD AUTO: 3.49 M/UL
RBC #/AREA URNS AUTO: 11 /HPF (ref 0–4)
SAMPLE: ABNORMAL
SAMPLE: ABNORMAL
SCHISTOCYTES BLD QL SMEAR: PRESENT
SITE: ABNORMAL
SITE: ABNORMAL
SODIUM SERPL-SCNC: 135 MMOL/L
SODIUM SERPL-SCNC: 137 MMOL/L
SODIUM SERPL-SCNC: 139 MMOL/L
SODIUM UR-SCNC: 76 MMOL/L
SP GR UR STRIP: 1.01 (ref 1–1.03)
SP02: 100
SQUAMOUS #/AREA URNS AUTO: 1 /HPF
URN SPEC COLLECT METH UR: ABNORMAL
UROBILINOGEN UR STRIP-ACNC: NEGATIVE EU/DL
UUN UR-MCNC: 221 MG/DL
VANCOMYCIN SERPL-MCNC: 14 UG/ML
VANCOMYCIN SERPL-MCNC: 20.3 UG/ML
WBC # BLD AUTO: 24.12 K/UL
WBC #/AREA URNS AUTO: >100 /HPF (ref 0–5)
WBC CLUMPS UR QL AUTO: ABNORMAL

## 2018-02-03 PROCEDURE — 80202 ASSAY OF VANCOMYCIN: CPT | Mod: 91

## 2018-02-03 PROCEDURE — 36415 COLL VENOUS BLD VENIPUNCTURE: CPT

## 2018-02-03 PROCEDURE — 83605 ASSAY OF LACTIC ACID: CPT

## 2018-02-03 PROCEDURE — 94660 CPAP INITIATION&MGMT: CPT

## 2018-02-03 PROCEDURE — 83605 ASSAY OF LACTIC ACID: CPT | Mod: 91

## 2018-02-03 PROCEDURE — 87040 BLOOD CULTURE FOR BACTERIA: CPT

## 2018-02-03 PROCEDURE — 25000003 PHARM REV CODE 250: Performed by: NURSE PRACTITIONER

## 2018-02-03 PROCEDURE — 87205 SMEAR GRAM STAIN: CPT

## 2018-02-03 PROCEDURE — 25000003 PHARM REV CODE 250: Performed by: INTERNAL MEDICINE

## 2018-02-03 PROCEDURE — 20000000 HC ICU ROOM

## 2018-02-03 PROCEDURE — 84100 ASSAY OF PHOSPHORUS: CPT

## 2018-02-03 PROCEDURE — 82570 ASSAY OF URINE CREATININE: CPT

## 2018-02-03 PROCEDURE — 84300 ASSAY OF URINE SODIUM: CPT

## 2018-02-03 PROCEDURE — 99232 SBSQ HOSP IP/OBS MODERATE 35: CPT | Mod: ,,, | Performed by: INTERNAL MEDICINE

## 2018-02-03 PROCEDURE — A4216 STERILE WATER/SALINE, 10 ML: HCPCS | Performed by: NURSE PRACTITIONER

## 2018-02-03 PROCEDURE — 81001 URINALYSIS AUTO W/SCOPE: CPT

## 2018-02-03 PROCEDURE — P9047 ALBUMIN (HUMAN), 25%, 50ML: HCPCS | Mod: JG | Performed by: INTERNAL MEDICINE

## 2018-02-03 PROCEDURE — 99900035 HC TECH TIME PER 15 MIN (STAT)

## 2018-02-03 PROCEDURE — 84540 ASSAY OF URINE/UREA-N: CPT

## 2018-02-03 PROCEDURE — 82803 BLOOD GASES ANY COMBINATION: CPT

## 2018-02-03 PROCEDURE — 25000003 PHARM REV CODE 250: Performed by: STUDENT IN AN ORGANIZED HEALTH CARE EDUCATION/TRAINING PROGRAM

## 2018-02-03 PROCEDURE — 37799 UNLISTED PX VASCULAR SURGERY: CPT

## 2018-02-03 PROCEDURE — 87086 URINE CULTURE/COLONY COUNT: CPT

## 2018-02-03 PROCEDURE — 99233 SBSQ HOSP IP/OBS HIGH 50: CPT | Mod: ,,, | Performed by: INTERNAL MEDICINE

## 2018-02-03 PROCEDURE — 63600175 PHARM REV CODE 636 W HCPCS: Performed by: STUDENT IN AN ORGANIZED HEALTH CARE EDUCATION/TRAINING PROGRAM

## 2018-02-03 PROCEDURE — 63600175 PHARM REV CODE 636 W HCPCS: Performed by: INTERNAL MEDICINE

## 2018-02-03 PROCEDURE — 85025 COMPLETE CBC W/AUTO DIFF WBC: CPT

## 2018-02-03 PROCEDURE — 83735 ASSAY OF MAGNESIUM: CPT

## 2018-02-03 PROCEDURE — 63600175 PHARM REV CODE 636 W HCPCS: Mod: JG | Performed by: INTERNAL MEDICINE

## 2018-02-03 PROCEDURE — 80202 ASSAY OF VANCOMYCIN: CPT

## 2018-02-03 PROCEDURE — 80069 RENAL FUNCTION PANEL: CPT

## 2018-02-03 PROCEDURE — 85049 AUTOMATED PLATELET COUNT: CPT

## 2018-02-03 PROCEDURE — 80053 COMPREHEN METABOLIC PANEL: CPT

## 2018-02-03 RX ORDER — SODIUM BICARBONATE 1 MEQ/ML
50 SYRINGE (ML) INTRAVENOUS EVERY 6 HOURS
Status: COMPLETED | OUTPATIENT
Start: 2018-02-03 | End: 2018-02-03

## 2018-02-03 RX ORDER — ALBUMIN HUMAN 250 G/1000ML
25 SOLUTION INTRAVENOUS ONCE
Status: COMPLETED | OUTPATIENT
Start: 2018-02-03 | End: 2018-02-03

## 2018-02-03 RX ADMIN — ALBUMIN (HUMAN) 25 G: 12.5 SOLUTION INTRAVENOUS at 01:02

## 2018-02-03 RX ADMIN — SODIUM BICARBONATE 50 MEQ: 84 INJECTION, SOLUTION INTRAVENOUS at 06:02

## 2018-02-03 RX ADMIN — PANTOPRAZOLE SODIUM 40 MG: 40 TABLET, DELAYED RELEASE ORAL at 08:02

## 2018-02-03 RX ADMIN — Medication 3 ML: at 06:02

## 2018-02-03 RX ADMIN — PIPERACILLIN AND TAZOBACTAM 4.5 G: 4; .5 INJECTION, POWDER, LYOPHILIZED, FOR SOLUTION INTRAVENOUS; PARENTERAL at 09:02

## 2018-02-03 RX ADMIN — SODIUM BICARBONATE 50 MEQ: 84 INJECTION, SOLUTION INTRAVENOUS at 02:02

## 2018-02-03 RX ADMIN — DEXTROSE MONOHYDRATE: 5 INJECTION, SOLUTION INTRAVENOUS at 01:02

## 2018-02-03 RX ADMIN — Medication 3 ML: at 02:02

## 2018-02-03 RX ADMIN — Medication 3 ML: at 09:02

## 2018-02-03 RX ADMIN — SODIUM POLYSTYRENE SULFONATE 30 G: 15 SUSPENSION ORAL; RECTAL at 01:02

## 2018-02-03 RX ADMIN — CHLOROTHIAZIDE SODIUM 250 MG: 500 INJECTION, POWDER, LYOPHILIZED, FOR SOLUTION INTRAVENOUS at 08:02

## 2018-02-03 RX ADMIN — DEXTROSE MONOHYDRATE 0.32 MCG/KG/MIN: 5 INJECTION, SOLUTION INTRAVENOUS at 06:02

## 2018-02-03 RX ADMIN — FUROSEMIDE 20 MG/HR: 10 INJECTION, SOLUTION INTRAMUSCULAR; INTRAVENOUS at 11:02

## 2018-02-03 RX ADMIN — INSULIN ASPART 1 UNITS: 100 INJECTION, SOLUTION INTRAVENOUS; SUBCUTANEOUS at 01:02

## 2018-02-03 RX ADMIN — SODIUM BICARBONATE 50 MEQ: 84 INJECTION, SOLUTION INTRAVENOUS at 08:02

## 2018-02-03 RX ADMIN — SODIUM BICARBONATE 50 MEQ: 84 INJECTION, SOLUTION INTRAVENOUS at 11:02

## 2018-02-03 RX ADMIN — INSULIN ASPART 1 UNITS: 100 INJECTION, SOLUTION INTRAVENOUS; SUBCUTANEOUS at 06:02

## 2018-02-03 RX ADMIN — PIPERACILLIN AND TAZOBACTAM 4.5 G: 4; .5 INJECTION, POWDER, LYOPHILIZED, FOR SOLUTION INTRAVENOUS; PARENTERAL at 01:02

## 2018-02-03 RX ADMIN — PIPERACILLIN AND TAZOBACTAM 4.5 G: 4; .5 INJECTION, POWDER, LYOPHILIZED, FOR SOLUTION INTRAVENOUS; PARENTERAL at 06:02

## 2018-02-03 NOTE — ASSESSMENT & PLAN NOTE
-baseline SCr 1.6 >>> admission 2.2  -monitor with diuresis and now significant hypotension post procedure

## 2018-02-03 NOTE — SUBJECTIVE & OBJECTIVE
Past Medical History:   Diagnosis Date    CHF (congestive heart failure)     Diabetes mellitus     Encounter for blood transfusion     GERD (gastroesophageal reflux disease)     Hypertension     Rectal cancer metastasized to lung 01/11/2010       Past Surgical History:   Procedure Laterality Date    ABDOMINAL SURGERY      COLON SURGERY      COLONOSCOPY N/A 10/3/2017    Procedure: COLONOSCOPY;  Surgeon: Sandoval Bowling MD;  Location: [facility] ([address] [place] [address]);  Service: Endoscopy;  Laterality: N/A;    CYSTOSCOPY W/ URETERAL STENT PLACEMENT      FRACTURE SURGERY      SKIN BIOPSY         Review of patient's allergies indicates:  No Known Allergies    Family History     Problem Relation (Age of Onset)    Breast cancer Mother    Cancer Mother, Father    Tuberculosis Mother          Social History Main Topics    Smoking status: Never Smoker    Smokeless tobacco: Never Used    Alcohol use No    Drug use: Unknown    Sexual activity: Not on file       Review of Systems   Constitutional: Negative for chills and fever.   HENT: Negative for ear pain and hearing loss.    Eyes: Negative for pain and visual disturbance.   Respiratory: Positive for shortness of breath. Negative for cough.    Cardiovascular: Negative for chest pain.   Genitourinary: Negative for difficulty urinating, dysuria, flank pain and hematuria.   Neurological: Negative for dizziness and headaches.   Psychiatric/Behavioral: Negative for behavioral problems and confusion.       Objective:     Temp:  [97.1 °F (36.2 °C)-97.7 °F (36.5 °C)] 97.7 °F (36.5 °C)  Pulse:  [] 94  Resp:  [12-38] 28  SpO2:  [93 %-100 %] 99 %  BP: ()/(52-79) 116/79  Arterial Line BP: ()/(50-78) 106/56     Body mass index is 30.11 kg/m².      Date 02/03/18 0700 - 02/04/18 0659   Shift 7066-6945 [id number] 24 Hour Total   I  N  T  A  K  E   I.V.  (mL/kg) 250.6  (2.6)   250.6  (2.6)    IV Piggyback 150   150    Shift Total  (mL/kg) 400.6  (4.2)    400.6  (4.2)   O  U  T  P  U  T   Urine  (mL/kg/hr) 115  (0.2)   115    Shift Total  (mL/kg) 115  (1.2)   115  (1.2)   Weight (kg) 95.2 95.2 95.2 95.2          Drains     Drain                 Ureteral Drain/Stent 03/04/16 Right ureter 6 Fr. 701 days         Urethral Catheter 02/02/18 1545 less than 1 day                Physical Exam   Constitutional: He appears ill. No distress.   Cardiovascular:  Tachycardia present.    Pulmonary/Chest: Accessory muscle usage present. No respiratory distress.   Abdominal: Soft. He exhibits distension. There is no tenderness. There is no rebound.   Genitourinary: Testes normal and penis normal. Right testis shows no mass, no swelling and no tenderness. Left testis shows no mass, no swelling and no tenderness. Circumcised.   Genitourinary Comments: Gallo draining clear yellow urine    Neurological: He is alert.   Skin: Skin is warm and dry. He is not diaphoretic.     Psychiatric: He has a normal mood and affect. His behavior is normal.       Significant Labs:    BMP:    Recent Labs  Lab 02/02/18  2132 02/03/18  0214 02/03/18  1355   * 135* 137   K 5.6* 5.3* 5.1    107 106   CO2 11* 16* 19*   BUN 62* 62* 67*   CREATININE 2.9* 3.1* 3.4*   CALCIUM 8.4* 8.4* 8.3*       CBC:    Recent Labs  Lab 02/02/18  0518 02/02/18  1634 02/02/18  1641 02/03/18  0214   WBC 7.57  --  18.88* 24.12*   HGB 8.0*  --  9.0* 9.7*   HCT 26.5* 31* 29.9* 31.0*     --  65* SEE COMMENT       All pertinent labs results from the past 24 hours have been reviewed.    Significant Imaging:  Retroperitoneal US:  Severe right hydronephrosis with stent in place in renal pelvis.  Hydronephrosis appears similar in severity to most recent CT A/p.  Left kidney without evidence of hydronephrosis.  No solid renal masses.  Bladder decompressed around gallo.

## 2018-02-03 NOTE — ASSESSMENT & PLAN NOTE
-a flutter with 2:1 conduction on admission EKG  -CBC remarkable for anemia and clumping of platelets  -chemistry remarkable for RE/metabolic acidosis, low total protein and low albumin; troponin negative, BNP ~850 -CXR unremarkable for pleural effusions or edema  -initial rate control attempted with IVP metoprolol however unsuccessful >>> transitioned to cardizem gtt with bolus, now to PO verapamil per EP recommendations    -all rate and BP therapies on hold due to hypotension and requirement of pressor support   -transferring to CCU for advanced therapies   -EP evaluated, appreciate assistance   -no OAC due to anemic state with need for frequent paracentesis and ureter stent exchances  -continue tele monitoring  -continue diuresis, see below for details  -maintain electrolytes WNL  -outpt follow up with EP at time of discharge

## 2018-02-03 NOTE — ASSESSMENT & PLAN NOTE
S/p recurrent paracenteses  S/p paracentesis w/ removal ~4.5L  Patient possibly hypotensive 2/2 third spacing s/p fluid removal and antihypertensives

## 2018-02-03 NOTE — HPI
"Mr. Medrano is a 68 yo male with HTN, T2DM, CKD stage 3 (baseline sCr 1.6), CHF (EF 40%), and rectal cancer with metastatic disease who was admitted from clinic with tachycardia. He was diagnosed with rectal cancer in 2010; he received neoadjuvant chemo with 5-FU and later underwent resection with clear margins. He was later found to have pulmonary mets and has received variations of 5-FU, FOLFOX, FOLFIRI, Cyranza, and  Avastin. He most recently received FOLFIRI and cyranza on 12/27/17. Further chemotherapy was held due to decline in functional status. His cancer course has also been complicated by R hydronephrosis requiring JJ stent in 2016. He is followed by Urology at [facility] for this. Stent was last changed 2 weeks ago. He also receives therapeutic paracenteses q 2 weeks. Upon arrival to [facility] he was diagnosed with aflutter and has been followed by EP. Wife reports decreased PO intake for several days. Yesterday he underwent a therapeutic paracentesis with 5L removed. SBP subsequently dropped to the 50-70s. He received albumin prior to being started on levophed and vasopressin. He was hypotensive for a few hours. Subsequent labs revealed worsening RE with sCr 2 --> 3.1, acidosis with bicarb 11, and elevated lactate of 3.5. He was transferred to the ICU and started on a bicarb drip. UOP only 175cc last night; gallo catheter placed without much improvement. Renal US this morning revealed severe R hydronephrosis. Hourly intake 40cc/hr this morning with levophed, vasopressin, and lasix gtt. Patient is currently on BiPAP and doesn't participate in the history; all was provided by wife. Wife is very fearful of "invasive procedures" given patient's propensity to bleed easily and get infections given immunosuppression. She would like to avoid dialysis as long as possible.   "

## 2018-02-03 NOTE — PROGRESS NOTES
[facility]  Cardiology  Progress Note    Patient Name: Karthik Medrano  MRN: [id number]  Admission Date: 2/1/2018  Hospital Length of Stay: 1 days  Code Status: Full Code   Attending Physician: Sandoval Martini MD   Primary Care Physician: Pinky Thornton MD  Expected Discharge Date: 2/5/2018  Principal Problem:Atrial flutter with rapid ventricular response    Subjective:     Hospital Course:   No notes on file    Past Medical History:   Diagnosis Date    CHF (congestive heart failure)     Diabetes mellitus     Encounter for blood transfusion     GERD (gastroesophageal reflux disease)     Hypertension     Rectal cancer metastasized to lung 01/11/2010       Past Surgical History:   Procedure Laterality Date    ABDOMINAL SURGERY      COLON SURGERY      COLONOSCOPY N/A 10/3/2017    Procedure: COLONOSCOPY;  Surgeon: Sandoval Bowling MD;  Location: [address];  Service: Endoscopy;  Laterality: N/A;    CYSTOSCOPY W/ URETERAL STENT PLACEMENT      FRACTURE SURGERY      SKIN BIOPSY         Review of patient's allergies indicates:  No Known Allergies    No current facility-administered medications on file prior to encounter.      Current Outpatient Prescriptions on File Prior to Encounter   Medication Sig    ascorbic acid (VITAMIN C) 1000 MG tablet Take 1,000 mg by mouth once daily.    cholecalciferol, vitamin D3, (VITAMIN D3) 2,000 unit Cap Take 1 capsule by mouth once daily.    cholestyramine, bulk, Powd 4 g by Misc.(Non-Drug; Combo Route) route once daily at 6am.    co-enzyme Q-10 30 mg capsule Take 30 mg by mouth 3 (three) times daily.    dronabinol (MARINOL) 10 MG capsule Take 1 capsule (10 mg total) by mouth 2 (two) times daily before meals.    esomeprazole (NEXIUM) 40 MG capsule TAKE 1 CAPSULE(40 MG) BY MOUTH EVERY DAY    furosemide (LASIX) 20 MG tablet Take 2 tablets (40 mg total) by mouth daily as needed.    glipiZIDE (GLUCOTROL) 5 MG tablet TAKE 1 TABLET BY  MOUTH THREE TIMES DAILY    metoprolol succinate (TOPROL-XL) 100 MG 24 hr tablet Take 1 tablet (100 mg total) by mouth 2 (two) times daily.    multivitamin with iron-mineral TbSR Take 1 tablet by mouth once daily.    nut.tx.imp.renal fxn,lac-reduc 0.08 gram-1.8 kcal/mL Liqd Take 237 mLs by mouth 2 (two) times daily.    opium tincture 10 mg/mL (morphine) Tinc Take 0.6 mLs (6 mg total) by mouth 4 (four) times daily.    oxycodone-acetaminophen (PERCOCET) 5-325 mg per tablet TK 1 T PO Q 6 H PRN P    paregoric 2 mg/5 mL Liqd TAKE 5 ML BY MOUTH THREE TIMES DAILY    regorafenib (STIVARGA) 40 mg Tab Take 120 mg by mouth once daily. Take for 21 days and then 1 week off and then repeat    tamsulosin (FLOMAX) 0.4 mg Cp24 Take 1 capsule (0.4 mg total) by mouth once daily.    diphenoxylate-atropine 2.5-0.025 mg (LOMOTIL) 2.5-0.025 mg per tablet TAKE 1 TABLET BY MOUTH FOUR TIMES DAILY AS NEEDED FOR DIARRHEA    NYSTATIN (DUKE'S SOLUTION) Mix equal amounts of benadryl, maalox, 2% viscous lidocaine and nystatin    Swish and swallow 10 ml 4 times a day    ondansetron (ZOFRAN-ODT) 8 MG TbDL Take 1 tablet (8 mg total) by mouth every 8 (eight) hours as needed (Nausea).     Family History     Problem Relation (Age of Onset)    Breast cancer Mother    Cancer Mother, Father    Tuberculosis Mother        Social History Main Topics    Smoking status: Never Smoker    Smokeless tobacco: Never Used    Alcohol use No    Drug use: Unknown    Sexual activity: Not on file     Review of Systems     Objective:     Vital Signs (Most Recent):  Temp: 96.8 °F (36 °C) (02/02/18 1207)  Pulse: 83 (02/02/18 1500)  Resp: 18 (02/02/18 1207)  BP: (!) 72/49 (02/02/18 1420)  SpO2: 98 % (02/02/18 1207) Vital Signs (24h Range):  Temp:  [95.4 °F (35.2 °C)-98.7 °F (37.1 °C)] 96.8 °F (36 °C)  Pulse:  [] 83  Resp:  [18-35] 18  SpO2:  [69 %-100 %] 98 %  BP: ()/(49-79) 72/49     Weight: 98.3 kg (216 lb 11.4 oz)  Body mass index is 31.09  kg/m².    SpO2: 98 %  O2 Device (Oxygen Therapy): room air      Intake/Output Summary (Last 24 hours) at 02/02/18 1543  Last data filed at 02/02/18 0500   Gross per 24 hour   Intake                0 ml   Output             1900 ml   Net            -1900 ml       Lines/Drains/Airways     Central Venous Catheter Line                 Port A Cath Single Lumen -- days         Port A Cath Single Lumen 02/01/18 1005 other (see comments) 1 day          Drain                 Ureteral Drain/Stent 03/04/16 Right ureter 6 Fr. 700 days          Peripheral Intravenous Line                 Peripheral IV - Single Lumen 02/02/18 1046 Right Hand less than 1 day                Physical Exam   Constitutional: He is oriented to person, place, and time. He appears distressed.   HENT:   Head: Normocephalic and atraumatic.   Eyes: Conjunctivae are normal. No scleral icterus.   Cardiovascular: Normal rate.    irregularly irregular  Extremities initially cooler, however warmer s/p levophed and vasopressin   Difficult to palpate pulses in upper ext b/l   Pulmonary/Chest: He is in respiratory distress. He has no wheezes. He has no rales.   Abdominal: Soft. He exhibits distension. There is no tenderness. There is no rebound and no guarding.   Musculoskeletal: He exhibits edema (+3 b/l LE edema). He exhibits no tenderness.   Neurological: He is alert and oriented to person, place, and time.   Skin: No rash noted.   Wet and cold       Significant Labs:   ABG: No results for input(s): PH, PCO2, HCO3, POCSATURATED, BE in the last 48 hours., CMP   Recent Labs  Lab 02/01/18  0818 02/01/18  1010 02/02/18  0518    140 139   K 4.2 4.2 4.6    111* 109   CO2 21* 20* 18*    112* 128*   BUN 55* 50* 57*   CREATININE 2.2* 2.2* 2.2*   CALCIUM 8.5* 8.2* 8.7   PROT 6.1 5.9* 6.0   ALBUMIN 2.1* 2.1* 2.1*   BILITOT 0.3 0.3 0.4   ALKPHOS 424* 419* 364*   AST 16 17 14   ALT 11 13 9*   ANIONGAP 8 9 12   ESTGFRAFRICA 34.1* 34.1* 34.1*   EGFRNONAA  29.5* 29.5* 29.5*    and CBC   Recent Labs  Lab 02/01/18  0818 02/01/18  1010 02/01/18  1428 02/02/18  0518   WBC 6.80 6.97  --  7.57   HGB 8.3* 7.8*  --  8.0*   HCT 27.3* 25.3*  --  26.5*    SEE COMMENT 180 161       Significant Imaging: TTE: EF 40%, PA pressure 54 mmHg, moderate MR and TR    Assessment and Plan:     Brief HPI:     * Atrial flutter with rapid ventricular response    Rates decreased to ~70s  Verapamil and toprol-xl d/c 2/2 hypotension        Anasarca    Likely 2/2 low albumin and third spacing        Septic shock    Lactate 5, WBC 18  Started on levophed and vasopressin  Started on vanc and zosyn        Ascites    S/p recurrent paracenteses  S/p paracentesis w/ removal ~4.5L  Patient possibly hypotensive 2/2 third spacing s/p fluid removal        Acute renal failure superimposed on stage 3 chronic kidney disease    Currently oliguric  Michael placed  Monitor urine output  May need to consult nephrology for dialysis        Type 2 diabetes mellitus with stage 3 chronic kidney disease, without long-term current use of insulin    accuchecks q 6 hours as pt NPO  aspart sliding scale        Acute congestive heart failure    TTE 8/29/17 EF 60%, TTE 2/2/18 EF 40%  S/p lasix 80mg x 1  Plan to continue diuresis w/ lasix 80mg bid in AM            VTE Risk Mitigation         Ordered     High Risk of VTE  Once      02/01/18 1231     Medium Risk of VTE  Once      02/01/18 1231     Place sequential compression device  Until discontinued      02/01/18 1231     Reason for No Pharmacological VTE Prophylaxis  Once      02/01/18 1231          Kiel Yang MD  Cardiology  [facility]

## 2018-02-03 NOTE — CONSULTS
[facility]  Urology  Consult Note    Patient Name: Karthik Medrano  MRN: [id number]  Admission Date: 2/1/2018  Hospital Length of Stay: 2   Code Status: Full Code   Attending Provider: Sandoval Martini MD   Consulting Provider: Flavio Johnson MD  Primary Care Physician: Pinky Thornton MD  Principal Problem:Atrial flutter with rapid ventricular response    Inpatient consult to Urology  Consult performed by: FLAVIO JOHNSON  Consult ordered by: KRYS CURRY          Subjective:     HPI:  67 YOM with metastatic rectal cancer s/p who underwent neoadjuvant chemo and XRT, surgical pathology [id number], who was found to have pulmonary nodules. He received further chemotherapy, but progressed on chemo.  He developed a right ureteral stricture with right hydronephrosis likely 2/2 XRT, and undergoes chronic ureteral stent exchanges every 3 months, most recently one week ago.  He had been taking amoxicillin for one week prior, and took cipro for one week following.  Pt now admitted from oncology clinic for a flutter, found to be in septic shock requiring pressor support with RE and low urine output.     Urology consulted for right hydronephrosis with stent in place.      Pt denies F/C.  Nausea at baseline.  No dysuria, hematuria, difficulty voiding.  He did note some mild right flank pain after the stent exchange, but he has experienced this before.  This has since resolved.      Past Medical History:   Diagnosis Date    CHF (congestive heart failure)     Diabetes mellitus     Encounter for blood transfusion     GERD (gastroesophageal reflux disease)     Hypertension     Rectal cancer metastasized to lung 01/11/2010       Past Surgical History:   Procedure Laterality Date    ABDOMINAL SURGERY      COLON SURGERY      COLONOSCOPY N/A 10/3/2017    Procedure: COLONOSCOPY;  Surgeon: Sandoval Bowling MD;  Location: [facility] ([address] [place] [address]);  Service: Endoscopy;  Laterality: N/A;     CYSTOSCOPY W/ URETERAL STENT PLACEMENT      FRACTURE SURGERY      SKIN BIOPSY         Review of patient's allergies indicates:  No Known Allergies    Family History     Problem Relation (Age of Onset)    Breast cancer Mother    Cancer Mother, Father    Tuberculosis Mother          Social History Main Topics    Smoking status: Never Smoker    Smokeless tobacco: Never Used    Alcohol use No    Drug use: Unknown    Sexual activity: Not on file       Review of Systems   Constitutional: Negative for chills and fever.   HENT: Negative for ear pain and hearing loss.    Eyes: Negative for pain and visual disturbance.   Respiratory: Positive for shortness of breath. Negative for cough.    Cardiovascular: Negative for chest pain.   Genitourinary: Negative for difficulty urinating, dysuria, flank pain and hematuria.   Neurological: Negative for dizziness and headaches.   Psychiatric/Behavioral: Negative for behavioral problems and confusion.       Objective:     Temp:  [97.1 °F (36.2 °C)-97.7 °F (36.5 °C)] 97.7 °F (36.5 °C)  Pulse:  [] 94  Resp:  [12-38] 28  SpO2:  [93 %-100 %] 99 %  BP: ()/(52-79) 116/79  Arterial Line BP: ()/(50-78) 106/56     Body mass index is 30.11 kg/m².      Date 02/03/18 0700 - 02/04/18 0659   Shift 5948-8485 1706-3771 8445-4330 24 Hour Total   I  N  T  A  K  E   I.V.  (mL/kg) 250.6  (2.6)   250.6  (2.6)    IV Piggyback 150   150    Shift Total  (mL/kg) 400.6  (4.2)   400.6  (4.2)   O  U  T  P  U  T   Urine  (mL/kg/hr) 115  (0.2)   115    Shift Total  (mL/kg) 115  (1.2)   115  (1.2)   Weight (kg) 95.2 95.2 95.2 95.2          Drains     Drain                 Ureteral Drain/Stent 03/04/16 Right ureter 6 Fr. 701 days         Urethral Catheter 02/02/18 1545 less than 1 day                Physical Exam   Constitutional: He appears ill. No distress.   Cardiovascular:  Tachycardia present.    Pulmonary/Chest: Accessory muscle usage present. No respiratory distress.   Abdominal: Soft.  He exhibits distension. There is no tenderness. There is no rebound.   Genitourinary: Testes normal and penis normal. Right testis shows no mass, no swelling and no tenderness. Left testis shows no mass, no swelling and no tenderness. Circumcised.   Genitourinary Comments: Gallo draining clear yellow urine    Neurological: He is alert.   Skin: Skin is warm and dry. He is not diaphoretic.     Psychiatric: He has a normal mood and affect. His behavior is normal.       Significant Labs:    BMP:    Recent Labs  Lab 02/02/18  2132 02/03/18  0214 02/03/18  1355   * 135* 137   K 5.6* 5.3* 5.1    107 106   CO2 11* 16* 19*   BUN 62* 62* 67*   CREATININE 2.9* 3.1* 3.4*   CALCIUM 8.4* 8.4* 8.3*       CBC:    Recent Labs  Lab 02/02/18  0518 02/02/18  1634 02/02/18  1641 02/03/18  0214   WBC 7.57  --  18.88* 24.12*   HGB 8.0*  --  9.0* 9.7*   HCT 26.5* 31* 29.9* 31.0*     --  65* SEE COMMENT       All pertinent labs results from the past 24 hours have been reviewed.    Significant Imaging:  Retroperitoneal US:  Severe right hydronephrosis with stent in place in renal pelvis.  Hydronephrosis appears similar in severity to most recent CT A/p.  Left kidney without evidence of hydronephrosis.  No solid renal masses.  Bladder decompressed around gallo.                     Assessment and Plan:     Hydronephrosis    67 YOM with metastatic rectal cancer s/p who underwent neoadjuvant chemo and XRT with right ureteral stricture with right hydronephrosis likely 2/2 XRT managed with chronic ureteral stent exchanges every 3 months, most recently one week ago, now admitted with A flutter, RE with oliguria and shock requiring pressors, presumed to be due to UTI.     Hydronephrosis appears similar in degree to previous examination, and stent was just recently replaced.  No CVAT on examination.  RE likely with pre-renal component.  The right ureteral stent appears to be functioning well.      - maintain gallo catheter until  clinically improved  - continue broad spectrum IV Abx  - f/u UCx and gram stain and treat as indicated   - recommend IV fluid resuscitation to the extent permissible by patient's other medical comorbidities  - agree with nephrology consult   - trend serum creatinine  - should patient clinically decompensate, would recommend right nephrostomy tube insertion by IR             VTE Risk Mitigation         Ordered     High Risk of VTE  Once      02/01/18 1231     Medium Risk of VTE  Once      02/01/18 1231     Place sequential compression device  Until discontinued      02/01/18 1231     Reason for No Pharmacological VTE Prophylaxis  Once      02/01/18 1231            Flavio Johnson MD  Urology  [facility]

## 2018-02-03 NOTE — HOSPITAL COURSE
2/2 transferred to CCU, started on levophed and vasopressin, oliguric and w/ anasarca, IVC consistent w/ volume overload, started on lasix drip, lactate 5, UA positive, leukocytosis, started on vanc/ zosyn  2/3 urology consulted for severe R hydronephrosis, UOP improving  2/4 weaning off levophed, starting heparin drip for anticoagulation  2/5 Weaned off and stopped pressors. Started 12.5 metoprolol with hold parameters. De-escalated zosyn to rocephin.  2/6 d/c lasix, holding duresis, pt still w/ good urine output. Patient developed L sided pain this AM in mid-axillary area, sharp, non-radiating, pleuritic, reproducible to palpation. CXR demonstrated R-sided pleural fluid, no evidence of pneumonia. Abdomen also noted to be more distended, bedside US performed demonstrated ascites. Dedicated US ordered. Plan to coordinate w/ EP for ablation for Aflutter and IR for paracentesis. Steadily increasing tachycardia noted throughout the day possibly 2/2 pain vs intravascular volume depletion. Lopressor increased and fluid boluses given (250cc x 1 and 500cc x 1).  2/7 Pain better controlled today, -130s,

## 2018-02-03 NOTE — ASSESSMENT & PLAN NOTE
-shortness of breath and significant BLE edema >>> remains despite therapies  -RE on admission, states baseline SCr usually 1.6, monitor with diuresis   -continue IVP lasix diuresis for now  -net negative ~2 liters overnight   -strict I&Os and daily weights  -cardiac diet with fluid restriction in house  -2D echo w/ EF 40%, moderate MR, moderate TR, pulmHTN, and RAP 8  -IR consulted for paracentesis, performed today   -hypotension post, unresponsive to bedside intervention >>> transferring to CCU for advanced therapies

## 2018-02-03 NOTE — ASSESSMENT & PLAN NOTE
Mr. Medrano is a 67 y.o. Man with metastatic rectal cancer Dx 2010, s/p resection, then with pulmonary mets 2013 on FOLFIRI chemotherapy, recurrent ascites s/p regular paracentesis, ureteral obstructive s/p urostomy tubes, and recently diagnosed atrial flutter with variable block, DM - who presents as a transfer from clinic with shortness of breath and tachycardia.     Rate controlled currently despite being on levophed. Wean as tolerated. Can start back AV armond agents if/when he improves.

## 2018-02-03 NOTE — SUBJECTIVE & OBJECTIVE
Interval History: Afebrile overnight. Yesterday transferred to CCU, started on levophed and vasopressin, oliguric and w/ anasarca, IVC consistent w/ volume overload, started on lasix drip, lactate 5 which downtrended to 1.1, UA positive, leukocytosis, started on vanc/ zosyn.     ROS  Objective:     Vital Signs (Most Recent):  Temp: 97.5 °F (36.4 °C) (02/03/18 0300)  Pulse: 101 (02/03/18 1000)  Resp: 18 (02/03/18 1000)  BP: 116/79 (02/03/18 0859)  SpO2: 100 % (02/03/18 1000) Vital Signs (24h Range):  Temp:  [96.8 °F (36 °C)-97.5 °F (36.4 °C)] 97.5 °F (36.4 °C)  Pulse:  [] 101  Resp:  [12-38] 18  SpO2:  [93 %-100 %] 100 %  BP: ()/(33-79) 116/79  Arterial Line BP: ()/(50-78) 118/64     Weight: 95.2 kg (209 lb 14.1 oz)  Body mass index is 30.11 kg/m².     SpO2: 100 %  O2 Device (Oxygen Therapy): BiPAP      Intake/Output Summary (Last 24 hours) at 02/03/18 1032  Last data filed at 02/03/18 1000   Gross per 24 hour   Intake          2201.77 ml   Output              290 ml   Net          1911.77 ml       Lines/Drains/Airways     Central Venous Catheter Line                 Port A Cath Single Lumen -- days         Port A Cath Single Lumen 02/01/18 1005 other (see comments) 2 days          Drain                 Ureteral Drain/Stent 03/04/16 Right ureter 6 Fr. 701 days         Urethral Catheter 02/02/18 1545 less than 1 day          Arterial Line                 Arterial Line 02/02/18 1637 Right Femoral less than 1 day                Physical Exam   Constitutional: He is oriented to person, place, and time.   HENT:   Head: Normocephalic and atraumatic.   Eyes: Conjunctivae are normal. No scleral icterus.   Pulmonary/Chest: No respiratory distress. He has no wheezes. He has rales.   Abdominal: Soft. He exhibits distension. There is no tenderness. There is no rebound and no guarding.   abd hernia   Musculoskeletal: He exhibits edema (+3 b/l LE edema).   Neurological: He is alert and oriented to person, place,  and time.   Skin: Skin is warm. No rash noted.       Significant Labs:   ABG:   Recent Labs  Lab 02/02/18  1820 02/02/18  2312 02/03/18  0756   PH 7.384 7.222* 7.243*   PCO2 31.8* 34.6* 43.9   HCO3 19.0* 14.2* 18.9*   POCSATURATED 100 99 96   BE -6 -13 -8   , Blood Culture: No results for input(s): LABBLOO in the last 48 hours., CMP   Recent Labs  Lab 02/02/18  0518 02/02/18  1641 02/02/18  2132 02/03/18  0214    134* 135* 135*   K 4.6 5.0 5.6* 5.3*    106 108 107   CO2 18* 12* 11* 16*   * 347* 267* 256*   BUN 57* 56* 62* 62*   CREATININE 2.2* 2.8* 2.9* 3.1*   CALCIUM 8.7 8.1* 8.4* 8.4*   PROT 6.0 6.1  --  6.3   ALBUMIN 2.1* 2.9*  --  3.0*   BILITOT 0.4 1.1*  --  1.1*   ALKPHOS 364* 681*  --  668*   AST 14 245*  --  313*   ALT 9* 83*  --  168*   ANIONGAP 12 16 16 12   ESTGFRAFRICA 34.1* 25.5* 24.4* 22.5*   EGFRNONAA 29.5* 22.0* 21.1* 19.5*   , CBC   Recent Labs  Lab 02/02/18  0518  02/02/18  1641 02/03/18  0214   WBC 7.57  --  18.88* 24.12*   HGB 8.0*  --  9.0* 9.7*   HCT 26.5*  < > 29.9* 31.0*     --  65* SEE COMMENT   < > = values in this interval not displayed. and Troponin   Recent Labs  Lab 02/02/18  1641   TROPONINI <0.006       Significant Imaging: X-Ray: CXR: X-Ray Chest 1 View (CXR):   Results for orders placed or performed during the hospital encounter of 02/01/18   X-Ray Chest 1 View    Narrative    AP CHEST (1 View):      Comparison: 1/16/18 and 2/1/18    Findings:     Pulmonary masses not as well-visualized.No interval infiltrates. The cardiac silhouette is prominent. The pulmonary vasculature is mildly engorged. There is no pleural effusion or pneumothorax. The hilar and mediastinal contours are unremarkable. There are no acute bony abnormalities. Left-sided central venous catheter with tip at level of superior vena cava. Atherosclerotic change within the aorta.    Impression    No interval detrimental change.      Electronically signed by: Dr. Luciano Cool MD  Date:      02/03/18  Time:    09:21

## 2018-02-03 NOTE — ASSESSMENT & PLAN NOTE
Per hem/onc notes pt note tolerating chemo and now chemo held 2/2 debility and functional status, prognosis guarded  Per wife pt wishes to be full code and wants everything to be done, however, does not want central line or dialysis

## 2018-02-03 NOTE — PROGRESS NOTES
[facility] Hospital Medicine  Progress Note-- SIGNIFICANT EVENT DOCUMENATION    Patient Name: Karthik Medrano  MRN: [id number]  Patient Class: IP- Inpatient   Admission Date: 2/1/2018  Length of Stay: 1 days  Attending Physician: Sandoval Martini MD  Primary Care Provider: Pinky Thornton MD    Hospital Medicine Team: [facility] HOSP MED J Rody Schmitz NP    Subjective:     Principal Problem:Atrial flutter with rapid ventricular response    HPI:  Mr. Medrano is a 66 yo male with medical history of metastatic rectal cancer Dx 2010, s/p resection, then with pulmonary mets 2013 on FOLFIRI chemotherapy, recurrent ascites s/p regular paracentesis, ureteral obstructive s/p urostomy tubes, recently diagnosed atrial flutter with variable block, DMHTN, diabetes, and CKD III who presented to the Emergency Department  from Heme/Onc office for elevated HR and symptoms of volume overload. He reports since a procedure to adjust urostomy tubes a week ago he has had worsening shortness of breath, abdominal distention, and lower extremity edema.  Lower extremity edema has been intermittent in the past for which he takes lasix PRN and has taken daily for the past week without resolution.  He gets periodic paracentesis as well for malignant ascites and is due for a tap today. In regards to his atrial flutter - diagnosed two weeks ago during admission for tachycardia, treated with resumption of metoprolol tartrate 100 mg BID which he was on previously.  No discussion per patient at that time of anti-coagulation. Pt denies CP, palpitations, orthopnea, PND, cough, fevers, abdominal pain, N/V/changes in BM. Also reports of weakness and decreased PO intake. His last chemo treatment was 12/27 and has not  had any further chemo since then due to decline in performance status.     In the ED CBC remarkable for anemia and clumping of platelets, chemistry remarkable for RE/metabolic acidosis, low total protein and  "low albumin; troponin negative, BNP ~850, CXR unremarkable for pleural effusions or edema. The patient was admitted to the Hospital Medicine Service for further evaluation and management.       Hospital Course:  Mr. Medrano was admitted 2/1 due to heart failure exacerbation and recurrent ascites in need of aggressive diuresis/paracentesis from the Heme/Onc clinic. He was initiated on lasix IVP boluses in the ED with ~2 liter response, will continue. Evaluated by EP suggested transitioning to PO verapamil and off cardizem gtt for rate control, he was transitioned to PO recommendations as rates had improved.     He is now s/p ~5 liter paracentesis 2/2. Upon return to room for IR procedure he was noted to be hypotensive with anxiety, dizziness, and light headiness. He was given 200 mL albumin for fluid resuscitation, PO midodrine, and alprazolam. BPs continued to trend down despite interventions, last check 58/doppler by primary RN check. Mr. Medrano reported feeling very bad. CCU team notified and accepted transfer. Levophed gtt orders initiated. Verbal report to RK Marcum as accepting provider. CCU team to assume care at this time.     Disposition plans: to be updated once treatment plan further develops    Interval History: has just returned from para, BPs low, feels dizzy, light headed, and "just miserable", he endorses shortness of breath, he denies chest pain, palpitations, or shortness of breath. No response to bedside interventions, will be transferred to CCU for advanced therapies.     Review of Systems   Constitutional: Positive for activity change, fatigue and unexpected weight change. Negative for appetite change, chills, diaphoresis and fever.   HENT: Negative for congestion, sinus pain, sinus pressure, sneezing and sore throat.    Respiratory: Positive for shortness of breath. Negative for apnea, cough and wheezing.    Cardiovascular: Positive for leg swelling. Negative for chest pain and palpitations. "   Gastrointestinal: Positive for abdominal distention and abdominal pain. Negative for constipation, diarrhea, nausea and vomiting.   Genitourinary: Negative for decreased urine volume, difficulty urinating, penile swelling, scrotal swelling and urgency.   Musculoskeletal: Positive for arthralgias (right shoulder pain) and gait problem. Negative for myalgias.   Skin: Negative.    Neurological: Positive for dizziness, weakness and light-headedness. Negative for syncope and headaches.     Objective:     Vital Signs (Most Recent):  Temp: 96.8 °F (36 °C) (02/02/18 1207)  Pulse: 70 (02/02/18 1808)  Resp: 20 (02/02/18 1808)  BP: 104/60 (02/02/18 1645)  SpO2: 96 % (02/02/18 1808) Vital Signs (24h Range):  Temp:  [95.4 °F (35.2 °C)-98.2 °F (36.8 °C)] 96.8 °F (36 °C)  Pulse:  [] 70  Resp:  [18-38] 20  SpO2:  [93 %-100 %] 96 %  BP: ()/(33-75) 104/60  Arterial Line BP: ()/(50-78) 145/64     Weight: 98.3 kg (216 lb 11.4 oz)  Body mass index is 31.09 kg/m².    Physical Exam   Constitutional: He is oriented to person, place, and time. He appears well-developed and well-nourished. No distress.   HENT:   Head: Normocephalic and atraumatic.   Mouth/Throat: Mucous membranes are normal. Normal dentition.   Eyes: Conjunctivae and lids are normal. Pupils are equal, round, and reactive to light. No scleral icterus.   Neck: Normal range of motion. Neck supple. No JVD present.   Cardiovascular: Normal heart sounds and intact distal pulses.  An irregularly irregular rhythm present. Exam reveals no gallop and no friction rub.    No murmur heard.  Pulmonary/Chest: Effort normal and breath sounds normal. No respiratory distress. He exhibits no tenderness.   Abdominal: Soft. Bowel sounds are normal. He exhibits distension (improved post para). There is no tenderness.   Musculoskeletal: Normal range of motion. He exhibits edema (3+ pitting edema mid thighs to feet). He exhibits no deformity.   Neurological: He is alert and  oriented to person, place, and time. No cranial nerve deficit.   Skin: Skin is dry. Capillary refill takes 2 to 3 seconds. No rash noted. He is not diaphoretic. No erythema.   BLE cool   Psychiatric: He has a normal mood and affect. Judgment and thought content normal.   Nursing note and vitals reviewed.        CRANIAL NERVES     CN III, IV, VI   Pupils are equal, round, and reactive to light.    Significant Labs:   CBC:     Recent Labs  Lab 02/01/18  1010 02/01/18  1428 02/02/18  0518 02/02/18  1634 02/02/18  1641   WBC 6.97  --  7.57  --  18.88*   HGB 7.8*  --  8.0*  --  9.0*   HCT 25.3*  --  26.5* 31* 29.9*   PLT SEE COMMENT 180 161  --  65*     CMP:     Recent Labs  Lab 02/01/18  1010 02/02/18  0518 02/02/18  1641    139 134*   K 4.2 4.6 5.0   * 109 106   CO2 20* 18* 12*   * 128* 347*   BUN 50* 57* 56*   CREATININE 2.2* 2.2* 2.8*   CALCIUM 8.2* 8.7 8.1*   PROT 5.9* 6.0 6.1   ALBUMIN 2.1* 2.1* 2.9*   BILITOT 0.3 0.4 1.1*   ALKPHOS 419* 364* 681*   AST 17 14 245*   ALT 13 9* 83*   ANIONGAP 9 12 16   EGFRNONAA 29.5* 29.5* 22.0*     Cardiac Markers:     Recent Labs  Lab 02/02/18  1641   *     Magnesium:     Recent Labs  Lab 02/01/18  1428 02/02/18  0518 02/02/18  1641   MG 2.0 1.8 2.1     Troponin:     Recent Labs  Lab 02/01/18  1010 02/02/18  1641   TROPONINI <0.006 <0.006     All pertinent labs within the past 24 hours have been reviewed.    Significant Imaging: I have reviewed all pertinent imaging results/findings within the past 24 hours.    Assessment/Plan:      * Atrial flutter with rapid ventricular response    -a flutter with 2:1 conduction on admission EKG  -CBC remarkable for anemia and clumping of platelets  -chemistry remarkable for RE/metabolic acidosis, low total protein and low albumin; troponin negative, BNP ~850 -CXR unremarkable for pleural effusions or edema  -initial rate control attempted with IVP metoprolol however unsuccessful >>> transitioned to cardizem gtt with  bolus, now to PO verapamil per EP recommendations    -all rate and BP therapies on hold due to hypotension and requirement of pressor support   -transferring to CCU for advanced therapies   -EP evaluated, appreciate assistance   -no OAC due to anemic state with need for frequent paracentesis and ureter stent exchances  -continue tele monitoring  -continue diuresis, see below for details  -maintain electrolytes WNL  -outpt follow up with EP at time of discharge        Acute congestive heart failure    -shortness of breath and significant BLE edema >>> remains despite therapies  -RE on admission, states baseline SCr usually 1.6, monitor with diuresis   -continue IVP lasix diuresis for now  -net negative ~2 liters overnight   -strict I&Os and daily weights  -cardiac diet with fluid restriction in house  -2D echo w/ EF 40%, moderate MR, moderate TR, pulmHTN, and RAP 8  -IR consulted for paracentesis, performed today   -hypotension post, unresponsive to bedside intervention >>> transferring to CCU for advanced therapies        Renovascular hypertension    -see above         Type 2 diabetes mellitus with stage 3 chronic kidney disease, without long-term current use of insulin    -hold home regimen  -recent HgA1C 5.5  -low dose SSI in house  -AC/HS checks         CKD (chronic kidney disease), stage III    -baseline SCr 1.6 >>> admission 2.2  -monitor with diuresis and now significant hypotension post procedure         Acute renal failure superimposed on stage 3 chronic kidney disease    -see below         Rectal cancer    -follows closely with Heme/Onc, last chemo 12/27  -no acute complaints at this time, consult heme/onc in house PRN  -outpt follow up with them         Chemotherapy induced neutropenia    -WBC stable currently, monitor         Anemia in neoplastic disease    -baseline ranges 7.1-10, asymptomatic currently   -monitor         Ascites    -see above, s/p ~ 5 liter para today >>> significant hypotension,  transferring to CCU   -INR WNL, no OAC as above          VTE Risk Mitigation         Ordered     High Risk of VTE  Once      02/01/18 1231     Medium Risk of VTE  Once      02/01/18 1231     Place sequential compression device  Until discontinued      02/01/18 1231     Reason for No Pharmacological VTE Prophylaxis  Once      02/01/18 1231          Rody Schmitz DNP, AG-ACNP, BC  Department of Hospital Medicine  [facility]  Pager [phone]  [facility] [id number]

## 2018-02-03 NOTE — ASSESSMENT & PLAN NOTE
Currently oliguric  Michael placed  Monitor urine output  Nephology consulted, no need for urgent dialysis at this time, family declining dialysis

## 2018-02-03 NOTE — PLAN OF CARE
Problem: Patient Care Overview  Goal: Plan of Care Review  Outcome: Ongoing (interventions implemented as appropriate)  No acute events throughout night, VS and assessment per flow sheet, patient progressing towards goals as tolerated, plan of care reviewed with Karthik Medrano and family, all concerns addressed.  Sodium Bicarb, Levophed, Vasopressin, Lasix gtt's infusing.  IV antibx.  Michael in place.  MD aware of pt little UOP; MD also discussed GOC with wife.  Will continue to monitor.     CM-ICU  ABCDEF Early Mobility Inclusion Criteria Pass / Fail   M  Myocardial Stability  No dysrhythmia requiring new antidysrhythmic agent x 24 hours.   No evidence of active myocardial ischemia x 24 hours.   No femoral Impella, Tandem, or IABP. pass   O  Oxygenation  PEEP < 10 cm H2O.   FiO2 ? 60%.   SpO2 > 88%. pass   V  Vasopressors  No increase of any vasopressor x 2 hours.   No high dose vasopressors. fail   E  Engages to Voice  RASS > -3.   Responds to verbal stimulation. pass   Pass?  Passes all four criteria. fail

## 2018-02-03 NOTE — SUBJECTIVE & OBJECTIVE
"Interval History: has just returned from para, BPs low, feels dizzy, light headed, and "just miserable", he endorses shortness of breath, he denies chest pain, palpitations, or shortness of breath. No response to bedside interventions, will be transferred to CCU for advanced therapies.     Review of Systems   Constitutional: Positive for activity change, fatigue and unexpected weight change. Negative for appetite change, chills, diaphoresis and fever.   HENT: Negative for congestion, sinus pain, sinus pressure, sneezing and sore throat.    Respiratory: Positive for shortness of breath. Negative for apnea, cough and wheezing.    Cardiovascular: Positive for leg swelling. Negative for chest pain and palpitations.   Gastrointestinal: Positive for abdominal distention and abdominal pain. Negative for constipation, diarrhea, nausea and vomiting.   Genitourinary: Negative for decreased urine volume, difficulty urinating, penile swelling, scrotal swelling and urgency.   Musculoskeletal: Positive for arthralgias (right shoulder pain) and gait problem. Negative for myalgias.   Skin: Negative.    Neurological: Positive for dizziness, weakness and light-headedness. Negative for syncope and headaches.     Objective:     Vital Signs (Most Recent):  Temp: 96.8 °F (36 °C) (02/02/18 1207)  Pulse: 70 (02/02/18 1808)  Resp: 20 (02/02/18 1808)  BP: 104/60 (02/02/18 1645)  SpO2: 96 % (02/02/18 1808) Vital Signs (24h Range):  Temp:  [95.4 °F (35.2 °C)-98.2 °F (36.8 °C)] 96.8 °F (36 °C)  Pulse:  [] 70  Resp:  [18-38] 20  SpO2:  [93 %-100 %] 96 %  BP: ()/(33-75) 104/60  Arterial Line BP: ()/(50-78) 145/64     Weight: 98.3 kg (216 lb 11.4 oz)  Body mass index is 31.09 kg/m².    Physical Exam   Constitutional: He is oriented to person, place, and time. He appears well-developed and well-nourished. No distress.   HENT:   Head: Normocephalic and atraumatic.   Mouth/Throat: Mucous membranes are normal. Normal dentition. "   Eyes: Conjunctivae and lids are normal. Pupils are equal, round, and reactive to light. No scleral icterus.   Neck: Normal range of motion. Neck supple. No JVD present.   Cardiovascular: Normal heart sounds and intact distal pulses.  An irregularly irregular rhythm present. Exam reveals no gallop and no friction rub.    No murmur heard.  Pulmonary/Chest: Effort normal and breath sounds normal. No respiratory distress. He exhibits no tenderness.   Abdominal: Soft. Bowel sounds are normal. He exhibits distension (improved post para). There is no tenderness.   Musculoskeletal: Normal range of motion. He exhibits edema (3+ pitting edema mid thighs to feet). He exhibits no deformity.   Neurological: He is alert and oriented to person, place, and time. No cranial nerve deficit.   Skin: Skin is dry. Capillary refill takes 2 to 3 seconds. No rash noted. He is not diaphoretic. No erythema.   BLE cool   Psychiatric: He has a normal mood and affect. Judgment and thought content normal.   Nursing note and vitals reviewed.        CRANIAL NERVES     CN III, IV, VI   Pupils are equal, round, and reactive to light.    Significant Labs:   CBC:     Recent Labs  Lab 02/01/18  1010 02/01/18  1428 02/02/18  0518 02/02/18  1634 02/02/18  1641   WBC 6.97  --  7.57  --  18.88*   HGB 7.8*  --  8.0*  --  9.0*   HCT 25.3*  --  26.5* 31* 29.9*   PLT SEE COMMENT 180 161  --  65*     CMP:     Recent Labs  Lab 02/01/18  1010 02/02/18  0518 02/02/18  1641    139 134*   K 4.2 4.6 5.0   * 109 106   CO2 20* 18* 12*   * 128* 347*   BUN 50* 57* 56*   CREATININE 2.2* 2.2* 2.8*   CALCIUM 8.2* 8.7 8.1*   PROT 5.9* 6.0 6.1   ALBUMIN 2.1* 2.1* 2.9*   BILITOT 0.3 0.4 1.1*   ALKPHOS 419* 364* 681*   AST 17 14 245*   ALT 13 9* 83*   ANIONGAP 9 12 16   EGFRNONAA 29.5* 29.5* 22.0*     Cardiac Markers:     Recent Labs  Lab 02/02/18  1641   *     Magnesium:     Recent Labs  Lab 02/01/18  1428 02/02/18  0518 02/02/18  1641   MG 2.0 1.8  2.1     Troponin:     Recent Labs  Lab 02/01/18  1010 02/02/18  1641   TROPONINI <0.006 <0.006     All pertinent labs within the past 24 hours have been reviewed.    Significant Imaging: I have reviewed all pertinent imaging results/findings within the past 24 hours.

## 2018-02-03 NOTE — ASSESSMENT & PLAN NOTE
Currently oliguric  Michael placed  Monitor urine output  May need to consult nephrology for dialysis

## 2018-02-03 NOTE — SUBJECTIVE & OBJECTIVE
Interval History: Patient still with sig hypotension on levophed and not urinating. AFL is controlled.     Review of Systems   All other systems reviewed and are negative.    Objective:     Vital Signs (Most Recent):  Temp: 97.5 °F (36.4 °C) (02/03/18 0300)  Pulse: 98 (02/03/18 0700)  Resp: 16 (02/03/18 0700)  BP: 104/60 (02/02/18 1645)  SpO2: 100 % (02/03/18 0700) Vital Signs (24h Range):  Temp:  [96.8 °F (36 °C)-97.5 °F (36.4 °C)] 97.5 °F (36.4 °C)  Pulse:  [] 98  Resp:  [12-38] 16  SpO2:  [93 %-100 %] 100 %  BP: ()/(33-68) 104/60  Arterial Line BP: ()/(50-78) 90/52     Weight: 95.2 kg (209 lb 14.1 oz)  Body mass index is 30.11 kg/m².     SpO2: 100 %  O2 Device (Oxygen Therapy): nasal cannula    Physical Exam  GEN: Alert and oriented in NAD  NECK: + JVD appreciated   CVS: RRR, s1/s2, no MRG  PULM: CTAB no rales  ABD: NT/ND BS +  Extremities: warm and dry, palpable pulses, 3+ edema  NEURO: Alert and oriented x 3  PSYCH: appropriate affect.         Significant Labs:   Recent Lab Results       02/03/18  0756 02/03/18  0611 02/03/18  0607 02/03/18  0214 02/03/18  0201      Immature Granulocytes    CANCELED  Comment:  Result canceled by the ancillary      Immature Grans (Abs)    CANCELED  Comment:  Result canceled by the ancillary      Albumin    3.0(L)      Alkaline Phosphatase    668(H)      Allens Test N/A         ALT    168(H)      Anion Gap    12      Aniso    Moderate      Appearance, UA    Cloudy(A)      aPTT          AST    313(H)      Bacteria, UA    Many(A)      Baso #          Basophilic Stippling    Occasional      Basophil%    0.0      Bilirubin (UA)    Negative      Total Bilirubin    1.1  Comment:  For infants and newborns, interpretation of results should be based  on gestational age, weight and in agreement with clinical  observations.  Premature Infant recommended reference ranges:  Up to 24 hours.............<8.0 mg/dL  Up to 48 hours............<12.0 mg/dL  3-5  days..................<15.0 mg/dL  6-29 days.................<15.0 mg/dL  (H)      BNP          Site RF         BUN, Bld    62(H)      [place] Cells    Occasional      Calcium    8.4(L)      Chloride    107      CO2    16(L)      Color, UA    Yellow      Creatinine    3.1(H)      DelSys NRB         Differential Method    Automated      eGFR if     22.5(A)      eGFR if non     19.5  Comment:  Calculation used to obtain the estimated glomerular filtration  rate (eGFR) is the CKD-EPI equation.   (A)      Eos #          Eosinophil%    0.0      EP          FiO2          Flow          Glucose    256(H)      Glucose, UA    1+(A)      Gran # (ANC)          Gran%    88.0(H)      Hematocrit    31.0(L)      Hemoglobin    9.7(L)      Hyaline Casts, UA    23(A)      Hypo    Occasional      Coumadin Monitoring INR          IP          Ketones, UA    Negative      Lactate, Dewey   1.1  Comment:  Falsely low lactic acid results can be found in samples   containing >=13.0 mg/dL total bilirubin and/or >=3.5 mg/dL   direct bilirubin.   1.4  Comment:  Falsely low lactic acid results can be found in samples   containing >=13.0 mg/dL total bilirubin and/or >=3.5 mg/dL   direct bilirubin.        Leukocytes, UA    3+(A)      Lymph #          Lymph%    6.0(L)      Magnesium    2.1      MCH    27.8      MCHC    31.3(L)      MCV    89      Microscopic Comment    SEE COMMENT  Comment:  Other formed elements not mentioned in the report are not   present in the microscopic examination.         Min Vol          Mode          Mono #          Mono%    6.0      MPV    10.8      Nitrite, UA    Negative      nRBC    0      Occult Blood UA    1+(A)      Ovalocytes    Occasional      pH, UA    5.0      Phosphorus    7.0(H)      Platelet Estimate    Clumped(A)      Platelets    SEE COMMENT  Comment:  Result not available.  EDTA causes platelet clumps that will not alloe plt ct to be resulyed   /ordering PLT CT onBlue top         POC BE -8         POC HCO3 18.9(L)         POC Hematocrit          POC Ionized Calcium          POC PCO2 43.9         POC PH 7.243(LL)         POC PO2 100         POC Potassium          POC SATURATED O2 96         POC Sodium          POC TCO2 20(L)         POCT Glucose  230(H)   246(H)     Poik    Slight      Poly    Occasional      Potassium    5.3  Comment:  *No Visible Hemolysis(H)      Total Protein    6.3      Protein, UA    3+  Comment:  Recommend a 24 hour urine protein or a urine   protein/creatinine ratio if globulin induced proteinuria is  clinically suspected.  (A)      Protime          Rate          RBC    3.49(L)      RBC, UA    11(H)      RDW    20.0(H)      Sample ARTERIAL         Schistocytes    Present      Sodium    135(L)      Sp02          Specific [place], UA    1.015      Specimen UA    Urine, Catheterized      Spont Rate          Squam Epithel, UA    1      Troponin I          Urobilinogen, UA    Negative      Vancomycin, Random    20.3      WBC Clumps, UA    Many(A)      WBC, UA    >100(H)      WBC    24.12(H)                  02/02/18  2312 02/02/18  2132 02/02/18  1820 02/02/18  1651 02/02/18  1641      Immature Granulocytes     1.5(H)     Immature Grans (Abs)     0.29(H)     Albumin     2.9(L)     Alkaline Phosphatase     681(H)     Allens Test N/A  N/A       ALT     83(H)     Anion Gap  16   16     Aniso          Appearance, UA          aPTT    23.5  Comment:  aPTT therapeutic range = 39-69 seconds      AST     245(H)     Bacteria, UA          Baso #     0.14     Basophilic Stippling          Basophil%     0.7     Bilirubin (UA)          Total Bilirubin     1.1  Comment:  For infants and newborns, interpretation of results should be based  on gestational age, weight and in agreement with clinical  observations.  Premature Infant recommended reference ranges:  Up to 24 hours.............<8.0 mg/dL  Up to 48 hours............<12.0 mg/dL  3-5 days..................<15.0 mg/dL  6-29  days.................<15.0 mg/dL  (H)     BNP     790  Comment:  Values of less than 100 pg/ml are consistent with non-CHF populations.(H)     Site Kristopher/UAC  Kristopher/UAC       BUN, Bld  62(H)   56(H)     [place] Cells          Calcium  8.4(L)   8.1(L)     Chloride  108   106     CO2  11(L)   12(L)     Color, UA          Creatinine  2.9(H)   2.8(H)     DelSys Nasal Can  CPAP/BiPAP       Differential Method     Automated     eGFR if   24.4(A)   25.5(A)     eGFR if non   21.1  Comment:  Calculation used to obtain the estimated glomerular filtration  rate (eGFR) is the CKD-EPI equation.   (A)   22.0  Comment:  Calculation used to obtain the estimated glomerular filtration  rate (eGFR) is the CKD-EPI equation.   (A)     Eos #     0.3     Eosinophil%     1.4     EP   5       FiO2   100       Flow          Glucose  267(H)   347(H)     Glucose, UA          Gran # (ANC)     10.3(H)     Gran%     54.4     Hematocrit     29.9(L)     Hemoglobin     9.0(L)     Hyaline Casts, UA          Hypo          Coumadin Monitoring INR    1.1  Comment:  Coumadin Therapy:  2.0 - 3.0 for INR for all indicators except mechanical heart valves  and antiphospholipid syndromes which should use 2.5 - 3.5.        IP   15       Ketones, UA          Lactate, Dewey  3.5  Comment:  Falsely low lactic acid results can be found in samples   containing >=13.0 mg/dL total bilirubin and/or >=3.5 mg/dL   direct bilirubin.  *Critical value -   Results called to and read back by: Deedee Akhtar.  ()        Leukocytes, UA          Lymph #     6.7(H)     Lymph%     35.4     Magnesium  2.1   2.1     MCH     28.0     MCHC     30.1(L)     MCV     93     Microscopic Comment          Min Vol   13.5       Mode   BiPAP       Mono #     1.3(H)     Mono%     6.6     MPV     10.0     Nitrite, UA          nRBC     0     Occult Blood UA          Ovalocytes          pH, UA          Phosphorus     6.6(H)     Platelet Estimate          Platelets      65(L)     POC BE -13  -6       POC HCO3 14.2(L)  19.0(L)       POC Hematocrit          POC Ionized Calcium          POC PCO2 34.6(L)  31.8(L)       POC PH 7.222(LL)  7.384       POC PO2 149(H)  290(H)       POC Potassium          POC SATURATED O2 99  100       POC Sodium          POC TCO2 15(L)  20(L)       POCT Glucose          Poik          Poly          Potassium  5.6  Comment:  *No Visible Hemolysis(H)   5.0     Total Protein     6.1     Protein, UA          Protime    11.3      Rate   14       RBC     3.21(L)     RBC, UA          RDW     20.5(H)     Sample ARTERIAL  ARTERIAL       Schistocytes          Sodium  135(L)   134(L)     Sp02   100       Specific [place], UA          Specimen UA          Spont Rate   19       Squam Epithel, UA          Troponin I     <0.006  Comment:  The reference interval for Troponin I represents the 99th percentile   cutoff   for our facility and is consistent with 3rd generation assay   performance.       Urobilinogen, UA          Vancomycin, Random          WBC Clumps, UA          WBC, UA          WBC     18.88(H)                 02/02/18  1634 02/02/18  1628 02/02/18  1529 02/02/18  1236      Immature Granulocytes         Immature Grans (Abs)         Albumin         Alkaline Phosphatase         Allens Test N/A        ALT         Anion Gap         Aniso         Appearance, UA         aPTT         AST         Bacteria, UA         Baso #         Basophilic Stippling         Basophil%         Bilirubin (UA)         Total Bilirubin         BNP         Site Other        BUN, Bld         [place] Cells         Calcium         Chloride         CO2         Color, UA         Creatinine         DelSys NRB        Differential Method         eGFR if          eGFR if non          Eos #         Eosinophil%         EP         FiO2         Flow 15        Glucose         Glucose, UA         Gran # (ANC)         Gran%         Hematocrit         Hemoglobin          Hyaline Casts, UA         Hypo         Coumadin Monitoring INR         IP         Ketones, UA         Lactate, Dewey  5.0  Comment:  Falsely low lactic acid results can be found in samples   containing >=13.0 mg/dL total bilirubin and/or >=3.5 mg/dL   direct bilirubin.  *Critical value -   Results called to and read back by: Razia Soctt.  (HH)       Leukocytes, UA         Lymph #         Lymph%         Magnesium         MCH         MCHC         MCV         Microscopic Comment         Min Vol         Mode SPONT        Mono #         Mono%         MPV         Nitrite, UA         nRBC         Occult Blood UA         Ovalocytes         pH, UA         Phosphorus         Platelet Estimate         Platelets         POC BE -11        POC HCO3 17.4(L)        POC Hematocrit 31(L)        POC Ionized Calcium 1.20        POC PCO2 47.6(H)        POC PH 7.171(L)        POC PO2 40        POC Potassium 4.9        POC SATURATED O2 61(L)        POC Sodium 140        POC TCO2 19(L)        POCT Glucose   228(H) 177(H)     Poik         Poly         Potassium         Total Protein         Protein, UA         Protime         Rate         RBC         RBC, UA         RDW         Sample VENOUS        Schistocytes         Sodium         Sp02 96        Specific Gravity, UA         Specimen UA         Spont Rate         Squam Epithel, UA         Troponin I         Urobilinogen, UA         Vancomycin, Random         WBC Clumps, UA         WBC, UA         WBC               Significant Imaging: Tele with AFL with variable block and PVCs

## 2018-02-03 NOTE — ASSESSMENT & PLAN NOTE
-see above, s/p ~ 5 liter para today >>> significant hypotension, transferring to CCU   -INR WNL, no OAC as above

## 2018-02-03 NOTE — ASSESSMENT & PLAN NOTE
67 YOM with metastatic rectal cancer s/p who underwent neoadjuvant chemo and XRT with right ureteral stricture with right hydronephrosis likely 2/2 XRT managed with chronic ureteral stent exchanges every 3 months, most recently one week ago, now admitted with A flutter, RE with oliguria and shock requiring pressors, presumed to be due to UTI.     Hydronephrosis appears similar in degree to previous examination, and stent was just recently replaced.  No CVAT on examination.  RE likely pre-renal.  The right ureteral stent appears to be functioning well.      - maintain gallo catheter until clinically improved  - continue broad spectrum IV Abx  - recommend IV fluid resuscitation to the extent permissible by patient's other medical comorbidities  - f/u UCx and gram stain and treat as indicated   - trend serum creatinine  - should patient clinically decompensate, would recommend right nephrostomy tube insertion by IR

## 2018-02-03 NOTE — PROGRESS NOTES
[facility]  Cardiac Electrophysiology  Progress Note    Admission Date: 2/1/2018  Code Status: Full Code   Attending Physician: Sandoval Martini MD   Expected Discharge Date: 2/5/2018  Principal Problem:Atrial flutter with rapid ventricular response    Subjective:     Interval History: Patient still with sig hypotension on levophed and not urinating. AFL is controlled.     Review of Systems   All other systems reviewed and are negative.    Objective:     Vital Signs (Most Recent):  Temp: 97.5 °F (36.4 °C) (02/03/18 0300)  Pulse: 98 (02/03/18 0700)  Resp: 16 (02/03/18 0700)  BP: 104/60 (02/02/18 1645)  SpO2: 100 % (02/03/18 0700) Vital Signs (24h Range):  Temp:  [96.8 °F (36 °C)-97.5 °F (36.4 °C)] 97.5 °F (36.4 °C)  Pulse:  [] 98  Resp:  [12-38] 16  SpO2:  [93 %-100 %] 100 %  BP: ()/(33-68) 104/60  Arterial Line BP: ()/(50-78) 90/52     Weight: 95.2 kg (209 lb 14.1 oz)  Body mass index is 30.11 kg/m².     SpO2: 100 %  O2 Device (Oxygen Therapy): nasal cannula    Physical Exam  GEN: Alert and oriented in NAD  NECK: + JVD appreciated   CVS: RRR, s1/s2, no MRG  PULM: CTAB no rales  ABD: NT/ND BS +  Extremities: warm and dry, palpable pulses, 3+ edema  NEURO: Alert and oriented x 3  PSYCH: appropriate affect.         Significant Labs:   Recent Lab Results       02/03/18  0756 02/03/18  0611 02/03/18  0607 02/03/18  0214 02/03/18  0201      Immature Granulocytes    CANCELED  Comment:  Result canceled by the ancillary      Immature Grans (Abs)    CANCELED  Comment:  Result canceled by the ancillary      Albumin    3.0(L)      Alkaline Phosphatase    668(H)      Allens Test N/A         ALT    168(H)      Anion Gap    12      Aniso    Moderate      Appearance, UA    Cloudy(A)      aPTT          AST    313(H)      Bacteria, UA    Many(A)      Baso #          Basophilic Stippling    Occasional      Basophil%    0.0      Bilirubin (UA)    Negative      Total Bilirubin     1.1  Comment:  For infants and newborns, interpretation of results should be based  on gestational age, weight and in agreement with clinical  observations.  Premature Infant recommended reference ranges:  Up to 24 hours.............<8.0 mg/dL  Up to 48 hours............<12.0 mg/dL  3-5 days..................<15.0 mg/dL  6-29 days.................<15.0 mg/dL  (H)      BNP          Site RF         BUN, Bld    62(H)      Tolu Cells    Occasional      Calcium    8.4(L)      Chloride    107      CO2    16(L)      Color, UA    Yellow      Creatinine    3.1(H)      DelSys NRB         Differential Method    Automated      eGFR if     22.5(A)      eGFR if non     19.5  Comment:  Calculation used to obtain the estimated glomerular filtration  rate (eGFR) is the CKD-EPI equation.   (A)      Eos #          Eosinophil%    0.0      EP          FiO2          Flow          Glucose    256(H)      Glucose, UA    1+(A)      Gran # (ANC)          Gran%    88.0(H)      Hematocrit    31.0(L)      Hemoglobin    9.7(L)      Hyaline Casts, UA    23(A)      Hypo    Occasional      Coumadin Monitoring INR          IP          Ketones, UA    Negative      Lactate, Dewey   1.1  Comment:  Falsely low lactic acid results can be found in samples   containing >=13.0 mg/dL total bilirubin and/or >=3.5 mg/dL   direct bilirubin.   1.4  Comment:  Falsely low lactic acid results can be found in samples   containing >=13.0 mg/dL total bilirubin and/or >=3.5 mg/dL   direct bilirubin.        Leukocytes, UA    3+(A)      Lymph #          Lymph%    6.0(L)      Magnesium    2.1      MCH    27.8      MCHC    31.3(L)      MCV    89      Microscopic Comment    SEE COMMENT  Comment:  Other formed elements not mentioned in the report are not   present in the microscopic examination.         Min Vol          Mode          Mono #          Mono%    6.0      MPV    10.8      Nitrite, UA    Negative      nRBC    0      Occult Blood UA     1+(A)      Ovalocytes    Occasional      pH, UA    5.0      Phosphorus    7.0(H)      Platelet Estimate    Clumped(A)      Platelets    SEE COMMENT  Comment:  Result not available.  EDTA causes platelet clumps that will not alloe plt ct to be resulyed   /ordering PLT CT onBlue top        POC BE -8         POC HCO3 18.9(L)         POC Hematocrit          POC Ionized Calcium          POC PCO2 43.9         POC PH 7.243(LL)         POC PO2 100         POC Potassium          POC SATURATED O2 96         POC Sodium          POC TCO2 20(L)         POCT Glucose  230(H)   246(H)     Poik    Slight      Poly    Occasional      Potassium    5.3  Comment:  *No Visible Hemolysis(H)      Total Protein    6.3      Protein, UA    3+  Comment:  Recommend a 24 hour urine protein or a urine   protein/creatinine ratio if globulin induced proteinuria is  clinically suspected.  (A)      Protime          Rate          RBC    3.49(L)      RBC, UA    11(H)      RDW    20.0(H)      Sample ARTERIAL         Schistocytes    Present      Sodium    135(L)      Sp02          Specific [place], UA    1.015      Specimen UA    Urine, Catheterized      Spont Rate          Squam Epithel, UA    1      Troponin I          Urobilinogen, UA    Negative      Vancomycin, Random    20.3      WBC Clumps, UA    Many(A)      WBC, UA    >100(H)      WBC    24.12(H)                  02/02/18  2312 02/02/18  2132 02/02/18  1820 02/02/18  1651 02/02/18  1641      Immature Granulocytes     1.5(H)     Immature Grans (Abs)     0.29(H)     Albumin     2.9(L)     Alkaline Phosphatase     681(H)     Allens Test N/A  N/A       ALT     83(H)     Anion Gap  16   16     Aniso          Appearance, UA          aPTT    23.5  Comment:  aPTT therapeutic range = 39-69 seconds      AST     245(H)     Bacteria, UA          Baso #     0.14     Basophilic Stippling          Basophil%     0.7     Bilirubin (UA)          Total Bilirubin     1.1  Comment:  For infants and newborns,  interpretation of results should be based  on gestational age, weight and in agreement with clinical  observations.  Premature Infant recommended reference ranges:  Up to 24 hours.............<8.0 mg/dL  Up to 48 hours............<12.0 mg/dL  3-5 days..................<15.0 mg/dL  6-29 days.................<15.0 mg/dL  (H)     BNP     790  Comment:  Values of less than 100 pg/ml are consistent with non-CHF populations.(H)     Site Kristopher/UAC  Kristopher/UAC       BUN, Bld  62(H)   56(H)     Tolu Cells          Calcium  8.4(L)   8.1(L)     Chloride  108   106     CO2  11(L)   12(L)     Color, UA          Creatinine  2.9(H)   2.8(H)     DelSys Nasal Can  CPAP/BiPAP       Differential Method     Automated     eGFR if   24.4(A)   25.5(A)     eGFR if non   21.1  Comment:  Calculation used to obtain the estimated glomerular filtration  rate (eGFR) is the CKD-EPI equation.   (A)   22.0  Comment:  Calculation used to obtain the estimated glomerular filtration  rate (eGFR) is the CKD-EPI equation.   (A)     Eos #     0.3     Eosinophil%     1.4     EP   5       FiO2   100       Flow          Glucose  267(H)   347(H)     Glucose, UA          Gran # (ANC)     10.3(H)     Gran%     54.4     Hematocrit     29.9(L)     Hemoglobin     9.0(L)     Hyaline Casts, UA          Hypo          Coumadin Monitoring INR    1.1  Comment:  Coumadin Therapy:  2.0 - 3.0 for INR for all indicators except mechanical heart valves  and antiphospholipid syndromes which should use 2.5 - 3.5.        IP   15       Ketones, UA          Lactate, Dewey  3.5  Comment:  Falsely low lactic acid results can be found in samples   containing >=13.0 mg/dL total bilirubin and/or >=3.5 mg/dL   direct bilirubin.  *Critical value -   Results called to and read back by: Deedee Akhtar.  (HH)        Leukocytes, UA          Lymph #     6.7(H)     Lymph%     35.4     Magnesium  2.1   2.1     MCH     28.0     MCHC     30.1(L)     MCV     93      Microscopic Comment          Min Vol   13.5       Mode   BiPAP       Mono #     1.3(H)     Mono%     6.6     MPV     10.0     Nitrite, UA          nRBC     0     Occult Blood UA          Ovalocytes          pH, UA          Phosphorus     6.6(H)     Platelet Estimate          Platelets     65(L)     POC BE -13  -6       POC HCO3 14.2(L)  19.0(L)       POC Hematocrit          POC Ionized Calcium          POC PCO2 34.6(L)  31.8(L)       POC PH 7.222(LL)  7.384       POC PO2 149(H)  290(H)       POC Potassium          POC SATURATED O2 99  100       POC Sodium          POC TCO2 15(L)  20(L)       POCT Glucose          Poik          Poly          Potassium  5.6  Comment:  *No Visible Hemolysis(H)   5.0     Total Protein     6.1     Protein, UA          Protime    11.3      Rate   14       RBC     3.21(L)     RBC, UA          RDW     20.5(H)     Sample ARTERIAL  ARTERIAL       Schistocytes          Sodium  135(L)   134(L)     Sp02   100       Specific [place], UA          Specimen UA          Spont Rate   19       Squam Epithel, UA          Troponin I     <0.006  Comment:  The reference interval for Troponin I represents the 99th percentile   cutoff   for our facility and is consistent with 3rd generation assay   performance.       Urobilinogen, UA          Vancomycin, Random          WBC Clumps, UA          WBC, UA          WBC     18.88(H)                 02/02/18  1634 02/02/18  1628 02/02/18  1529 02/02/18  1236      Immature Granulocytes         Immature Grans (Abs)         Albumin         Alkaline Phosphatase         Allens Test N/A        ALT         Anion Gap         Aniso         Appearance, UA         aPTT         AST         Bacteria, UA         Baso #         Basophilic Stippling         Basophil%         Bilirubin (UA)         Total Bilirubin         BNP         Site Other        BUN, Bld         [place] Cells         Calcium         Chloride         CO2         Color, UA         Creatinine         DelSys NRB         Differential Method         eGFR if          eGFR if non          Eos #         Eosinophil%         EP         FiO2         Flow 15        Glucose         Glucose, UA         Gran # (ANC)         Gran%         Hematocrit         Hemoglobin         Hyaline Casts, UA         Hypo         Coumadin Monitoring INR         IP         Ketones, UA         Lactate, Dewey  5.0  Comment:  Falsely low lactic acid results can be found in samples   containing >=13.0 mg/dL total bilirubin and/or >=3.5 mg/dL   direct bilirubin.  *Critical value -   Results called to and read back by: Razia Scott.  ()       Leukocytes, UA         Lymph #         Lymph%         Magnesium         MCH         MCHC         MCV         Microscopic Comment         Min Vol         Mode SPONT        Mono #         Mono%         MPV         Nitrite, UA         nRBC         Occult Blood UA         Ovalocytes         pH, UA         Phosphorus         Platelet Estimate         Platelets         POC BE -11        POC HCO3 17.4(L)        POC Hematocrit 31(L)        POC Ionized Calcium 1.20        POC PCO2 47.6(H)        POC PH 7.171(L)        POC PO2 40        POC Potassium 4.9        POC SATURATED O2 61(L)        POC Sodium 140        POC TCO2 19(L)        POCT Glucose   228(H) 177(H)     Poik         Poly         Potassium         Total Protein         Protein, UA         Protime         Rate         RBC         RBC, UA         RDW         Sample VENOUS        Schistocytes         Sodium         Sp02 96        Specific Gravity, UA         Specimen UA         Spont Rate         Squam Epithel, UA         Troponin I         Urobilinogen, UA         Vancomycin, Random         WBC Clumps, UA         WBC, UA         WBC               Significant Imaging: Tele with AFL with variable block and PVCs    Assessment and Plan:     * Atrial flutter with rapid ventricular response    Mr. Medrano is a 67 y.o. Man with metastatic rectal  cancer Dx 2010, s/p resection, then with pulmonary mets 2013 on FOLFIRI chemotherapy, recurrent ascites s/p regular paracentesis, ureteral obstructive s/p urostomy tubes, and recently diagnosed atrial flutter with variable block, DM - who presents as a transfer from clinic with shortness of breath and tachycardia.     Rate controlled currently despite being on levophed. Wean as tolerated. Can start back AV armond agents if/when he improves.           Will sign off. Please call with further questions.     Leland Trujillo MD  Cardiac Electrophysiology  [facility]

## 2018-02-03 NOTE — HPI
67 YOM with metastatic rectal cancer s/p who underwent neoadjuvant chemo and XRT, surgical pathology [id number], who was found to have pulmonary nodules. He received further chemotherapy, but progressed on chemo.  He developed a right ureteral stricture with right hydronephrosis likely 2/2 XRT, and undergoes chronic ureteral stent exchanges every 3 months, most recently one week ago.  He had been taking amoxicillin for one week prior, and took cipro for one week following.  Pt now admitted from oncology clinic for a flutter, found to be in septic shock requiring pressor support with RE and low urine output.     Urology consulted for right hydronephrosis with stent in place.      Pt denies F/C.  Nausea at baseline.  No dysuria, hematuria, difficulty voiding.  He did note some mild right flank pain after the stent exchange, but he has experienced this before.  This has since resolved.

## 2018-02-03 NOTE — NURSING
Cont to wean pts levo gtt as tolerated. Pt resting with eyes closed, currently on bipap. Wife and son updated. Dr. Yang spoke with several times about no urine output since gallo was placed. Irrigated per verbal instructions to make sure gallo wasn't cloged. Irrigated with 20 ml, 20ml was returned and some mucus noted in the tubing. Orders were placed, waiting on meds from pharmacy.  Pt awakens easly and answeres questions appropriatly.

## 2018-02-03 NOTE — PROGRESS NOTES
Long and multiple discussions with wife Jolene Medrano-Patient with no urine output, repeat labs showed worsening renal function, worsening acidosis and hyperkalemia.Discussed emergent CRRT -but wife adamantly refusing.Explained the reasoning behind starting CRRT and possibility that it could be short term but she continues to refuse. She is ok with lasix drip. I Explained to her that lasix drip has not made him urinate and his urine output has not picked up. Discussed goals of care and resuscitation status-Would like him to be full code. Also explained that resuscitation can be futile without Hemodialysis.She will discuss with her family members.Will continue to monitor. Will try some bicarbonate to correct acidosis and kayexalate. Wife refusing central line also

## 2018-02-03 NOTE — CLINICAL REVIEW
Brent contacted, no UA or urine culture done at time of ureteral stenting. Will follow up cultures here and continue abx.

## 2018-02-03 NOTE — PROGRESS NOTES
[facility]  Cardiology  Progress Note    Patient Name: Karthik Medrano  MRN: [id number]  Admission Date: 2/1/2018  Hospital Length of Stay: 2 days  Code Status: Full Code   Attending Physician: Sandoval Martini MD   Primary Care Physician: Pinky Thornton MD  Expected Discharge Date: 2/5/2018  Principal Problem:Atrial flutter with rapid ventricular response    Subjective:     Hospital Course:   2/2 transferred to CCU, started on levophed and vasopressin, oliguric and w/ anasarca, IVC consistent w/ volume overload, started on lasix drip, lactate 5, UA positive, leukocytosis, started on vanc/ zosyn    Interval History: Afebrile overnight. Yesterday transferred to CCU, started on levophed and vasopressin, oliguric and w/ anasarca, IVC consistent w/ volume overload, started on lasix drip, lactate 5 which downtrended to 1.1, UA positive, leukocytosis, started on vanc/ zosyn.     ROS  Objective:     Vital Signs (Most Recent):  Temp: 97.5 °F (36.4 °C) (02/03/18 0300)  Pulse: 101 (02/03/18 1000)  Resp: 18 (02/03/18 1000)  BP: 116/79 (02/03/18 0859)  SpO2: 100 % (02/03/18 1000) Vital Signs (24h Range):  Temp:  [96.8 °F (36 °C)-97.5 °F (36.4 °C)] 97.5 °F (36.4 °C)  Pulse:  [] 101  Resp:  [12-38] 18  SpO2:  [93 %-100 %] 100 %  BP: ()/(33-79) 116/79  Arterial Line BP: ()/(50-78) 118/64     Weight: 95.2 kg (209 lb 14.1 oz)  Body mass index is 30.11 kg/m².     SpO2: 100 %  O2 Device (Oxygen Therapy): BiPAP      Intake/Output Summary (Last 24 hours) at 02/03/18 1032  Last data filed at 02/03/18 1000   Gross per 24 hour   Intake          2201.77 ml   Output              290 ml   Net          1911.77 ml       Lines/Drains/Airways     Central Venous Catheter Line                 Port A Cath Single Lumen -- days         Port A Cath Single Lumen 02/01/18 1005 other (see comments) 2 days          Drain                 Ureteral Drain/Stent 03/04/16 Right ureter 6 Fr. 701 days          Urethral Catheter 02/02/18 1545 less than 1 day          Arterial Line                 Arterial Line 02/02/18 1637 Right Femoral less than 1 day                Physical Exam   Constitutional: He is oriented to person, place, and time.   HENT:   Head: Normocephalic and atraumatic.   Eyes: Conjunctivae are normal. No scleral icterus.   Pulmonary/Chest: No respiratory distress. He has no wheezes. He has rales.   Abdominal: Soft. He exhibits distension. There is no tenderness. There is no rebound and no guarding.   abd hernia   Musculoskeletal: He exhibits edema (+3 b/l LE edema).   Neurological: He is alert and oriented to person, place, and time.   Skin: Skin is warm. No rash noted.       Significant Labs:   ABG:   Recent Labs  Lab 02/02/18  1820 02/02/18  2312 02/03/18  0756   PH 7.384 7.222* 7.243*   PCO2 31.8* 34.6* 43.9   HCO3 19.0* 14.2* 18.9*   POCSATURATED 100 99 96   BE -6 -13 -8   , Blood Culture: No results for input(s): LABBLOO in the last 48 hours., CMP   Recent Labs  Lab 02/02/18  0518 02/02/18  1641 02/02/18  2132 02/03/18  0214    134* 135* 135*   K 4.6 5.0 5.6* 5.3*    106 108 107   CO2 18* 12* 11* 16*   * 347* 267* 256*   BUN 57* 56* 62* 62*   CREATININE 2.2* 2.8* 2.9* 3.1*   CALCIUM 8.7 8.1* 8.4* 8.4*   PROT 6.0 6.1  --  6.3   ALBUMIN 2.1* 2.9*  --  3.0*   BILITOT 0.4 1.1*  --  1.1*   ALKPHOS 364* 681*  --  668*   AST 14 245*  --  313*   ALT 9* 83*  --  168*   ANIONGAP 12 16 16 12   ESTGFRAFRICA 34.1* 25.5* 24.4* 22.5*   EGFRNONAA 29.5* 22.0* 21.1* 19.5*   , CBC   Recent Labs  Lab 02/02/18  0518  02/02/18  1641 02/03/18  0214   WBC 7.57  --  18.88* 24.12*   HGB 8.0*  --  9.0* 9.7*   HCT 26.5*  < > 29.9* 31.0*     --  65* SEE COMMENT   < > = values in this interval not displayed. and Troponin   Recent Labs  Lab 02/02/18  1641   TROPONINI <0.006       Significant Imaging: X-Ray: CXR: X-Ray Chest 1 View (CXR):   Results for orders placed or performed during the hospital  encounter of 02/01/18   X-Ray Chest 1 View    Narrative    AP CHEST (1 View):      Comparison: 1/16/18 and 2/1/18    Findings:     Pulmonary masses not as well-visualized.No interval infiltrates. The cardiac silhouette is prominent. The pulmonary vasculature is mildly engorged. There is no pleural effusion or pneumothorax. The hilar and mediastinal contours are unremarkable. There are no acute bony abnormalities. Left-sided central venous catheter with tip at level of superior vena cava. Atherosclerotic change within the aorta.    Impression    No interval detrimental change.      Electronically signed by: Dr. Luciano Cool MD  Date:     02/03/18  Time:    09:21      Assessment and Plan:     Brief HPI:     * Atrial flutter with rapid ventricular response    Rates decreased to ~70s  Verapamil and toprol-xl d/c 2/2 hypotension        Anasarca    Likely 2/2 low albumin and CKD  C/w lasix for diuresis        Septic shock    Lactate decresed to 1.1, leukocytosis worsened today to 24 from 18  wean levophed and vasopressin as tolerated  c/w vanc and zosyn        Ascites    S/p recurrent paracenteses  S/p paracentesis w/ removal ~4.5L  Patient possibly hypotensive 2/2 third spacing s/p fluid removal and antihypertensives        Urinary tract infection without hematuria    UA positive  C/w vanc and zosyn  F/u urine cx        Acute renal failure superimposed on stage 3 chronic kidney disease    Currently oliguric  Michael placed  Monitor urine output  Nephology consulted, no need for urgent dialysis at this time, family declining dialysis        Type 2 diabetes mellitus with stage 3 chronic kidney disease, without long-term current use of insulin    accuchecks q 6 hours as pt NPO  aspart sliding scale        Rectal cancer    Per hem/onc notes pt note tolerating chemo and now chemo held 2/2 debility and functional status, prognosis guarded  Per wife pt wishes to be full code and wants everything to be done, however, does not  want central line or dialysis        Acute congestive heart failure    TTE 8/29/17 EF 60%, TTE 2/2/18 EF 40%  S/p lasix 80mg x 1  Started on lasix drip            VTE Risk Mitigation         Ordered     High Risk of VTE  Once      02/01/18 1231     Medium Risk of VTE  Once      02/01/18 1231     Place sequential compression device  Until discontinued      02/01/18 1231     Reason for No Pharmacological VTE Prophylaxis  Once      02/01/18 1231          Kiel Yang MD  Cardiology  [facility]

## 2018-02-03 NOTE — HOSPITAL COURSE
Mr. Medrano was admitted 2/1 due to heart failure exacerbation and recurrent ascites in need of aggressive diuresis/paracentesis from the Heme/Onc clinic. He was initiated on lasix IVP boluses in the ED with ~2 liter response, will continue. Evaluated by EP suggested transitioning to PO verapamil and off cardizem gtt for rate control, he was transitioned to PO recommendations as rates had improved.     He is now s/p ~5 liter paracentesis 2/2. Upon return to room for IR procedure he was noted to be hypotensive with anxiety, dizziness, and light headiness. He was given 200 mL albumin for fluid resuscitation, PO midodrine, and alprazolam. BPs continued to trend down despite interventions, last check 58/doppler by primary RN check. Mr. Medrano reported feeling very bad. CCU team notified and accepted transfer. Levophed gtt orders initiated. Verbal report to RK Marcum as accepting provider. CCU team to assume care at this time.     Disposition plans: to be updated once treatment plan further develops

## 2018-02-03 NOTE — ASSESSMENT & PLAN NOTE
Lactate decresed to 1.1, leukocytosis worsened today to 24 from 18  wean levophed and vasopressin as tolerated  c/w vanc and zosyn

## 2018-02-04 PROBLEM — R57.1 HYPOVOLEMIC SHOCK: Status: ACTIVE | Noted: 2018-02-02

## 2018-02-04 PROBLEM — R57.1 HYPOVOLEMIC SHOCK: Status: RESOLVED | Noted: 2018-02-02 | Resolved: 2018-02-04

## 2018-02-04 PROBLEM — I50.31 ACUTE DIASTOLIC CONGESTIVE HEART FAILURE: Status: ACTIVE | Noted: 2018-02-04

## 2018-02-04 PROBLEM — N17.0 ACUTE KIDNEY FAILURE WITH LESION OF TUBULAR NECROSIS: Status: ACTIVE | Noted: 2018-02-04

## 2018-02-04 PROBLEM — R65.21 SEPTIC SHOCK: Status: ACTIVE | Noted: 2018-02-04

## 2018-02-04 PROBLEM — A41.9 SEPTIC SHOCK: Status: ACTIVE | Noted: 2018-02-04

## 2018-02-04 PROBLEM — I95.9 HYPOTENSION: Status: ACTIVE | Noted: 2018-02-04

## 2018-02-04 LAB
ALBUMIN SERPL BCP-MCNC: 2.6 G/DL
ALBUMIN SERPL BCP-MCNC: 2.6 G/DL
ALBUMIN SERPL BCP-MCNC: 2.7 G/DL
ALBUMIN SERPL BCP-MCNC: 2.7 G/DL
ALLENS TEST: ABNORMAL
ALP SERPL-CCNC: 414 U/L
ALT SERPL W/O P-5'-P-CCNC: 103 U/L
ANION GAP SERPL CALC-SCNC: 13 MMOL/L
ANION GAP SERPL CALC-SCNC: 14 MMOL/L
ANION GAP SERPL CALC-SCNC: 16 MMOL/L
ANION GAP SERPL CALC-SCNC: 20 MMOL/L
AST SERPL-CCNC: 73 U/L
BACTERIA UR CULT: NO GROWTH
BASOPHILS # BLD AUTO: 0.03 K/UL
BASOPHILS NFR BLD: 0.3 %
BILIRUB SERPL-MCNC: 0.5 MG/DL
BUN SERPL-MCNC: 66 MG/DL
BUN SERPL-MCNC: 66 MG/DL
BUN SERPL-MCNC: 70 MG/DL
BUN SERPL-MCNC: 71 MG/DL
CALCIUM SERPL-MCNC: 8.3 MG/DL
CALCIUM SERPL-MCNC: 8.4 MG/DL
CALCIUM SERPL-MCNC: 8.5 MG/DL
CALCIUM SERPL-MCNC: 8.6 MG/DL
CHLORIDE SERPL-SCNC: 103 MMOL/L
CHLORIDE SERPL-SCNC: 104 MMOL/L
CK SERPL-CCNC: 17 U/L
CO2 SERPL-SCNC: 18 MMOL/L
CO2 SERPL-SCNC: 21 MMOL/L
CO2 SERPL-SCNC: 23 MMOL/L
CO2 SERPL-SCNC: 24 MMOL/L
CREAT SERPL-MCNC: 3.3 MG/DL
CREAT SERPL-MCNC: 3.4 MG/DL
CREAT SERPL-MCNC: 3.5 MG/DL
CREAT SERPL-MCNC: 3.5 MG/DL
DELSYS: ABNORMAL
DIFFERENTIAL METHOD: ABNORMAL
EOSINOPHIL # BLD AUTO: 0.1 K/UL
EOSINOPHIL NFR BLD: 0.8 %
ERYTHROCYTE [DISTWIDTH] IN BLOOD BY AUTOMATED COUNT: 19.9 %
EST. GFR  (AFRICAN AMERICAN): 19.4 ML/MIN/1.73 M^2
EST. GFR  (AFRICAN AMERICAN): 19.4 ML/MIN/1.73 M^2
EST. GFR  (AFRICAN AMERICAN): 20.1 ML/MIN/1.73 M^2
EST. GFR  (AFRICAN AMERICAN): 20.9 ML/MIN/1.73 M^2
EST. GFR  (NON AFRICAN AMERICAN): 16.8 ML/MIN/1.73 M^2
EST. GFR  (NON AFRICAN AMERICAN): 16.8 ML/MIN/1.73 M^2
EST. GFR  (NON AFRICAN AMERICAN): 17.4 ML/MIN/1.73 M^2
EST. GFR  (NON AFRICAN AMERICAN): 18.1 ML/MIN/1.73 M^2
FACT X PPP CHRO-ACNC: 0.31 IU/ML
FACT X PPP CHRO-ACNC: <0.1 IU/ML
FLOW: 4
GLUCOSE SERPL-MCNC: 168 MG/DL
GLUCOSE SERPL-MCNC: 171 MG/DL
GLUCOSE SERPL-MCNC: 172 MG/DL
GLUCOSE SERPL-MCNC: 176 MG/DL
HCO3 UR-SCNC: 26.7 MMOL/L (ref 24–28)
HCT VFR BLD AUTO: 26.8 %
HGB BLD-MCNC: 8.5 G/DL
IMM GRANULOCYTES # BLD AUTO: 0.09 K/UL
IMM GRANULOCYTES NFR BLD AUTO: 1 %
LACTATE SERPL-SCNC: 0.4 MMOL/L
LACTATE SERPL-SCNC: 0.6 MMOL/L
LYMPHOCYTES # BLD AUTO: 1.2 K/UL
LYMPHOCYTES NFR BLD: 13.1 %
MAGNESIUM SERPL-MCNC: 2.1 MG/DL
MCH RBC QN AUTO: 28.1 PG
MCHC RBC AUTO-ENTMCNC: 31.7 G/DL
MCV RBC AUTO: 89 FL
MODE: ABNORMAL
MONOCYTES # BLD AUTO: 0.6 K/UL
MONOCYTES NFR BLD: 7.2 %
NEUTROPHILS # BLD AUTO: 6.9 K/UL
NEUTROPHILS NFR BLD: 77.6 %
NRBC BLD-RTO: 0 /100 WBC
PCO2 BLDA: 51.2 MMHG (ref 35–45)
PH SMN: 7.33 [PH] (ref 7.35–7.45)
PHOSPHATE SERPL-MCNC: 7.1 MG/DL
PHOSPHATE SERPL-MCNC: 7.4 MG/DL
PHOSPHATE SERPL-MCNC: 7.6 MG/DL
PHOSPHATE SERPL-MCNC: 7.8 MG/DL
PLATELET # BLD AUTO: 139 K/UL
PMV BLD AUTO: 9.6 FL
PO2 BLDA: 183 MMHG (ref 80–100)
POC BE: 1 MMOL/L
POC SATURATED O2: 100 % (ref 95–100)
POC TCO2: 28 MMOL/L (ref 23–27)
POCT GLUCOSE: 165 MG/DL (ref 70–110)
POCT GLUCOSE: 171 MG/DL (ref 70–110)
POCT GLUCOSE: 171 MG/DL (ref 70–110)
POCT GLUCOSE: 176 MG/DL (ref 70–110)
POTASSIUM SERPL-SCNC: 4.2 MMOL/L
POTASSIUM SERPL-SCNC: 4.3 MMOL/L
PROT SERPL-MCNC: 5.8 G/DL
RBC # BLD AUTO: 3.02 M/UL
SAMPLE: ABNORMAL
SITE: ABNORMAL
SODIUM SERPL-SCNC: 139 MMOL/L
SODIUM SERPL-SCNC: 140 MMOL/L
SODIUM SERPL-SCNC: 142 MMOL/L
SODIUM SERPL-SCNC: 143 MMOL/L
URATE SERPL-MCNC: 11.4 MG/DL
VANCOMYCIN SERPL-MCNC: 11.7 UG/ML
VANCOMYCIN SERPL-MCNC: 30.2 UG/ML
WBC # BLD AUTO: 8.87 K/UL

## 2018-02-04 PROCEDURE — 94660 CPAP INITIATION&MGMT: CPT

## 2018-02-04 PROCEDURE — 99232 SBSQ HOSP IP/OBS MODERATE 35: CPT | Mod: ,,, | Performed by: INTERNAL MEDICINE

## 2018-02-04 PROCEDURE — 27000221 HC OXYGEN, UP TO 24 HOURS

## 2018-02-04 PROCEDURE — 82803 BLOOD GASES ANY COMBINATION: CPT

## 2018-02-04 PROCEDURE — 25000003 PHARM REV CODE 250: Performed by: NURSE PRACTITIONER

## 2018-02-04 PROCEDURE — 85025 COMPLETE CBC W/AUTO DIFF WBC: CPT

## 2018-02-04 PROCEDURE — 83735 ASSAY OF MAGNESIUM: CPT

## 2018-02-04 PROCEDURE — 63600175 PHARM REV CODE 636 W HCPCS: Performed by: INTERNAL MEDICINE

## 2018-02-04 PROCEDURE — 99900035 HC TECH TIME PER 15 MIN (STAT)

## 2018-02-04 PROCEDURE — 80202 ASSAY OF VANCOMYCIN: CPT

## 2018-02-04 PROCEDURE — 82550 ASSAY OF CK (CPK): CPT

## 2018-02-04 PROCEDURE — 25000003 PHARM REV CODE 250: Performed by: STUDENT IN AN ORGANIZED HEALTH CARE EDUCATION/TRAINING PROGRAM

## 2018-02-04 PROCEDURE — 37799 UNLISTED PX VASCULAR SURGERY: CPT

## 2018-02-04 PROCEDURE — 63600175 PHARM REV CODE 636 W HCPCS: Performed by: STUDENT IN AN ORGANIZED HEALTH CARE EDUCATION/TRAINING PROGRAM

## 2018-02-04 PROCEDURE — 20000000 HC ICU ROOM

## 2018-02-04 PROCEDURE — 80053 COMPREHEN METABOLIC PANEL: CPT

## 2018-02-04 PROCEDURE — 25000003 PHARM REV CODE 250: Performed by: INTERNAL MEDICINE

## 2018-02-04 PROCEDURE — 80069 RENAL FUNCTION PANEL: CPT | Mod: 91

## 2018-02-04 PROCEDURE — 85520 HEPARIN ASSAY: CPT

## 2018-02-04 PROCEDURE — 83605 ASSAY OF LACTIC ACID: CPT

## 2018-02-04 PROCEDURE — 84550 ASSAY OF BLOOD/URIC ACID: CPT

## 2018-02-04 PROCEDURE — 84100 ASSAY OF PHOSPHORUS: CPT

## 2018-02-04 PROCEDURE — 85520 HEPARIN ASSAY: CPT | Mod: 91

## 2018-02-04 PROCEDURE — A4216 STERILE WATER/SALINE, 10 ML: HCPCS | Performed by: NURSE PRACTITIONER

## 2018-02-04 RX ORDER — HEPARIN SODIUM,PORCINE/D5W 25000/250
18 INTRAVENOUS SOLUTION INTRAVENOUS CONTINUOUS
Status: DISCONTINUED | OUTPATIENT
Start: 2018-02-04 | End: 2018-02-08

## 2018-02-04 RX ADMIN — DEXTROSE MONOHYDRATE 0.11 MCG/KG/MIN: 5 INJECTION, SOLUTION INTRAVENOUS at 10:02

## 2018-02-04 RX ADMIN — PIPERACILLIN AND TAZOBACTAM 4.5 G: 4; .5 INJECTION, POWDER, LYOPHILIZED, FOR SOLUTION INTRAVENOUS; PARENTERAL at 05:02

## 2018-02-04 RX ADMIN — Medication 3 ML: at 05:02

## 2018-02-04 RX ADMIN — PIPERACILLIN AND TAZOBACTAM 4.5 G: 4; .5 INJECTION, POWDER, LYOPHILIZED, FOR SOLUTION INTRAVENOUS; PARENTERAL at 01:02

## 2018-02-04 RX ADMIN — VANCOMYCIN HYDROCHLORIDE 1000 MG: 1 INJECTION, POWDER, LYOPHILIZED, FOR SOLUTION INTRAVENOUS at 08:02

## 2018-02-04 RX ADMIN — VASOPRESSIN 0.04 UNITS/MIN: 20 INJECTION INTRAVENOUS at 03:02

## 2018-02-04 RX ADMIN — VANCOMYCIN HYDROCHLORIDE 500 MG: 500 INJECTION, POWDER, LYOPHILIZED, FOR SOLUTION INTRAVENOUS at 10:02

## 2018-02-04 RX ADMIN — HEPARIN SODIUM 18 UNITS/KG/HR: 10000 INJECTION, SOLUTION INTRAVENOUS at 02:02

## 2018-02-04 RX ADMIN — PIPERACILLIN AND TAZOBACTAM 4.5 G: 4; .5 INJECTION, POWDER, LYOPHILIZED, FOR SOLUTION INTRAVENOUS; PARENTERAL at 10:02

## 2018-02-04 RX ADMIN — FUROSEMIDE 20 MG/HR: 10 INJECTION, SOLUTION INTRAMUSCULAR; INTRAVENOUS at 03:02

## 2018-02-04 RX ADMIN — Medication 3 ML: at 09:02

## 2018-02-04 RX ADMIN — ACETAMINOPHEN 650 MG: 325 TABLET, FILM COATED ORAL at 10:02

## 2018-02-04 RX ADMIN — PANTOPRAZOLE SODIUM 40 MG: 40 TABLET, DELAYED RELEASE ORAL at 08:02

## 2018-02-04 NOTE — PROGRESS NOTES
[facility]  Urology  Progress Note    Patient Name: Karthik Medrano  MRN: [id number]  Admission Date: 2/1/2018  Hospital Length of Stay: 3 days  Code Status: Full Code   Attending Provider: Milton Olivo MD   Primary Care Physician: Pinky Thornton MD    Subjective:     HPI:  67 YOM with metastatic rectal cancer s/p who underwent neoadjuvant chemo and XRT, surgical pathology [id number], who was found to have pulmonary nodules. He received further chemotherapy, but progressed on chemo.  He developed a right ureteral stricture with right hydronephrosis likely 2/2 XRT, and undergoes chronic ureteral stent exchanges every 3 months, most recently one week ago.  He had been taking amoxicillin for one week prior, and took cipro for one week following.  Pt now admitted from oncology clinic for a flutter, found to be in septic shock requiring pressor support with RE and low urine output.     Urology consulted for right hydronephrosis with stent in place.      Pt denies F/C.  Nausea at baseline.  No dysuria, hematuria, difficulty voiding.  He did note some mild right flank pain after the stent exchange, but he has experienced this before.  This has since resolved.      Interval History:     RAFAEL  Feels slightly improved today   On vaso/ levo  On lasix gtt   UOP increased on lasix gtt      Review of Systems  Objective:     Temp:  [97.5 °F (36.4 °C)-98.7 °F (37.1 °C)] 97.9 °F (36.6 °C)  Pulse:  [] 129  Resp:  [9-32] 12  SpO2:  [97 %-100 %] 100 %  BP: (111-131)/(65-77) 111/65  Arterial Line BP: ()/(51-77) 119/61     Body mass index is 28.94 kg/m².      Date 02/04/18 0700 - 02/05/18 0659   Shift 4755-4249 7000-6823 3486-7024 24 Hour Total   I  N  T  A  K  E   I.V.  (mL/kg) 84.2  (0.9)   84.2  (0.9)    Shift Total  (mL/kg) 84.2  (0.9)   84.2  (0.9)   O  U  T  P  U  T   Urine  (mL/kg/hr) 1050   1050    Shift Total  (mL/kg) 1050  (11.5)   1050  (11.5)   Weight (kg) 91.5 91.5 91.5 91.5           Drains     Drain                 Ureteral Drain/Stent 03/04/16 Right ureter 6 Fr. 702 days         Urethral Catheter 02/02/18 1545 1 day                Physical Exam   Constitutional: No distress.   Cardiovascular:  Tachycardia present.    Pulmonary/Chest: Accessory muscle usage present. No respiratory distress.   Abdominal: Soft. He exhibits distension. There is no tenderness. There is no rebound.   Genitourinary:   Genitourinary Comments: Michael draining clear yellow urine    Neurological: He is alert.   Skin: Skin is warm and dry. He is not diaphoretic.     Psychiatric: He has a normal mood and affect. His behavior is normal.       Significant Labs:    BMP:    Recent Labs  Lab 02/03/18  2343 02/04/18  0504 02/04/18  0820    140 139   K 4.2 4.3 4.3    103 104   CO2 23 24 21*   BUN 71* 70* 66*   CREATININE 3.4* 3.5* 3.5*   CALCIUM 8.3* 8.5* 8.4*       CBC:     Recent Labs  Lab 02/02/18  1641 02/03/18  0214 02/04/18  0504   WBC 18.88* 24.12* 8.87   HGB 9.0* 9.7* 8.5*   HCT 29.9* 31.0* 26.8*   PLT 65* SEE COMMENT 139*       All pertinent labs results from the past 24 hours have been reviewed.    Significant Imaging:  All pertinent imaging results/findings from the past 24 hours have been reviewed.                  Assessment/Plan:     Hydronephrosis    67 YOM with metastatic rectal cancer s/p who underwent neoadjuvant chemo and XRT with right ureteral stricture with right hydronephrosis likely 2/2 XRT managed with chronic ureteral stent exchanges every 3 months, most recently one week ago, now admitted with A flutter, RE with oliguria and shock requiring pressors s/p high volume paracentesis with urine gram stain no growth and BCx no growth to date, leukocytosis resolved.     Hydronephrosis appears similar in degree to previous examination, and stent was just recently replaced.  No CVAT on examination.  RE likely pre-renal.  The right ureteral stent appears to be functioning well.      - maintain  gallo catheter until clinically improved  - continue broad spectrum IV Abx until UCx results, although low suspicion for pyelo given no fevers, no flank pain, negative urine gram stain  - we continue to recommend IV fluid resuscitation to the extent permissible by patient's other medical comorbidities, as RE does not appear to be obstructive in nature  - trend serum creatinine  - should patient clinically decompensate, would recommend right nephrostomy tube insertion by IR   - please call with questions             VTE Risk Mitigation         Ordered     High Risk of VTE  Once      02/01/18 1231     Medium Risk of VTE  Once      02/01/18 1231     Place sequential compression device  Until discontinued      02/01/18 1231     Reason for No Pharmacological VTE Prophylaxis  Once      02/01/18 1231          Flavio Johnson MD  Urology  [facility]

## 2018-02-04 NOTE — ASSESSMENT & PLAN NOTE
TTE 8/29/17 EF 60%, TTE 2/2/18 EF 40%  S/p lasix 80mg x 1  c/w lasix drip, transition down to 10cc/hr today  Monitor I/Os

## 2018-02-04 NOTE — PROGRESS NOTES
[facility]  Nephrology  Progress Note    Patient Name: Karthik Medrano  MRN: [id number]  Admission Date: 2/1/2018  Hospital Length of Stay: 3 days  Attending Provider: Sandoval Martini MD   Primary Care Physician: Pinky Thornton MD  Principal Problem:Atrial flutter with rapid ventricular response    Subjective:     HPI: Mr. Medrano is a 66 yo male with HTN, T2DM, CKD stage 3 (baseline sCr 1.6), CHF (EF 40%), and rectal cancer with metastatic disease who was admitted from clinic with tachycardia. He was diagnosed with rectal cancer in 2010; he received neoadjuvant chemo with 5-FU and later underwent resection with clear margins. He was later found to have pulmonary mets and has received variations of 5-FU, FOLFOX, FOLFIRI, Cyranza, and  Avastin. He most recently received FOLFIRI and cyranza on 12/27/17. Further chemotherapy was held due to decline in functional status. His cancer course has also been complicated by R hydronephrosis requiring JJ stent in 2016. He is followed by Urology at [facility] for this. Stent was last changed 2 weeks ago. He also receives therapeutic paracenteses q 2 weeks. [place] [facility] [place] and has been followed by EP. Wife reports decreased PO intake for several days. Yesterday he underwent a therapeutic paracentesis with 5L removed. SBP subsequently dropped to the 50-70s. He received albumin prior to being started on levophed and vasopressin. He was hypotensive for a few hours. Subsequent labs revealed worsening RE with sCr 2 --> 3.1, acidosis with bicarb 11, and elevated lactate of 3.5. He was transferred to the ICU and started on a bicarb drip. UOP only 175cc last night; gallo catheter placed without much improvement. Renal US this morning revealed severe R hydronephrosis. Hourly intake 40cc/hr this morning with levophed, vasopressin, and lasix gtt. Patient is currently on BiPAP and doesn't participate in the history; all was  "provided by wife. Wife is very fearful of "invasive procedures" given patient's propensity to bleed easily and get infections given immunosuppression. She would like to avoid dialysis as long as possible.     Interval History:   No events overnight. UOP finally picked up; UOP 5.6L overnight. Patient off BiPAP this morning; currently on 4L NC. Doing better today. Denies SOB. Thankful he started making urine again. Hourly intake 30cc/hr with levo, vaso, and lasix.   Net negative 4L yesterday.     Review of patient's allergies indicates:  No Known Allergies  Current Facility-Administered Medications   Medication Frequency    acetaminophen tablet 650 mg Q4H PRN    dextrose 50% injection 12.5 g PRN    dextrose 50% injection 25 g PRN    diphenoxylate-atropine 2.5-0.025 mg per tablet 1 tablet QID PRN    furosemide (LASIX) 250 mg in sodium chloride 0.9 % 250 mL infusion (non-titrating) Continuous    glucagon (human recombinant) injection 1 mg PRN    glucose chewable tablet 16 g PRN    glucose chewable tablet 24 g PRN    heparin 25,000 units in dextrose 5% 250 mL (100 units/mL) infusion Continuous    insulin aspart pen 0-5 Units Q6H    norepinephrine 32 mg in dextrose 5 % 250 mL infusion Continuous    ondansetron disintegrating tablet 8 mg Q6H PRN    ondansetron injection 4 mg Q6H PRN    oxyCODONE immediate release tablet 10 mg Q4H PRN    oxyCODONE immediate release tablet 5 mg Q4H PRN    pantoprazole EC tablet 40 mg Daily    piperacillin-tazobactam 4.5 g in sodium chloride 0.9% 100 mL IVPB (ready to mix system) Q8H    sodium chloride 0.9% flush 3 mL Q8H    vasopressin (PITRESSIN) 1 Units/mL in dextrose 5 % 100 mL infusion Continuous       Objective:     Vital Signs (Most Recent):  Temp: 98.1 °F (36.7 °C) (02/04/18 1500)  Pulse: (!) 131 (02/04/18 1500)  Resp: (!) 21 (02/04/18 1500)  BP: 111/65 (02/03/18 2100)  SpO2: 100 % (02/04/18 1500)  O2 Device (Oxygen Therapy): nasal cannula (02/04/18 1500) Vital " Signs (24h Range):  Temp:  [97.5 °F (36.4 °C)-98.7 °F (37.1 °C)] 98.1 °F (36.7 °C)  Pulse:  [103-131] 131  Resp:  [9-32] 21  SpO2:  [97 %-100 %] 100 %  BP: (111-131)/(65-77) 111/65  Arterial Line BP: ()/(43-74) 122/64     Weight: 91.5 kg (201 lb 11.5 oz) (02/04/18 0400)  Body mass index is 28.94 kg/m².  Body surface area is 2.13 meters squared.    I/O last 3 completed shifts:  In: 2365.2 [I.V.:1782.2; Other:33; IV Piggyback:550]  Out: 5670 [Urine:5670]    Physical Exam   Constitutional: He appears well-developed and well-nourished. No distress.   HENT:   Head: Normocephalic and atraumatic.   Eyes: Conjunctivae and EOM are normal.   Cardiovascular: Normal rate and regular rhythm.    Pulmonary/Chest: Effort normal. He has rales (improved).   Abdominal: Soft. He exhibits no distension.   Musculoskeletal: He exhibits edema. He exhibits no deformity.   Skin: Skin is warm. He is not diaphoretic.       Significant Labs:  CBC:   Recent Labs  Lab 02/04/18  0504   WBC 8.87   RBC 3.02*   HGB 8.5*   HCT 26.8*   *   MCV 89   MCH 28.1   MCHC 31.7*     CMP:   Recent Labs  Lab 02/04/18  0504 02/04/18  0820   * 172*   CALCIUM 8.5* 8.4*   ALBUMIN 2.7* 2.7*   PROT 5.8*  --     139   K 4.3 4.3   CO2 24 21*    104   BUN 70* 66*   CREATININE 3.5* 3.5*   ALKPHOS 414*  --    *  --    AST 73*  --    BILITOT 0.5  --      All labs within the past 24 hours have been reviewed.     Significant Imaging:  Labs: Reviewed    Assessment/Plan:     Acute renal failure superimposed on stage 3 chronic kidney disease    - baseline sCr 1.6  - sCr dion to 3.1 s/p hypotensive episode after large volume paracentesis on 2/2. Likely ischemic ATN  - renal US with severe hydronephrosis; stent recently exchanged 2 weeks prior  - minimal UOP yesterday despite lasix gtt. Started on lasix.  - UOP dramatically improved overnight. UOP 5.6L. Suspecting post-ATN vs post-obstructive diuresis based on presentation  - consider  repeating renal US in a few days  - sCr up to 3.5 but delta creatinine definitely improving. Anticipate renal function is recovering  - recommend holding diuril today  - can continue or decrease rate of lasix infusion  - hyperphosphatemia: continue to trend. Likely related to RE but ordered CK and uric acid to r/o other etiologies.             Shelley Awad, PGY-5  Nephrology Fellow  [facility]  Pager: [phone]

## 2018-02-04 NOTE — ASSESSMENT & PLAN NOTE
Verapamil and toprol-xl d/c 2/2 hypotension, with increase in rates up to 130s  Started anticoagulation w/ heparin gtt as per EP reccs  Now off pressure support, started metoprolol 12.5 q6hr with hold parameters of MAP <65  Plan for ablation once hemodynamically stable and as per EP reccs, will try to time with paracentesis later this week, will need assessment if paracentesis required at that time.   Check electrolytes and Mg at least BID for repletion

## 2018-02-04 NOTE — SUBJECTIVE & OBJECTIVE
Interval History:     RAFAEL  Feels slightly improved today   On vaso/ levo  On lasix gtt   UOP increased on lasix gtt      Review of Systems  Objective:     Temp:  [97.5 °F (36.4 °C)-98.7 °F (37.1 °C)] 97.9 °F (36.6 °C)  Pulse:  [] 129  Resp:  [9-32] 12  SpO2:  [97 %-100 %] 100 %  BP: (111-131)/(65-77) 111/65  Arterial Line BP: ()/(51-77) 119/61     Body mass index is 28.94 kg/m².      Date 02/04/18 0700 - 02/05/18 0659   Shift 0970-9457 3170-9247 9555-8884 24 Hour Total   I  N  T  A  K  E   I.V.  (mL/kg) 84.2  (0.9)   84.2  (0.9)    Shift Total  (mL/kg) 84.2  (0.9)   84.2  (0.9)   O  U  T  P  U  T   Urine  (mL/kg/hr) 1050   1050    Shift Total  (mL/kg) 1050  (11.5)   1050  (11.5)   Weight (kg) 91.5 91.5 91.5 91.5          Drains     Drain                 Ureteral Drain/Stent 03/04/16 Right ureter 6 Fr. 702 days         Urethral Catheter 02/02/18 1545 1 day                Physical Exam   Constitutional: No distress.   Cardiovascular:  Tachycardia present.    Pulmonary/Chest: Accessory muscle usage present. No respiratory distress.   Abdominal: Soft. He exhibits distension. There is no tenderness. There is no rebound.   Genitourinary:   Genitourinary Comments: Michael draining clear yellow urine    Neurological: He is alert.   Skin: Skin is warm and dry. He is not diaphoretic.     Psychiatric: He has a normal mood and affect. His behavior is normal.       Significant Labs:    BMP:    Recent Labs  Lab 02/03/18  2343 02/04/18  0504 02/04/18  0820    140 139   K 4.2 4.3 4.3    103 104   CO2 23 24 21*   BUN 71* 70* 66*   CREATININE 3.4* 3.5* 3.5*   CALCIUM 8.3* 8.5* 8.4*       CBC:     Recent Labs  Lab 02/02/18  1641 02/03/18  0214 02/04/18  0504   WBC 18.88* 24.12* 8.87   HGB 9.0* 9.7* 8.5*   HCT 29.9* 31.0* 26.8*   PLT 65* SEE COMMENT 139*       All pertinent labs results from the past 24 hours have been reviewed.    Significant Imaging:  All pertinent imaging results/findings from the past 24 hours  have been reviewed.

## 2018-02-04 NOTE — SUBJECTIVE & OBJECTIVE
Interval History: Feels weak, remains on levophed and vasopressin HR in the 120s.     Review of Systems   All other systems reviewed and are negative.    Objective:     Vital Signs (Most Recent):  Temp: 97.9 °F (36.6 °C) (02/04/18 0700)  Pulse: (!) 130 (02/04/18 1000)  Resp: 15 (02/04/18 1000)  BP: 111/65 (02/03/18 2100)  SpO2: 100 % (02/04/18 1000) Vital Signs (24h Range):  Temp:  [97.5 °F (36.4 °C)-98.7 °F (37.1 °C)] 97.9 °F (36.6 °C)  Pulse:  [] 130  Resp:  [9-32] 15  SpO2:  [97 %-100 %] 100 %  BP: (111-131)/(65-77) 111/65  Arterial Line BP: ()/(43-77) 81/43     Weight: 91.5 kg (201 lb 11.5 oz)  Body mass index is 28.94 kg/m².     SpO2: 100 %  O2 Device (Oxygen Therapy): nasal cannula    Physical Exam  GEN: Alert and oriented in NAD  NECK: + JVD appreciated to ear  CVS: Tachycardic, s1/s2, no MRG  PULM: CTAB no rales  ABD: NT/ND BS +  Extremities: warm and dry, palpable pulses, 3+ edema  NEURO: Alert and oriented x 3  PSYCH: appropriate affect.         Significant Labs: All pertinent lab results from the last 24 hours have been reviewed.    Significant Imaging: reviewed

## 2018-02-04 NOTE — CONSULTS
[facility]  Nephrology  Consult Note    Patient Name: Karthik Medrano  MRN: [id number]  Admission Date: 2/1/2018  Hospital Length of Stay: 2 days  Attending Provider: Sandoval Martini MD   Primary Care Physician: Pinky Thornton MD  Principal Problem:Atrial flutter with rapid ventricular response    Inpatient consult to Nephrology  Consult performed by: NICOLE GAONA  Consult ordered by: ADEN GARRETT  Reason for consult: RE        Subjective:     HPI: Mr. Medrano is a 68 yo male with HTN, T2DM, CKD stage 3 (baseline sCr 1.6), CHF (EF 40%), and rectal cancer with metastatic disease who was admitted from clinic with tachycardia. He was diagnosed with rectal cancer in 2010; he received neoadjuvant chemo with 5-FU and later underwent resection with clear margins. He was later found to have pulmonary mets and has received variations of 5-FU, FOLFOX, FOLFIRI, Cyranza, and  Avastin. He most recently received FOLFIRI and cyranza on 12/27/17. Further chemotherapy was held due to decline in functional status. His cancer course has also been complicated by R hydronephrosis requiring JJ stent in 2016. He is followed by Urology at [facility] for this. Stent was last changed 2 weeks ago. He also receives therapeutic paracenteses q 2 weeks. [place] [facility] [place] with [place] and has been followed by EP. Wife reports decreased PO intake for several days. Yesterday he underwent a therapeutic paracentesis with 5L removed. SBP subsequently dropped to the 50-70s. He received albumin prior to being started on levophed and vasopressin. He was hypotensive for a few hours. Subsequent labs revealed worsening RE with sCr 2 --> 3.1, acidosis with bicarb 11, and elevated lactate of 3.5. He was transferred to the ICU and started on a bicarb drip. UOP only 175cc last night; gallo catheter placed without much improvement. Renal US this morning revealed severe R hydronephrosis. Hourly  "intake 40cc/hr this morning with levophed, vasopressin, and lasix gtt. Patient is currently on BiPAP and doesn't participate in the history; all was provided by wife. Wife is very fearful of "invasive procedures" given patient's propensity to bleed easily and get infections given immunosuppression. She would like to avoid dialysis as long as possible.     Past Medical History:   Diagnosis Date    CHF (congestive heart failure)     Diabetes mellitus     Encounter for blood transfusion     GERD (gastroesophageal reflux disease)     Hypertension     Rectal cancer metastasized to lung 01/11/2010       Past Surgical History:   Procedure Laterality Date    ABDOMINAL SURGERY      COLON SURGERY      COLONOSCOPY N/A 10/3/2017    Procedure: COLONOSCOPY;  Surgeon: Sandoval Bowling MD;  Location: [address]);  Service: Endoscopy;  Laterality: N/A;    CYSTOSCOPY W/ URETERAL STENT PLACEMENT      FRACTURE SURGERY      SKIN BIOPSY         Review of patient's allergies indicates:  No Known Allergies  Current Facility-Administered Medications   Medication Frequency    acetaminophen tablet 650 mg Q4H PRN    dextrose 50% injection 12.5 g PRN    dextrose 50% injection 25 g PRN    diphenoxylate-atropine 2.5-0.025 mg per tablet 1 tablet QID PRN    furosemide (LASIX) 250 mg in sodium chloride 0.9 % 250 mL infusion (non-titrating) Continuous    glucagon (human recombinant) injection 1 mg PRN    glucose chewable tablet 16 g PRN    glucose chewable tablet 24 g PRN    insulin aspart pen 0-5 Units Q6H    norepinephrine 32 mg in dextrose 5 % 250 mL infusion Continuous    ondansetron disintegrating tablet 8 mg Q6H PRN    ondansetron injection 4 mg Q6H PRN    oxyCODONE immediate release tablet 10 mg Q4H PRN    oxyCODONE immediate release tablet 5 mg Q4H PRN    pantoprazole EC tablet 40 mg Daily    piperacillin-tazobactam 4.5 g in sodium chloride 0.9% 100 mL IVPB (ready to mix system) Q8H    sodium bicarbonate 8.4 " % (1 mEq/mL) injection 50 mEq Q6H    sodium chloride 0.9% flush 3 mL Q8H    vancomycin 1 g in 0.9% sodium chloride 250 mL IVPB (ready to mix system) Once    vasopressin (PITRESSIN) 1 Units/mL in dextrose 5 % 100 mL infusion Continuous     Family History     Problem Relation (Age of Onset)    Breast cancer Mother    Cancer Mother, Father    Tuberculosis Mother        Social History Main Topics    Smoking status: Never Smoker    Smokeless tobacco: Never Used    Alcohol use No    Drug use: Unknown    Sexual activity: Not on file     Review of Systems   Unable to perform ROS: Other     Objective:     Vital Signs (Most Recent):  Temp: 98.7 °F (37.1 °C) (02/03/18 1630)  Pulse: 103 (02/03/18 1700)  Resp: 15 (02/03/18 1700)  BP: 119/70 (02/03/18 1700)  SpO2: 100 % (02/03/18 1700)  O2 Device (Oxygen Therapy): BiPAP (02/03/18 1700) Vital Signs (24h Range):  Temp:  [97.1 °F (36.2 °C)-98.7 °F (37.1 °C)] 98.7 °F (37.1 °C)  Pulse:  [] 103  Resp:  [12-30] 15  SpO2:  [94 %-100 %] 100 %  BP: (116-129)/(69-79) 119/70  Arterial Line BP: ()/(52-78) 121/63     Weight: 95.2 kg (209 lb 14.1 oz) (02/03/18 0322)  Body mass index is 30.11 kg/m².  Body surface area is 2.17 meters squared.    I/O last 3 completed shifts:  In: 1841.7 [I.V.:1390.7; IV Piggyback:451]  Out: 2090 [Urine:2090]    Physical Exam   Constitutional: He appears well-developed and well-nourished.   HENT:   Head: Normocephalic and atraumatic.   Eyes: Conjunctivae and EOM are normal.   Neck: Neck supple. No tracheal deviation present.   Cardiovascular: An irregularly irregular rhythm present. Tachycardia present.    Pulmonary/Chest: Effort normal.   BiPAP 15/5   Abdominal: Soft. He exhibits no distension.   Musculoskeletal: He exhibits edema (Significant pitting edema). He exhibits no deformity.   Skin: Skin is warm and dry. He is not diaphoretic.       Significant Labs:  ABGs:   Recent Labs  Lab 02/03/18  1305   PH 7.283*   PCO2 42.6   HCO3 20.1*    POCSATURATED 100   BE -7     CBC:   Recent Labs  Lab 02/03/18 0214   WBC 24.12*   RBC 3.49*   HGB 9.7*   HCT 31.0*   PLT SEE COMMENT   MCV 89   MCH 27.8   MCHC 31.3*     CMP:   Recent Labs  Lab 02/03/18 0214 02/03/18  1608   *  < > 202*   CALCIUM 8.4*  < > 8.1*   ALBUMIN 3.0*  < > 2.7*   PROT 6.3  --   --    *  < > 139   K 5.3*  < > 4.8   CO2 16*  < > 22*     < > 104   BUN 62*  < > 66*   CREATININE 3.1*  < > 3.4*   ALKPHOS 668*  --   --    *  --   --    *  --   --    BILITOT 1.1*  --   --    < > = values in this interval not displayed.  All labs within the past 24 hours have been reviewed.    Significant Imaging:  Labs: Reviewed    Assessment/Plan:     Acute renal failure superimposed on stage 3 chronic kidney disease    - baseline sCr 1.6  - sCr dion to 3.1 s/p hypotensive episode after large volume paracentesis yesterday. Likely ischemic ATN  - volume overloaded, mild hyperkalemia, moderate metabolic acidosis (also with combined resp/met acidemia). No CXR today.   - minimal UOP on lasix gtt  - agree with adding diuril  - renal US with severe hydronephrosis. Ureteral stent patency? Agree with urology consult. If no improvement in sCr or UOP soon, may need percutaneous nephrostomy tube  - hopefully now that hypotension has resolved; renal function will start to improve as well  - no need for RRT at this moment. Trend labs q6h. Strict I/Os. Please call with any changes.             Shelley Awad, PGY-5  Nephrology Fellow  [facility]  Pager: [phone]

## 2018-02-04 NOTE — PROGRESS NOTES
[facility]  Cardiac Electrophysiology  Progress Note    Admission Date: 2/1/2018  Code Status: Full Code   Attending Physician: Sandoval Martini MD   Expected Discharge Date: 2/5/2018  Principal Problem:Atrial flutter with rapid ventricular response    Subjective:     Interval History: Feels weak, remains on levophed and vasopressin HR in the 120s.     Review of Systems   All other systems reviewed and are negative.    Objective:     Vital Signs (Most Recent):  Temp: 97.9 °F (36.6 °C) (02/04/18 0700)  Pulse: (!) 130 (02/04/18 1000)  Resp: 15 (02/04/18 1000)  BP: 111/65 (02/03/18 2100)  SpO2: 100 % (02/04/18 1000) Vital Signs (24h Range):  Temp:  [97.5 °F (36.4 °C)-98.7 °F (37.1 °C)] 97.9 °F (36.6 °C)  Pulse:  [] 130  Resp:  [9-32] 15  SpO2:  [97 %-100 %] 100 %  BP: (111-131)/(65-77) 111/65  Arterial Line BP: ()/(43-77) 81/43     Weight: 91.5 kg (201 lb 11.5 oz)  Body mass index is 28.94 kg/m².     SpO2: 100 %  O2 Device (Oxygen Therapy): nasal cannula    Physical Exam  GEN: Alert and oriented in NAD  NECK: + JVD appreciated to ear  CVS: Tachycardic, s1/s2, no MRG  PULM: CTAB no rales  ABD: NT/ND BS +  Extremities: warm and dry, palpable pulses, 3+ edema  NEURO: Alert and oriented x 3  PSYCH: appropriate affect.         Significant Labs: All pertinent lab results from the last 24 hours have been reviewed.    Significant Imaging: reviewed    Assessment and Plan:     * Atrial flutter with rapid ventricular response    Mr. Medrano is a 67 y.o. Man with metastatic rectal cancer Dx 2010, s/p resection, then with pulmonary mets 2013 on FOLFIRI chemotherapy, recurrent ascites s/p regular paracentesis, ureteral obstructive s/p urostomy tubes, and recently diagnosed atrial flutter with variable block, DM - who presents as a transfer from clinic with shortness of breath and tachycardia.     Rate now uncontrolled likely 2/2 levophed. Wean as tolerated. Can start back AV armond agents if/when he  improves. He may not be a candidate for AC long term but while inpatient would recommend heparin gtt.             Leland Trujillo MD  Cardiac Electrophysiology  [facility]

## 2018-02-04 NOTE — ASSESSMENT & PLAN NOTE
R ureteral stricture with right hydronephrosis managed w/ ureteral stent exchanges q 3 months  Per pt last stent exchange ~1 week ago, states he received pre- and post procedure antibiotic prophylaxis  Urology consulted  Per urology (hydronephrosis likely 2/2 XRT for rectal ca) should patient clinically decompensate recommend right nephrostomy tube  Discussion w/ pt regarding possible nephrostomy tube, pt does not want nephrostomy tube  Pt currently w/ improving urine output

## 2018-02-04 NOTE — ASSESSMENT & PLAN NOTE
- baseline sCr 1.6  - sCr dion to 3.1 s/p hypotensive episode after large volume paracentesis on 2/2. Likely ischemic ATN  - renal US with severe hydronephrosis; stent recently exchanged 2 weeks prior  - minimal UOP yesterday despite lasix gtt. Started on lasix.  - UOP dramatically improved overnight. UOP 5.6L. Suspecting post-ATN vs post-obstructive diuresis based on presentation  - consider repeating renal US in a few days  - sCr up to 3.5 but delta creatinine definitely improving. Anticipate renal function is recovering  - recommend holding diuril today  - can continue or decrease rate of lasix infusion  - hyperphosphatemia: continue to trend. Likely related to RE but ordered CK and uric acid to r/o other etiologies.

## 2018-02-04 NOTE — ASSESSMENT & PLAN NOTE
Mr. Medrano is a 67 y.o. Man with metastatic rectal cancer Dx 2010, s/p resection, then with pulmonary mets 2013 on FOLFIRI chemotherapy, recurrent ascites s/p regular paracentesis, ureteral obstructive s/p urostomy tubes, and recently diagnosed atrial flutter with variable block, DM - who presents as a transfer from clinic with shortness of breath and tachycardia.     Rate now uncontrolled likely 2/2 levophed. Wean as tolerated. Can start back AV armond agents if/when he improves. He may not be a candidate for AC long term but while inpatient would recommend heparin gtt.

## 2018-02-04 NOTE — ASSESSMENT & PLAN NOTE
- baseline sCr 1.6  - sCr dion to 3.1 s/p hypotensive episode after large volume paracentesis yesterday. Likely ischemic ATN  - volume overloaded, mild hyperkalemia, moderate metabolic acidosis (also with combined resp/met acidemia). No CXR today.   - minimal UOP on lasix gtt  - agree with adding diuril  - renal US with severe hydronephrosis. Ureteral stent patency? Agree with urology consult. If no improvement in sCr or UOP soon, may need percutaneous nephrostomy tube  - hopefully now that hypotension has resolved; renal function will start to improve as well  - no need for RRT at this moment. Trend labs q6h. Strict I/Os. Please call with any changes.

## 2018-02-04 NOTE — SUBJECTIVE & OBJECTIVE
Past Medical History:   Diagnosis Date    CHF (congestive heart failure)     Diabetes mellitus     Encounter for blood transfusion     GERD (gastroesophageal reflux disease)     Hypertension     Rectal cancer metastasized to lung 01/11/2010       Past Surgical History:   Procedure Laterality Date    ABDOMINAL SURGERY      COLON SURGERY      COLONOSCOPY N/A 10/3/2017    Procedure: COLONOSCOPY;  Surgeon: Sandoval Bowling MD;  Location: [facility] ([address] [place] [address]);  Service: Endoscopy;  Laterality: N/A;    CYSTOSCOPY W/ URETERAL STENT PLACEMENT      FRACTURE SURGERY      SKIN BIOPSY         Review of patient's allergies indicates:  No Known Allergies  Current Facility-Administered Medications   Medication Frequency    acetaminophen tablet 650 mg Q4H PRN    dextrose 50% injection 12.5 g PRN    dextrose 50% injection 25 g PRN    diphenoxylate-atropine 2.5-0.025 mg per tablet 1 tablet QID PRN    furosemide (LASIX) 250 mg in sodium chloride 0.9 % 250 mL infusion (non-titrating) Continuous    glucagon (human recombinant) injection 1 mg PRN    glucose chewable tablet 16 g PRN    glucose chewable tablet 24 g PRN    insulin aspart pen 0-5 Units Q6H    norepinephrine 32 mg in dextrose 5 % 250 mL infusion Continuous    ondansetron disintegrating tablet 8 mg Q6H PRN    ondansetron injection 4 mg Q6H PRN    oxyCODONE immediate release tablet 10 mg Q4H PRN    oxyCODONE immediate release tablet 5 mg Q4H PRN    pantoprazole EC tablet 40 mg Daily    piperacillin-tazobactam 4.5 g in sodium chloride 0.9% 100 mL IVPB (ready to mix system) Q8H    sodium bicarbonate 8.4 % (1 mEq/mL) injection 50 mEq Q6H    sodium chloride 0.9% flush 3 mL Q8H    vancomycin 1 g in 0.9% sodium chloride 250 mL IVPB (ready to mix system) Once    vasopressin (PITRESSIN) 1 Units/mL in dextrose 5 % 100 mL infusion Continuous     Family History     Problem Relation (Age of Onset)    Breast cancer Mother    Cancer Mother, Father    Tuberculosis  Mother        Social History Main Topics    Smoking status: Never Smoker    Smokeless tobacco: Never Used    Alcohol use No    Drug use: Unknown    Sexual activity: Not on file     Review of Systems   Unable to perform ROS: Other     Objective:     Vital Signs (Most Recent):  Temp: 98.7 °F (37.1 °C) (02/03/18 1630)  Pulse: 103 (02/03/18 1700)  Resp: 15 (02/03/18 1700)  BP: 119/70 (02/03/18 1700)  SpO2: 100 % (02/03/18 1700)  O2 Device (Oxygen Therapy): BiPAP (02/03/18 1700) Vital Signs (24h Range):  Temp:  [97.1 °F (36.2 °C)-98.7 °F (37.1 °C)] 98.7 °F (37.1 °C)  Pulse:  [] 103  Resp:  [12-30] 15  SpO2:  [94 %-100 %] 100 %  BP: (116-129)/(69-79) 119/70  Arterial Line BP: ()/(52-78) 121/63     Weight: 95.2 kg (209 lb 14.1 oz) (02/03/18 0322)  Body mass index is 30.11 kg/m².  Body surface area is 2.17 meters squared.    I/O last 3 completed shifts:  In: 1841.7 [I.V.:1390.7; IV Piggyback:451]  Out: 2090 [Urine:2090]    Physical Exam   Constitutional: He appears well-developed and well-nourished.   HENT:   Head: Normocephalic and atraumatic.   Eyes: Conjunctivae and EOM are normal.   Neck: Neck supple. No tracheal deviation present.   Cardiovascular: An irregularly irregular rhythm present. Tachycardia present.    Pulmonary/Chest: Effort normal.   BiPAP 15/5   Abdominal: Soft. He exhibits no distension.   Musculoskeletal: He exhibits edema (Significant pitting edema). He exhibits no deformity.   Skin: Skin is warm and dry. He is not diaphoretic.       Significant Labs:  ABGs:   Recent Labs  Lab 02/03/18  1305   PH 7.283*   PCO2 42.6   HCO3 20.1*   POCSATURATED 100   BE -7     CBC:   Recent Labs  Lab 02/03/18  0214   WBC 24.12*   RBC 3.49*   HGB 9.7*   HCT 31.0*   PLT SEE COMMENT   MCV 89   MCH 27.8   MCHC 31.3*     CMP:   Recent Labs  Lab 02/03/18  0214  02/03/18  1608   *  < > 202*   CALCIUM 8.4*  < > 8.1*   ALBUMIN 3.0*  < > 2.7*   PROT 6.3  --   --    *  < > 139   K 5.3*  < > 4.8   CO2  16*  < > 22*     < > 104   BUN 62*  < > 66*   CREATININE 3.1*  < > 3.4*   ALKPHOS 668*  --   --    *  --   --    *  --   --    BILITOT 1.1*  --   --    < > = values in this interval not displayed.  All labs within the past 24 hours have been reviewed.    Significant Imaging:  Labs: Reviewed

## 2018-02-04 NOTE — ASSESSMENT & PLAN NOTE
Per hem/onc notes pt note tolerating chemo and now chemo held 2/2 debility and functional status, prognosis guarded  Per wife pt wishes to be full code and want full resuscitation, however, does not want central line or dialysis

## 2018-02-04 NOTE — ASSESSMENT & PLAN NOTE
Lactate decresed to 1.1, leukocytosis now resolved  Pressors weaned and now stopped  D/C'd vanc and zosyn transitioned to rocephin 2/5/18

## 2018-02-04 NOTE — PLAN OF CARE
Problem: Patient Care Overview  Goal: Plan of Care Review  Outcome: Ongoing (interventions implemented as appropriate)  No acute events throughout night, VS and assessment per flow sheet, patient progressing towards goals as tolerated, plan of care reviewed with Karthik Medrano and family, all concerns addressed.  Levophed and Vasopressin gtt infusing.  Lasix gtt infusing; pt tolerating well.  IV antibx.  Michael in place.  Will continue to monitor.    CM-ICU  ABCDEF Early Mobility Inclusion Criteria Pass / Fail   M  Myocardial Stability  No dysrhythmia requiring new antidysrhythmic agent x 24 hours.   No evidence of active myocardial ischemia x 24 hours.   No femoral Impella, Tandem, or IABP. pass   O  Oxygenation  PEEP < 10 cm H2O.   FiO2 ? 60%.   SpO2 > 88%. pass   V  Vasopressors  No increase of any vasopressor x 2 hours.   No high dose vasopressors. fail   E  Engages to Voice  RASS > -3.   Responds to verbal stimulation. pass   Pass?  Passes all four criteria.  fail

## 2018-02-04 NOTE — ASSESSMENT & PLAN NOTE
67 YOM with metastatic rectal cancer s/p who underwent neoadjuvant chemo and XRT with right ureteral stricture with right hydronephrosis likely 2/2 XRT managed with chronic ureteral stent exchanges every 3 months, most recently one week ago, now admitted with A flutter, RE with oliguria and shock requiring pressors s/p high volume paracentesis with urine gram stain no growth and BCx no growth to date, leukocytosis resolved.     Hydronephrosis appears similar in degree to previous examination, and stent was just recently replaced.  No CVAT on examination.  RE likely pre-renal.  The right ureteral stent appears to be functioning well.      - maintain gallo catheter until clinically improved  - continue broad spectrum IV Abx until UCx results, although low suspicion for pyelo given no fevers, no flank pain, negative urine gram stain  - we continue to recommend IV fluid resuscitation to the extent permissible by patient's other medical comorbidities, as RE does not appear to be obstructive in nature  - trend serum creatinine  - should patient clinically decompensate, would recommend right nephrostomy tube insertion by IR   - please call with questions

## 2018-02-04 NOTE — SUBJECTIVE & OBJECTIVE
Interval History: Continues to be afebrile, leukocytosis resolved, urine output improving w/ ~4900cc over last 24 hrs. Tachycardic to 133, but asymptomatic. Only complaining of indigestion, but well controlled with PPI. Denies CP, SOB, n/v/d/c.     Review of Systems   Constitution: Positive for weakness. Negative for chills and fever.   HENT: Negative for congestion and sore throat.    Eyes: Negative for blurred vision, pain and visual disturbance.   Cardiovascular: Positive for leg swelling, orthopnea and paroxysmal nocturnal dyspnea. Negative for chest pain, claudication, near-syncope, palpitations and syncope.   Respiratory: Negative for cough and shortness of breath.    Musculoskeletal: Negative for joint pain and joint swelling.   Gastrointestinal: Negative for bloating, abdominal pain, constipation and diarrhea.   Genitourinary: Negative for bladder incontinence, frequency and urgency.   Neurological: Negative for dizziness, headaches and light-headedness.   Psychiatric/Behavioral: Negative for altered mental status and depression.     Objective:     Vital Signs (Most Recent):  Temp: 97.6 °F (36.4 °C) (02/05/18 0715)  Pulse: (!) 128 (02/05/18 0800)  Resp: 11 (02/05/18 0800)  BP: 111/65 (02/03/18 2100)  SpO2: (!) 88 % (02/05/18 0800) Vital Signs (24h Range):  Temp:  [97.6 °F (36.4 °C)-98.1 °F (36.7 °C)] 97.6 °F (36.4 °C)  Pulse:  [124-133] 128  Resp:  [10-22] 11  SpO2:  [88 %-100 %] 88 %  Arterial Line BP: ()/(43-86) 120/64     Weight: 86.4 kg (190 lb 7.6 oz)  Body mass index is 27.33 kg/m².     SpO2: (!) 88 %  O2 Device (Oxygen Therapy): room air      Intake/Output Summary (Last 24 hours) at 02/05/18 0907  Last data filed at 02/05/18 0900   Gross per 24 hour   Intake          1255.08 ml   Output             6160 ml   Net         -4904.92 ml       Lines/Drains/Airways     Central Venous Catheter Line                 Port A Cath Single Lumen -- days         Port A Cath Single Lumen 02/01/18 1005 other (see  comments) 3 days          Drain                 Ureteral Drain/Stent 03/04/16 Right ureter 6 Fr. 703 days         Urethral Catheter 02/02/18 1545 2 days          Arterial Line                 Arterial Line 02/02/18 1637 Right Femoral 2 days                Physical Exam   Constitutional: He is oriented to person, place, and time. No distress.   HENT:   Head: Normocephalic and atraumatic.   Eyes: Conjunctivae are normal. No scleral icterus.   Neck: Neck supple. JVD present.   Cardiovascular: Tachycardia present.    No murmur heard.  Tachycardic   Pulmonary/Chest: He has no wheezes.   On nasal cannula   Abdominal: Soft. He exhibits distension. There is no tenderness. There is no rebound and no guarding.   Musculoskeletal: He exhibits edema (+3 b/l LE).   Neurological: He is alert and oriented to person, place, and time.   Skin: Skin is warm. No rash noted.       Significant Labs:   ABG:     Recent Labs  Lab 02/03/18  1305 02/04/18  0819   PH 7.283* 7.325*   PCO2 42.6 51.2*   HCO3 20.1* 26.7   POCSATURATED 100 100   BE -7 1   , Blood Culture: No results for input(s): LABBLOO in the last 48 hours., BMP:   Recent Labs  Lab 02/04/18  0504  02/05/18  0029 02/05/18  0502 02/05/18  0824   *  < > 170* 170* 175*     < > 142 142 142   K 4.3  < > 3.8 3.8 3.9     < > 101 101 99   CO2 24  < > 27 26 28   BUN 70*  < > 66* 62* 64*   CREATININE 3.5*  < > 3.5* 3.5* 3.4*   CALCIUM 8.5*  < > 8.6* 8.6* 8.6*   MG 2.1  --   --  1.6  --    < > = values in this interval not displayed. and CBC     Recent Labs  Lab 02/04/18  0504 02/05/18  0502   WBC 8.87 6.86   HGB 8.5* 8.2*   HCT 26.8* 26.5*   * 44*       Significant Imaging: Renal US: Severe right hydronephrosis

## 2018-02-04 NOTE — SUBJECTIVE & OBJECTIVE
Interval History:   No events overnight. UOP finally picked up; UOP 5.6L overnight. Patient off BiPAP this morning; currently on 4L NC. Doing better today. Denies SOB. Thankful he started making urine again. Hourly intake 30cc/hr with levo, vaso, and lasix.   Net negative 4L yesterday.     Review of patient's allergies indicates:  No Known Allergies  Current Facility-Administered Medications   Medication Frequency    acetaminophen tablet 650 mg Q4H PRN    dextrose 50% injection 12.5 g PRN    dextrose 50% injection 25 g PRN    diphenoxylate-atropine 2.5-0.025 mg per tablet 1 tablet QID PRN    furosemide (LASIX) 250 mg in sodium chloride 0.9 % 250 mL infusion (non-titrating) Continuous    glucagon (human recombinant) injection 1 mg PRN    glucose chewable tablet 16 g PRN    glucose chewable tablet 24 g PRN    heparin 25,000 units in dextrose 5% 250 mL (100 units/mL) infusion Continuous    insulin aspart pen 0-5 Units Q6H    norepinephrine 32 mg in dextrose 5 % 250 mL infusion Continuous    ondansetron disintegrating tablet 8 mg Q6H PRN    ondansetron injection 4 mg Q6H PRN    oxyCODONE immediate release tablet 10 mg Q4H PRN    oxyCODONE immediate release tablet 5 mg Q4H PRN    pantoprazole EC tablet 40 mg Daily    piperacillin-tazobactam 4.5 g in sodium chloride 0.9% 100 mL IVPB (ready to mix system) Q8H    sodium chloride 0.9% flush 3 mL Q8H    vasopressin (PITRESSIN) 1 Units/mL in dextrose 5 % 100 mL infusion Continuous       Objective:     Vital Signs (Most Recent):  Temp: 98.1 °F (36.7 °C) (02/04/18 1500)  Pulse: (!) 131 (02/04/18 1500)  Resp: (!) 21 (02/04/18 1500)  BP: 111/65 (02/03/18 2100)  SpO2: 100 % (02/04/18 1500)  O2 Device (Oxygen Therapy): nasal cannula (02/04/18 1500) Vital Signs (24h Range):  Temp:  [97.5 °F (36.4 °C)-98.7 °F (37.1 °C)] 98.1 °F (36.7 °C)  Pulse:  [103-131] 131  Resp:  [9-32] 21  SpO2:  [97 %-100 %] 100 %  BP: (111-131)/(65-77) 111/65  Arterial Line BP:  ()/(43-74) 122/64     Weight: 91.5 kg (201 lb 11.5 oz) (02/04/18 0400)  Body mass index is 28.94 kg/m².  Body surface area is 2.13 meters squared.    I/O last 3 completed shifts:  In: 2365.2 [I.V.:1782.2; Other:33; IV Piggyback:550]  Out: 5670 [Urine:5670]    Physical Exam   Constitutional: He appears well-developed and well-nourished. No distress.   HENT:   Head: Normocephalic and atraumatic.   Eyes: Conjunctivae and EOM are normal.   Cardiovascular: Normal rate and regular rhythm.    Pulmonary/Chest: Effort normal. He has rales (improved).   Abdominal: Soft. He exhibits no distension.   Musculoskeletal: He exhibits edema. He exhibits no deformity.   Skin: Skin is warm. He is not diaphoretic.       Significant Labs:  CBC:   Recent Labs  Lab 02/04/18  0504   WBC 8.87   RBC 3.02*   HGB 8.5*   HCT 26.8*   *   MCV 89   MCH 28.1   MCHC 31.7*     CMP:   Recent Labs  Lab 02/04/18  0504 02/04/18  0820   * 172*   CALCIUM 8.5* 8.4*   ALBUMIN 2.7* 2.7*   PROT 5.8*  --     139   K 4.3 4.3   CO2 24 21*    104   BUN 70* 66*   CREATININE 3.5* 3.5*   ALKPHOS 414*  --    *  --    AST 73*  --    BILITOT 0.5  --      All labs within the past 24 hours have been reviewed.     Significant Imaging:  Labs: Reviewed

## 2018-02-05 PROBLEM — E83.39 HYPERPHOSPHATEMIA: Status: ACTIVE | Noted: 2018-02-05

## 2018-02-05 PROBLEM — R57.9 SHOCK: Status: ACTIVE | Noted: 2018-02-04

## 2018-02-05 LAB
ALBUMIN SERPL BCP-MCNC: 2.6 G/DL
ALBUMIN SERPL BCP-MCNC: 2.6 G/DL
ALBUMIN SERPL BCP-MCNC: 2.7 G/DL
ALBUMIN SERPL BCP-MCNC: 2.8 G/DL
ALP SERPL-CCNC: 345 U/L
ALT SERPL W/O P-5'-P-CCNC: 77 U/L
ANION GAP SERPL CALC-SCNC: 14 MMOL/L
ANION GAP SERPL CALC-SCNC: 14 MMOL/L
ANION GAP SERPL CALC-SCNC: 15 MMOL/L
ANION GAP SERPL CALC-SCNC: 15 MMOL/L
AST SERPL-CCNC: 34 U/L
BASOPHILS # BLD AUTO: 0.04 K/UL
BASOPHILS NFR BLD: 0.6 %
BILIRUB SERPL-MCNC: 0.5 MG/DL
BUN SERPL-MCNC: 61 MG/DL
BUN SERPL-MCNC: 62 MG/DL
BUN SERPL-MCNC: 64 MG/DL
BUN SERPL-MCNC: 66 MG/DL
CALCIUM SERPL-MCNC: 8.6 MG/DL
CALCIUM SERPL-MCNC: 8.7 MG/DL
CHLORIDE SERPL-SCNC: 100 MMOL/L
CHLORIDE SERPL-SCNC: 101 MMOL/L
CHLORIDE SERPL-SCNC: 101 MMOL/L
CHLORIDE SERPL-SCNC: 99 MMOL/L
CO2 SERPL-SCNC: 26 MMOL/L
CO2 SERPL-SCNC: 27 MMOL/L
CO2 SERPL-SCNC: 28 MMOL/L
CO2 SERPL-SCNC: 28 MMOL/L
CREAT SERPL-MCNC: 3.4 MG/DL
CREAT SERPL-MCNC: 3.4 MG/DL
CREAT SERPL-MCNC: 3.5 MG/DL
CREAT SERPL-MCNC: 3.5 MG/DL
DIFFERENTIAL METHOD: ABNORMAL
EOSINOPHIL # BLD AUTO: 0.1 K/UL
EOSINOPHIL NFR BLD: 1.3 %
ERYTHROCYTE [DISTWIDTH] IN BLOOD BY AUTOMATED COUNT: 19.6 %
EST. GFR  (AFRICAN AMERICAN): 19.4 ML/MIN/1.73 M^2
EST. GFR  (AFRICAN AMERICAN): 19.4 ML/MIN/1.73 M^2
EST. GFR  (AFRICAN AMERICAN): 20.1 ML/MIN/1.73 M^2
EST. GFR  (AFRICAN AMERICAN): 20.1 ML/MIN/1.73 M^2
EST. GFR  (NON AFRICAN AMERICAN): 16.8 ML/MIN/1.73 M^2
EST. GFR  (NON AFRICAN AMERICAN): 16.8 ML/MIN/1.73 M^2
EST. GFR  (NON AFRICAN AMERICAN): 17.4 ML/MIN/1.73 M^2
EST. GFR  (NON AFRICAN AMERICAN): 17.4 ML/MIN/1.73 M^2
FACT X PPP CHRO-ACNC: 0.46 IU/ML
GLUCOSE SERPL-MCNC: 170 MG/DL
GLUCOSE SERPL-MCNC: 170 MG/DL
GLUCOSE SERPL-MCNC: 174 MG/DL
GLUCOSE SERPL-MCNC: 175 MG/DL
HCT VFR BLD AUTO: 26.5 %
HGB BLD-MCNC: 8.2 G/DL
IMM GRANULOCYTES # BLD AUTO: 0.03 K/UL
IMM GRANULOCYTES NFR BLD AUTO: 0.4 %
LYMPHOCYTES # BLD AUTO: 1.2 K/UL
LYMPHOCYTES NFR BLD: 17.1 %
MAGNESIUM SERPL-MCNC: 1.6 MG/DL
MAGNESIUM SERPL-MCNC: 2.2 MG/DL
MCH RBC QN AUTO: 27.8 PG
MCHC RBC AUTO-ENTMCNC: 30.9 G/DL
MCV RBC AUTO: 90 FL
MONOCYTES # BLD AUTO: 0.5 K/UL
MONOCYTES NFR BLD: 7.7 %
NEUTROPHILS # BLD AUTO: 5 K/UL
NEUTROPHILS NFR BLD: 72.9 %
NRBC BLD-RTO: 0 /100 WBC
PHOSPHATE SERPL-MCNC: 5.9 MG/DL
PHOSPHATE SERPL-MCNC: 6.5 MG/DL
PHOSPHATE SERPL-MCNC: 6.6 MG/DL
PHOSPHATE SERPL-MCNC: 7.1 MG/DL
PLATELET # BLD AUTO: 44 K/UL
PMV BLD AUTO: 9.9 FL
POCT GLUCOSE: 171 MG/DL (ref 70–110)
POCT GLUCOSE: 176 MG/DL (ref 70–110)
POCT GLUCOSE: 178 MG/DL (ref 70–110)
POCT GLUCOSE: 181 MG/DL (ref 70–110)
POCT GLUCOSE: 184 MG/DL (ref 70–110)
POTASSIUM SERPL-SCNC: 3.6 MMOL/L
POTASSIUM SERPL-SCNC: 3.8 MMOL/L
POTASSIUM SERPL-SCNC: 3.8 MMOL/L
POTASSIUM SERPL-SCNC: 3.9 MMOL/L
PROT SERPL-MCNC: 6 G/DL
RBC # BLD AUTO: 2.95 M/UL
SODIUM SERPL-SCNC: 142 MMOL/L
VANCOMYCIN SERPL-MCNC: 22.1 UG/ML
WBC # BLD AUTO: 6.86 K/UL

## 2018-02-05 PROCEDURE — 25000003 PHARM REV CODE 250: Performed by: STUDENT IN AN ORGANIZED HEALTH CARE EDUCATION/TRAINING PROGRAM

## 2018-02-05 PROCEDURE — 85025 COMPLETE CBC W/AUTO DIFF WBC: CPT

## 2018-02-05 PROCEDURE — 63600175 PHARM REV CODE 636 W HCPCS: Performed by: STUDENT IN AN ORGANIZED HEALTH CARE EDUCATION/TRAINING PROGRAM

## 2018-02-05 PROCEDURE — 84100 ASSAY OF PHOSPHORUS: CPT

## 2018-02-05 PROCEDURE — 99233 SBSQ HOSP IP/OBS HIGH 50: CPT | Mod: ,,, | Performed by: INTERNAL MEDICINE

## 2018-02-05 PROCEDURE — 63600175 PHARM REV CODE 636 W HCPCS: Performed by: INTERNAL MEDICINE

## 2018-02-05 PROCEDURE — 85520 HEPARIN ASSAY: CPT

## 2018-02-05 PROCEDURE — 83735 ASSAY OF MAGNESIUM: CPT | Mod: 91

## 2018-02-05 PROCEDURE — 80069 RENAL FUNCTION PANEL: CPT

## 2018-02-05 PROCEDURE — 27000221 HC OXYGEN, UP TO 24 HOURS

## 2018-02-05 PROCEDURE — 25000003 PHARM REV CODE 250: Performed by: INTERNAL MEDICINE

## 2018-02-05 PROCEDURE — 83735 ASSAY OF MAGNESIUM: CPT

## 2018-02-05 PROCEDURE — G8996 SWALLOW CURRENT STATUS: HCPCS | Mod: CI

## 2018-02-05 PROCEDURE — A4216 STERILE WATER/SALINE, 10 ML: HCPCS | Performed by: NURSE PRACTITIONER

## 2018-02-05 PROCEDURE — 80202 ASSAY OF VANCOMYCIN: CPT

## 2018-02-05 PROCEDURE — 25000003 PHARM REV CODE 250: Performed by: NURSE PRACTITIONER

## 2018-02-05 PROCEDURE — 99232 SBSQ HOSP IP/OBS MODERATE 35: CPT | Mod: ,,, | Performed by: INTERNAL MEDICINE

## 2018-02-05 PROCEDURE — 80069 RENAL FUNCTION PANEL: CPT | Mod: 91

## 2018-02-05 PROCEDURE — 63600175 PHARM REV CODE 636 W HCPCS: Performed by: NURSE PRACTITIONER

## 2018-02-05 PROCEDURE — 20000000 HC ICU ROOM

## 2018-02-05 PROCEDURE — 80053 COMPREHEN METABOLIC PANEL: CPT

## 2018-02-05 PROCEDURE — 92610 EVALUATE SWALLOWING FUNCTION: CPT

## 2018-02-05 RX ORDER — POTASSIUM CHLORIDE 20 MEQ/1
40 TABLET, EXTENDED RELEASE ORAL ONCE
Status: COMPLETED | OUTPATIENT
Start: 2018-02-05 | End: 2018-02-05

## 2018-02-05 RX ORDER — METOPROLOL TARTRATE 25 MG/1
12.5 TABLET ORAL 4 TIMES DAILY
Status: DISCONTINUED | OUTPATIENT
Start: 2018-02-05 | End: 2018-02-06

## 2018-02-05 RX ORDER — MAGNESIUM SULFATE HEPTAHYDRATE 40 MG/ML
2 INJECTION, SOLUTION INTRAVENOUS ONCE
Status: COMPLETED | OUTPATIENT
Start: 2018-02-05 | End: 2018-02-05

## 2018-02-05 RX ORDER — POTASSIUM CHLORIDE 20 MEQ/1
20 TABLET, EXTENDED RELEASE ORAL ONCE
Status: COMPLETED | OUTPATIENT
Start: 2018-02-05 | End: 2018-02-05

## 2018-02-05 RX ADMIN — POTASSIUM CHLORIDE 20 MEQ: 1500 TABLET, EXTENDED RELEASE ORAL at 01:02

## 2018-02-05 RX ADMIN — CEFTRIAXONE SODIUM 2 G: 2 INJECTION, POWDER, FOR SOLUTION INTRAMUSCULAR; INTRAVENOUS at 10:02

## 2018-02-05 RX ADMIN — ONDANSETRON 4 MG: 2 INJECTION INTRAMUSCULAR; INTRAVENOUS at 10:02

## 2018-02-05 RX ADMIN — Medication 12.5 MG: at 12:02

## 2018-02-05 RX ADMIN — DEXTROSE MONOHYDRATE 0.02 MCG/KG/MIN: 5 INJECTION, SOLUTION INTRAVENOUS at 07:02

## 2018-02-05 RX ADMIN — Medication 12.5 MG: at 11:02

## 2018-02-05 RX ADMIN — FUROSEMIDE 10 MG/HR: 10 INJECTION, SOLUTION INTRAMUSCULAR; INTRAVENOUS at 06:02

## 2018-02-05 RX ADMIN — Medication 3 ML: at 06:02

## 2018-02-05 RX ADMIN — PIPERACILLIN AND TAZOBACTAM 4.5 G: 4; .5 INJECTION, POWDER, LYOPHILIZED, FOR SOLUTION INTRAVENOUS; PARENTERAL at 01:02

## 2018-02-05 RX ADMIN — POTASSIUM CHLORIDE 20 MEQ: 1500 TABLET, EXTENDED RELEASE ORAL at 08:02

## 2018-02-05 RX ADMIN — POTASSIUM CHLORIDE 40 MEQ: 1500 TABLET, EXTENDED RELEASE ORAL at 03:02

## 2018-02-05 RX ADMIN — MAGNESIUM SULFATE IN WATER 2 G: 40 INJECTION, SOLUTION INTRAVENOUS at 08:02

## 2018-02-05 RX ADMIN — Medication 12.5 MG: at 05:02

## 2018-02-05 RX ADMIN — PANTOPRAZOLE SODIUM 40 MG: 40 TABLET, DELAYED RELEASE ORAL at 08:02

## 2018-02-05 RX ADMIN — FUROSEMIDE 20 MG/HR: 10 INJECTION, SOLUTION INTRAMUSCULAR; INTRAVENOUS at 01:02

## 2018-02-05 RX ADMIN — HEPARIN SODIUM 18 UNITS/KG/HR: 10000 INJECTION, SOLUTION INTRAVENOUS at 09:02

## 2018-02-05 NOTE — PLAN OF CARE
Problem: Patient Care Overview  Goal: Plan of Care Review  Outcome: Ongoing (interventions implemented as appropriate)  No acute events throughout day, VS and assessment per flow sheet. Patient weaned off vaso and levo this morning. Lasix drip decreased from 20 to 10. UO has been 200-300/hr. Heparin drip is at 18 u/kg/hr and anti-xa is therapeutic. He's to have a barium swallow study tomorrow and an US of his kidney. Electrolytes replaced per orders. Plan of care reviewed with Karthik Medrano and family, all concerns addressed, will continue to monitor.

## 2018-02-05 NOTE — SUBJECTIVE & OBJECTIVE
Interval History:   Remains on lasix 20mg/hr. No events overnight. NC weaned to RA. Off levophed this AM; remains on vaso. Patient doing okay this morning. No complaints. Denies SOB.   UOP 6.4L yesterday; net negative 5L/24h.     Review of patient's allergies indicates:  No Known Allergies  Current Facility-Administered Medications   Medication Frequency    acetaminophen tablet 650 mg Q4H PRN    cefTRIAXone (ROCEPHIN) 2 g in dextrose 5 % 50 mL IVPB Q24H    dextrose 50% injection 12.5 g PRN    dextrose 50% injection 25 g PRN    diphenoxylate-atropine 2.5-0.025 mg per tablet 1 tablet QID PRN    furosemide (LASIX) 250 mg in sodium chloride 0.9 % 250 mL infusion (non-titrating) Continuous    glucagon (human recombinant) injection 1 mg PRN    glucose chewable tablet 16 g PRN    glucose chewable tablet 24 g PRN    heparin 25,000 units in dextrose 5% 250 mL (100 units/mL) bolus from bag; ADDITIONAL PRN BOLUS PRN    heparin 25,000 units in dextrose 5% 250 mL (100 units/mL) bolus from bag; ADDITIONAL PRN BOLUS PRN    heparin 25,000 units in dextrose 5% 250 mL (100 units/mL) infusion Continuous    insulin aspart pen 0-5 Units Q6H    metoprolol tartrate (LOPRESSOR) split tablet 12.5 mg QID    ondansetron disintegrating tablet 8 mg Q6H PRN    ondansetron injection 4 mg Q6H PRN    oxyCODONE immediate release tablet 10 mg Q4H PRN    oxyCODONE immediate release tablet 5 mg Q4H PRN    pantoprazole EC tablet 40 mg Daily    sodium chloride 0.9% flush 3 mL Q8H       Objective:     Vital Signs (Most Recent):  Temp: 97.5 °F (36.4 °C) (02/05/18 1100)  Pulse: (!) 129 (02/05/18 1200)  Resp: 20 (02/05/18 1200)  BP: 111/65 (02/03/18 2100)  SpO2: 99 % (02/05/18 1200)  O2 Device (Oxygen Therapy): nasal cannula (02/05/18 1200) Vital Signs (24h Range):  Temp:  [97.5 °F (36.4 °C)-98.1 °F (36.7 °C)] 97.5 °F (36.4 °C)  Pulse:  [124-133] 129  Resp:  [10-22] 20  SpO2:  [88 %-100 %] 99 %  Arterial Line BP: ()/(46-86) 101/46      Weight: 86.4 kg (190 lb 7.6 oz) (02/05/18 0400)  Body mass index is 27.33 kg/m².  Body surface area is 2.07 meters squared.    I/O last 3 completed shifts:  In: 1961 [I.V.:1211; IV Piggyback:750]  Out: 85094 [Urine:81383]    Physical Exam   Constitutional: He is oriented to person, place, and time. He appears well-developed and well-nourished. No distress.   HENT:   Head: Normocephalic and atraumatic.   Eyes: Conjunctivae and EOM are normal.   Cardiovascular: Normal rate and regular rhythm.    Pulmonary/Chest: He has no wheezes. He has no rales.   Abdominal: Soft. He exhibits no distension.   Musculoskeletal: He exhibits edema (trace). He exhibits no deformity.   Neurological: He is alert and oriented to person, place, and time.   Skin: Skin is warm. He is not diaphoretic.       Significant Labs:  CBC:   Recent Labs  Lab 02/05/18  0502   WBC 6.86   RBC 2.95*   HGB 8.2*   HCT 26.5*   PLT 44*   MCV 90   MCH 27.8   MCHC 30.9*     CMP:   Recent Labs  Lab 02/05/18  0502 02/05/18  0824   * 175*   CALCIUM 8.6* 8.6*   ALBUMIN 2.6* 2.8*   PROT 6.0  --     142   K 3.8 3.9   CO2 26 28    99   BUN 62* 64*   CREATININE 3.5* 3.4*   ALKPHOS 345*  --    ALT 77*  --    AST 34  --    BILITOT 0.5  --      All labs within the past 24 hours have been reviewed.     Significant Imaging:  Labs: Reviewed

## 2018-02-05 NOTE — ASSESSMENT & PLAN NOTE
- phos remains quite elevated despite improvement in renal function  - uric acid elevated in setting of untreated malignancy  - continue to trend uric acid levels daily

## 2018-02-05 NOTE — PROGRESS NOTES
[facility]  Cardiology  Progress Note    Patient Name: Karthik Medrano  MRN: [id number]  Admission Date: 2/1/2018  Hospital Length of Stay: 4 days  Code Status: Full Code   Attending Physician: Sandoval Martini MD   Primary Care Physician: Pinky Thornton MD  Expected Discharge Date: 2/5/2018  Principal Problem:Atrial flutter with rapid ventricular response    Subjective:     Hospital Course:   2/2 transferred to CCU, started on levophed and vasopressin, oliguric and w/ anasarca, IVC consistent w/ volume overload, started on lasix drip, lactate 5, UA positive, leukocytosis, started on vanc/ zosyn  2/3 urology consulted for severe R hydronephrosis, UOP improving  2/4 weaning off levophed, starting heparin drip for anticoagulation  2/5 Weaned off and stopped pressors. Started 12.5 metoprolol with hold parameters. De-escalated zosyn to rocephin.    Interval History: Continues to be afebrile, leukocytosis resolved, urine output improving w/ ~4900cc over last 24 hrs. Tachycardic to 133, but asymptomatic. Only complaining of indigestion, but well controlled with PPI. Denies CP, SOB, n/v/d/c.     Review of Systems   Constitution: Positive for weakness. Negative for chills and fever.   HENT: Negative for congestion and sore throat.    Eyes: Negative for blurred vision, pain and visual disturbance.   Cardiovascular: Positive for leg swelling, orthopnea and paroxysmal nocturnal dyspnea. Negative for chest pain, claudication, near-syncope, palpitations and syncope.   Respiratory: Negative for cough and shortness of breath.    Musculoskeletal: Negative for joint pain and joint swelling.   Gastrointestinal: Negative for bloating, abdominal pain, constipation and diarrhea.   Genitourinary: Negative for bladder incontinence, frequency and urgency.   Neurological: Negative for dizziness, headaches and light-headedness.   Psychiatric/Behavioral: Negative for altered mental status and depression.      Objective:     Vital Signs (Most Recent):  Temp: 97.6 °F (36.4 °C) (02/05/18 0715)  Pulse: (!) 128 (02/05/18 0800)  Resp: 11 (02/05/18 0800)  BP: 111/65 (02/03/18 2100)  SpO2: (!) 88 % (02/05/18 0800) Vital Signs (24h Range):  Temp:  [97.6 °F (36.4 °C)-98.1 °F (36.7 °C)] 97.6 °F (36.4 °C)  Pulse:  [124-133] 128  Resp:  [10-22] 11  SpO2:  [88 %-100 %] 88 %  Arterial Line BP: ()/(43-86) 120/64     Weight: 86.4 kg (190 lb 7.6 oz)  Body mass index is 27.33 kg/m².     SpO2: (!) 88 %  O2 Device (Oxygen Therapy): room air      Intake/Output Summary (Last 24 hours) at 02/05/18 0907  Last data filed at 02/05/18 0900   Gross per 24 hour   Intake          1255.08 ml   Output             6160 ml   Net         -4904.92 ml       Lines/Drains/Airways     Central Venous Catheter Line                 Port A Cath Single Lumen -- days         Port A Cath Single Lumen 02/01/18 1005 other (see comments) 3 days          Drain                 Ureteral Drain/Stent 03/04/16 Right ureter 6 Fr. 703 days         Urethral Catheter 02/02/18 1545 2 days          Arterial Line                 Arterial Line 02/02/18 1637 Right Femoral 2 days                Physical Exam   Constitutional: He is oriented to person, place, and time. No distress.   HENT:   Head: Normocephalic and atraumatic.   Eyes: Conjunctivae are normal. No scleral icterus.   Neck: Neck supple. JVD present.   Cardiovascular: Tachycardia present.    No murmur heard.  Tachycardic   Pulmonary/Chest: He has no wheezes.   On nasal cannula   Abdominal: Soft. He exhibits distension. There is no tenderness. There is no rebound and no guarding.   Musculoskeletal: He exhibits edema (+3 b/l LE).   Neurological: He is alert and oriented to person, place, and time.   Skin: Skin is warm. No rash noted.       Significant Labs:   ABG:     Recent Labs  Lab 02/03/18  1305 02/04/18  0819   PH 7.283* 7.325*   PCO2 42.6 51.2*   HCO3 20.1* 26.7   POCSATURATED 100 100   BE -7 1   , Blood  Culture: No results for input(s): LABBLOO in the last 48 hours., BMP:   Recent Labs  Lab 02/04/18  0504  02/05/18  0029 02/05/18  0502 02/05/18  0824   *  < > 170* 170* 175*     < > 142 142 142   K 4.3  < > 3.8 3.8 3.9     < > 101 101 99   CO2 24  < > 27 26 28   BUN 70*  < > 66* 62* 64*   CREATININE 3.5*  < > 3.5* 3.5* 3.4*   CALCIUM 8.5*  < > 8.6* 8.6* 8.6*   MG 2.1  --   --  1.6  --    < > = values in this interval not displayed. and CBC     Recent Labs  Lab 02/04/18  0504 02/05/18  0502   WBC 8.87 6.86   HGB 8.5* 8.2*   HCT 26.8* 26.5*   * 44*       Significant Imaging: Renal US: Severe right hydronephrosis    Assessment and Plan:       * Atrial flutter with rapid ventricular response    Verapamil and toprol-xl d/c 2/2 hypotension, with increase in rates up to 130s  Started anticoagulation w/ heparin gtt as per EP reccs  Now off pressure support, started metoprolol 12.5 q6hr with hold parameters of MAP <65  Plan for ablation once hemodynamically stable and as per EP reccs, will try to time with paracentesis later this week, will need assessment if paracentesis required at that time.   Check electrolytes and Mg at least BID for repletion        Acute congestive heart failure    TTE 8/29/17 EF 60%, TTE 2/2/18 EF 40%  S/p lasix 80mg x 1  c/w lasix drip, transition down to 10cc/hr today  Monitor I/Os        Type 2 diabetes mellitus with stage 3 chronic kidney disease, without long-term current use of insulin    C/w accuchecks  aspart sliding scale        Hydronephrosis    R ureteral stricture with right hydronephrosis managed w/ ureteral stent exchanges q 3 months  Per pt last stent exchange ~1 week ago, states he received pre- and post procedure antibiotic prophylaxis  Urology consulted  Per urology (hydronephrosis likely 2/2 XRT for rectal ca) should patient clinically decompensate recommend right nephrostomy tube  Discussion w/ pt regarding possible nephrostomy tube, pt does not want  nephrostomy tube  Pt currently w/ improving urine output        Acute renal failure superimposed on stage 3 chronic kidney disease    Urine output improving  Monitor I/Os  Nephology following        Rectal cancer    Per hem/onc notes pt note tolerating chemo and now chemo held 2/2 debility and functional status, prognosis guarded  Per wife pt wishes to be full code and want full resuscitation, however, does not want central line or dialysis        Ascites    S/p recurrent paracenteses  S/p paracentesis w/ removal ~4.5L 2/2/18  Assess later in week for need of repeat paracentesis as it could be done with ablation  Concern for  vs SBP as had leukocytosis, though leukocytosis now resolved. Fluid from 2/2/18 paracentesis was not sent for analysis. Transitioned today from zosyn to rocephin.        Shock    Lactate decresed to 1.1, leukocytosis now resolved  Pressors weaned and now stopped  D/C'd vanc and zosyn transitioned to rocephin 2/5/18        Anasarca    Likely 2/2 low albumin and CKD  C/w lasix for diuresis        Urinary tract infection without hematuria    UA positive  Initially on vanc and zosyn, have de-escalated to rocephin  Urine cx: no growth, gram stain (-)            VTE Risk Mitigation         Ordered     heparin 25,000 units in dextrose 5% 250 mL (100 units/mL) bolus from bag; ADDITIONAL PRN BOLUS  As needed (PRN)     Route:  Intravenous        02/05/18 1050     heparin 25,000 units in dextrose 5% 250 mL (100 units/mL) bolus from bag; ADDITIONAL PRN BOLUS  As needed (PRN)     Route:  Intravenous        02/05/18 1050     heparin 25,000 units in dextrose 5% 250 mL (100 units/mL) infusion  Continuous     Route:  Intravenous        02/04/18 1359     High Risk of VTE  Once      02/01/18 1231     Medium Risk of VTE  Once      02/01/18 1231     Place sequential compression device  Until discontinued      02/01/18 1231     Reason for No Pharmacological VTE Prophylaxis  Once      02/01/18 1231          Leona  MD Nazanin  Cardiology  [facility]

## 2018-02-05 NOTE — PLAN OF CARE
Problem: SLP Goal  Goal: SLP Goal  Speech Language Pathology Goals  Goals expected to be met by 2/12/18  1. Pt will tolerate regular diet with nectar-thickened liquids w/o overt S/S aspiration, Supervision   2. Pt will participate in Modified Barium Swallow Study to rule out risk of aspiration     Outcome: Ongoing (interventions implemented as appropriate)  Bedside Swallow Study initiated. Pt with mildly reduced vocal quality and delayed throat clear on thin liquids. REC: regular diet with nectar-thickened liquids, medications whole one at a time ok, and ST to continue to follow to monitor tolerance. Due to h/o weight loss and decreased appetite, Patient would benefit from further, objective assessment via MBSS to r/o risk of silent aspiration.  Please order if in agreement. Findings reviewed with Pt, spouse, nurse and MD team. Thank you.    DARIN Wells., [facility]  Speech-Language Pathology  Pager: [phone]  2/5/2018

## 2018-02-05 NOTE — PT/OT/SLP EVAL
Speech Language Pathology Evaluation  Bedside Swallow    Patient Name:  Karthik Medrano   MRN:  [id number]  Admitting Diagnosis: Atrial flutter with rapid ventricular response  The primary encounter diagnosis was Atrial flutter with rapid ventricular response. Diagnoses of Tachycardia, Chest pain, Arrhythmia, Anemia in neoplastic disease, Other ascites, CKD (chronic kidney disease), stage III, Rectal cancer, Renovascular hypertension, Type 2 diabetes mellitus with stage 3 chronic kidney disease, without long-term current use of insulin, Atrial flutter, Atrial fibrillation, Shock, Acute kidney failure with lesion of tubular necrosis, Acute diastolic congestive heart failure, Hypotension, unspecified hypotension type, and Hypovolemic shock were also pertinent to this visit.    Recommendations:                 General Recommendations:  Modified barium swallow study, dysphagia therapy   Diet recommendations:  Regular, Nectar Thick   Aspiration Precautions: 1 bite/sip at a time, Alternating bites/sips, Assistance with thickening liquids, Avoid talking while eating, Eliminate distractions, Frequent oral care, HOB to 90 degrees, Meds whole 1 at a time, Remain upright 30 minutes post meal, Small bites/sips, Strict aspiration precautions and Wear oxygen during intake   General Precautions: Standard, aspiration, fall  Communication strategies:  none    History:     Past Medical History:   Diagnosis Date    CHF (congestive heart failure)     Diabetes mellitus     Encounter for blood transfusion     GERD (gastroesophageal reflux disease)     Hypertension     Rectal cancer metastasized to lung 01/11/2010       Past Surgical History:   Procedure Laterality Date    ABDOMINAL SURGERY      COLON SURGERY      COLONOSCOPY N/A 10/3/2017    Procedure: COLONOSCOPY;  Surgeon: Sandoval Bowling MD;  Location: [address];  Service: Endoscopy;  Laterality: N/A;    CYSTOSCOPY W/ URETERAL STENT PLACEMENT      FRACTURE SURGERY    "   SKIN BIOPSY         Social History: Patient lives with Spouse in [place].    Chest X-Rays:   2/2/2018: Pulmonary masses not as well-visualized.No interval infiltrates. The cardiac silhouette is prominent. The pulmonary vasculature is mildly engorged. There is no pleural effusion or pneumothorax. The hilar and mediastinal contours are unremarkable. There are no acute bony abnormalities. Left-sided central venous catheter with tip at level of superior vena cava. Atherosclerotic change within the aorta.    02/01/18:  Poor inspiration with volume loss bilaterally. Continued bilateral pulmonary nodules which are not as well-demonstrated due to underpenetration. The mass at the right upper lobe measures a larger as compared to the previous study.      Prior diet: Regular, thin    Occupation/hobbies/homemaking: Employed, CPA.    Subjective     SLP explains Pt off BiPAP since 2/4/18. She reports Pt tolerating pills whole with thin liquids w/o difficulty  Pt presents alert and awake  He explains, "Sometimes I notice they [medications] don't go down right." He reports fluctuating nausea for ~ 1.5 months. He endorses decreased appetite.   Patient goals: "I need to eat something"    Objective:   Pt found reclined in bed, Spouse at bedside. Nurse in room to assess vitals. HOB elevated. Pt asking SLP to stop elevation of bed at 75 degree angle 2/2  Discomfort/distension of the abdomen.     Oral Musculature Evaluation  · Oral Musculature: WFL  · Dentition: present and adequate  · Mucosal Quality: good  · Mandibular Strength and Mobility: WFL  · Buccal Strength and Mobility: WFL    Bedside Swallow Eval:   Consistencies Assessed:  · Thin liquids ice chips x2, cup edge sips x3  · Nectar thick liquids tsp sipx1, cup edge sip x1  · Puree tsp bites x2 fed by clinician, tsp bites x5 self-fed  · Solids bites of carlyn cracker x2, self-fed     Oral Phase:   · WFL    Pharyngeal Phase:   · throat clearing delayedx1    Compensatory " Strategies  · Multiple swallows    Treatment: Pt with hoarse vocal quality and c/o nausea and decreased PO Intake upon review at the bedside today. Pt with inconsistent throat clear, delayed with thin liquids. Pt educated on SLP role, S/S aspiration, aspiration precautions and recommendations for MBSS to r/o aspiration. Pt verbalizes understanding, emphasizes he is eager to eat. SLP reviews Pt with MD team and nurse following session.     Assessment:     Karthik Medrano is a 67 y.o. male with an SLP diagnosis of Dysphagia.  He presents with risk of aspiration and would benefit from further, objective assessment via MBSS to determine feasibility of PO upgrade.     Goals:    SLP Goals        Problem: SLP Goal    Goal Priority Disciplines Outcome   SLP Goal     SLP Ongoing (interventions implemented as appropriate)   Description:  Speech Language Pathology Goals  Goals expected to be met by 2/12/18  1. Pt will tolerate regular diet with nectar-thickened liquids w/o overt S/S aspiration, Supervision   2. Pt will participate in Modified Barium Swallow Study to rule out risk of aspiration and determine safest, least restrictive means of hydration/nutrition                        Plan:     · Patient to be seen:  5 x/week   · Plan of Care expires:  03/07/18  · Plan of Care reviewed with:      · SLP Follow-Up:  Yes       Discharge recommendations:  other (see comments), home health speech therapy (pending progress )   Barriers to Discharge:  None    Time Tracking:     SLP Treatment Date:   02/05/18  Speech Start Time:  1045  Speech Stop Time:  1102     Speech Total Time (min):  17 min    Billable Minutes: Eval Swallow and Oral Function 17    DARIN Wells., [facility]-SL[facility]  Speech-Language Pathology  Pager: [phone]    02/05/2018

## 2018-02-05 NOTE — PROGRESS NOTES
[facility]  Nephrology  Progress Note    Patient Name: Karthik Medrano  MRN: [id number]  Admission Date: 2/1/2018  Hospital Length of Stay: 4 days  Attending Provider: Sandoval Martini MD   Primary Care Physician: Pinky Thornton MD  Principal Problem:Atrial flutter with rapid ventricular response    Subjective:     HPI: Mr. Medrano is a 66 yo male with HTN, T2DM, CKD stage 3 (baseline sCr 1.6), CHF (EF 40%), and rectal cancer with metastatic disease who was admitted from clinic with tachycardia. He was diagnosed with rectal cancer in 2010; he received neoadjuvant chemo with 5-FU and later underwent resection with clear margins. He was later found to have pulmonary mets and has received variations of 5-FU, FOLFOX, FOLFIRI, Cyranza, and  Avastin. He most recently received FOLFIRI and cyranza on 12/27/17. Further chemotherapy was held due to decline in functional status. His cancer course has also been complicated by R hydronephrosis requiring JJ stent in 2016. He is followed by Urology at [facility] for this. Stent was last changed 2 weeks ago. He also receives therapeutic paracenteses q 2 weeks. Upon arrival to [facility] he was diagnosed with aflutter and has been followed by EP. Wife reports decreased PO intake for several days. Yesterday he underwent a therapeutic paracentesis with 5L removed. SBP subsequently dropped to the 50-70s. He received albumin prior to being started on levophed and vasopressin. He was hypotensive for a few hours. Subsequent labs revealed worsening RE with sCr 2 --> 3.1, acidosis with bicarb 11, and elevated lactate of 3.5. He was transferred to the ICU and started on a bicarb drip. UOP only 175cc last night; gallo catheter placed without much improvement. Renal US this morning revealed severe R hydronephrosis. Hourly intake 40cc/hr this morning with levophed, vasopressin, and lasix gtt. Patient is currently on BiPAP and doesn't participate in the history; all was  "provided by wife. Wife is very fearful of "invasive procedures" given patient's propensity to bleed easily and get infections given immunosuppression. She would like to avoid dialysis as long as possible.     Interval History:   Remains on lasix 20mg/hr. No events overnight. NC weaned to RA. Off levophed this AM; remains on vaso. Patient doing okay this morning. No complaints. Denies SOB.   UOP 6.4L yesterday; net negative 5L/24h.     Review of patient's allergies indicates:  No Known Allergies  Current Facility-Administered Medications   Medication Frequency    acetaminophen tablet 650 mg Q4H PRN    cefTRIAXone (ROCEPHIN) 2 g in dextrose 5 % 50 mL IVPB Q24H    dextrose 50% injection 12.5 g PRN    dextrose 50% injection 25 g PRN    diphenoxylate-atropine 2.5-0.025 mg per tablet 1 tablet QID PRN    furosemide (LASIX) 250 mg in sodium chloride 0.9 % 250 mL infusion (non-titrating) Continuous    glucagon (human recombinant) injection 1 mg PRN    glucose chewable tablet 16 g PRN    glucose chewable tablet 24 g PRN    heparin 25,000 units in dextrose 5% 250 mL (100 units/mL) bolus from bag; ADDITIONAL PRN BOLUS PRN    heparin 25,000 units in dextrose 5% 250 mL (100 units/mL) bolus from bag; ADDITIONAL PRN BOLUS PRN    heparin 25,000 units in dextrose 5% 250 mL (100 units/mL) infusion Continuous    insulin aspart pen 0-5 Units Q6H    metoprolol tartrate (LOPRESSOR) split tablet 12.5 mg QID    ondansetron disintegrating tablet 8 mg Q6H PRN    ondansetron injection 4 mg Q6H PRN    oxyCODONE immediate release tablet 10 mg Q4H PRN    oxyCODONE immediate release tablet 5 mg Q4H PRN    pantoprazole EC tablet 40 mg Daily    sodium chloride 0.9% flush 3 mL Q8H       Objective:     Vital Signs (Most Recent):  Temp: 97.5 °F (36.4 °C) (02/05/18 1100)  Pulse: (!) 129 (02/05/18 1200)  Resp: 20 (02/05/18 1200)  BP: 111/65 (02/03/18 2100)  SpO2: 99 % (02/05/18 1200)  O2 Device (Oxygen Therapy): nasal cannula " (02/05/18 1200) Vital Signs (24h Range):  Temp:  [97.5 °F (36.4 °C)-98.1 °F (36.7 °C)] 97.5 °F (36.4 °C)  Pulse:  [124-133] 129  Resp:  [10-22] 20  SpO2:  [88 %-100 %] 99 %  Arterial Line BP: ()/(46-86) 101/46     Weight: 86.4 kg (190 lb 7.6 oz) (02/05/18 0400)  Body mass index is 27.33 kg/m².  Body surface area is 2.07 meters squared.    I/O last 3 completed shifts:  In: 1961 [I.V.:1211; IV Piggyback:750]  Out: 36127 [Urine:93472]    Physical Exam   Constitutional: He is oriented to person, place, and time. He appears well-developed and well-nourished. No distress.   HENT:   Head: Normocephalic and atraumatic.   Eyes: Conjunctivae and EOM are normal.   Cardiovascular: Normal rate and regular rhythm.    Pulmonary/Chest: He has no wheezes. He has no rales.   Abdominal: Soft. He exhibits no distension.   Musculoskeletal: He exhibits edema (trace). He exhibits no deformity.   Neurological: He is alert and oriented to person, place, and time.   Skin: Skin is warm. He is not diaphoretic.       Significant Labs:  CBC:   Recent Labs  Lab 02/05/18  0502   WBC 6.86   RBC 2.95*   HGB 8.2*   HCT 26.5*   PLT 44*   MCV 90   MCH 27.8   MCHC 30.9*     CMP:   Recent Labs  Lab 02/05/18  0502 02/05/18  0824   * 175*   CALCIUM 8.6* 8.6*   ALBUMIN 2.6* 2.8*   PROT 6.0  --     142   K 3.8 3.9   CO2 26 28    99   BUN 62* 64*   CREATININE 3.5* 3.4*   ALKPHOS 345*  --    ALT 77*  --    AST 34  --    BILITOT 0.5  --      All labs within the past 24 hours have been reviewed.     Significant Imaging:  Labs: Reviewed    Assessment/Plan:     Acute renal failure superimposed on stage 3 chronic kidney disease    - baseline sCr 1.6  - sCr dion to 3.1 s/p hypotensive episode after large volume paracentesis on 2/2. Likely ischemic ATN  - renal US with severe hydronephrosis; stent recently exchanged 2 weeks prior  - initially with minimal UOP; but later started diuresing very well. Suspecting post-ATN vs post-obstructive  diuresis based on presentation. Do not think lasix gtt is solely responsible for this.   - UOP 6.4L yesterday; net negative 5L/24h  - recommend stopping lasix gtt and holding diuresis today. If truly post-obstructive ATN, patient will likely make ~3L of urine on his own without diuretics. Can restart in tomorrow if needed  - sCr peaked at 3.5; stable at 3.4 today  - borderline contraction alkalosis today. Na also rising. Continue to monitor closely  - will repeat renal US tomorrow to assess status of hydronephrosis  - will sign off. Please call with any changes/questions        Hyperphosphatemia    - phos remains quite elevated despite improvement in renal function  - uric acid elevated in setting of untreated malignancy  - continue to trend uric acid levels daily            Shelley Awad, PGY-5  Nephrology Fellow  [facility]  Pager: [phone]  I have reviewed and concur with the fellow's history, physical, assessment, and plan. I have personally interviewed and examined the patient at bedside.

## 2018-02-05 NOTE — PLAN OF CARE
Problem: Patient Care Overview  Goal: Plan of Care Review  Outcome: Ongoing (interventions implemented as appropriate)  POC reviewed with pt and spouse at bedside. No acute events this shift, VSS. Pt still remains on levo @ .05mcg/kg, vaso, lasix, and heparin gtt. Michael with great UOP this shift, 200-350ml/hr. Pt remains NPO for swallow eval in the morning. Pt still remains in afluter, heparin gtt started per primary. Patient remained on nasal cannula all day without any issues. All questions and concerns addressed with patient. Monitoring.

## 2018-02-05 NOTE — PLAN OF CARE
Problem: Patient Care Overview  Goal: Plan of Care Review  Outcome: Ongoing (interventions implemented as appropriate)  No acute events throughout night, VS and assessment per flow sheet, patient progressing towards goals as tolerated, plan of care reviewed with Karthik Medrano and family, all concerns addressed,  Levophed and Vasopressin infusing; Heparin and Lasix infusing.  Michael in place.  2L NC.  Will continue to monitor.    CM-ICU  ABCDEF Early Mobility Inclusion Criteria Pass / Fail   M  Myocardial Stability  No dysrhythmia requiring new antidysrhythmic agent x 24 hours.   No evidence of active myocardial ischemia x 24 hours.   No femoral Impella, Tandem, or IABP. pass   O  Oxygenation  PEEP < 10 cm H2O.   FiO2 ? 60%.   SpO2 > 88%. pass   V  Vasopressors  No increase of any vasopressor x 2 hours.   No high dose vasopressors. fail   E  Engages to Voice  RASS > -3.   Responds to verbal stimulation. pass   Pass?  Passes all four criteria. Fail

## 2018-02-05 NOTE — ASSESSMENT & PLAN NOTE
Mr. Medrano is a 67 y.o. Man with metastatic rectal cancer Dx 2010, s/p resection, then with pulmonary mets 2013 on FOLFIRI chemotherapy, recurrent ascites s/p regular paracentesis, ureteral obstructive s/p urostomy tubes, and recently diagnosed atrial flutter with variable block, DM - who presents as a transfer from clinic with shortness of breath and tachycardia.     Rate uncontrolled, please uptitrate AV armond agents as tolerated. He may not be a candidate for AC long term but while inpatient would recommend heparin gtt. Will attempt to coordinate AFL ablation with next paracentesis.

## 2018-02-05 NOTE — PROGRESS NOTES
Notified Dr. Taylor about holding Lasix drip overnight per nephrology recs.  No new orders. Will continue to monitor.

## 2018-02-05 NOTE — ASSESSMENT & PLAN NOTE
- baseline sCr 1.6  - sCr dion to 3.1 s/p hypotensive episode after large volume paracentesis on 2/2. Likely ischemic ATN  - renal US with severe hydronephrosis; stent recently exchanged 2 weeks prior  - initially with minimal UOP; but later started diuresing very well. Suspecting post-ATN vs post-obstructive diuresis based on presentation. Do not think lasix gtt is solely responsible for this.   - UOP 6.4L yesterday; net negative 5L/24h  - recommend stopping lasix gtt and holding diuresis today. If truly post-obstructive ATN, patient will likely make ~3L of urine on his own without diuretics. Can restart in tomorrow if needed  - sCr peaked at 3.5; stable at 3.4 today  - borderline contraction alkalosis today. Na also rising. Continue to monitor closely  - will repeat renal US tomorrow to assess status of hydronephrosis  - will sign off. Please call with any changes/questions

## 2018-02-05 NOTE — PROGRESS NOTES
[facility]  Cardiac Electrophysiology  Progress Note    Admission Date: 2/1/2018  Code Status: Full Code   Attending Physician: Sandoval Martini MD   Expected Discharge Date: 2/5/2018  Principal Problem:Atrial flutter with rapid ventricular response    Subjective:     No new subjective & objective note has been filed under this hospital service since the last note was generated.    Assessment and Plan:     * Atrial flutter with rapid ventricular response    Mr. Medrano is a 67 y.o. Man with metastatic rectal cancer Dx 2010, s/p resection, then with pulmonary mets 2013 on FOLFIRI chemotherapy, recurrent ascites s/p regular paracentesis, ureteral obstructive s/p urostomy tubes, and recently diagnosed atrial flutter with variable block, DM - who presents as a transfer from clinic with shortness of breath and tachycardia.     Rate now uncontrolled likely 2/2 levophed. Wean as tolerated. Can start back AV armond agents if/when he improves. He may not be a candidate for AC long term but while inpatient would recommend heparin gtt. Will attempt to coordinate AFL ablation with next paracentesis             Leland Trujillo MD  Cardiac Electrophysiology  [facility]

## 2018-02-06 PROBLEM — R57.9 SHOCK: Status: RESOLVED | Noted: 2018-02-04 | Resolved: 2018-02-06

## 2018-02-06 LAB
ALBUMIN SERPL BCP-MCNC: 2.5 G/DL
ALBUMIN SERPL BCP-MCNC: 2.6 G/DL
ALBUMIN SERPL BCP-MCNC: 2.9 G/DL
ALP SERPL-CCNC: 288 U/L
ALT SERPL W/O P-5'-P-CCNC: 52 U/L
ANION GAP SERPL CALC-SCNC: 11 MMOL/L
ANION GAP SERPL CALC-SCNC: 11 MMOL/L
ANION GAP SERPL CALC-SCNC: 12 MMOL/L
ANION GAP SERPL CALC-SCNC: 12 MMOL/L
ANION GAP SERPL CALC-SCNC: 13 MMOL/L
AST SERPL-CCNC: 23 U/L
BASOPHILS # BLD AUTO: 0.02 K/UL
BASOPHILS NFR BLD: 0.3 %
BILIRUB SERPL-MCNC: 0.3 MG/DL
BUN SERPL-MCNC: 53 MG/DL
BUN SERPL-MCNC: 55 MG/DL
BUN SERPL-MCNC: 55 MG/DL
BUN SERPL-MCNC: 58 MG/DL
BUN SERPL-MCNC: 60 MG/DL
CALCIUM SERPL-MCNC: 8 MG/DL
CALCIUM SERPL-MCNC: 8.5 MG/DL
CALCIUM SERPL-MCNC: 8.7 MG/DL
CALCIUM SERPL-MCNC: 8.9 MG/DL
CALCIUM SERPL-MCNC: 9 MG/DL
CHLORIDE SERPL-SCNC: 100 MMOL/L
CHLORIDE SERPL-SCNC: 100 MMOL/L
CHLORIDE SERPL-SCNC: 101 MMOL/L
CHLORIDE SERPL-SCNC: 98 MMOL/L
CHLORIDE SERPL-SCNC: 99 MMOL/L
CO2 SERPL-SCNC: 29 MMOL/L
CO2 SERPL-SCNC: 30 MMOL/L
CO2 SERPL-SCNC: 31 MMOL/L
CO2 SERPL-SCNC: 32 MMOL/L
CO2 SERPL-SCNC: 32 MMOL/L
CREAT SERPL-MCNC: 2.9 MG/DL
CREAT SERPL-MCNC: 3.1 MG/DL
CREAT SERPL-MCNC: 3.3 MG/DL
CREAT SERPL-MCNC: 3.3 MG/DL
CREAT SERPL-MCNC: 3.4 MG/DL
DIFFERENTIAL METHOD: ABNORMAL
EOSINOPHIL # BLD AUTO: 0.1 K/UL
EOSINOPHIL NFR BLD: 2 %
ERYTHROCYTE [DISTWIDTH] IN BLOOD BY AUTOMATED COUNT: 19.5 %
EST. GFR  (AFRICAN AMERICAN): 20.1 ML/MIN/1.73 M^2
EST. GFR  (AFRICAN AMERICAN): 20.9 ML/MIN/1.73 M^2
EST. GFR  (AFRICAN AMERICAN): 20.9 ML/MIN/1.73 M^2
EST. GFR  (AFRICAN AMERICAN): 22.5 ML/MIN/1.73 M^2
EST. GFR  (AFRICAN AMERICAN): 24.4 ML/MIN/1.73 M^2
EST. GFR  (NON AFRICAN AMERICAN): 17.4 ML/MIN/1.73 M^2
EST. GFR  (NON AFRICAN AMERICAN): 18.1 ML/MIN/1.73 M^2
EST. GFR  (NON AFRICAN AMERICAN): 18.1 ML/MIN/1.73 M^2
EST. GFR  (NON AFRICAN AMERICAN): 19.5 ML/MIN/1.73 M^2
EST. GFR  (NON AFRICAN AMERICAN): 21.1 ML/MIN/1.73 M^2
FACT X PPP CHRO-ACNC: 0.65 IU/ML
GLUCOSE SERPL-MCNC: 168 MG/DL
GLUCOSE SERPL-MCNC: 177 MG/DL
GLUCOSE SERPL-MCNC: 184 MG/DL
GLUCOSE SERPL-MCNC: 188 MG/DL
GLUCOSE SERPL-MCNC: 196 MG/DL
HCT VFR BLD AUTO: 25.5 %
HGB BLD-MCNC: 8 G/DL
IMM GRANULOCYTES # BLD AUTO: 0.02 K/UL
IMM GRANULOCYTES NFR BLD AUTO: 0.3 %
LYMPHOCYTES # BLD AUTO: 1.3 K/UL
LYMPHOCYTES NFR BLD: 19.7 %
MAGNESIUM SERPL-MCNC: 2.1 MG/DL
MAGNESIUM SERPL-MCNC: 2.1 MG/DL
MCH RBC QN AUTO: 28.3 PG
MCHC RBC AUTO-ENTMCNC: 31.4 G/DL
MCV RBC AUTO: 90 FL
MONOCYTES # BLD AUTO: 0.6 K/UL
MONOCYTES NFR BLD: 9 %
NEUTROPHILS # BLD AUTO: 4.5 K/UL
NEUTROPHILS NFR BLD: 68.7 %
NRBC BLD-RTO: 0 /100 WBC
PHOSPHATE SERPL-MCNC: 4.4 MG/DL
PHOSPHATE SERPL-MCNC: 4.5 MG/DL
PHOSPHATE SERPL-MCNC: 4.9 MG/DL
PHOSPHATE SERPL-MCNC: 5.3 MG/DL
PHOSPHATE SERPL-MCNC: 5.4 MG/DL
PLATELET # BLD AUTO: 65 K/UL
PMV BLD AUTO: 10.1 FL
POCT GLUCOSE: 150 MG/DL (ref 70–110)
POCT GLUCOSE: 189 MG/DL (ref 70–110)
POCT GLUCOSE: 198 MG/DL (ref 70–110)
POCT GLUCOSE: 227 MG/DL (ref 70–110)
POCT GLUCOSE: 299 MG/DL (ref 70–110)
POTASSIUM SERPL-SCNC: 3.6 MMOL/L
POTASSIUM SERPL-SCNC: 3.6 MMOL/L
POTASSIUM SERPL-SCNC: 3.8 MMOL/L
POTASSIUM SERPL-SCNC: 4.2 MMOL/L
POTASSIUM SERPL-SCNC: 4.6 MMOL/L
PROT SERPL-MCNC: 6.1 G/DL
RBC # BLD AUTO: 2.83 M/UL
SODIUM SERPL-SCNC: 141 MMOL/L
SODIUM SERPL-SCNC: 141 MMOL/L
SODIUM SERPL-SCNC: 142 MMOL/L
SODIUM SERPL-SCNC: 143 MMOL/L
SODIUM SERPL-SCNC: 144 MMOL/L
URATE SERPL-MCNC: 10.7 MG/DL
WBC # BLD AUTO: 6.56 K/UL

## 2018-02-06 PROCEDURE — 99232 SBSQ HOSP IP/OBS MODERATE 35: CPT | Mod: ,,, | Performed by: INTERNAL MEDICINE

## 2018-02-06 PROCEDURE — 80069 RENAL FUNCTION PANEL: CPT | Mod: 91

## 2018-02-06 PROCEDURE — 84100 ASSAY OF PHOSPHORUS: CPT

## 2018-02-06 PROCEDURE — 25000003 PHARM REV CODE 250: Performed by: INTERNAL MEDICINE

## 2018-02-06 PROCEDURE — 25000003 PHARM REV CODE 250: Performed by: STUDENT IN AN ORGANIZED HEALTH CARE EDUCATION/TRAINING PROGRAM

## 2018-02-06 PROCEDURE — 93010 ELECTROCARDIOGRAM REPORT: CPT | Mod: ,,, | Performed by: INTERNAL MEDICINE

## 2018-02-06 PROCEDURE — 20000000 HC ICU ROOM

## 2018-02-06 PROCEDURE — 92611 MOTION FLUOROSCOPY/SWALLOW: CPT

## 2018-02-06 PROCEDURE — 80069 RENAL FUNCTION PANEL: CPT

## 2018-02-06 PROCEDURE — 85520 HEPARIN ASSAY: CPT

## 2018-02-06 PROCEDURE — 80053 COMPREHEN METABOLIC PANEL: CPT

## 2018-02-06 PROCEDURE — 85025 COMPLETE CBC W/AUTO DIFF WBC: CPT

## 2018-02-06 PROCEDURE — 63600175 PHARM REV CODE 636 W HCPCS: Performed by: INTERNAL MEDICINE

## 2018-02-06 PROCEDURE — 63600175 PHARM REV CODE 636 W HCPCS: Performed by: STUDENT IN AN ORGANIZED HEALTH CARE EDUCATION/TRAINING PROGRAM

## 2018-02-06 PROCEDURE — 25000003 PHARM REV CODE 250: Performed by: NURSE PRACTITIONER

## 2018-02-06 PROCEDURE — 83735 ASSAY OF MAGNESIUM: CPT

## 2018-02-06 PROCEDURE — 84550 ASSAY OF BLOOD/URIC ACID: CPT

## 2018-02-06 PROCEDURE — 63600175 PHARM REV CODE 636 W HCPCS: Performed by: NURSE PRACTITIONER

## 2018-02-06 PROCEDURE — 83735 ASSAY OF MAGNESIUM: CPT | Mod: 91

## 2018-02-06 RX ORDER — CEFTRIAXONE 1 G/1
1 INJECTION, POWDER, FOR SOLUTION INTRAMUSCULAR; INTRAVENOUS
Status: DISCONTINUED | OUTPATIENT
Start: 2018-02-07 | End: 2018-02-11 | Stop reason: HOSPADM

## 2018-02-06 RX ORDER — POTASSIUM CHLORIDE 20 MEQ/1
40 TABLET, EXTENDED RELEASE ORAL ONCE
Status: COMPLETED | OUTPATIENT
Start: 2018-02-06 | End: 2018-02-06

## 2018-02-06 RX ORDER — FUROSEMIDE 10 MG/ML
80 INJECTION INTRAMUSCULAR; INTRAVENOUS 2 TIMES DAILY
Status: DISCONTINUED | OUTPATIENT
Start: 2018-02-06 | End: 2018-02-06

## 2018-02-06 RX ORDER — METOPROLOL TARTRATE 25 MG/1
25 TABLET, FILM COATED ORAL ONCE
Status: COMPLETED | OUTPATIENT
Start: 2018-02-06 | End: 2018-02-06

## 2018-02-06 RX ORDER — METOPROLOL TARTRATE 25 MG/1
25 TABLET, FILM COATED ORAL 4 TIMES DAILY
Status: DISCONTINUED | OUTPATIENT
Start: 2018-02-06 | End: 2018-02-06

## 2018-02-06 RX ORDER — MORPHINE SULFATE 2 MG/ML
1 INJECTION, SOLUTION INTRAMUSCULAR; INTRAVENOUS ONCE
Status: COMPLETED | OUTPATIENT
Start: 2018-02-06 | End: 2018-02-06

## 2018-02-06 RX ORDER — METOPROLOL TARTRATE 50 MG/1
50 TABLET ORAL 4 TIMES DAILY
Status: DISCONTINUED | OUTPATIENT
Start: 2018-02-06 | End: 2018-02-11 | Stop reason: HOSPADM

## 2018-02-06 RX ORDER — INSULIN ASPART 100 [IU]/ML
0-5 INJECTION, SOLUTION INTRAVENOUS; SUBCUTANEOUS
Status: DISCONTINUED | OUTPATIENT
Start: 2018-02-06 | End: 2018-02-11 | Stop reason: HOSPADM

## 2018-02-06 RX ADMIN — INSULIN ASPART 3 UNITS: 100 INJECTION, SOLUTION INTRAVENOUS; SUBCUTANEOUS at 11:02

## 2018-02-06 RX ADMIN — ACETAMINOPHEN 650 MG: 325 TABLET, FILM COATED ORAL at 12:02

## 2018-02-06 RX ADMIN — POTASSIUM CHLORIDE 40 MEQ: 1500 TABLET, EXTENDED RELEASE ORAL at 05:02

## 2018-02-06 RX ADMIN — MORPHINE SULFATE 1 MG: 2 INJECTION, SOLUTION INTRAMUSCULAR; INTRAVENOUS at 02:02

## 2018-02-06 RX ADMIN — SODIUM CHLORIDE 500 ML: 0.9 INJECTION, SOLUTION INTRAVENOUS at 02:02

## 2018-02-06 RX ADMIN — INSULIN ASPART 2 UNITS: 100 INJECTION, SOLUTION INTRAVENOUS; SUBCUTANEOUS at 05:02

## 2018-02-06 RX ADMIN — OXYCODONE HYDROCHLORIDE 5 MG: 5 TABLET ORAL at 11:02

## 2018-02-06 RX ADMIN — PANTOPRAZOLE SODIUM 40 MG: 40 TABLET, DELAYED RELEASE ORAL at 08:02

## 2018-02-06 RX ADMIN — SODIUM CHLORIDE 250 ML: 0.9 INJECTION, SOLUTION INTRAVENOUS at 11:02

## 2018-02-06 RX ADMIN — ACETAMINOPHEN 650 MG: 325 TABLET, FILM COATED ORAL at 01:02

## 2018-02-06 RX ADMIN — CEFTRIAXONE SODIUM 2 G: 2 INJECTION, POWDER, FOR SOLUTION INTRAMUSCULAR; INTRAVENOUS at 09:02

## 2018-02-06 RX ADMIN — METOPROLOL TARTRATE 50 MG: 50 TABLET ORAL at 05:02

## 2018-02-06 RX ADMIN — ACETAMINOPHEN 650 MG: 325 TABLET, FILM COATED ORAL at 09:02

## 2018-02-06 RX ADMIN — METOPROLOL TARTRATE 25 MG: 25 TABLET ORAL at 11:02

## 2018-02-06 RX ADMIN — ONDANSETRON 4 MG: 2 INJECTION INTRAMUSCULAR; INTRAVENOUS at 07:02

## 2018-02-06 RX ADMIN — METOPROLOL TARTRATE 50 MG: 50 TABLET ORAL at 11:02

## 2018-02-06 RX ADMIN — HEPARIN SODIUM 18 UNITS/KG/HR: 10000 INJECTION, SOLUTION INTRAVENOUS at 01:02

## 2018-02-06 RX ADMIN — Medication 12.5 MG: at 05:02

## 2018-02-06 RX ADMIN — METOPROLOL TARTRATE 25 MG: 25 TABLET ORAL at 02:02

## 2018-02-06 NOTE — NURSING
Pt. complaining of new onset right sided, lateral chest pain. Complains of sharp pain (9 out of 10) on inspiration.   EKG done, Cardiology service MD Payton called to bedside, chest xray ordered, pain medicine given.

## 2018-02-06 NOTE — PLAN OF CARE
Problem: SLP Goal  Goal: SLP Goal  Speech Therapy Short Term Goals  Goal expected to be met by 2/20  1. Pt will tolerate regular diet with nectar-thickened liquids w/o overt S/S aspiration, Supervision   2. Pt will tolerate small sips of thin liquid with 3 swallows and no overt s/s of aspiration, supervision.   3. Pt will completed BOT retraction and pharyngeal wall constriction exercises x10 each, supervision.    Speech Language Pathology Goals  Goals expected to be met by 2/12/18  1. Pt will tolerate regular diet with nectar-thickened liquids w/o overt S/S aspiration, Supervision -ongoing  2. Pt will participate in Modified Barium Swallow Study to rule out risk of aspiration and determine safest, least restrictive means of hydration/nutrition -met 2/6      Outcome: Ongoing (interventions implemented as appropriate)  MBSS completed. Recommend: Continue regular diet, nectar thick liquids, double swallows per bite/sip, aspiration precautions. ST will continue to follow.   DARIN Toledo., CCC-SLP  Pager: [phone]  02/06/2018

## 2018-02-06 NOTE — PROGRESS NOTES
Reviewed US abdomen results. Will discuss paracentesis with IR for Thursday 02/08/18 and to be co-ordinated with EP.    Moy Cain MD, PGY-4

## 2018-02-06 NOTE — SUBJECTIVE & OBJECTIVE
Interval History: feeling better    Review of Systems   All other systems reviewed and are negative.    Objective:     Vital Signs (Most Recent):  Temp: 97.7 °F (36.5 °C) (02/06/18 0701)  Pulse: (!) 138 (02/06/18 0900)  Resp: (!) 29 (02/06/18 0900)  BP: 119/75 (02/06/18 0900)  SpO2: 100 % (02/06/18 0900) Vital Signs (24h Range):  Temp:  [97.7 °F (36.5 °C)-98.4 °F (36.9 °C)] 97.7 °F (36.5 °C)  Pulse:  [129-138] 138  Resp:  [14-29] 29  SpO2:  [90 %-100 %] 100 %  BP: ()/(53-75) 119/75  Arterial Line BP: ()/(46-66) 116/65     Weight: 86.4 kg (190 lb 7.6 oz)  Body mass index is 27.33 kg/m².     SpO2: 100 %  O2 Device (Oxygen Therapy): room air    Physical Exam  GEN: Alert and oriented in NAD  NECK: no JVD appreciated   CVS: RRR, s1/s2, no MRG  PULM: CTAB no rales  ABD: NT/ND BS +  Extremities: warm and dry, palpable pulses, mild edema  NEURO: Alert and oriented x 3  PSYCH: appropriate affect.         Significant Labs: reviewed    Significant Imaging: reviewed

## 2018-02-06 NOTE — PLAN OF CARE
Problem: Patient Care Overview  Goal: Plan of Care Review  Outcome: Ongoing (interventions implemented as appropriate)  No acute events throughout shift, VS and assessment per flow sheet, patient progressing towards goals as tolerated. AAOX4. BP WNL, remained in aflutter with rates in the 130's. Put out ~1.5 L throughout night. Heparin and lasix gtts infusing. Renal U/S performed this AM. Plan for barium swallow study today. Mild c/o pain to abdomen relieved with tylenol. 1 liquid BM. Plan of care reviewed with Karthik Medrano and family, all concerns addressed, will continue to monitor.

## 2018-02-06 NOTE — SIGNIFICANT EVENT
Patient w/ L sided pain in mid-axillary area, sharp, non-radiating, pleuritic, reproducible to palpation. CXR demonstrated R-sided pleural fluid, no evidence of pneumonia. Abdomen also noted to be more distended, bedside US performed demonstrated ascites. Dedicated US ordered. IR contacted for possible paracentesis tomorrow pending US abd. Concurrently, coordinating w/ EP for ablation for Aflutter. Steadily increasing tachycardia noted throughout the day possibly 2/2 pain vs intravascular volume depletion. Lopressor increased and fluid boluses given (250cc x 1 and 500cc x 1).

## 2018-02-06 NOTE — SUBJECTIVE & OBJECTIVE
Interval History: Afebrile, leukocytosis resolved. Off pressors. Lopressor in creased from 12.5 qid to 25 qid. Per interventional cards will plan for possible ablation w/ next paracentesis when anticoagulation held.    ROS  Objective:     Vital Signs (Most Recent):  Temp: 97.7 °F (36.5 °C) (02/06/18 0701)  Pulse: (!) 138 (02/06/18 0900)  Resp: (!) 29 (02/06/18 0900)  BP: 119/75 (02/06/18 0900)  SpO2: 100 % (02/06/18 0900) Vital Signs (24h Range):  Temp:  [97.5 °F (36.4 °C)-98.4 °F (36.9 °C)] 97.7 °F (36.5 °C)  Pulse:  [129-138] 138  Resp:  [14-29] 29  SpO2:  [90 %-100 %] 100 %  BP: ()/(53-75) 119/75  Arterial Line BP: ()/(46-66) 116/65     Weight: 86.4 kg (190 lb 7.6 oz)  Body mass index is 27.33 kg/m².     SpO2: 100 %  O2 Device (Oxygen Therapy): room air      Intake/Output Summary (Last 24 hours) at 02/06/18 0918  Last data filed at 02/06/18 0900   Gross per 24 hour   Intake           1737.2 ml   Output             4060 ml   Net          -2322.8 ml       Lines/Drains/Airways     Central Venous Catheter Line                 Port A Cath Single Lumen -- days         Port A Cath Single Lumen 02/01/18 1005 other (see comments) 4 days          Drain                 Ureteral Drain/Stent 03/04/16 Right ureter 6 Fr. 704 days         Urethral Catheter 02/02/18 1545 3 days          Arterial Line                 Arterial Line 02/02/18 1637 Right Femoral 3 days          Pressure Ulcer                 Pressure Injury 02/05/18 1856 Coccyx Stage 2 less than 1 day                Physical Exam   Constitutional: He is oriented to person, place, and time. No distress.   HENT:   Head: Normocephalic and atraumatic.   Eyes: Conjunctivae are normal. No scleral icterus.   Cardiovascular: Intact distal pulses.    tachycardic   Pulmonary/Chest: No respiratory distress. He has no wheezes. He has no rales.   On nasal canula   Abdominal: Soft. He exhibits no distension. There is no tenderness. There is no rebound and no guarding.    Musculoskeletal: He exhibits edema (+1 LE b/l edema). He exhibits no tenderness.   Neurological: He is alert and oriented to person, place, and time.   Skin: Skin is warm. No rash noted.       Significant Labs:   CMP   Recent Labs  Lab 02/05/18  0502  02/06/18  0014 02/06/18  0318 02/06/18  0814     < > 141 141 143   K 3.8  < > 3.6 3.6 3.8     < > 99 98 100   CO2 26  < > 29 32* 31*   *  < > 196* 168* 188*   BUN 62*  < > 58* 60* 55*   CREATININE 3.5*  < > 3.3* 3.4* 3.3*   CALCIUM 8.6*  < > 8.0* 8.5* 8.7   PROT 6.0  --   --  6.1  --    ALBUMIN 2.6*  < > 2.5* 2.6* 2.6*   BILITOT 0.5  --   --  0.3  --    ALKPHOS 345*  --   --  288*  --    AST 34  --   --  23  --    ALT 77*  --   --  52*  --    ANIONGAP 15  < > 13 11 12   ESTGFRAFRICA 19.4*  < > 20.9* 20.1* 20.9*   EGFRNONAA 16.8*  < > 18.1* 17.4* 18.1*   < > = values in this interval not displayed. and CBC   Recent Labs  Lab 02/05/18  0502 02/06/18  0318   WBC 6.86 6.56   HGB 8.2* 8.0*   HCT 26.5* 25.5*   PLT 44* 65*       Significant Imaging: Retroperitoneal US: Interval improvement of previously identified severe right hydronephrosis, now mild.

## 2018-02-06 NOTE — ASSESSMENT & PLAN NOTE
Pt w/ paracenteses every ~2 weeks for recurrent ascites  S/p paracentesis w/ removal ~4.5L 2/2/18  Assess later in week for need of repeat paracentesis as it could be done with ablation and anticoagulation could be held at same time for both procedures  Concern for  vs SBP as had leukocytosis, though leukocytosis now resolved. Fluid from 2/2/18 paracentesis was not sent for analysis. Transitioned today from zosyn to rocephin.

## 2018-02-06 NOTE — PLAN OF CARE
Problem: Patient Care Overview  Goal: Plan of Care Review  Outcome: Ongoing (interventions implemented as appropriate)  Neuro:   Pt. AAOX4; Afebrile this shift. Pt complaining of pain to left lateral chest. TX plan included EKG, US and pain medication. (see results review)    Cardiac:   Patient has been tachycardic throughout shift (see flowsheets) and remains in A flutter. Lopressor given throughout shift.     Respriatory:   Sats are  on 2 L NC. BS are clear and diminished.     GI/:   Michael cath in place. UO  cc/hr. Urine is clear yellow.  1 BM this shift. BS audible and normoactive in all four quads. Hernia present. Abdomen soft and non-tender with increased distention throughout shift. Mod Barium Swallow study done this shift. Pt. cont to be on a Nectar thick/ dental soft diet.     Skin: Dry and cool, healing pressure injury to sacrum. Generalized edema present.     Gtts: Heparin infusing at ordered rate. Bolus of fluids still running. (see flowsheets)    Plan to monitor HR and paracentesis scheduled for 2/7/18.

## 2018-02-06 NOTE — PROGRESS NOTES
[facility]  Cardiac Electrophysiology  Progress Note    Admission Date: 2/1/2018  Code Status: Full Code   Attending Physician: Sandoval Martini MD   Expected Discharge Date: 2/8/2018  Principal Problem:Atrial flutter with rapid ventricular response    Subjective:     Interval History: feeling better    Review of Systems   All other systems reviewed and are negative.    Objective:     Vital Signs (Most Recent):  Temp: 97.7 °F (36.5 °C) (02/06/18 0701)  Pulse: (!) 138 (02/06/18 0900)  Resp: (!) 29 (02/06/18 0900)  BP: 119/75 (02/06/18 0900)  SpO2: 100 % (02/06/18 0900) Vital Signs (24h Range):  Temp:  [97.7 °F (36.5 °C)-98.4 °F (36.9 °C)] 97.7 °F (36.5 °C)  Pulse:  [129-138] 138  Resp:  [14-29] 29  SpO2:  [90 %-100 %] 100 %  BP: ()/(53-75) 119/75  Arterial Line BP: ()/(46-66) 116/65     Weight: 86.4 kg (190 lb 7.6 oz)  Body mass index is 27.33 kg/m².     SpO2: 100 %  O2 Device (Oxygen Therapy): room air    Physical Exam  GEN: Alert and oriented in NAD  NECK: no JVD appreciated   CVS: RRR, s1/s2, no MRG  PULM: CTAB no rales  ABD: NT/ND BS +  Extremities: warm and dry, palpable pulses, mild edema  NEURO: Alert and oriented x 3  PSYCH: appropriate affect.         Significant Labs: reviewed    Significant Imaging: reviewed    Assessment and Plan:     * Atrial flutter with rapid ventricular response    Mr. Medrano is a 67 y.o. Man with metastatic rectal cancer Dx 2010, s/p resection, then with pulmonary mets 2013 on FOLFIRI chemotherapy, recurrent ascites s/p regular paracentesis, ureteral obstructive s/p urostomy tubes, and recently diagnosed atrial flutter with variable block, DM - who presents as a transfer from clinic with shortness of breath and tachycardia.     Rate uncontrolled, please uptitrate AV armond agents as tolerated. He may not be a candidate for AC long term but while inpatient would recommend heparin gtt. Will attempt to coordinate AFL ablation with next paracentesis.              Leland Trujillo MD  Cardiac Electrophysiology  [facility]

## 2018-02-06 NOTE — PROGRESS NOTES
Called to bedside to evaluate patient's elevated heart rate in AFL at rates in the 140's. Did a bedside US of patient's abdomen and IVC (patient has distended abdomen more so now than earlier today). US revealed ascites and his RAP was estimated at 8 mm Hg. Patient complaining of pleuritic left sided chest pain. CXR shows no evidence of left sided PNA or effusion. Pain could be related to MSK pain. This pain and the fact that he could be hypovolemic (secondary to post-ATN diuresis) are possibly contributing as well to his heart rate in the 140's.     Plan:  Discussed with EP about an earlier RFA provided that the patient may have enough ascites to undergo a paracentesis.   Discussed with IR about paracentesis. They will f/u results of the US abdomen complete before deciding on IR paracentesis  In the interim, will try gentle hydration with NS for a total of 750 cc's of IVF. In addition to increasing the lopressor to 50 mg QID. If HR>150 and sustains will consider digoxin 500 mcg x 1, followed by digoxin 250 mcg q 6 hours for 2 doses.     Moy Cain MD, PGY-4  Cardiology  CCU fellow x [phone]

## 2018-02-06 NOTE — PROGRESS NOTES
[facility]  Cardiology  Progress Note    Patient Name: Karthik Medrano  MRN: [id number]  Admission Date: 2/1/2018  Hospital Length of Stay: 5 days  Code Status: Full Code   Attending Physician: Sandoval Martini MD   Primary Care Physician: Pinky Thornton MD  Expected Discharge Date: 2/5/2018  Principal Problem:Atrial flutter with rapid ventricular response    Subjective:     Hospital Course:   2/2 transferred to CCU, started on levophed and vasopressin, oliguric and w/ anasarca, IVC consistent w/ volume overload, started on lasix drip, lactate 5, UA positive, leukocytosis, started on vanc/ zosyn  2/3 urology consulted for severe R hydronephrosis, UOP improving  2/4 weaning off levophed, starting heparin drip for anticoagulation  2/5 Weaned off and stopped pressors. Started 12.5 metoprolol with hold parameters. De-escalated zosyn to rocephin.  2/6 d/c lasix, holding duresis    Interval History: Afebrile, leukocytosis resolved. Off pressors. Lopressor in creased from 12.5 qid to 25 qid. Per interventional cards will plan for possible ablation w/ next paracentesis when anticoagulation held.    ROS  Objective:     Vital Signs (Most Recent):  Temp: 97.7 °F (36.5 °C) (02/06/18 0701)  Pulse: (!) 138 (02/06/18 0900)  Resp: (!) 29 (02/06/18 0900)  BP: 119/75 (02/06/18 0900)  SpO2: 100 % (02/06/18 0900) Vital Signs (24h Range):  Temp:  [97.5 °F (36.4 °C)-98.4 °F (36.9 °C)] 97.7 °F (36.5 °C)  Pulse:  [129-138] 138  Resp:  [14-29] 29  SpO2:  [90 %-100 %] 100 %  BP: ()/(53-75) 119/75  Arterial Line BP: ()/(46-66) 116/65     Weight: 86.4 kg (190 lb 7.6 oz)  Body mass index is 27.33 kg/m².     SpO2: 100 %  O2 Device (Oxygen Therapy): room air      Intake/Output Summary (Last 24 hours) at 02/06/18 0918  Last data filed at 02/06/18 0900   Gross per 24 hour   Intake           1737.2 ml   Output             4060 ml   Net          -2322.8 ml       Lines/Drains/Airways     Central Venous  Catheter Line                 Port A Cath Single Lumen -- days         Port A Cath Single Lumen 02/01/18 1005 other (see comments) 4 days          Drain                 Ureteral Drain/Stent 03/04/16 Right ureter 6 Fr. 704 days         Urethral Catheter 02/02/18 1545 3 days          Arterial Line                 Arterial Line 02/02/18 1637 Right Femoral 3 days          Pressure Ulcer                 Pressure Injury 02/05/18 1856 Coccyx Stage 2 less than 1 day                Physical Exam   Constitutional: He is oriented to person, place, and time. No distress.   HENT:   Head: Normocephalic and atraumatic.   Eyes: Conjunctivae are normal. No scleral icterus.   Cardiovascular: Intact distal pulses.    tachycardic   Pulmonary/Chest: No respiratory distress. He has no wheezes. He has no rales.   On nasal canula   Abdominal: Soft. He exhibits no distension. There is no tenderness. There is no rebound and no guarding.   Musculoskeletal: He exhibits edema (+1 LE b/l edema). He exhibits no tenderness.   Neurological: He is alert and oriented to person, place, and time.   Skin: Skin is warm. No rash noted.       Significant Labs:   CMP   Recent Labs  Lab 02/05/18  0502  02/06/18  0014 02/06/18  0318 02/06/18  0814     < > 141 141 143   K 3.8  < > 3.6 3.6 3.8     < > 99 98 100   CO2 26  < > 29 32* 31*   *  < > 196* 168* 188*   BUN 62*  < > 58* 60* 55*   CREATININE 3.5*  < > 3.3* 3.4* 3.3*   CALCIUM 8.6*  < > 8.0* 8.5* 8.7   PROT 6.0  --   --  6.1  --    ALBUMIN 2.6*  < > 2.5* 2.6* 2.6*   BILITOT 0.5  --   --  0.3  --    ALKPHOS 345*  --   --  288*  --    AST 34  --   --  23  --    ALT 77*  --   --  52*  --    ANIONGAP 15  < > 13 11 12   ESTGFRAFRICA 19.4*  < > 20.9* 20.1* 20.9*   EGFRNONAA 16.8*  < > 18.1* 17.4* 18.1*   < > = values in this interval not displayed. and CBC   Recent Labs  Lab 02/05/18  0502 02/06/18  0318   WBC 6.86 6.56   HGB 8.2* 8.0*   HCT 26.5* 25.5*   PLT 44* 65*       Significant  Imaging: Retroperitoneal US: Interval improvement of previously identified severe right hydronephrosis, now mild.    Assessment and Plan:     Brief HPI:     * Atrial flutter with rapid ventricular response    Verapamil and toprol-xl d/c 2/2 hypotension, with increase in rates up to 130s  Started anticoagulation w/ heparin gtt as per EP reccs  Now off pressure support, started metoprolol 12.5 q6hr with hold parameters of MAP <65  Though lopressor likely not to improve tachycardia  Plan for ablation once hemodynamically stable and as per EP reccs, will try to time with paracentesis later this week, will need assessment if paracentesis required at that time.         Anasarca    Likely 2/2 low albumin and CKD  C/w lasix for diuresis        Ascites    Pt w/ paracenteses every ~2 weeks for recurrent ascites  S/p paracentesis w/ removal ~4.5L 2/2/18  Assess later in week for need of repeat paracentesis as it could be done with ablation and anticoagulation could be held at same time for both procedures  Concern for  vs SBP as had leukocytosis, though leukocytosis now resolved. Fluid from 2/2/18 paracentesis was not sent for analysis. Transitioned today from zosyn to rocephin.        Urinary tract infection without hematuria    UA positive  Initially on vanc and zosyn, have de-escalated to rocephin  Urine cx: no growth, gram stain (-)        Acute renal failure superimposed on stage 3 chronic kidney disease    Pt w/ contraction alkalosis  Contraction alkalosis w/ bicarb 32  Per nephrology if postobstructive ATN pt should self-diurese w/o diuretics  Monitor I/Os off diuretics        Hydronephrosis    R ureteral stricture with right hydronephrosis managed w/ ureteral stent exchanges q 3 months  Per pt last stent exchange ~1 week ago  US retroperitoneal improving R-sided hydronephrosis (mild from severe)  Per urology (hydronephrosis likely 2/2 XRT for rectal ca) should patient clinically decompensate recommend right  nephrostomy tube          Type 2 diabetes mellitus with stage 3 chronic kidney disease, without long-term current use of insulin    C/w accuchecks  aspart sliding scale        Rectal cancer    Per hem/onc notes pt note tolerating chemo and now chemo held 2/2 debility and functional status, prognosis guarded  Per wife pt wishes to be full code and want full resuscitation, however, does not want central line or dialysis        Acute congestive heart failure    TTE 8/29/17 EF 60%, TTE 2/2/18 EF 40%  Contraction alkalosis w/ bicarb 32  Per nephrology if postobstructive ATN pt should self-diurese w/o diuretics  Monitor I/Os off diuretics            VTE Risk Mitigation         Ordered     heparin 25,000 units in dextrose 5% 250 mL (100 units/mL) bolus from bag; ADDITIONAL PRN BOLUS  As needed (PRN)     Route:  Intravenous        02/05/18 1050     heparin 25,000 units in dextrose 5% 250 mL (100 units/mL) bolus from bag; ADDITIONAL PRN BOLUS  As needed (PRN)     Route:  Intravenous        02/05/18 1050     heparin 25,000 units in dextrose 5% 250 mL (100 units/mL) infusion  Continuous     Route:  Intravenous        02/04/18 1359     High Risk of VTE  Once      02/01/18 1231     Medium Risk of VTE  Once      02/01/18 1231     Place sequential compression device  Until discontinued      02/01/18 1231     Reason for No Pharmacological VTE Prophylaxis  Once      02/01/18 1231          Kiel Yang MD  Cardiology  [facility]

## 2018-02-06 NOTE — ASSESSMENT & PLAN NOTE
TTE 8/29/17 EF 60%, TTE 2/2/18 EF 40%  Contraction alkalosis w/ bicarb 32  Per nephrology if postobstructive ATN pt should self-diurese w/o diuretics  Monitor I/Os off diuretics

## 2018-02-06 NOTE — ASSESSMENT & PLAN NOTE
R ureteral stricture with right hydronephrosis managed w/ ureteral stent exchanges q 3 months  Per pt last stent exchange ~1 week ago  US retroperitoneal improving R-sided hydronephrosis (mild from severe)  Per urology (hydronephrosis likely 2/2 XRT for rectal ca) should patient clinically decompensate recommend right nephrostomy tube

## 2018-02-06 NOTE — ASSESSMENT & PLAN NOTE
Pt w/ contraction alkalosis  Contraction alkalosis w/ bicarb 32  Per nephrology if postobstructive ATN pt should self-diurese w/o diuretics  Monitor I/Os off diuretics

## 2018-02-06 NOTE — ASSESSMENT & PLAN NOTE
Verapamil and toprol-xl d/c 2/2 hypotension, with increase in rates up to 130s  Started anticoagulation w/ heparin gtt as per EP reccs  Now off pressure support, started metoprolol 12.5 q6hr with hold parameters of MAP <65  Though lopressor likely not to improve tachycardia  Plan for ablation once hemodynamically stable and as per EP reccs, will try to time with paracentesis later this week, will need assessment if paracentesis required at that time.

## 2018-02-06 NOTE — PROCEDURES
Modified Barium Swallow    Patient Name:  Karthik Medrano   MRN:  [id number]  3072/3072 A      Recommendations:     Recommendations:                General Recommendations:  Dysphagia therapy  Diet recommendations:  Regular, Nectar Thick   Aspiration Precautions:   · 1 bite/sip at a time,   · Alternating bites/sips,   · Double swallow with each bite/sip,   · HOB to 90 degrees,   · Meds whole 1 at a time,   · Monitor for s/s of aspiration,   · No straws and   · Remain upright 30 minutes post meal   General Precautions: Standard, fall, aspiration, nectar thick  Communication strategies:  none    Referral     Reason for Referral  Patient was referred for a Modified Barium Swallow Study to assess the efficiency of his/her swallow function, rule out aspiration and make recommendations regarding safe dietary consistencies, effective compensatory strategies, and safe eating environment.     Diagnosis: Atrial flutter with rapid ventricular response       History:     Past Medical History:   Diagnosis Date    CHF (congestive heart failure)     Diabetes mellitus     Encounter for blood transfusion     GERD (gastroesophageal reflux disease)     Hypertension     Rectal cancer metastasized to lung 01/11/2010       Objective:     Current Respiratory Status: 02/06/18    Alert: yes    Cooperative: yes    Follows Directions: yes    Visualization  · Patient was seen in the lateral view    Oral Peripheral Examination  · Oral Musculature: WFL  · Dentition: present and adequate  · Mucosal Quality: good  · Mandibular Strength and Mobility: WFL  · Buccal Strength and Mobility: WFL    Consistencies Assessed  · Thin 5cc sip x1, 10 cc sip x1, self regulated cup sip x2  · Nectar thick cup sip x2, straw sip x1  · Honey thick tsp sip x1  · Puree tsp bites x4 (one with barium tablet and one utilized to clear thin liquid residue)  · Solids bite of saltine cracker with barium paste x1    Oral Preparation/Oral Phase  · WFL- Pt with adequate bolus  acceptance, containment, control and timely A-P transfer across consistencies    · Mild oral residue s/p cracker trial adequately cleared with second swallow    Pharyngeal Phase   Pt presented with moderate pharyngeal dysphagia c/b mildly delayed pharyngeal swallow resulting in pooling in the valleucla with all trials, decreased BOT retraction, pharyngeal wall constriction, and UES relaxation resulting in scattered pharyngeal residue with all trials.   Residue:   · Thin: moderate-severe scattered pharyngeal residue adequately cleared to trace amounts with 2 cued subsequent swallows; residue also adequately cleared when followed by tsp bite of puree  · Nectar: Mild scattered pharyngeal residue with straw sip adequately cleared with cued second swallow.  Mild residue with cup sips adequately cleared to trace amounts with cued swallow.   · Honey: moderate vallecular residue adequately cleared with cued second swallow  · Puree: Moderate residue following 1/2 tsp trial of puree only; decreased residue with tsp bites  · Solid: with moderate BOT and vallecular residue adequately cleared with cued second swallow  · Barium tablet: No residue  **Chin tuck trialed with thin liquids, however, did not reduce severity of residue.   No aspiration or penetration with all trials. Pt with an increased risk of aspiration after the swallow with thin liquids 2/2 increased residue requiring 3 swallows to adequately clear pharyngeal residue.     Cervical Esophageal Phase  · Decreased UES opening   · No retrograde flow observed under flouro    Assessment:     Impressions  ·   Patient demonstrates moderate pharyngeal dysphagia characterized by decreased overall swallowing strength (BOT retraction, pharyngeal wall constriction, and UES opening). No aspiration or penetration with all trials. Risk of aspiration after the swallow with thin liquids.     Prognosis: Good    Barriers:  · Decreased intake per chart review  · Double swallows per  bite/sip    Plan  ST will continue to follow to provide further education, ensure tolerance of diet, and implement dysphagia therapy exercises.     Education  Results were discussed with patient. Results were discussed with RN.   Team paged to discuss MBSS results/recommendaitons. Awaiting call back.     Goals:    SLP Goals        Problem: SLP Goal    Goal Priority Disciplines Outcome   SLP Goal     SLP Ongoing (interventions implemented as appropriate)   Description:  Speech Therapy Short Term Goals  Goal expected to be met by 2/20  1. Pt will tolerate regular diet with nectar-thickened liquids w/o overt S/S aspiration, Supervision   2. Pt will tolerate small sips of thin liquid with 3 swallows and no overt s/s of aspiration, supervision.   3. Pt will completed BOT retraction and pharyngeal wall constriction exercises x10 each, supervision.    Speech Language Pathology Goals  Goals expected to be met by 2/12/18  1. Pt will tolerate regular diet with nectar-thickened liquids w/o overt S/S aspiration, Supervision -ongoing  2. Pt will participate in Modified Barium Swallow Study to rule out risk of aspiration and determine safest, least restrictive means of hydration/nutrition -met 2/6                        Plan:   · Patient to be seen:  Therapy Frequency: 4 x/week   · Plan of Care expires:  03/07/18  · Plan of Care reviewed with:  patient        Discharge recommendations:  home health speech therapy     Time Tracking:   SLP Treatment Date:   02/06/18  Speech Start Time:  1028  Speech Stop Time:  1045     Speech Total Time (min):  17 min    DARON Torres, CCC-SLP   Pager: [phone]  02/06/2018

## 2018-02-07 ENCOUNTER — ANESTHESIA EVENT (OUTPATIENT)
Dept: MEDSURG UNIT | Facility: HOSPITAL | Age: 68
DRG: 273 | End: 2018-02-07
Payer: COMMERCIAL

## 2018-02-07 LAB
ALBUMIN SERPL BCP-MCNC: 2.6 G/DL
ALBUMIN SERPL BCP-MCNC: 2.6 G/DL
ALBUMIN SERPL BCP-MCNC: 2.8 G/DL
ALP SERPL-CCNC: 278 U/L
ALT SERPL W/O P-5'-P-CCNC: 39 U/L
ANION GAP SERPL CALC-SCNC: 10 MMOL/L
ANION GAP SERPL CALC-SCNC: 11 MMOL/L
ANION GAP SERPL CALC-SCNC: 9 MMOL/L
AST SERPL-CCNC: 14 U/L
BASOPHILS # BLD AUTO: 0.03 K/UL
BASOPHILS NFR BLD: 0.3 %
BILIRUB SERPL-MCNC: 0.3 MG/DL
BUN SERPL-MCNC: 48 MG/DL
BUN SERPL-MCNC: 49 MG/DL
BUN SERPL-MCNC: 54 MG/DL
CALCIUM SERPL-MCNC: 9 MG/DL
CALCIUM SERPL-MCNC: 9.1 MG/DL
CALCIUM SERPL-MCNC: 9.2 MG/DL
CHLORIDE SERPL-SCNC: 101 MMOL/L
CO2 SERPL-SCNC: 33 MMOL/L
CO2 SERPL-SCNC: 33 MMOL/L
CO2 SERPL-SCNC: 35 MMOL/L
CREAT SERPL-MCNC: 2.6 MG/DL
CREAT SERPL-MCNC: 2.6 MG/DL
CREAT SERPL-MCNC: 2.8 MG/DL
DIFFERENTIAL METHOD: ABNORMAL
EOSINOPHIL # BLD AUTO: 0 K/UL
EOSINOPHIL NFR BLD: 0.3 %
ERYTHROCYTE [DISTWIDTH] IN BLOOD BY AUTOMATED COUNT: 19.7 %
EST. GFR  (AFRICAN AMERICAN): 25.5 ML/MIN/1.73 M^2
EST. GFR  (AFRICAN AMERICAN): 27.8 ML/MIN/1.73 M^2
EST. GFR  (AFRICAN AMERICAN): 27.8 ML/MIN/1.73 M^2
EST. GFR  (NON AFRICAN AMERICAN): 22 ML/MIN/1.73 M^2
EST. GFR  (NON AFRICAN AMERICAN): 24.1 ML/MIN/1.73 M^2
EST. GFR  (NON AFRICAN AMERICAN): 24.1 ML/MIN/1.73 M^2
FACT X PPP CHRO-ACNC: 0.66 IU/ML
FACT X PPP CHRO-ACNC: 0.86 IU/ML
GLUCOSE SERPL-MCNC: 145 MG/DL
GLUCOSE SERPL-MCNC: 170 MG/DL
GLUCOSE SERPL-MCNC: 184 MG/DL
HCT VFR BLD AUTO: 30.9 %
HGB BLD-MCNC: 9.1 G/DL
IMM GRANULOCYTES # BLD AUTO: 0.05 K/UL
IMM GRANULOCYTES NFR BLD AUTO: 0.6 %
LYMPHOCYTES # BLD AUTO: 1.7 K/UL
LYMPHOCYTES NFR BLD: 18.2 %
MAGNESIUM SERPL-MCNC: 1.6 MG/DL
MAGNESIUM SERPL-MCNC: 2 MG/DL
MCH RBC QN AUTO: 27.8 PG
MCHC RBC AUTO-ENTMCNC: 29.4 G/DL
MCV RBC AUTO: 95 FL
MONOCYTES # BLD AUTO: 0.7 K/UL
MONOCYTES NFR BLD: 7.4 %
NEUTROPHILS # BLD AUTO: 6.6 K/UL
NEUTROPHILS NFR BLD: 73.2 %
NRBC BLD-RTO: 0 /100 WBC
PHOSPHATE SERPL-MCNC: 4.3 MG/DL
PHOSPHATE SERPL-MCNC: 5.1 MG/DL
PHOSPHATE SERPL-MCNC: 5.4 MG/DL
PLATELET # BLD AUTO: 62 K/UL
PMV BLD AUTO: 10.1 FL
POCT GLUCOSE: 144 MG/DL (ref 70–110)
POCT GLUCOSE: 193 MG/DL (ref 70–110)
POCT GLUCOSE: 198 MG/DL (ref 70–110)
POCT GLUCOSE: 220 MG/DL (ref 70–110)
POCT GLUCOSE: 236 MG/DL (ref 70–110)
POTASSIUM SERPL-SCNC: 4.2 MMOL/L
POTASSIUM SERPL-SCNC: 4.3 MMOL/L
POTASSIUM SERPL-SCNC: 4.3 MMOL/L
PROT SERPL-MCNC: 6.6 G/DL
RBC # BLD AUTO: 3.27 M/UL
SODIUM SERPL-SCNC: 144 MMOL/L
SODIUM SERPL-SCNC: 145 MMOL/L
SODIUM SERPL-SCNC: 145 MMOL/L
WBC # BLD AUTO: 9.06 K/UL

## 2018-02-07 PROCEDURE — 84100 ASSAY OF PHOSPHORUS: CPT

## 2018-02-07 PROCEDURE — 83735 ASSAY OF MAGNESIUM: CPT | Mod: 91

## 2018-02-07 PROCEDURE — 85520 HEPARIN ASSAY: CPT | Mod: 91

## 2018-02-07 PROCEDURE — 85025 COMPLETE CBC W/AUTO DIFF WBC: CPT

## 2018-02-07 PROCEDURE — 11000001 HC ACUTE MED/SURG PRIVATE ROOM

## 2018-02-07 PROCEDURE — 80069 RENAL FUNCTION PANEL: CPT

## 2018-02-07 PROCEDURE — 99232 SBSQ HOSP IP/OBS MODERATE 35: CPT | Mod: ,,, | Performed by: INTERNAL MEDICINE

## 2018-02-07 PROCEDURE — 92526 ORAL FUNCTION THERAPY: CPT

## 2018-02-07 PROCEDURE — 25000003 PHARM REV CODE 250: Performed by: NURSE PRACTITIONER

## 2018-02-07 PROCEDURE — 99233 SBSQ HOSP IP/OBS HIGH 50: CPT | Mod: ,,, | Performed by: INTERNAL MEDICINE

## 2018-02-07 PROCEDURE — 25000003 PHARM REV CODE 250: Performed by: STUDENT IN AN ORGANIZED HEALTH CARE EDUCATION/TRAINING PROGRAM

## 2018-02-07 PROCEDURE — 63600175 PHARM REV CODE 636 W HCPCS: Performed by: NURSE PRACTITIONER

## 2018-02-07 PROCEDURE — A4216 STERILE WATER/SALINE, 10 ML: HCPCS | Performed by: NURSE PRACTITIONER

## 2018-02-07 PROCEDURE — 63600175 PHARM REV CODE 636 W HCPCS: Performed by: STUDENT IN AN ORGANIZED HEALTH CARE EDUCATION/TRAINING PROGRAM

## 2018-02-07 PROCEDURE — 63600175 PHARM REV CODE 636 W HCPCS: Performed by: INTERNAL MEDICINE

## 2018-02-07 PROCEDURE — 83735 ASSAY OF MAGNESIUM: CPT

## 2018-02-07 PROCEDURE — 27000221 HC OXYGEN, UP TO 24 HOURS

## 2018-02-07 PROCEDURE — 94761 N-INVAS EAR/PLS OXIMETRY MLT: CPT

## 2018-02-07 PROCEDURE — 80053 COMPREHEN METABOLIC PANEL: CPT

## 2018-02-07 RX ADMIN — METOPROLOL TARTRATE 50 MG: 50 TABLET ORAL at 06:02

## 2018-02-07 RX ADMIN — METOPROLOL TARTRATE 50 MG: 50 TABLET ORAL at 11:02

## 2018-02-07 RX ADMIN — INSULIN ASPART 2 UNITS: 100 INJECTION, SOLUTION INTRAVENOUS; SUBCUTANEOUS at 06:02

## 2018-02-07 RX ADMIN — PANTOPRAZOLE SODIUM 40 MG: 40 TABLET, DELAYED RELEASE ORAL at 08:02

## 2018-02-07 RX ADMIN — ACETAMINOPHEN 650 MG: 325 TABLET, FILM COATED ORAL at 07:02

## 2018-02-07 RX ADMIN — CEFTRIAXONE SODIUM 1 G: 1 INJECTION, POWDER, FOR SOLUTION INTRAMUSCULAR; INTRAVENOUS at 09:02

## 2018-02-07 RX ADMIN — Medication 3 ML: at 10:02

## 2018-02-07 RX ADMIN — ACETAMINOPHEN 650 MG: 325 TABLET, FILM COATED ORAL at 11:02

## 2018-02-07 RX ADMIN — Medication 3 ML: at 02:02

## 2018-02-07 RX ADMIN — ACETAMINOPHEN 650 MG: 325 TABLET, FILM COATED ORAL at 03:02

## 2018-02-07 RX ADMIN — ONDANSETRON 4 MG: 2 INJECTION INTRAMUSCULAR; INTRAVENOUS at 03:02

## 2018-02-07 RX ADMIN — HEPARIN SODIUM 18 UNITS/KG/HR: 10000 INJECTION, SOLUTION INTRAVENOUS at 10:02

## 2018-02-07 RX ADMIN — ONDANSETRON 8 MG: 4 TABLET, ORALLY DISINTEGRATING ORAL at 07:02

## 2018-02-07 RX ADMIN — ONDANSETRON 8 MG: 4 TABLET, ORALLY DISINTEGRATING ORAL at 04:02

## 2018-02-07 RX ADMIN — INSULIN ASPART 2 UNITS: 100 INJECTION, SOLUTION INTRAVENOUS; SUBCUTANEOUS at 10:02

## 2018-02-07 NOTE — ASSESSMENT & PLAN NOTE
Verapamil and toprol-xl d/c 2/2 hypotension, with increase in rates up to 130s  Started anticoagulation w/ heparin gtt as per EP reccs  Now off pressure support, started metoprolol 12.5 q6hr with hold parameters of MAP <65  Though lopressor likely not to improve tachycardia  Plan for ablation once hemodynamically stable and as per EP recs, will try to time with paracentesis possibly later this week

## 2018-02-07 NOTE — PLAN OF CARE
Brief EP Note:    Discussed case with primary service. At this time paracentesis with IR to occur tomorrow 2/8/18 however time of day is yet to be determined. If case occurs earlier in the morning, this will allow ablation procedure to follow. At this time we will plan to do ablation tomorrow, pending any scheduling issues that would not permit it.     Chapo Longoria, DO  Cardiology Fellow

## 2018-02-07 NOTE — ASSESSMENT & PLAN NOTE
TTE 8/29/17 EF 60%, TTE 2/2/18 EF 40%  Contraction alkalosis w/ elevated bicarb  Pt still w/ good urine output off diuretics  Monitor I/Os

## 2018-02-07 NOTE — PLAN OF CARE
Problem: SLP Goal  Goal: SLP Goal  Speech Therapy Short Term Goals  Goal expected to be met by 2/20  1. Pt will tolerate regular diet with nectar-thickened liquids w/o overt S/S aspiration, Supervision   2. Pt will tolerate small sips of thin liquid with 3 swallows and no overt s/s of aspiration, supervision.   3. Pt will completed BOT retraction and pharyngeal wall constriction exercises x10 each, supervision.    Speech Language Pathology Goals  Goals expected to be met by 2/12/18  1. Pt will tolerate regular diet with nectar-thickened liquids w/o overt S/S aspiration, Supervision -ongoing  2. Pt will participate in Modified Barium Swallow Study to rule out risk of aspiration and determine safest, least restrictive means of hydration/nutrition -met 2/6       Outcome: Ongoing (interventions implemented as appropriate)  Pt ongoing with Dysphagia therapy. Recommend continue current regular diet with NECTAR-thickened liquids, medications one at a time ok provided the following, strict Aspiration precautions. Aspiration Precautions: 1 bite/sip at a time, Alternating bites/sips, Double swallow with each bite/sip, HOB to 90 degrees, Meds whole 1 at a time, Monitor for s/s of aspiration, No straws and  Remain upright 30 minutes post meal. ST to continue to follow. Thank you.     DARIN Wells., [facility]-SLP  Speech-Language Pathology  Pager: [phone]  2/7/2018

## 2018-02-07 NOTE — ASSESSMENT & PLAN NOTE
Pt w/ paracenteses every ~2 weeks for recurrent ascites  S/p paracentesis w/ removal ~4.5L 2/2/18  Assess later in week for need of repeat paracentesis as it could be done with ablation and anticoagulation could be held at same time for both procedures  Concern for  vs SBP as had leukocytosis, though leukocytosis now resolved. Fluid from 2/2/18 paracentesis was not sent for analysis. Transitioned from zosyn to rocephin.

## 2018-02-07 NOTE — PLAN OF CARE
Problem: Patient Care Overview  Goal: Plan of Care Review  Outcome: Ongoing (interventions implemented as appropriate)  No acute events throughout shift, VS and assessment per flow sheet, patient progressing towards goals as tolerated. Patient AAOX4. Mild complaints of left sided pain and nausea with some dry heaving, however no vomiting. PRN medications given with mild relief obtained. Some complaints of weakness as well, slept comfortably. Remained in aflutter, rates as low as 110's, but average around 130. BP WNL. Michael draining adequately. 1 small BM. Plan of care reviewed with Karthik Medrano and family, all concerns addressed, will continue to monitor.

## 2018-02-07 NOTE — RESIDENT HANDOFF
Handoff     Primary Team: Networked reference to record PCT  Room Number: 3072/3072 A     Patient Name: Karthik Medrano MRN: [id number]     Date of Birth: [id number] Allergies: Patient has no known allergies.     Age: 67 y.o. Admit Date: 2/1/2018     Sex: male  BMI: Body mass index is 26.86 kg/m².     Code Status: Full Code        Illness Level (current clinical status): Watcher - No    Reason for Admission: Atrial flutter with rapid ventricular response    Brief HPI (pertinent PMH and diagnosis or differential diagnosis): 66 yo male PMhx metastatic rectal cancer Dx 2010, s/p resection w/ pulmonary mets 2013 on FOLFIRI chemotherapy, recurrent ascites s/p regular paracentesis, ureteral obstructive s/p urostomy tubes, recently diagnosed atrial flutter with variable block, DMHTN, diabetes, and CKD III who presented to the Emergency Department  from Heme/Onc office for elevated HR and symptoms of volume overload including worsening shortness of breath, abdominal distention, and LE edema for which he takes lasix PRN.  He gets periodic paracentesis as well for malignant ascites. He developed hypotension on the floor s/p paracentesis this admission w/ removal ~4.5L as well as aggressive rate control for Aflutter. He was subsequently transferred to the CCU.    Hospital Course (updated, brief assessment by system or problem, significant events): Patient transferred from the floor to CCU w/ labs notable for lactate 5, and leukocytosis to 18 and started on pressors as well as broad spectrum antibiotics. Antihypertensives were held. UA was positive but urine cx negative, paracentesis fluid was not sent for analysis. Patient was weaned off pressors and antibiotics were de-escalated to rocephin. Patient developed ATN likely 2/2 hypotension. He started on lasix gtt and diuril w/ improvement of urine output. Renal US demonstrated severe R hydronephrosis which improved on repeat US w/ diuresis. Heparin drip started for Aflutter and  lopressor has been uptitrated w/out much improvement in rate control. Plan for IR paracentesis w/ possible ISAIAS/RFA ablation 2/8.    Tasks (specific, using if-then statements): Plan for paracentesis scheduled for 2/8 (per IR stop heparin infusion just prior to transport to IR), Possible ISAIAS/RFA ablation 2/8 as well pending timing of paracentesis, monitor for improvement of renal function, titrate off nasal cannula    Contingency Plan (special circumstances anticipated and plan): Can step back up to CCU if patient clinically decompensates as well as following ablation    Discharge Disposition: pending further work-up    Mentored By: Dr. Martini

## 2018-02-07 NOTE — SUBJECTIVE & OBJECTIVE
Interval History: Feeling ok, had abd pain yesterday    Review of Systems   All other systems reviewed and are negative.    Objective:     Vital Signs (Most Recent):  Temp: 97.7 °F (36.5 °C) (02/07/18 0701)  Pulse: 101 (02/07/18 0900)  Resp: (!) 8 (02/07/18 0900)  BP: 104/64 (02/07/18 0900)  SpO2: 100 % (02/07/18 0900) Vital Signs (24h Range):  Temp:  [97.7 °F (36.5 °C)-98.2 °F (36.8 °C)] 97.7 °F (36.5 °C)  Pulse:  [101-149] 101  Resp:  [7-30] 8  SpO2:  [94 %-100 %] 100 %  BP: ()/(58-88) 104/64  Arterial Line BP: ()/(47-79) 101/49     Weight: 84.9 kg (187 lb 2.7 oz)  Body mass index is 26.86 kg/m².     SpO2: 100 %  O2 Device (Oxygen Therapy): nasal cannula    Physical Exam  GEN: Alert and oriented in NAD  NECK: no JVD appreciated  CVS: RRR, s1/s2  PULM: CTAB no rales  ABD: NT/ND BS +  Extremities: warm and dry, palpable pulses, mild edema  NEURO: Alert and oriented x 3  PSYCH: appropriate affect.         Significant Labs: reviewed    Significant Imaging: reviewed

## 2018-02-07 NOTE — PROGRESS NOTES
[facility]  Cardiac Electrophysiology  Progress Note    Admission Date: 2/1/2018  Code Status: Full Code   Attending Physician: Sandoval Martini MD   Expected Discharge Date: 2/8/2018  Principal Problem:Atrial flutter with rapid ventricular response    Subjective:     Interval History: Feeling ok, had abd pain yesterday    Review of Systems   All other systems reviewed and are negative.    Objective:     Vital Signs (Most Recent):  Temp: 97.7 °F (36.5 °C) (02/07/18 0701)  Pulse: 101 (02/07/18 0900)  Resp: (!) 8 (02/07/18 0900)  BP: 104/64 (02/07/18 0900)  SpO2: 100 % (02/07/18 0900) Vital Signs (24h Range):  Temp:  [97.7 °F (36.5 °C)-98.2 °F (36.8 °C)] 97.7 °F (36.5 °C)  Pulse:  [101-149] 101  Resp:  [7-30] 8  SpO2:  [94 %-100 %] 100 %  BP: ()/(58-88) 104/64  Arterial Line BP: ()/(47-79) 101/49     Weight: 84.9 kg (187 lb 2.7 oz)  Body mass index is 26.86 kg/m².     SpO2: 100 %  O2 Device (Oxygen Therapy): nasal cannula    Physical Exam  GEN: Alert and oriented in NAD  NECK: no JVD appreciated  CVS: RRR, s1/s2  PULM: CTAB no rales  ABD: NT/ND BS +  Extremities: warm and dry, palpable pulses, mild edema  NEURO: Alert and oriented x 3  PSYCH: appropriate affect.         Significant Labs: reviewed    Significant Imaging: reviewed    Assessment and Plan:     * Atrial flutter with rapid ventricular response    Mr. Medrano is a 67 y.o. Man with metastatic rectal cancer Dx 2010, s/p resection, then with pulmonary mets 2013 on FOLFIRI chemotherapy, recurrent ascites s/p regular paracentesis, ureteral obstructive s/p urostomy tubes, and recently diagnosed atrial flutter with variable block, DM - who presents as a transfer from clinic with shortness of breath and tachycardia.     Rate better controlled, please uptitrate AV armond agents as tolerated. He may not be a candidate for AC long term but while inpatient would recommend heparin gtt. Will attempt to coordinate AFL ablation with next  paracentesis.            Leland Trujillo MD  Cardiac Electrophysiology  [facility]

## 2018-02-07 NOTE — ASSESSMENT & PLAN NOTE
Pt w/ contraction alkalosis  Per nephrology if postobstructive ATN pt should self-diurese w/o diuretics  Creatinine improving today, 2.8 from 3.4

## 2018-02-07 NOTE — NURSING TRANSFER
Nursing Transfer Note      2/7/2018     Transfer to 940B From 3072    Transfer via wheelchair    Transfer with belongings and meds     Transported by RN    Medicines sent: yes    Chart send with patient: yes    Notified: Spouse    Patient reassessed at: 1620     Upon arrival to floor: cardiac monitor applied, patient oriented to room, call bell in reach and bed in lowest position

## 2018-02-07 NOTE — PROGRESS NOTES
Patient admitted to 9th floor. Report received from PEREZ Gloria. Vital signs stable. No signs of acute distress noted. Patient on telemetry monitor. Patient instructed to call for assistance. Will continue to monitor.

## 2018-02-07 NOTE — ASSESSMENT & PLAN NOTE
Mr. Medrano is a 67 y.o. Man with metastatic rectal cancer Dx 2010, s/p resection, then with pulmonary mets 2013 on FOLFIRI chemotherapy, recurrent ascites s/p regular paracentesis, ureteral obstructive s/p urostomy tubes, and recently diagnosed atrial flutter with variable block, DM - who presents as a transfer from clinic with shortness of breath and tachycardia.     Rate better controlled, please uptitrate AV armond agents as tolerated. He may not be a candidate for AC long term but while inpatient would recommend heparin gtt. Will attempt to coordinate AFL ablation with next paracentesis.

## 2018-02-07 NOTE — PT/OT/SLP PROGRESS
"Speech Language Pathology Treatment    Patient Name:  Karthik Medrano   MRN:  [id number]  Admitting Diagnosis: Atrial flutter with rapid ventricular response  The primary encounter diagnosis was Atrial flutter with rapid ventricular response. Diagnoses of Tachycardia, Chest pain, Arrhythmia, Anemia in neoplastic disease, Other ascites, CKD (chronic kidney disease), stage III, Rectal cancer, Renovascular hypertension, Type 2 diabetes mellitus with stage 3 chronic kidney disease, without long-term current use of insulin, Atrial flutter, Atrial fibrillation, Shock, Acute kidney failure with lesion of tubular necrosis, Acute diastolic congestive heart failure, Hypotension, unspecified hypotension type, and Hypovolemic shock were also pertinent to this visit.    Recommendations:                 General Recommendations:  Dysphagia therapy  Diet recommendations:  Regular, Liquid Diet Level: Nectar Thick   Aspiration Precautions: 1 bite/sip at a time, Double swallow with each bite/sip, Eliminate distractions, Frequent oral care, HOB to 90 degrees, Meds whole 1 at a time, No straws, Remain upright 30 minutes post meal, Small bites/sips, Strict aspiration precautions and Wear oxygen during intake   General Precautions: Standard, aspiration, fall  Communication strategies:  none    Subjective     SLP reviewed Pt with nurse, nurse explains she needs to clean pt and asks SLP to return later  Pt presents calm and cooperative upon second attempt  He asks, "Is there a procedure or something I need to have to fix the swallow?"  Pain/Comfort:  · Pain Rating 1: 0/10  · Pain Rating Post-Intervention 1: 0/10    Objective:     Has the patient been evaluated by SLP for swallowing?   Yes  Keep patient NPO? No   Current Respiratory Status: nasal cannula      CXR 2/6/18: One view: There is a central line lower SVC. There is cardiomegaly, moderate edema, and pleural fluid.    Pt seen for ongoing monitoring of tolerance of diet and Dysphagia " therapy. Upon first attempt, Pt unavailable 2/2 Pt with nursing.  Upon second attempt, Pt found awake in bed, son at bedside. Pt reports fair appetite.  Patient and son educated on thickener guidelines and recommendation from findings from MBSS completed day prior. Pt expressing he does not agree with recommendations and explains he does not feel anything different when he swallows nectar-thickened compared to thin liquids. SLP reviews risk of aspiration, ongoing safe swallow precautoins and ongoing dysphagia exercises for BOT and laryngeal elevation. Pt completes reps of lingual protrusion tasks with resistance x5 and attempts nicki x2 provided set-up from SLP, then transport arrives to bring Pt to floor. SLP discontinues session as Pt stepping down to floor. No further questions noted. Whiteboard current.     Assessment:     Karthik Medrano is a 67 y.o. male with an SLP diagnosis of Pharyngeal Dysphagia.  He would benefit from ongoing assistance with thickening of liquids and ongoing Dysphagia therapy. Strict aspiration precautions advised.     Goals:    SLP Goals        Problem: SLP Goal    Goal Priority Disciplines Outcome   SLP Goal     SLP Ongoing (interventions implemented as appropriate)   Description:  Speech Therapy Short Term Goals  Goal expected to be met by 2/20  1. Pt will tolerate regular diet with nectar-thickened liquids w/o overt S/S aspiration, Supervision   2. Pt will tolerate small sips of thin liquid with 3 swallows and no overt s/s of aspiration, supervision.   3. Pt will completed BOT retraction and pharyngeal wall constriction exercises x10 each, supervision.    Speech Language Pathology Goals  Goals expected to be met by 2/12/18  1. Pt will tolerate regular diet with nectar-thickened liquids w/o overt S/S aspiration, Supervision -ongoing  2. Pt will participate in Modified Barium Swallow Study to rule out risk of aspiration and determine safest, least restrictive means of  hydration/nutrition -met 2/6                        Plan:     · Patient to be seen:  4 x/week   · Plan of Care expires:  03/07/18  · Plan of Care reviewed with:  patient   · SLP Follow-Up:  Yes       Discharge recommendations:  home health speech therapy   Barriers to Discharge:  none    Time Tracking:     SLP Treatment Date:   02/07/18  Speech Start Time:  1443  Speech Stop Time:  1456     Speech Total Time (min):  13 min    Billable Minutes: Treatment Swallowing Dysfunction 13    MOLINA Wells, CCC-SLP  Speech-Language Pathology  Pager: [phone]      02/07/2018

## 2018-02-07 NOTE — ANESTHESIA PREPROCEDURE EVALUATION
[facility]  Anesthesia Pre-Operative Evaluation         Patient Name: Karthik Medrano  YOB: 1950  MRN: [id number]    SUBJECTIVE:     Pre-operative evaluation for Procedure(s) (LRB):  TRANSESOPHAGEAL ECHOCARDIOGRAM (ISAIAS) (N/A)  Scheduled for 2/8/2018    HPI 02/07/2018:  Karthik Medrano is a 67 y.o. male with hx of metastatic rectal cancer diagnosed in 2010 s/p resection, then pulm mets 2013 on FOLFIRI chemo, recurrent ascites s/p regular paracentesis, ureteral obstruction s/p urostomy tubes, recently diagnosed atrial flutter with variable block, DM, HTN, CKD3.    Patient was admitted on 2/1/2018 for elevated HR and volume overload (EF 40%).    Wife states that the last time the patient had general anesthesia, he took 9 hours to wake up.    Patient presents for the above procedure(s).    Prev airway:   No prior records in Epic or Legacy Documents.    Oxygen/Ventilation Requirements:  Satting high 90s with 2L/min NC.  Oxygen Concentration (%):  [28] 28    Current LDA:        Port A Cath Single Lumen 02/01/18 1005 other (see comments) (Active)   Dressing Type Transparent 2/7/2018 11:00 AM   Dressing Status Biopatch in place;Clean;Dry;Intact 2/7/2018 11:00 AM   Dressing Intervention New dressing 2/7/2018 11:00 AM   Dressing Change Due 02/12/18 2/7/2018 11:00 AM   Line Status Infusing 2/7/2018 11:00 AM   Flush Performed Yes 2/7/2018 11:00 AM   Daily Line Review Performed 2/7/2018 11:00 AM   Type of Needle Ascencio 2/7/2018 11:00 AM   Gauge 20 2/7/2018  3:00 AM   Number of days: 6            Urethral Catheter 02/02/18 1545 (Active)   Site Assessment Clean;Intact 2/7/2018 11:00 AM   Collection Container Urimeter 2/7/2018 11:00 AM   Securement Method secured to top of thigh w/ adhesive device 2/7/2018 11:00 AM   Catheter Care Performed yes 2/7/2018 11:00 AM   Reason for Continuing Urinary Catheterization Critically ill in ICU requiring intensive monitoring 2/7/2018 11:00 AM   CAUTI Prevention  "Bundle StatLock in place w 1" slack 2/7/2018  7:01 AM   Output (mL) 50 mL 2/7/2018  2:00 PM   Number of days: 5       Current Drips:   heparin (porcine) in D5W 16 Units/kg/hr (02/07/18 1400)       Patient Active Problem List   Diagnosis    Acute congestive heart failure    Renovascular hypertension    Overweight(278.02)    Encounter for antineoplastic chemotherapy and immunotherapy    Rectal cancer    Type 2 diabetes mellitus with stage 3 chronic kidney disease, without long-term current use of insulin    Hydronephrosis    Chemotherapy induced neutropenia    Chemotherapy induced diarrhea    Encounter for antineoplastic chemotherapy    Vitamin D deficiency    Rectal bleeding    Chemotherapy-induced thrombocytopenia    Dehydration    Anemia in neoplastic disease    Hypokalemia    Acute renal failure superimposed on stage 3 chronic kidney disease    Atrial flutter with rapid ventricular response    CKD (chronic kidney disease), stage III    Rectal cancer metastasized to lung    Urinary tract infection without hematuria    Ascites    Tachycardia    Anasarca    Acute kidney failure with lesion of tubular necrosis    Acute diastolic congestive heart failure    Hypotension    Hyperphosphatemia       Review of patient's allergies indicates:  No Known Allergies    Outpatient Medications:  No current facility-administered medications on file prior to encounter.      Current Outpatient Prescriptions on File Prior to Encounter   Medication Sig Dispense Refill    ascorbic acid (VITAMIN C) 1000 MG tablet Take 1,000 mg by mouth once daily.      cholecalciferol, vitamin D3, (VITAMIN D3) 2,000 unit Cap Take 1 capsule by mouth once daily.      cholestyramine, bulk, Powd 4 g by Misc.(Non-Drug; Combo Route) route once daily at 6am. 500 g 3    co-enzyme Q-10 30 mg capsule Take 30 mg by mouth 3 (three) times daily.      dronabinol (MARINOL) 10 MG capsule Take 1 capsule (10 mg total) by mouth 2 (two) " times daily before meals. 60 capsule 2    esomeprazole (NEXIUM) 40 MG capsule TAKE 1 CAPSULE(40 MG) BY MOUTH EVERY DAY 90 capsule 1    furosemide (LASIX) 20 MG tablet Take 2 tablets (40 mg total) by mouth daily as needed. 90 tablet 6    glipiZIDE (GLUCOTROL) 5 MG tablet TAKE 1 TABLET BY MOUTH THREE TIMES DAILY 270 tablet 0    metoprolol succinate (TOPROL-XL) 100 MG 24 hr tablet Take 1 tablet (100 mg total) by mouth 2 (two) times daily. 180 tablet 3    multivitamin with iron-mineral TbSR Take 1 tablet by mouth once daily. 100 tablet 2    nut.tx.imp.renal fxn,lac-reduc 0.08 gram-1.8 kcal/mL Liqd Take 237 mLs by mouth 2 (two) times daily. 24 Can 12    opium tincture 10 mg/mL (morphine) Tinc Take 0.6 mLs (6 mg total) by mouth 4 (four) times daily. 473 mL 0    oxycodone-acetaminophen (PERCOCET) 5-325 mg per tablet TK 1 T PO Q 6 H PRN P  0    paregoric 2 mg/5 mL Liqd TAKE 5 ML BY MOUTH THREE TIMES DAILY 473 mL 0    regorafenib (STIVARGA) 40 mg Tab Take 120 mg by mouth once daily. Take for 21 days and then 1 week off and then repeat 84 tablet 6    tamsulosin (FLOMAX) 0.4 mg Cp24 Take 1 capsule (0.4 mg total) by mouth once daily. 90 capsule 6    diphenoxylate-atropine 2.5-0.025 mg (LOMOTIL) 2.5-0.025 mg per tablet TAKE 1 TABLET BY MOUTH FOUR TIMES DAILY AS NEEDED FOR DIARRHEA 100 tablet 0    NYSTATIN (DUKE'S SOLUTION) Mix equal amounts of benadryl, maalox, 2% viscous lidocaine and nystatin    Swish and swallow 10 ml 4 times a day 500 mL 5    ondansetron (ZOFRAN-ODT) 8 MG TbDL Take 1 tablet (8 mg total) by mouth every 8 (eight) hours as needed (Nausea). 60 tablet 6        Current Inpatient Medications:   cefTRIAXone (ROCEPHIN) IVPB  1 g Intravenous Q24H    metoprolol tartrate  50 mg Oral QID    pantoprazole  40 mg Oral Daily    sodium chloride 0.9%  3 mL Intravenous Q8H       Past Surgical History:   Procedure Laterality Date    ABDOMINAL SURGERY      COLON SURGERY      COLONOSCOPY N/A 10/3/2017     Procedure: COLONOSCOPY;  Surgeon: Sandoval Bowling MD;  Location: [facility] ([address] [place] [address]);  Service: Endoscopy;  Laterality: N/A;    CYSTOSCOPY W/ URETERAL STENT PLACEMENT      FRACTURE SURGERY      SKIN BIOPSY         Social History     Social History    Marital status:      Spouse name: N/A    Number of children: N/A    Years of education: N/A     Occupational History    Not on file.     Social History Main Topics    Smoking status: Never Smoker    Smokeless tobacco: Never Used    Alcohol use No    Drug use: Unknown    Sexual activity: Not on file     Other Topics Concern    Not on file     Social History Narrative    No narrative on file       OBJECTIVE:   Weight:  Wt Readings from Last 4 Encounters:   02/07/18 84.9 kg (187 lb 2.7 oz)   01/16/18 90.7 kg (200 lb)   01/16/18 90.3 kg (199 lb 1.2 oz)   01/09/18 84.3 kg (185 lb 13.6 oz)       Vital Signs Range (Last 24H):  Temp:  [35.6 °C (96.1 °F)-36.8 °C (98.2 °F)]   Pulse:  [101-138]   Resp:  [7-25]   BP: ()/(58-81)   SpO2:  [91 %-100 %]   Arterial Line BP: ()/(47-79)       CBC:   Recent Labs      02/06/18 0318 02/07/18 0318   WBC  6.56  9.06   RBC  2.83*  3.27*   HGB  8.0*  9.1*   HCT  25.5*  30.9*   PLT  65*  62*   MCV  90  95   MCH  28.3  27.8   MCHC  31.4*  29.4*       CMP:   Recent Labs      02/06/18 0318 02/07/18 0318 02/07/18   0750  02/07/18   1617   NA  141   < >  145  144  145   K  3.6   < >  4.3  4.3  4.2   CL  98   < >  101  101  101   CO2  32*   < >  33*  33*  35*   BUN  60*   < >  54*  49*  48*   CREATININE  3.4*   < >  2.8*  2.6*  2.6*   GLU  168*   < >  184*  145*  170*   MG  2.1   < >  2.0   --   1.6   PHOS  4.9*   < >  5.4*  5.1*  4.3   CALCIUM  8.5*   < >  9.1  9.2  9.0   ALBUMIN  2.6*   < >  2.8*  2.6*  2.6*   PROT  6.1   --   6.6   --    --    ALKPHOS  288*   --   278*   --    --    ALT  52*   --   39   --    --    AST  23   --   14   --    --    BILITOT  0.3   --   0.3   --    --     < > = values in this  interval not displayed.       INR:  No results for input(s): INR, PROTIME, APTT in the last 72 hours.    Diagnostic Studies:  US Abd 2018  There is moderate amount of ascites throughout the abdomen, notably in the left lower quadrant. The liver demonstrates a heterogeneous echotexture and nodular contour suggestive of cirrhosis.    CXR 2018  One view: There is a central line lower SVC. There is cardiomegaly, moderate edema, and pleural fluid.    EK2018  Blood Pressure : 107/064 mmHG  Vent. Rate : 141 BPM     Atrial Rate : 277 BPM     P-R Int : 000 ms          QRS Dur : 066 ms      QT Int : 350 ms       P-R-T Axes : 233 012 095 degrees     QTc Int : 536 ms    Atrial flutter with variable A-V block with premature ventricular or  aberrantly conducted complexes  Minimal voltage criteria for LVH, may be normal variant  Cannot rule out Inferior infarct ,age undetermined  Nonspecific ST and/or T wave abnormalities  When compared with ECG of 2018 08:25,  Minimal criteria for Inferior infarct are now Present  Nonspecific T wave abnormality, worse in Anterior leads     2D Echo:  2018    1 - Upper limit of normal left ventricular enlargement.     2 - Wall motion abnormalities.     3 - Biatrial enlargement.     4 - Right ventricular enlargement with normal systolic function.     5 - Pulmonary hypertension. The estimated PA systolic pressure is 54 mmHg.     6 - Moderate mitral regurgitation.     7 - Moderate tricuspid regurgitation.     8 - Intermediate central venous pressure.     9 - Mildly to moderately depressed left ventricular systolic function (EF 40%).     10 - Appended report with change in LVEF.     Results for orders placed or performed during the hospital encounter of 18   2D echo with color flow doppler   Result Value Ref Range    EF 40 (A) 55 - 65    Mitral Valve Regurgitation MODERATE (A)     Est. PA Systolic Pressure 54.24 (A)     Tricuspid Valve Regurgitation MODERATE (A)           ASSESSMENT/PLAN:         Anesthesia Evaluation    I have reviewed the Patient Summary Reports.    I have reviewed the Nursing Notes.   I have reviewed the Medications.     Review of Systems  Anesthesia Hx:  No problems with previous Anesthesia  History of prior surgery of interest to airway management or planning: Denies Family Hx of Anesthesia complications.    Social:  Non-Smoker, No Alcohol Use    Hematology/Oncology:         -- Anemia: Current/Recent Cancer. chemotherapy Oncology Comments: Metastatic rectal cancer     EENT/Dental:EENT/Dental Normal   Cardiovascular:   Exercise tolerance: poor Hypertension Valvular problems/Murmurs, MR Denies MI.  Denies CAD.    Denies CABG/stent. Dysrhythmias atrial fibrillation CHF hyperlipidemia ECG has been reviewed.    Pulmonary:   Denies COPD.  Denies Asthma. Metastatic rectal cancer to the lungs   Renal/:   Chronic Renal Disease, CRI Hydronephrosis    Hepatic/GI:   GERD, well controlled Denies Liver Disease.    Neurological:   Peripheral Neuropathy    Endocrine:   Diabetes        Physical Exam  General:  Well nourished    Airway/Jaw/Neck:  Airway Findings: Mouth Opening: Normal Tongue: Normal  Mallampati: III  TM Distance: Normal, at least 6 cm      Dental:  Dental Findings: In tact   Chest/Lungs:  Chest/Lungs Findings: Clear to auscultation, Normal Respiratory Rate     Heart/Vascular:  Heart Findings: Rate: Normal  Rhythm: Regular Rhythm        Mental Status:  Mental Status Findings:  Cooperative, Alert and Oriented         Anesthesia Plan  Type of Anesthesia, risks & benefits discussed:  Anesthesia Type:  general, MAC  Patient's Preference:   Intra-op Monitoring Plan: standard ASA monitors  Intra-op Monitoring Plan Comments:   Post Op Pain Control Plan: IV/PO Opioids PRN, multimodal analgesia and per primary service following discharge from PACU  Post Op Pain Control Plan Comments:   Induction:   IV  Beta Blocker:  Patient is on a Beta-Blocker and has received  one dose within the past 24 hours (No further documentation required).       Informed Consent: Patient understands risks and agrees with Anesthesia plan.  Questions answered. Anesthesia consent signed with patient.  ASA Score: 3     Day of Surgery Review of History & Physical: I have interviewed and examined the patient. I have reviewed the patient's H&P dated:  There are no significant changes.  H&P update referred to the provider.         Ready For Surgery From Anesthesia Perspective.

## 2018-02-07 NOTE — PROGRESS NOTES
Discussed with IR. Mr. Medrano is on schedule for IR paracentesis tomorrow, Thursday. Abdomen is less distended today than it was yesterday. Will discuss potential step down to the floor today and coordination of care with EP today.     Moy Cain MD, PGY-4

## 2018-02-07 NOTE — PROGRESS NOTES
Spoke to hospital medicine. Patient will step down. Expected for paracentesis tomorrow AM and potential ISAIAS/RFA tomorrow if paracentesis done in AM.     Keep NPO past midnight    Moy Cain MD, PGY-4

## 2018-02-07 NOTE — PROGRESS NOTES
[facility]  Cardiology  Progress Note    Patient Name: Karthik Medrano  MRN: [id number]  Admission Date: 2/1/2018  Hospital Length of Stay: 6 days  Code Status: Full Code   Attending Physician: Sandoval Martini MD   Primary Care Physician: Pinky Thornton MD  Expected Discharge Date: 2/8/2018  Principal Problem:Atrial flutter with rapid ventricular response    Subjective:     Hospital Course:   2/2 transferred to CCU, started on levophed and vasopressin, oliguric and w/ anasarca, IVC consistent w/ volume overload, started on lasix drip, lactate 5, UA positive, leukocytosis, started on vanc/ zosyn  2/3 urology consulted for severe R hydronephrosis, UOP improving  2/4 weaning off levophed, starting heparin drip for anticoagulation  2/5 Weaned off and stopped pressors. Started 12.5 metoprolol with hold parameters. De-escalated zosyn to rocephin.  2/6 d/c lasix, holding duresis, pt still w/ good urine output. Patient developed L sided pain this AM in mid-axillary area, sharp, non-radiating, pleuritic, reproducible to palpation. CXR demonstrated R-sided pleural fluid, no evidence of pneumonia. Abdomen also noted to be more distended, bedside US performed demonstrated ascites. Dedicated US ordered. Plan to coordinate w/ EP for ablation for Aflutter and IR for paracentesis. Steadily increasing tachycardia noted throughout the day possibly 2/2 pain vs intravascular volume depletion. Lopressor increased and fluid boluses given (250cc x 1 and 500cc x 1).  2/7 Pain better controlled today, -130s,    Interval History: L-sided pain in mid axillary line better controlled this Am. Abdomen softer today then yesterday. Patient still w/ good urine output off diuretics and creatinine improving. Plan to possibly step patient down today.    ROS  Objective:     Vital Signs (Most Recent):  Temp: 97.7 °F (36.5 °C) (02/07/18 0701)  Pulse: (!) 131 (02/07/18 0800)  Resp: 16 (02/07/18 0800)  BP: 103/69  (02/07/18 0800)  SpO2: 99 % (02/07/18 0800) Vital Signs (24h Range):  Temp:  [97.7 °F (36.5 °C)-98.2 °F (36.8 °C)] 97.7 °F (36.5 °C)  Pulse:  [120-149] 131  Resp:  [7-30] 16  SpO2:  [94 %-100 %] 99 %  BP: ()/(58-88) 103/69  Arterial Line BP: ()/(47-79) 97/54     Weight: 84.9 kg (187 lb 2.7 oz)  Body mass index is 26.86 kg/m².     SpO2: 99 %  O2 Device (Oxygen Therapy): nasal cannula      Intake/Output Summary (Last 24 hours) at 02/07/18 0854  Last data filed at 02/07/18 0800   Gross per 24 hour   Intake           403.84 ml   Output             1720 ml   Net         -1316.16 ml       Lines/Drains/Airways     Central Venous Catheter Line                 Port A Cath Single Lumen -- days         Port A Cath Single Lumen 02/01/18 1005 other (see comments) 5 days          Drain                 Ureteral Drain/Stent 03/04/16 Right ureter 6 Fr. 705 days         Urethral Catheter 02/02/18 1545 4 days          Arterial Line                 Arterial Line 02/02/18 1637 Right Femoral 4 days          Pressure Ulcer                 Pressure Injury 02/05/18 1856 Coccyx Stage 2 1 day                Physical Exam   Constitutional: He is oriented to person, place, and time. No distress.   HENT:   Head: Normocephalic and atraumatic.   Eyes: Conjunctivae are normal. No scleral icterus.   Neck: Neck supple.   Cardiovascular:   Tachycardic  Aflutter  +2 radial pulses b/l   Pulmonary/Chest: No respiratory distress. He has no wheezes. Rales: mild bibasilar. He exhibits tenderness (L mid axillar area, mild compared w/ yesterday).   Nasal cannula in place   Abdominal: Soft. He exhibits no distension. There is no tenderness. There is no guarding.   Musculoskeletal: He exhibits edema (+1 b/l LE).   Neurological: He is alert and oriented to person, place, and time.   Skin: Skin is warm. No rash noted.       Significant Labs:   BMP:   Recent Labs  Lab 02/06/18  0318  02/06/18  1522 02/06/18  2317 02/07/18  0318 02/07/18  0750   GLU  168*  < > 184* 177* 184* 145*     < > 144 142 145 144   K 3.6  < > 4.2 4.6 4.3 4.3   CL 98  < > 100 101 101 101   CO2 32*  < > 32* 30* 33* 33*   BUN 60*  < > 55* 53* 54* 49*   CREATININE 3.4*  < > 3.1* 2.9* 2.8* 2.6*   CALCIUM 8.5*  < > 9.0 8.9 9.1 9.2   MG 2.1  --  2.1  --  2.0  --    < > = values in this interval not displayed. and CBC   Recent Labs  Lab 02/06/18 0318 02/07/18 0318   WBC 6.56 9.06   HGB 8.0* 9.1*   HCT 25.5* 30.9*   PLT 65* 62*       Significant Imaging: X-Ray: CXR: X-Ray Chest 1 View (CXR):   Results for orders placed or performed during the hospital encounter of 02/01/18   X-Ray Chest 1 View    Narrative    One view: There is a central line lower SVC. There is cardiomegaly, moderate edema, and pleural fluid.      Electronically signed by: NAKUL CASTRO MD  Date:     02/06/18  Time:    13:13    Abd US: moderate amount of ascites throughout the abdomen, notably in the left lower quadrant. The liver demonstrates a heterogeneous echotexture and nodular contour suggestive of cirrhosis.    Assessment and Plan:     Brief HPI:     * Atrial flutter with rapid ventricular response    Verapamil and toprol-xl d/c 2/2 hypotension, with increase in rates up to 130s  Started anticoagulation w/ heparin gtt as per EP reccs  Now off pressure support, started metoprolol 12.5 q6hr with hold parameters of MAP <65  Though lopressor likely not to improve tachycardia  Plan for ablation once hemodynamically stable and as per EP recs, will try to time with paracentesis possibly later this week         Anasarca    Improving s/p diuresis  Likely 2/2 low albumin and CKD        Ascites    Pt w/ paracenteses every ~2 weeks for recurrent ascites  S/p paracentesis w/ removal ~4.5L 2/2/18  Assess later in week for need of repeat paracentesis as it could be done with ablation and anticoagulation could be held at same time for both procedures  Concern for  vs SBP as had leukocytosis, though leukocytosis now resolved.  Fluid from 2/2/18 paracentesis was not sent for analysis. Transitioned from zosyn to rocephin.        Urinary tract infection without hematuria    UA positive  Initially on vanc and zosyn, have de-escalated to rocephin  Urine cx: no growth, gram stain (-)        Acute renal failure superimposed on stage 3 chronic kidney disease    Pt w/ contraction alkalosis  Per nephrology if postobstructive ATN pt should self-diurese w/o diuretics  Creatinine improving today, 2.8 from 3.4        Hydronephrosis    R ureteral stricture with right hydronephrosis managed w/ ureteral stent exchanges q 3 months  Per pt last stent exchange ~1 week ago  US retroperitoneal improving R-sided hydronephrosis (mild from severe)  Per urology (hydronephrosis likely 2/2 XRT for rectal ca) should patient clinically decompensate recommend right nephrostomy tube          Type 2 diabetes mellitus with stage 3 chronic kidney disease, without long-term current use of insulin    C/w accuchecks  aspart sliding scale        Rectal cancer    Per hem/onc notes pt note tolerating chemo and now chemo held 2/2 debility and functional status, prognosis guarded  Per wife pt wishes to be full code and want full resuscitation, however, does not want central line or dialysis        Acute congestive heart failure    TTE 8/29/17 EF 60%, TTE 2/2/18 EF 40%  Contraction alkalosis w/ elevated bicarb  Pt still w/ good urine output off diuretics  Monitor I/Os            VTE Risk Mitigation         Ordered     heparin 25,000 units in dextrose 5% 250 mL (100 units/mL) bolus from bag; ADDITIONAL PRN BOLUS  As needed (PRN)     Route:  Intravenous        02/05/18 1050     heparin 25,000 units in dextrose 5% 250 mL (100 units/mL) bolus from bag; ADDITIONAL PRN BOLUS  As needed (PRN)     Route:  Intravenous        02/05/18 1050     heparin 25,000 units in dextrose 5% 250 mL (100 units/mL) infusion  Continuous     Route:  Intravenous        02/04/18 1359     High Risk of VTE  Once       02/01/18 1231     Medium Risk of VTE  Once      02/01/18 1231     Place sequential compression device  Until discontinued      02/01/18 1231     Reason for No Pharmacological VTE Prophylaxis  Once      02/01/18 1231          Kiel Yang MD  Cardiology  [facility]

## 2018-02-07 NOTE — SUBJECTIVE & OBJECTIVE
Interval History: L-sided pain in mid axillary line better controlled this Am. Abdomen softer today then yesterday. Patient still w/ good urine output off diuretics and creatinine improving. Plan to possibly step patient down today.    ROS  Objective:     Vital Signs (Most Recent):  Temp: 97.7 °F (36.5 °C) (02/07/18 0701)  Pulse: (!) 131 (02/07/18 0800)  Resp: 16 (02/07/18 0800)  BP: 103/69 (02/07/18 0800)  SpO2: 99 % (02/07/18 0800) Vital Signs (24h Range):  Temp:  [97.7 °F (36.5 °C)-98.2 °F (36.8 °C)] 97.7 °F (36.5 °C)  Pulse:  [120-149] 131  Resp:  [7-30] 16  SpO2:  [94 %-100 %] 99 %  BP: ()/(58-88) 103/69  Arterial Line BP: ()/(47-79) 97/54     Weight: 84.9 kg (187 lb 2.7 oz)  Body mass index is 26.86 kg/m².     SpO2: 99 %  O2 Device (Oxygen Therapy): nasal cannula      Intake/Output Summary (Last 24 hours) at 02/07/18 0854  Last data filed at 02/07/18 0800   Gross per 24 hour   Intake           403.84 ml   Output             1720 ml   Net         -1316.16 ml       Lines/Drains/Airways     Central Venous Catheter Line                 Port A Cath Single Lumen -- days         Port A Cath Single Lumen 02/01/18 1005 other (see comments) 5 days          Drain                 Ureteral Drain/Stent 03/04/16 Right ureter 6 Fr. 705 days         Urethral Catheter 02/02/18 1545 4 days          Arterial Line                 Arterial Line 02/02/18 1637 Right Femoral 4 days          Pressure Ulcer                 Pressure Injury 02/05/18 1856 Coccyx Stage 2 1 day                Physical Exam   Constitutional: He is oriented to person, place, and time. No distress.   HENT:   Head: Normocephalic and atraumatic.   Eyes: Conjunctivae are normal. No scleral icterus.   Neck: Neck supple.   Cardiovascular:   Tachycardic  Aflutter  +2 radial pulses b/l   Pulmonary/Chest: No respiratory distress. He has no wheezes. Rales: mild bibasilar. He exhibits tenderness (L mid axillar area, mild compared w/ yesterday).   Nasal  cannula in place   Abdominal: Soft. He exhibits no distension. There is no tenderness. There is no guarding.   Musculoskeletal: He exhibits edema (+1 b/l LE).   Neurological: He is alert and oriented to person, place, and time.   Skin: Skin is warm. No rash noted.       Significant Labs:   BMP:   Recent Labs  Lab 02/06/18  0318  02/06/18  1522 02/06/18  2317 02/07/18  0318 02/07/18  0750   *  < > 184* 177* 184* 145*     < > 144 142 145 144   K 3.6  < > 4.2 4.6 4.3 4.3   CL 98  < > 100 101 101 101   CO2 32*  < > 32* 30* 33* 33*   BUN 60*  < > 55* 53* 54* 49*   CREATININE 3.4*  < > 3.1* 2.9* 2.8* 2.6*   CALCIUM 8.5*  < > 9.0 8.9 9.1 9.2   MG 2.1  --  2.1  --  2.0  --    < > = values in this interval not displayed. and CBC   Recent Labs  Lab 02/06/18 0318 02/07/18 0318   WBC 6.56 9.06   HGB 8.0* 9.1*   HCT 25.5* 30.9*   PLT 65* 62*       Significant Imaging: X-Ray: CXR: X-Ray Chest 1 View (CXR):   Results for orders placed or performed during the hospital encounter of 02/01/18   X-Ray Chest 1 View    Narrative    One view: There is a central line lower SVC. There is cardiomegaly, moderate edema, and pleural fluid.      Electronically signed by: NAKUL CASTRO MD  Date:     02/06/18  Time:    13:13    Abd US: moderate amount of ascites throughout the abdomen, notably in the left lower quadrant. The liver demonstrates a heterogeneous echotexture and nodular contour suggestive of cirrhosis.

## 2018-02-08 ENCOUNTER — ANESTHESIA (OUTPATIENT)
Dept: MEDSURG UNIT | Facility: HOSPITAL | Age: 68
DRG: 273 | End: 2018-02-08
Payer: COMMERCIAL

## 2018-02-08 ENCOUNTER — SURGERY (OUTPATIENT)
Age: 68
End: 2018-02-08

## 2018-02-08 LAB
ALBUMIN SERPL BCP-MCNC: 2.5 G/DL
ALBUMIN SERPL BCP-MCNC: 2.5 G/DL
ALBUMIN SERPL BCP-MCNC: 2.6 G/DL
ALBUMIN SERPL BCP-MCNC: 2.6 G/DL
ALP SERPL-CCNC: 263 U/L
ALT SERPL W/O P-5'-P-CCNC: 29 U/L
ANION GAP SERPL CALC-SCNC: 6 MMOL/L
ANION GAP SERPL CALC-SCNC: 7 MMOL/L
ANION GAP SERPL CALC-SCNC: 8 MMOL/L
ANION GAP SERPL CALC-SCNC: 8 MMOL/L
ANISOCYTOSIS BLD QL SMEAR: SLIGHT
AORTIC ATHEROMA: YES
AST SERPL-CCNC: 11 U/L
BACTERIA BLD CULT: NORMAL
BASO STIPL BLD QL SMEAR: ABNORMAL
BASO STIPL BLD QL SMEAR: ABNORMAL
BASOPHILS # BLD AUTO: 0.03 K/UL
BASOPHILS # BLD AUTO: 0.03 K/UL
BASOPHILS # BLD AUTO: 0.05 K/UL
BASOPHILS # BLD AUTO: 0.05 K/UL
BASOPHILS NFR BLD: 0.4 %
BASOPHILS NFR BLD: 0.4 %
BASOPHILS NFR BLD: 0.5 %
BASOPHILS NFR BLD: 0.6 %
BILIRUB SERPL-MCNC: 0.2 MG/DL
BUN SERPL-MCNC: 40 MG/DL
BUN SERPL-MCNC: 43 MG/DL
BUN SERPL-MCNC: 46 MG/DL
BUN SERPL-MCNC: 46 MG/DL
CALCIUM SERPL-MCNC: 8.8 MG/DL
CALCIUM SERPL-MCNC: 8.8 MG/DL
CALCIUM SERPL-MCNC: 9.2 MG/DL
CALCIUM SERPL-MCNC: 9.2 MG/DL
CHLORIDE SERPL-SCNC: 101 MMOL/L
CHLORIDE SERPL-SCNC: 103 MMOL/L
CO2 SERPL-SCNC: 35 MMOL/L
CO2 SERPL-SCNC: 35 MMOL/L
CO2 SERPL-SCNC: 36 MMOL/L
CO2 SERPL-SCNC: 37 MMOL/L
CREAT SERPL-MCNC: 2.3 MG/DL
CREAT SERPL-MCNC: 2.3 MG/DL
CREAT SERPL-MCNC: 2.4 MG/DL
CREAT SERPL-MCNC: 2.4 MG/DL
DIFFERENTIAL METHOD: ABNORMAL
EOSINOPHIL # BLD AUTO: 0.2 K/UL
EOSINOPHIL NFR BLD: 1.7 %
EOSINOPHIL NFR BLD: 2.3 %
EOSINOPHIL NFR BLD: 2.4 %
EOSINOPHIL NFR BLD: 2.4 %
ERYTHROCYTE [DISTWIDTH] IN BLOOD BY AUTOMATED COUNT: 19.6 %
ERYTHROCYTE [DISTWIDTH] IN BLOOD BY AUTOMATED COUNT: 19.9 %
EST. GFR  (AFRICAN AMERICAN): 30.7 ML/MIN/1.73 M^2
EST. GFR  (AFRICAN AMERICAN): 30.7 ML/MIN/1.73 M^2
EST. GFR  (AFRICAN AMERICAN): 32.3 ML/MIN/1.73 M^2
EST. GFR  (AFRICAN AMERICAN): 32.3 ML/MIN/1.73 M^2
EST. GFR  (NON AFRICAN AMERICAN): 26.5 ML/MIN/1.73 M^2
EST. GFR  (NON AFRICAN AMERICAN): 26.5 ML/MIN/1.73 M^2
EST. GFR  (NON AFRICAN AMERICAN): 27.9 ML/MIN/1.73 M^2
EST. GFR  (NON AFRICAN AMERICAN): 27.9 ML/MIN/1.73 M^2
FACT X PPP CHRO-ACNC: 0.41 IU/ML
FACT X PPP CHRO-ACNC: <0.1 IU/ML
GLUCOSE SERPL-MCNC: 140 MG/DL
GLUCOSE SERPL-MCNC: 155 MG/DL
GLUCOSE SERPL-MCNC: 166 MG/DL
GLUCOSE SERPL-MCNC: 166 MG/DL
HCT VFR BLD AUTO: 28.7 %
HCT VFR BLD AUTO: 28.8 %
HCT VFR BLD AUTO: 31.4 %
HCT VFR BLD AUTO: 32.1 %
HGB BLD-MCNC: 8.5 G/DL
HGB BLD-MCNC: 8.5 G/DL
HGB BLD-MCNC: 9.3 G/DL
HGB BLD-MCNC: 9.5 G/DL
HYPOCHROMIA BLD QL SMEAR: ABNORMAL
IMM GRANULOCYTES # BLD AUTO: 0.03 K/UL
IMM GRANULOCYTES # BLD AUTO: 0.04 K/UL
IMM GRANULOCYTES NFR BLD AUTO: 0.4 %
IMM GRANULOCYTES NFR BLD AUTO: 0.4 %
IMM GRANULOCYTES NFR BLD AUTO: 0.5 %
IMM GRANULOCYTES NFR BLD AUTO: 0.6 %
LYMPHOCYTES # BLD AUTO: 1.4 K/UL
LYMPHOCYTES # BLD AUTO: 1.6 K/UL
LYMPHOCYTES # BLD AUTO: 1.9 K/UL
LYMPHOCYTES # BLD AUTO: 2.4 K/UL
LYMPHOCYTES NFR BLD: 19.7 %
LYMPHOCYTES NFR BLD: 20.8 %
LYMPHOCYTES NFR BLD: 21.2 %
LYMPHOCYTES NFR BLD: 27.3 %
MAGNESIUM SERPL-MCNC: 1.7 MG/DL
MAGNESIUM SERPL-MCNC: 1.7 MG/DL
MCH RBC QN AUTO: 27.7 PG
MCH RBC QN AUTO: 27.9 PG
MCH RBC QN AUTO: 28.4 PG
MCH RBC QN AUTO: 28.6 PG
MCHC RBC AUTO-ENTMCNC: 29.5 G/DL
MCHC RBC AUTO-ENTMCNC: 29.6 G/DL
MCV RBC AUTO: 94 FL
MCV RBC AUTO: 94 FL
MCV RBC AUTO: 96 FL
MCV RBC AUTO: 97 FL
MITRAL VALVE MOBILITY: NORMAL
MITRAL VALVE REGURGITATION: ABNORMAL
MONOCYTES # BLD AUTO: 0.5 K/UL
MONOCYTES # BLD AUTO: 0.6 K/UL
MONOCYTES # BLD AUTO: 0.6 K/UL
MONOCYTES # BLD AUTO: 0.7 K/UL
MONOCYTES NFR BLD: 7.1 %
MONOCYTES NFR BLD: 7.2 %
MONOCYTES NFR BLD: 7.4 %
MONOCYTES NFR BLD: 7.9 %
NEUTROPHILS # BLD AUTO: 4.9 K/UL
NEUTROPHILS # BLD AUTO: 5.2 K/UL
NEUTROPHILS # BLD AUTO: 5.5 K/UL
NEUTROPHILS # BLD AUTO: 6.4 K/UL
NEUTROPHILS NFR BLD: 62.1 %
NEUTROPHILS NFR BLD: 67.7 %
NEUTROPHILS NFR BLD: 69.5 %
NEUTROPHILS NFR BLD: 69.5 %
NRBC BLD-RTO: 0 /100 WBC
OVALOCYTES BLD QL SMEAR: ABNORMAL
PHOSPHATE SERPL-MCNC: 3.5 MG/DL
PHOSPHATE SERPL-MCNC: 3.6 MG/DL
PHOSPHATE SERPL-MCNC: 4 MG/DL
PHOSPHATE SERPL-MCNC: 4 MG/DL
PLATELET # BLD AUTO: 72 K/UL
PLATELET # BLD AUTO: ABNORMAL K/UL
PLATELET BLD QL SMEAR: ABNORMAL
PMV BLD AUTO: 10 FL
PMV BLD AUTO: 11.4 FL
PMV BLD AUTO: ABNORMAL FL
PMV BLD AUTO: ABNORMAL FL
POCT GLUCOSE: 146 MG/DL (ref 70–110)
POCT GLUCOSE: 155 MG/DL (ref 70–110)
POCT GLUCOSE: 178 MG/DL (ref 70–110)
POIKILOCYTOSIS BLD QL SMEAR: SLIGHT
POLYCHROMASIA BLD QL SMEAR: ABNORMAL
POTASSIUM SERPL-SCNC: 3.8 MMOL/L
POTASSIUM SERPL-SCNC: 4 MMOL/L
POTASSIUM SERPL-SCNC: 4 MMOL/L
POTASSIUM SERPL-SCNC: 4.2 MMOL/L
PROT SERPL-MCNC: 6.1 G/DL
RBC # BLD AUTO: 3.05 M/UL
RBC # BLD AUTO: 3.07 M/UL
RBC # BLD AUTO: 3.25 M/UL
RBC # BLD AUTO: 3.34 M/UL
RETIRED EF AND QEF - SEE NOTES: 45 (ref 55–65)
SCHISTOCYTES BLD QL SMEAR: PRESENT
SODIUM SERPL-SCNC: 144 MMOL/L
SODIUM SERPL-SCNC: 146 MMOL/L
TRICUSPID VALVE REGURGITATION: ABNORMAL
WBC # BLD AUTO: 7 K/UL
WBC # BLD AUTO: 7.6 K/UL
WBC # BLD AUTO: 8.8 K/UL
WBC # BLD AUTO: 9.24 K/UL

## 2018-02-08 PROCEDURE — 93621 COMP EP EVL L PAC&REC C SINS: CPT | Mod: 26,,, | Performed by: INTERNAL MEDICINE

## 2018-02-08 PROCEDURE — 25000003 PHARM REV CODE 250: Performed by: ANESTHESIOLOGY

## 2018-02-08 PROCEDURE — 27000221 HC OXYGEN, UP TO 24 HOURS

## 2018-02-08 PROCEDURE — 93355 ECHO TRANSESOPHAGEAL (TEE): CPT | Mod: ,,, | Performed by: INTERNAL MEDICINE

## 2018-02-08 PROCEDURE — 93653 COMPRE EP EVAL TX SVT: CPT | Mod: ,,, | Performed by: INTERNAL MEDICINE

## 2018-02-08 PROCEDURE — 84100 ASSAY OF PHOSPHORUS: CPT

## 2018-02-08 PROCEDURE — D9220A PRA ANESTHESIA: Mod: ANES,,, | Performed by: ANESTHESIOLOGY

## 2018-02-08 PROCEDURE — 63600175 PHARM REV CODE 636 W HCPCS: Performed by: NURSE ANESTHETIST, CERTIFIED REGISTERED

## 2018-02-08 PROCEDURE — 85520 HEPARIN ASSAY: CPT | Mod: 91

## 2018-02-08 PROCEDURE — 25000003 PHARM REV CODE 250: Performed by: NURSE PRACTITIONER

## 2018-02-08 PROCEDURE — C1733 CATH, EP, OTHR THAN COOL-TIP: HCPCS

## 2018-02-08 PROCEDURE — 87075 CULTR BACTERIA EXCEPT BLOOD: CPT

## 2018-02-08 PROCEDURE — D9220A PRA ANESTHESIA: Mod: CRNA,,, | Performed by: NURSE ANESTHETIST, CERTIFIED REGISTERED

## 2018-02-08 PROCEDURE — 83735 ASSAY OF MAGNESIUM: CPT | Mod: 91

## 2018-02-08 PROCEDURE — 37000009 HC ANESTHESIA EA ADD 15 MINS

## 2018-02-08 PROCEDURE — 85520 HEPARIN ASSAY: CPT

## 2018-02-08 PROCEDURE — 80053 COMPREHEN METABOLIC PANEL: CPT

## 2018-02-08 PROCEDURE — A4216 STERILE WATER/SALINE, 10 ML: HCPCS | Performed by: NURSE PRACTITIONER

## 2018-02-08 PROCEDURE — 02K83ZZ MAP CONDUCTION MECHANISM, PERCUTANEOUS APPROACH: ICD-10-PCS | Performed by: INTERNAL MEDICINE

## 2018-02-08 PROCEDURE — 94761 N-INVAS EAR/PLS OXIMETRY MLT: CPT

## 2018-02-08 PROCEDURE — 87070 CULTURE OTHR SPECIMN AEROBIC: CPT

## 2018-02-08 PROCEDURE — 93613 INTRACARDIAC EPHYS 3D MAPG: CPT | Mod: ,,, | Performed by: INTERNAL MEDICINE

## 2018-02-08 PROCEDURE — 93010 ELECTROCARDIOGRAM REPORT: CPT | Mod: ,,, | Performed by: INTERNAL MEDICINE

## 2018-02-08 PROCEDURE — 63600175 PHARM REV CODE 636 W HCPCS: Performed by: STUDENT IN AN ORGANIZED HEALTH CARE EDUCATION/TRAINING PROGRAM

## 2018-02-08 PROCEDURE — 4A0234Z MEASUREMENT OF CARDIAC ELECTRICAL ACTIVITY, PERCUTANEOUS APPROACH: ICD-10-PCS | Performed by: INTERNAL MEDICINE

## 2018-02-08 PROCEDURE — 37000008 HC ANESTHESIA 1ST 15 MINUTES

## 2018-02-08 PROCEDURE — 85025 COMPLETE CBC W/AUTO DIFF WBC: CPT | Mod: 91

## 2018-02-08 PROCEDURE — 99233 SBSQ HOSP IP/OBS HIGH 50: CPT | Mod: ,,, | Performed by: HOSPITALIST

## 2018-02-08 PROCEDURE — 63600175 PHARM REV CODE 636 W HCPCS

## 2018-02-08 PROCEDURE — 87116 MYCOBACTERIA CULTURE: CPT

## 2018-02-08 PROCEDURE — 93325 DOPPLER ECHO COLOR FLOW MAPG: CPT

## 2018-02-08 PROCEDURE — 80069 RENAL FUNCTION PANEL: CPT

## 2018-02-08 PROCEDURE — 25000003 PHARM REV CODE 250: Performed by: STUDENT IN AN ORGANIZED HEALTH CARE EDUCATION/TRAINING PROGRAM

## 2018-02-08 PROCEDURE — 83735 ASSAY OF MAGNESIUM: CPT

## 2018-02-08 PROCEDURE — 99233 SBSQ HOSP IP/OBS HIGH 50: CPT | Mod: 25,,, | Performed by: INTERNAL MEDICINE

## 2018-02-08 PROCEDURE — 4A023FZ MEASUREMENT OF CARDIAC RHYTHM, PERCUTANEOUS APPROACH: ICD-10-PCS | Performed by: INTERNAL MEDICINE

## 2018-02-08 PROCEDURE — 0W9G3ZZ DRAINAGE OF PERITONEAL CAVITY, PERCUTANEOUS APPROACH: ICD-10-PCS | Performed by: FAMILY MEDICINE

## 2018-02-08 PROCEDURE — 63600175 PHARM REV CODE 636 W HCPCS: Performed by: INTERNAL MEDICINE

## 2018-02-08 PROCEDURE — 25000003 PHARM REV CODE 250: Performed by: NURSE ANESTHETIST, CERTIFIED REGISTERED

## 2018-02-08 PROCEDURE — 25000003 PHARM REV CODE 250

## 2018-02-08 PROCEDURE — 02573ZK DESTRUCTION OF LEFT ATRIAL APPENDAGE, PERCUTANEOUS APPROACH: ICD-10-PCS | Performed by: INTERNAL MEDICINE

## 2018-02-08 PROCEDURE — 93005 ELECTROCARDIOGRAM TRACING: CPT

## 2018-02-08 PROCEDURE — 11000001 HC ACUTE MED/SURG PRIVATE ROOM

## 2018-02-08 PROCEDURE — 36415 COLL VENOUS BLD VENIPUNCTURE: CPT

## 2018-02-08 RX ORDER — LIDOCAINE HYDROCHLORIDE 40 MG/ML
SOLUTION TOPICAL
Status: DISCONTINUED | OUTPATIENT
Start: 2018-02-08 | End: 2018-02-15

## 2018-02-08 RX ORDER — PHENYLEPHRINE HYDROCHLORIDE 10 MG/ML
INJECTION INTRAVENOUS
Status: DISCONTINUED | OUTPATIENT
Start: 2018-02-08 | End: 2018-02-15

## 2018-02-08 RX ORDER — ETOMIDATE 2 MG/ML
INJECTION INTRAVENOUS
Status: DISCONTINUED | OUTPATIENT
Start: 2018-02-08 | End: 2018-02-15

## 2018-02-08 RX ORDER — MEPERIDINE HYDROCHLORIDE 50 MG/ML
12.5 INJECTION INTRAMUSCULAR; INTRAVENOUS; SUBCUTANEOUS EVERY 10 MIN PRN
Status: DISCONTINUED | OUTPATIENT
Start: 2018-02-08 | End: 2018-02-08 | Stop reason: HOSPADM

## 2018-02-08 RX ORDER — HEPARIN SODIUM,PORCINE/D5W 25000/250
17 INTRAVENOUS SOLUTION INTRAVENOUS CONTINUOUS
Status: DISCONTINUED | OUTPATIENT
Start: 2018-02-08 | End: 2018-02-09

## 2018-02-08 RX ORDER — PROPOFOL 10 MG/ML
VIAL (ML) INTRAVENOUS
Status: DISCONTINUED | OUTPATIENT
Start: 2018-02-08 | End: 2018-02-15

## 2018-02-08 RX ORDER — MEPERIDINE HYDROCHLORIDE 50 MG/ML
12.5 INJECTION INTRAMUSCULAR; INTRAVENOUS; SUBCUTANEOUS ONCE AS NEEDED
Status: DISCONTINUED | OUTPATIENT
Start: 2018-02-08 | End: 2018-02-08 | Stop reason: HOSPADM

## 2018-02-08 RX ORDER — SODIUM CHLORIDE 0.9 % (FLUSH) 0.9 %
3 SYRINGE (ML) INJECTION
Status: DISCONTINUED | OUTPATIENT
Start: 2018-02-08 | End: 2018-02-11 | Stop reason: HOSPADM

## 2018-02-08 RX ORDER — ONDANSETRON 2 MG/ML
INJECTION INTRAMUSCULAR; INTRAVENOUS
Status: DISCONTINUED | OUTPATIENT
Start: 2018-02-08 | End: 2018-02-15

## 2018-02-08 RX ORDER — PROPOFOL 10 MG/ML
VIAL (ML) INTRAVENOUS CONTINUOUS PRN
Status: DISCONTINUED | OUTPATIENT
Start: 2018-02-08 | End: 2018-02-15

## 2018-02-08 RX ORDER — FENTANYL CITRATE 50 UG/ML
INJECTION, SOLUTION INTRAMUSCULAR; INTRAVENOUS
Status: DISCONTINUED | OUTPATIENT
Start: 2018-02-08 | End: 2018-02-15

## 2018-02-08 RX ORDER — SODIUM CHLORIDE 9 MG/ML
INJECTION, SOLUTION INTRAVENOUS CONTINUOUS
Status: DISCONTINUED | OUTPATIENT
Start: 2018-02-08 | End: 2018-02-09

## 2018-02-08 RX ORDER — FENTANYL CITRATE 50 UG/ML
50 INJECTION, SOLUTION INTRAMUSCULAR; INTRAVENOUS EVERY 5 MIN PRN
Status: DISCONTINUED | OUTPATIENT
Start: 2018-02-08 | End: 2018-02-08 | Stop reason: HOSPADM

## 2018-02-08 RX ORDER — MIDAZOLAM HYDROCHLORIDE 1 MG/ML
INJECTION, SOLUTION INTRAMUSCULAR; INTRAVENOUS
Status: DISCONTINUED | OUTPATIENT
Start: 2018-02-08 | End: 2018-02-15

## 2018-02-08 RX ORDER — SODIUM CHLORIDE 9 MG/ML
INJECTION, SOLUTION INTRAVENOUS CONTINUOUS PRN
Status: DISCONTINUED | OUTPATIENT
Start: 2018-02-08 | End: 2018-02-15

## 2018-02-08 RX ORDER — SUCCINYLCHOLINE CHLORIDE 20 MG/ML
INJECTION INTRAMUSCULAR; INTRAVENOUS
Status: DISCONTINUED | OUTPATIENT
Start: 2018-02-08 | End: 2018-02-15

## 2018-02-08 RX ADMIN — SUCCINYLCHOLINE CHLORIDE 80 MG: 20 INJECTION, SOLUTION INTRAMUSCULAR; INTRAVENOUS at 03:02

## 2018-02-08 RX ADMIN — METOPROLOL TARTRATE 50 MG: 50 TABLET ORAL at 01:02

## 2018-02-08 RX ADMIN — CEFTRIAXONE SODIUM 1 G: 1 INJECTION, POWDER, FOR SOLUTION INTRAMUSCULAR; INTRAVENOUS at 01:02

## 2018-02-08 RX ADMIN — Medication 3 ML: at 10:02

## 2018-02-08 RX ADMIN — HEPARIN SODIUM 17 UNITS/KG/HR: 10000 INJECTION, SOLUTION INTRAVENOUS at 10:02

## 2018-02-08 RX ADMIN — ETOMIDATE 10 MG: 2 INJECTION, SOLUTION INTRAVENOUS at 03:02

## 2018-02-08 RX ADMIN — METOPROLOL TARTRATE 50 MG: 50 TABLET ORAL at 11:02

## 2018-02-08 RX ADMIN — MIDAZOLAM 1 MG: 1 INJECTION INTRAMUSCULAR; INTRAVENOUS at 03:02

## 2018-02-08 RX ADMIN — SODIUM CHLORIDE: 0.9 INJECTION, SOLUTION INTRAVENOUS at 03:02

## 2018-02-08 RX ADMIN — ACETAMINOPHEN 650 MG: 325 TABLET, FILM COATED ORAL at 03:02

## 2018-02-08 RX ADMIN — ACETAMINOPHEN 650 MG: 325 TABLET, FILM COATED ORAL at 07:02

## 2018-02-08 RX ADMIN — PHENYLEPHRINE HYDROCHLORIDE 200 MCG: 10 INJECTION INTRAVENOUS at 03:02

## 2018-02-08 RX ADMIN — LIDOCAINE HYDROCHLORIDE 4 ML: 40 SPRAY LARYNGEAL; TRANSTRACHEAL at 03:02

## 2018-02-08 RX ADMIN — SODIUM CHLORIDE: 0.9 INJECTION, SOLUTION INTRAVENOUS at 10:02

## 2018-02-08 RX ADMIN — FENTANYL CITRATE 100 MCG: 50 INJECTION, SOLUTION INTRAMUSCULAR; INTRAVENOUS at 03:02

## 2018-02-08 RX ADMIN — PHENYLEPHRINE HYDROCHLORIDE 100 MCG: 10 INJECTION INTRAVENOUS at 04:02

## 2018-02-08 RX ADMIN — PROPOFOL 60 MG: 10 INJECTION, EMULSION INTRAVENOUS at 03:02

## 2018-02-08 RX ADMIN — METOPROLOL TARTRATE 50 MG: 50 TABLET ORAL at 05:02

## 2018-02-08 RX ADMIN — SODIUM CHLORIDE: 0.9 INJECTION, SOLUTION INTRAVENOUS at 04:02

## 2018-02-08 RX ADMIN — ONDANSETRON 4 MG: 2 INJECTION INTRAMUSCULAR; INTRAVENOUS at 05:02

## 2018-02-08 RX ADMIN — SODIUM CHLORIDE: 0.9 INJECTION, SOLUTION INTRAVENOUS at 05:02

## 2018-02-08 RX ADMIN — PANTOPRAZOLE SODIUM 40 MG: 40 TABLET, DELAYED RELEASE ORAL at 08:02

## 2018-02-08 RX ADMIN — PROPOFOL 100 MCG/KG/MIN: 10 INJECTION, EMULSION INTRAVENOUS at 03:02

## 2018-02-08 RX ADMIN — Medication 3 ML: at 01:02

## 2018-02-08 NOTE — ASSESSMENT & PLAN NOTE
Mr. Medrano is a 67 y.o. Man with metastatic rectal cancer Dx 2010, s/p resection, then with pulmonary mets 2013 on FOLFIRI chemotherapy, recurrent ascites s/p regular paracentesis, ureteral obstructive s/p urostomy tubes, and recently diagnosed atrial flutter with variable block, DM - who presents as a transfer from clinic with shortness of breath and tachycardia.     Still uncontrolled, please uptitrate AV armond agents as tolerated. He may not be a candidate for AC long term but while inpatient would recommend heparin gtt. Will do ISAIAS/AFL ablation after paracentesis today. Will need to be on NOAC after procedure Ideally for 1 month but may stop the day before the next paracentesis is needed. After that is completed will need to resume for a 1 month course.

## 2018-02-08 NOTE — PROCEDURES
Radiology Post-Procedure Note    Pre Op Diagnosis: Ascites  Post Op Diagnosis: Same    Procedure: Ultrasound Guided Paracentesis    Procedure performed by: Kaylie CAMERON, Candie     Written Informed Consent Obtained: Yes  Specimen Removed: YES clear yellow  Estimated Blood Loss: Minimal    Findings:   Successful paracentesis.  Albumin administered PRN per protocol.    Patient tolerated procedure well.    Candie Lott, APRN, FNP  Interventional Radiology  [phone] [employer]

## 2018-02-08 NOTE — SUBJECTIVE & OBJECTIVE
Past Medical History:   Diagnosis Date    CHF (congestive heart failure)     Diabetes mellitus     Encounter for blood transfusion     GERD (gastroesophageal reflux disease)     Hypertension     Rectal cancer metastasized to lung 01/11/2010       Past Surgical History:   Procedure Laterality Date    ABDOMINAL SURGERY      COLON SURGERY      COLONOSCOPY N/A 10/3/2017    Procedure: COLONOSCOPY;  Surgeon: Sandoval Bowling MD;  Location: [address]);  Service: Endoscopy;  Laterality: N/A;    CYSTOSCOPY W/ URETERAL STENT PLACEMENT      FRACTURE SURGERY      SKIN BIOPSY         Review of patient's allergies indicates:  No Known Allergies    No current facility-administered medications on file prior to encounter.      Current Outpatient Prescriptions on File Prior to Encounter   Medication Sig    ascorbic acid (VITAMIN C) 1000 MG tablet Take 1,000 mg by mouth once daily.    cholecalciferol, vitamin D3, (VITAMIN D3) 2,000 unit Cap Take 1 capsule by mouth once daily.    cholestyramine, bulk, Powd 4 g by Misc.(Non-Drug; Combo Route) route once daily at 6am.    co-enzyme Q-10 30 mg capsule Take 30 mg by mouth 3 (three) times daily.    dronabinol (MARINOL) 10 MG capsule Take 1 capsule (10 mg total) by mouth 2 (two) times daily before meals.    esomeprazole (NEXIUM) 40 MG capsule TAKE 1 CAPSULE(40 MG) BY MOUTH EVERY DAY    furosemide (LASIX) 20 MG tablet Take 2 tablets (40 mg total) by mouth daily as needed.    glipiZIDE (GLUCOTROL) 5 MG tablet TAKE 1 TABLET BY MOUTH THREE TIMES DAILY    metoprolol succinate (TOPROL-XL) 100 MG 24 hr tablet Take 1 tablet (100 mg total) by mouth 2 (two) times daily.    multivitamin with iron-mineral TbSR Take 1 tablet by mouth once daily.    nut.tx.imp.renal fxn,lac-reduc 0.08 gram-1.8 kcal/mL Liqd Take 237 mLs by mouth 2 (two) times daily.    opium tincture 10 mg/mL (morphine) Tinc Take 0.6 mLs (6 mg total) by mouth 4 (four) times daily.    oxycodone-acetaminophen  (PERCOCET) 5-325 mg per tablet TK 1 T PO Q 6 H PRN P    paregoric 2 mg/5 mL Liqd TAKE 5 ML BY MOUTH THREE TIMES DAILY    regorafenib (STIVARGA) 40 mg Tab Take 120 mg by mouth once daily. Take for 21 days and then 1 week off and then repeat    tamsulosin (FLOMAX) 0.4 mg Cp24 Take 1 capsule (0.4 mg total) by mouth once daily.    diphenoxylate-atropine 2.5-0.025 mg (LOMOTIL) 2.5-0.025 mg per tablet TAKE 1 TABLET BY MOUTH FOUR TIMES DAILY AS NEEDED FOR DIARRHEA    NYSTATIN (DUKE'S SOLUTION) Mix equal amounts of benadryl, maalox, 2% viscous lidocaine and nystatin    Swish and swallow 10 ml 4 times a day    ondansetron (ZOFRAN-ODT) 8 MG TbDL Take 1 tablet (8 mg total) by mouth every 8 (eight) hours as needed (Nausea).     Family History     Problem Relation (Age of Onset)    Breast cancer Mother    Cancer Mother, Father    Tuberculosis Mother        Social History Main Topics    Smoking status: Never Smoker    Smokeless tobacco: Never Used    Alcohol use No    Drug use: Unknown    Sexual activity: Not on file     Review of Systems   Constitution: Negative for chills, fever and malaise/fatigue.   Cardiovascular: Positive for palpitations. Negative for chest pain, dyspnea on exertion and orthopnea.   Respiratory: Negative for cough, shortness of breath and wheezing.    Gastrointestinal: Negative for diarrhea, jaundice, melena and nausea.   Genitourinary: Negative for dysuria and hematuria.     Objective:     Vital Signs (Most Recent):  Temp: 97.3 °F (36.3 °C) (02/08/18 0728)  Pulse: (!) 155 (02/08/18 1055)  Resp: 18 (02/08/18 1055)  BP: 119/81 (02/08/18 1055)  SpO2: 100 % (02/08/18 1055) Vital Signs (24h Range):  Temp:  [96.1 °F (35.6 °C)-98.4 °F (36.9 °C)] 97.3 °F (36.3 °C)  Pulse:  [116-155] 155  Resp:  [12-20] 18  SpO2:  [96 %-100 %] 100 %  BP: (103-127)/(60-82) 119/81  Arterial Line BP: ()/(53-58) 106/58     Weight: 84.9 kg (187 lb 2.7 oz)  Body mass index is 26.86 kg/m².    SpO2: 100 %  O2 Device  (Oxygen Therapy): nasal cannula      Intake/Output Summary (Last 24 hours) at 02/08/18 1129  Last data filed at 02/08/18 0600   Gross per 24 hour   Intake            293.8 ml   Output             1510 ml   Net          -1216.2 ml       Lines/Drains/Airways     Central Venous Catheter Line                 Port A Cath Single Lumen -- days         Port A Cath Single Lumen 02/01/18 1005 other (see comments) 7 days          Drain                 Ureteral Drain/Stent 03/04/16 Right ureter 6 Fr. 706 days         Urethral Catheter 02/02/18 1545 5 days          Pressure Ulcer                 Pressure Injury 02/05/18 1856 Coccyx Stage 2 2 days                Physical Exam   Gen: no acute distress, alert & oriented x 3  Neck: no JVD, no carotid bruits  CV: tachy, regular, normal S1/2, no murmurs, rubs, gallops  Resp: clear to auscultation bilaterally, normal effort  GI: soft, nontender nondistended, normal bowel sounds  Ext: warm well perfused, no edema, no clubbing or cyanosis      Significant Labs:   All pertinent lab results from the last 24 hours have been reviewed. and   Recent Lab Results       02/08/18  0936 02/08/18  0725 02/08/18  0451 02/08/18  0307 02/07/18  2059      Immature Granulocytes   0.6(H) 0.4      Immature Grans (Abs)   0.04  Comment:  Mild elevation in immature granulocytes is non specific and   can be seen in a variety of conditions including stress response,   acute inflammation, trauma and pregnancy. Correlation with other   laboratory and clinical findings is essential.   0.03  Comment:  Mild elevation in immature granulocytes is non specific and   can be seen in a variety of conditions including stress response,   acute inflammation, trauma and pregnancy. Correlation with other   laboratory and clinical findings is essential.        Albumin 2.6(L)   2.5(L)          2.5(L)      Alkaline Phosphatase    263(H)      ALT    29      Anion Gap 7(L)   8          8      Aniso    Slight      AST    11       Baso #   0.03 0.03      Basophilic Stippling    Occasional      Basophil%   0.4 0.4      Total Bilirubin    0.2  Comment:  For infants and newborns, interpretation of results should be based  on gestational age, weight and in agreement with clinical  observations.  Premature Infant recommended reference ranges:  Up to 24 hours.............<8.0 mg/dL  Up to 48 hours............<12.0 mg/dL  3-5 days..................<15.0 mg/dL  6-29 days.................<15.0 mg/dL        BUN, Bld 43(H)   46(H)          46(H)      Calcium 9.2   8.8          8.8      Chloride 101   101          101      CO2 36(H)   35(H)          35(H)      Creatinine 2.3(H)   2.4(H)          2.4(H)      Differential Method   Automated Automated      eGFR if  32.3(A)   30.7(A)          30.7(A)      eGFR if non  27.9  Comment:  Calculation used to obtain the estimated glomerular filtration  rate (eGFR) is the CKD-EPI equation.   (A)   26.5  Comment:  Calculation used to obtain the estimated glomerular filtration  rate (eGFR) is the CKD-EPI equation.   (A)          26.5  Comment:  Calculation used to obtain the estimated glomerular filtration  rate (eGFR) is the CKD-EPI equation.   (A)      Eos #   0.2 0.2      Eosinophil%   2.4 2.4      Glucose 155(H)   166(H)          166(H)      Gran # (ANC)   4.9 5.2      Gran%   69.5 67.7      Hematocrit   28.7(L) 28.8(L)      Hemoglobin   8.5(L) 8.5(L)      Heparin Anti-Xa    0.41  Comment:  Expected therapeutic range for Unfractionated heparin (UFH)  is 0.3-0.7 IU/mL.  The therapeutic range for low molecular weight heparins   (LMWH) varies with the type and , but is   typically between 0.4 and 1.1 IU/mL.        Hypo    Occasional      Lymph #   1.4 1.6      Lymph%   19.7 21.2      Magnesium    1.7      MCH   27.9 27.7      MCHC   29.6(L) 29.5(L)      MCV   94 94      Mono #   0.5 0.6      Mono%   7.4 7.9      MPV   10.0 SEE COMMENT  Comment:  Do not report      nRBC   0  0      Ovalocytes    Occasional      Phosphorus 3.5   4.0          4.0      Platelet Estimate    Clumped(A)      Platelets   72(L) SEE COMMENT  Comment:  Do not report  CALLED TO DERICK CASAS RN, SUGGEST REDRAWING PLATELET IN BLUE TOP         POCT Glucose  178(H)   220(H)     Poik    Slight      Poly    Occasional      Potassium 3.8   4.0          4.0      Total Protein    6.1      RBC   3.05(L) 3.07(L)      RDW   19.6(H) 19.6(H)      Schistocytes    Present      Sodium 144   144          144      WBC   7.00 7.60                  02/07/18  1800 02/07/18  1617 02/07/18  1329      Immature Granulocytes        Immature Grans (Abs)        Albumin  2.6(L)      Alkaline Phosphatase        ALT        Anion Gap  9      Aniso        AST        Baso #        Basophilic Stippling        Basophil%        Total Bilirubin        BUN, Bld  48(H)      Calcium  9.0      Chloride  101      CO2  35(H)      Creatinine  2.6(H)      Differential Method        eGFR if   27.8(A)      eGFR if non   24.1  Comment:  Calculation used to obtain the estimated glomerular filtration  rate (eGFR) is the CKD-EPI equation.   (A)      Eos #        Eosinophil%        Glucose  170(H)      Gran # (ANC)        Gran%        Hematocrit        Hemoglobin        Heparin Anti-Xa        Hypo        Lymph #        Lymph%        Magnesium  1.6      MCH        MCHC        MCV        Mono #        Mono%        MPV        nRBC        Ovalocytes        Phosphorus  4.3      Platelet Estimate        Platelets        POCT Glucose 236(H)  198(H)     Poik        Poly        Potassium  4.2      Total Protein        RBC        RDW        Schistocytes        Sodium  145      WBC              Significant Imaging: Echocardiogram:   2D echo with color flow doppler:   Results for orders placed or performed during the hospital encounter of 02/01/18   2D echo with color flow doppler   Result Value Ref Range    EF 40 (A) 55 - 65    Mitral Valve  Regurgitation MODERATE (A)     Est. PA Systolic Pressure 54.24 (A)     Tricuspid Valve Regurgitation MODERATE (A)

## 2018-02-08 NOTE — PROGRESS NOTES
"Patient sustaining heart rate of 140s on telemetry monitor. Notified MD with cardiology who stated "patient runs 130s-140s." No new orders at this time. Will continue to monitor.  "

## 2018-02-08 NOTE — PT/OT/SLP PROGRESS
Speech Language Pathology      Karthik Medrano   940/940 B    MRN: [id number]    Patient not seen today secondary to Nursing hold (Comment) (Per NSG, pt NPO for procedures x3 later this date. Verbal order received to defer SLP services until next service date. ). Will follow-up according to SLP POC.     DARON Curry, CCC-SLP  [phone]  2/8/2018

## 2018-02-08 NOTE — PROGRESS NOTES
[facility]  Cardiac Electrophysiology  Progress Note    Admission Date: 2/1/2018  Code Status: Full Code   Attending Physician: Cassidy Collazo MD   Expected Discharge Date: 2/8/2018  Principal Problem:Atrial flutter with rapid ventricular response    Subjective:     No new subjective & objective note has been filed under this hospital service since the last note was generated.    Assessment and Plan:     * Atrial flutter with rapid ventricular response    Mr. Medrano is a 67 y.o. Man with metastatic rectal cancer Dx 2010, s/p resection, then with pulmonary mets 2013 on FOLFIRI chemotherapy, recurrent ascites s/p regular paracentesis, ureteral obstructive s/p urostomy tubes, and recently diagnosed atrial flutter with variable block, DM - who presents as a transfer from clinic with shortness of breath and tachycardia.     Still uncontrolled, please uptitrate AV armond agents as tolerated. He may not be a candidate for AC long term but while inpatient would recommend heparin gtt. Will do ISAIAS/AFL ablation after paracentesis today. Will need to be on NOAC after Ablation Ideally for 1 month but may stop the day before the next paracentesis is needed. After that is completed will need to resume for a 1 month course.             Leland Trujillo MD  Cardiac Electrophysiology  [facility]

## 2018-02-08 NOTE — ASSESSMENT & PLAN NOTE
Mr. Medrano is a 67 year old man with metastatic rectal cancer and recurrent ascites, new onset atrial flutter who presented with SOB, ascites and AFL with RVR. Planned for RFA after paracentesis, ISAIAS prior to r/o EVERETT thrombus    PLAN:  1. ISAIAS for evaluation of EVERETT for thrombus prior to RFA    -The risks, benefits & alternatives of the procedure were explained to the patient.    -The risks of transesophageal echo include but are not limited to:  Dental trauma, esophageal trauma/perforation, bleeding, laryngospasm/brochospasm, aspiration, sore throat/hoarseness, & dislodgement of the endotracheal tube/nasogastric tube (where applicable).    -The risks of moderate sedation include hypotension, respiratory depression, arrhythmias, bronchospasm, & death.    -Informed consent was obtained & the patient is agreeable to proceed with the procedure.    I will discuss with the attending physician. Attending addendum is to follow.     Further recommendations per attending addendum    Wilson Ceballos MD  [phone])  Cardiology Fellow

## 2018-02-08 NOTE — PLAN OF CARE
02/08/18 0819   Discharge Reassessment   Assessment Type Discharge Planning Reassessment   Do you have any problems affording any of your prescribed medications? No   Discharge Plan A Home with family   Discharge Plan B Home with family;Home Health   Can the patient answer the patient profile reliably? Yes, cognitively intact   How does the patient rate their overall health at the present time? Poor   Describe the patient's ability to walk at the present time. No restrictions   How often would a person be available to care for the patient? Whenever needed

## 2018-02-08 NOTE — CONSULTS
[facility]  Cardiology  Consult Note    Patient Name: Karthik Medrano  MRN: [id number]  Admission Date: 2/1/2018  Hospital Length of Stay: 7 days  Code Status: Full Code   Attending Provider: Cassidy Collazo MD   Consulting Provider: Wilson Cowan MD  Primary Care Physician: Pinky Thornton MD  Principal Problem:Atrial flutter with rapid ventricular response    Patient information was obtained from patient and past medical records.     Consults  Subjective:     Chief Complaint:  Atrial flutter     HPI:   Mr. Medrano is a 67 y.o. man with metastatic rectal cancer Dx 2010, s/p resection, then with pulmonary mets 2013 on FOLFIRI chemotherapy, recurrent ascites s/p regular paracentesis, ureteral obstructive s/p urostomy tubes, and recently diagnosed atrial flutter with variable block, DM who presented to [facility] with tachycardia and SOB. Found to be in AFL RVR. Seen by EP and planned for RFA today with Dr. Arreaga after paracentesis. ISAIAS requested before to r/o EVERETT thrombus    Past Medical History:   Diagnosis Date    CHF (congestive heart failure)     Diabetes mellitus     Encounter for blood transfusion     GERD (gastroesophageal reflux disease)     Hypertension     Rectal cancer metastasized to lung 01/11/2010       Past Surgical History:   Procedure Laterality Date    ABDOMINAL SURGERY      COLON SURGERY      COLONOSCOPY N/A 10/3/2017    Procedure: COLONOSCOPY;  Surgeon: Sandoval Bowling MD;  Location: [address]);  Service: Endoscopy;  Laterality: N/A;    CYSTOSCOPY W/ URETERAL STENT PLACEMENT      FRACTURE SURGERY      SKIN BIOPSY         Review of patient's allergies indicates:  No Known Allergies    No current facility-administered medications on file prior to encounter.      Current Outpatient Prescriptions on File Prior to Encounter   Medication Sig    ascorbic acid (VITAMIN C) 1000 MG tablet Take 1,000 mg by mouth once daily.    cholecalciferol, vitamin D3, (VITAMIN  D3) 2,000 unit Cap Take 1 capsule by mouth once daily.    cholestyramine, bulk, Powd 4 g by Misc.(Non-Drug; Combo Route) route once daily at 6am.    co-enzyme Q-10 30 mg capsule Take 30 mg by mouth 3 (three) times daily.    dronabinol (MARINOL) 10 MG capsule Take 1 capsule (10 mg total) by mouth 2 (two) times daily before meals.    esomeprazole (NEXIUM) 40 MG capsule TAKE 1 CAPSULE(40 MG) BY MOUTH EVERY DAY    furosemide (LASIX) 20 MG tablet Take 2 tablets (40 mg total) by mouth daily as needed.    glipiZIDE (GLUCOTROL) 5 MG tablet TAKE 1 TABLET BY MOUTH THREE TIMES DAILY    metoprolol succinate (TOPROL-XL) 100 MG 24 hr tablet Take 1 tablet (100 mg total) by mouth 2 (two) times daily.    multivitamin with iron-mineral TbSR Take 1 tablet by mouth once daily.    nut.tx.imp.renal fxn,lac-reduc 0.08 gram-1.8 kcal/mL Liqd Take 237 mLs by mouth 2 (two) times daily.    opium tincture 10 mg/mL (morphine) Tinc Take 0.6 mLs (6 mg total) by mouth 4 (four) times daily.    oxycodone-acetaminophen (PERCOCET) 5-325 mg per tablet TK 1 T PO Q 6 H PRN P    paregoric 2 mg/5 mL Liqd TAKE 5 ML BY MOUTH THREE TIMES DAILY    regorafenib (STIVARGA) 40 mg Tab Take 120 mg by mouth once daily. Take for 21 days and then 1 week off and then repeat    tamsulosin (FLOMAX) 0.4 mg Cp24 Take 1 capsule (0.4 mg total) by mouth once daily.    diphenoxylate-atropine 2.5-0.025 mg (LOMOTIL) 2.5-0.025 mg per tablet TAKE 1 TABLET BY MOUTH FOUR TIMES DAILY AS NEEDED FOR DIARRHEA    NYSTATIN (DUKE'S SOLUTION) Mix equal amounts of benadryl, maalox, 2% viscous lidocaine and nystatin    Swish and swallow 10 ml 4 times a day    ondansetron (ZOFRAN-ODT) 8 MG TbDL Take 1 tablet (8 mg total) by mouth every 8 (eight) hours as needed (Nausea).     Family History     Problem Relation (Age of Onset)    Breast cancer Mother    Cancer Mother, Father    Tuberculosis Mother        Social History Main Topics    Smoking status: Never Smoker    Smokeless  tobacco: Never Used    Alcohol use No    Drug use: Unknown    Sexual activity: Not on file     Review of Systems   Constitution: Negative for chills, fever and malaise/fatigue.   Cardiovascular: Positive for palpitations. Negative for chest pain, dyspnea on exertion and orthopnea.   Respiratory: Negative for cough, shortness of breath and wheezing.    Gastrointestinal: Negative for diarrhea, jaundice, melena and nausea.   Genitourinary: Negative for dysuria and hematuria.     Objective:     Vital Signs (Most Recent):  Temp: 97.3 °F (36.3 °C) (02/08/18 0728)  Pulse: (!) 155 (02/08/18 1055)  Resp: 18 (02/08/18 1055)  BP: 119/81 (02/08/18 1055)  SpO2: 100 % (02/08/18 1055) Vital Signs (24h Range):  Temp:  [96.1 °F (35.6 °C)-98.4 °F (36.9 °C)] 97.3 °F (36.3 °C)  Pulse:  [116-155] 155  Resp:  [12-20] 18  SpO2:  [96 %-100 %] 100 %  BP: (103-127)/(60-82) 119/81  Arterial Line BP: ()/(53-58) 106/58     Weight: 84.9 kg (187 lb 2.7 oz)  Body mass index is 26.86 kg/m².    SpO2: 100 %  O2 Device (Oxygen Therapy): nasal cannula      Intake/Output Summary (Last 24 hours) at 02/08/18 1129  Last data filed at 02/08/18 0600   Gross per 24 hour   Intake            293.8 ml   Output             1510 ml   Net          -1216.2 ml       Lines/Drains/Airways     Central Venous Catheter Line                 Port A Cath Single Lumen -- days         Port A Cath Single Lumen 02/01/18 1005 other (see comments) 7 days          Drain                 Ureteral Drain/Stent 03/04/16 Right ureter 6 Fr. 706 days         Urethral Catheter 02/02/18 1545 5 days          Pressure Ulcer                 Pressure Injury 02/05/18 1856 Coccyx Stage 2 2 days                Physical Exam   Gen: no acute distress, alert & oriented x 3  Neck: no JVD, no carotid bruits  CV: tachy, regular, normal S1/2, no murmurs, rubs, gallops  Resp: clear to auscultation bilaterally, normal effort  GI: soft, nontender nondistended, normal bowel sounds  Ext: warm well  perfused, no edema, no clubbing or cyanosis      Significant Labs:   All pertinent lab results from the last 24 hours have been reviewed. and   Recent Lab Results       02/08/18  0936 02/08/18  0725 02/08/18  0451 02/08/18  0307 02/07/18  2059      Immature Granulocytes   0.6(H) 0.4      Immature Grans (Abs)   0.04  Comment:  Mild elevation in immature granulocytes is non specific and   can be seen in a variety of conditions including stress response,   acute inflammation, trauma and pregnancy. Correlation with other   laboratory and clinical findings is essential.   0.03  Comment:  Mild elevation in immature granulocytes is non specific and   can be seen in a variety of conditions including stress response,   acute inflammation, trauma and pregnancy. Correlation with other   laboratory and clinical findings is essential.        Albumin 2.6(L)   2.5(L)          2.5(L)      Alkaline Phosphatase    263(H)      ALT    29      Anion Gap 7(L)   8          8      Aniso    Slight      AST    11      Baso #   0.03 0.03      Basophilic Stippling    Occasional      Basophil%   0.4 0.4      Total Bilirubin    0.2  Comment:  For infants and newborns, interpretation of results should be based  on gestational age, weight and in agreement with clinical  observations.  Premature Infant recommended reference ranges:  Up to 24 hours.............<8.0 mg/dL  Up to 48 hours............<12.0 mg/dL  3-5 days..................<15.0 mg/dL  6-29 days.................<15.0 mg/dL        BUN, Bld 43(H)   46(H)          46(H)      Calcium 9.2   8.8          8.8      Chloride 101   101          101      CO2 36(H)   35(H)          35(H)      Creatinine 2.3(H)   2.4(H)          2.4(H)      Differential Method   Automated Automated      eGFR if  32.3(A)   30.7(A)          30.7(A)      eGFR if non  27.9  Comment:  Calculation used to obtain the estimated glomerular filtration  rate (eGFR) is the CKD-EPI equation.   (A)    26.5  Comment:  Calculation used to obtain the estimated glomerular filtration  rate (eGFR) is the CKD-EPI equation.   (A)          26.5  Comment:  Calculation used to obtain the estimated glomerular filtration  rate (eGFR) is the CKD-EPI equation.   (A)      Eos #   0.2 0.2      Eosinophil%   2.4 2.4      Glucose 155(H)   166(H)          166(H)      Gran # (ANC)   4.9 5.2      Gran%   69.5 67.7      Hematocrit   28.7(L) 28.8(L)      Hemoglobin   8.5(L) 8.5(L)      Heparin Anti-Xa    0.41  Comment:  Expected therapeutic range for Unfractionated heparin (UFH)  is 0.3-0.7 IU/mL.  The therapeutic range for low molecular weight heparins   (LMWH) varies with the type and , but is   typically between 0.4 and 1.1 IU/mL.        Hypo    Occasional      Lymph #   1.4 1.6      Lymph%   19.7 21.2      Magnesium    1.7      MCH   27.9 27.7      MCHC   29.6(L) 29.5(L)      MCV   94 94      Mono #   0.5 0.6      Mono%   7.4 7.9      MPV   10.0 SEE COMMENT  Comment:  Do not report      nRBC   0 0      Ovalocytes    Occasional      Phosphorus 3.5   4.0          4.0      Platelet Estimate    Clumped(A)      Platelets   72(L) SEE COMMENT  Comment:  Do not report  CALLED TO DERICK CASAS RN, SUGGEST REDRAWING PLATELET IN BLUE TOP         POCT Glucose  178(H)   220(H)     Poik    Slight      Poly    Occasional      Potassium 3.8   4.0          4.0      Total Protein    6.1      RBC   3.05(L) 3.07(L)      RDW   19.6(H) 19.6(H)      Schistocytes    Present      Sodium 144   144          144      WBC   7.00 7.60                  02/07/18  1800 02/07/18  1617 02/07/18  1329      Immature Granulocytes        Immature Grans (Abs)        Albumin  2.6(L)      Alkaline Phosphatase        ALT        Anion Gap  9      Aniso        AST        Baso #        Basophilic Stippling        Basophil%        Total Bilirubin        BUN, Bld  48(H)      Calcium  9.0      Chloride  101      CO2  35(H)      Creatinine  2.6(H)      Differential  Method        eGFR if   27.8(A)      eGFR if non   24.1  Comment:  Calculation used to obtain the estimated glomerular filtration  rate (eGFR) is the CKD-EPI equation.   (A)      Eos #        Eosinophil%        Glucose  170(H)      Gran # (ANC)        Gran%        Hematocrit        Hemoglobin        Heparin Anti-Xa        Hypo        Lymph #        Lymph%        Magnesium  1.6      MCH        MCHC        MCV        Mono #        Mono%        MPV        nRBC        Ovalocytes        Phosphorus  4.3      Platelet Estimate        Platelets        POCT Glucose 236(H)  198(H)     Poik        Poly        Potassium  4.2      Total Protein        RBC        RDW        Schistocytes        Sodium  145      WBC              Significant Imaging: Echocardiogram:   2D echo with color flow doppler:   Results for orders placed or performed during the hospital encounter of 02/01/18   2D echo with color flow doppler   Result Value Ref Range    EF 40 (A) 55 - 65    Mitral Valve Regurgitation MODERATE (A)     Est. PA Systolic Pressure 54.24 (A)     Tricuspid Valve Regurgitation MODERATE (A)      Assessment and Plan:     * Atrial flutter with rapid ventricular response    Mr. Medrano is a 67 year old man with metastatic rectal cancer and recurrent ascites, new onset atrial flutter who presented with SOB, ascites and AFL with RVR. Planned for RFA after paracentesis, ISAIAS prior to r/o EVERETT thrombus    PLAN:  1. ISAIAS for evaluation of EVERETT for thrombus prior to RFA    -The risks, benefits & alternatives of the procedure were explained to the patient.    -The risks of transesophageal echo include but are not limited to:  Dental trauma, esophageal trauma/perforation, bleeding, laryngospasm/brochospasm, aspiration, sore throat/hoarseness, & dislodgement of the endotracheal tube/nasogastric tube (where applicable).    -The risks of moderate sedation include hypotension, respiratory depression, arrhythmias,  bronchospasm, & death.    -Informed consent was obtained & the patient is agreeable to proceed with the procedure.    I will discuss with the attending physician. Attending addendum is to follow.     Further recommendations per attending addendum    Wilson Ceballos MD  [phone] ([phone])  Cardiology Fellow            Anasarca    Improving s/p diuresis  Likely 2/2 low albumin and CKD        Ascites    Pt w/ paracenteses every ~2 weeks for recurrent ascites  S/p paracentesis w/ removal ~4.5L 2/2/18  Assess later in week for need of repeat paracentesis as it could be done with ablation and anticoagulation could be held at same time for both procedures  Concern for  vs SBP as had leukocytosis, though leukocytosis now resolved. Fluid from 2/2/18 paracentesis was not sent for analysis. Transitioned from zosyn to rocephin.        Urinary tract infection without hematuria    UA positive  Initially on vanc and zosyn, have de-escalated to rocephin  Urine cx: no growth, gram stain (-)        Acute renal failure superimposed on stage 3 chronic kidney disease    Pt w/ contraction alkalosis  Per nephrology if postobstructive ATN pt should self-diurese w/o diuretics  Creatinine improving today, 2.8 from 3.4        Hydronephrosis    R ureteral stricture with right hydronephrosis managed w/ ureteral stent exchanges q 3 months  Per pt last stent exchange ~1 week ago  US retroperitoneal improving R-sided hydronephrosis (mild from severe)  Per urology (hydronephrosis likely 2/2 XRT for rectal ca) should patient clinically decompensate recommend right nephrostomy tube          Type 2 diabetes mellitus with stage 3 chronic kidney disease, without long-term current use of insulin    C/w accuchecks  aspart sliding scale        Rectal cancer    Per hem/onc notes pt note tolerating chemo and now chemo held 2/2 debility and functional status, prognosis guarded  Per wife pt wishes to be full code and want full resuscitation, however, does not  want central line or dialysis        Acute congestive heart failure    TTE 8/29/17 EF 60%, TTE 2/2/18 EF 40%  Contraction alkalosis w/ elevated bicarb  Pt still w/ good urine output off diuretics  Monitor I/Os            VTE Risk Mitigation         Ordered     heparin 25,000 units in dextrose 5% 250 mL (100 units/mL) bolus from bag; ADDITIONAL PRN BOLUS  As needed (PRN)     Route:  Intravenous        02/05/18 1050     heparin 25,000 units in dextrose 5% 250 mL (100 units/mL) bolus from bag; ADDITIONAL PRN BOLUS  As needed (PRN)     Route:  Intravenous        02/05/18 1050     heparin 25,000 units in dextrose 5% 250 mL (100 units/mL) infusion  Continuous     Route:  Intravenous        02/04/18 1359     High Risk of VTE  Once      02/01/18 1231     Medium Risk of VTE  Once      02/01/18 1231     Place sequential compression device  Until discontinued      02/01/18 1231     Reason for No Pharmacological VTE Prophylaxis  Once      02/01/18 1231          Thank you for your consult.     Wilson Cowan MD  Cardiology   [facility]

## 2018-02-08 NOTE — PROGRESS NOTES
Paracentesis complete at this time. 2900cc removed from left abdomen. Labs drawn and sent for testing. Dressing applied clean, dry and intact. Patient transferred to [facility]U awaiting transport back to room. Report called to floor nurse.

## 2018-02-08 NOTE — ASSESSMENT & PLAN NOTE
Mr. Medrano is a 67 y.o. Man with metastatic rectal cancer Dx 2010, s/p resection, then with pulmonary mets 2013 on FOLFIRI chemotherapy, recurrent ascites s/p regular paracentesis, ureteral obstructive s/p urostomy tubes, and recently diagnosed atrial flutter with variable block, DM - who presents as a transfer from clinic with shortness of breath and tachycardia.     S/p AFL ablation on 2/8. As the risk of clot formation is high in the 1-month period following ablation/termination:  Recommend noninterrupted anticoagulation for as long as possible from this point forward. That is, try to extend as long as possible the time to next paracentesis (at which time a/c is typically held), and minimize the amount of time nonanticoagulated. Continue eliquis. Follow up with Dr. Arreaga in 3 months. Will sign off.

## 2018-02-08 NOTE — PROGRESS NOTES
Notified by telemetry that patient had 7 beats of V Tach. Patient recently transported off unit to EP lab for ablation. Notified Dr. Collazo of V Tach. No new orders at this time.

## 2018-02-08 NOTE — CARE UPDATE
RN Proactive Rounding Note  Time of Visit: 09:40    Admit Date: 2018  LOS: 7  Code Status: Full Code   Date of Visit: 2018  : 1950  Age: 67 y.o.  Sex: male  Bed: [address] [place] [address] B:   MRN: [id number]  Was the patient discharged from an ICU this admission? yes   Was the patient discharged from a PACU within last 24 hours? no  Did the patient receive conscious sedation/general anesthesia in last 24 hours? no  Was the patient in the ED within the past 24 hours? no  Was the patient started on NIPPV within the past 24 hours? no  Attending Physician: Cassidy Collazo MD  Primary Service: [facility]       ASSESSMENT:     Modified Early Warning Score (MEWS): 4  Abnormal Vital Signs: HR 140s   Clinical Issues: Circulatory     INTERVENTIONS/ RECOMMENDATIONS:     Patient seen on proactive rounds for MEWs score of 4.   Discharged from ICU yesterday.   Baseline HR 140s, plans for ISAIAS/ablation as well as para today.     On bedside exam, patient resting comfortably in bed.   No acute issues noted at this time.   HR on tele 140s-150s. Denies SOB, denies chest pain.   Awaiting his procedures today.    Discussed plan of care with PEREZ RUIZ     PHYSICIAN ESCALATION:     Yes/No no    Orders received and case discussed with   n/a     Disposition: MSU     FOLLOW-UP/CONTINGENCY:       Call back the Rapid Response Nurse at [phone]  for additional questions or concerns

## 2018-02-08 NOTE — SUBJECTIVE & OBJECTIVE
Interval History: No new complaints.     Review of Systems   All other systems reviewed and are negative.    Objective:     Vital Signs (Most Recent):  Temp: 97.3 °F (36.3 °C) (02/08/18 0728)  Pulse: (!) 118 (02/08/18 0747)  Resp: 16 (02/08/18 0728)  BP: 127/79 (02/08/18 0728)  SpO2: 100 % (02/08/18 0728) Vital Signs (24h Range):  Temp:  [96.1 °F (35.6 °C)-98.4 °F (36.9 °C)] 97.3 °F (36.3 °C)  Pulse:  [116-150] 118  Resp:  [12-25] 16  SpO2:  [91 %-100 %] 100 %  BP: (103-127)/(60-82) 127/79  Arterial Line BP: ()/(53-60) 106/58     Weight: 84.9 kg (187 lb 2.7 oz)  Body mass index is 26.86 kg/m².     SpO2: 100 %  O2 Device (Oxygen Therapy): nasal cannula    Physical Exam  GEN: Alert and oriented in NAD  NECK: no JVD appreciated   CVS: tachy, s1/s2  PULM: CTAB no rales  ABD: NT/ND BS +  Extremities: warm and dry, palpable pulses, mild edema  NEURO: Alert and oriented x 3  PSYCH: appropriate affect.         Significant Labs: reviewed    Significant Imaging: reviewed

## 2018-02-08 NOTE — BRIEF OP NOTE
CTI-dependent AFL ablated successfully.    Return to room.  Flat in bed x 6 hours for hemostasis.  Start heparin (no bolus) 6 hours from now.    Lovenox or NOAC likely to start tomorrow AM.     As the risk of clot formation is high in the 1-month period following ablation/termination:  Recommend noninterrupted anticoagulation for as long as possible from this point forward. That is, try to extend as long as possible the time to next paracentesis (at which time a/c is typically held), and minimize the amount of time nonanticoagulated.

## 2018-02-08 NOTE — PROGRESS NOTES
Progress Note  Hospital Medicine    Primary Team: [facility] MED C  Admit Date: 2/1/2018   Length of Stay:  LOS: 7 days   SUBJECTIVE:   Reason for Admission:  Atrial flutter with rapid ventricular response    HPI:  68 yo male PMhx metastatic rectal cancer Dx 2010, s/p resection w/ pulmonary mets 2013 on FOLFIRI chemotherapy, recurrent ascites s/p regular paracentesis, ureteral obstructive s/p urostomy tubes, recently diagnosed atrial flutter with variable block, DMHTN, diabetes, and CKD III who presented to the Emergency Department  from Heme/Onc office for elevated HR and symptoms of volume overload including worsening shortness of breath, abdominal distention, and LE edema for which he takes lasix PRN.  He gets periodic paracentesis as well for malignant ascites. He developed hypotension on the floor s/p paracentesis this admission w/ removal ~4.5L as well as aggressive rate control for Aflutter. He was subsequently transferred to the CCU.    Hospital Course:  Patient transferred from the floor to CCU w/ labs notable for lactate 5, and leukocytosis to 18 and started on pressors as well as broad spectrum antibiotics. Antihypertensives were held. UA was positive but urine cx negative, paracentesis fluid was not sent for analysis. Patient was weaned off pressors and antibiotics were de-escalated to rocephin. Patient developed ATN likely 2/2 hypotension. He started on lasix gtt and diuril w/ improvement of urine output. Renal US demonstrated severe R hydronephrosis which improved on repeat US w/ diuresis. Heparin drip started for Aflutter and lopressor has been uptitrated w/out much improvement in rate control. Plan for IR paracentesis w/ possible ISAIAS/RFA ablation 2/8.    Interval history:    No acute events overnight.  Pt reports that he was cold, but no palpitations, chest pain, or SOB.  Pt and wife concerned about appropriate scheduling of paracentesis and ISAIAS/RFA.    Review of Systems:  Constitutional: no fever or  chills  Respiratory: no cough or shortness of breath  Cardiovascular: no chest pain or palpitations  Gastrointestinal: no nausea or vomiting, no abdominal pain or change in bowel habits  Musculoskeletal: no arthralgias or myalgias     OBJECTIVE:     Temp:  [96.1 °F (35.6 °C)-98.4 °F (36.9 °C)]   Pulse:  [116-155]   Resp:  [14-20]   BP: (103-127)/(60-82)   SpO2:  [96 %-100 %]  Body mass index is 26.86 kg/m².  Intake/Outake:  This Shift:  I/O this shift:  In: -   Out: 2900 [Other:2900]    Net I/O past 24h:     Intake/Output Summary (Last 24 hours) at 02/08/18 1354  Last data filed at 02/08/18 1139   Gross per 24 hour   Intake             64.6 ml   Output             4300 ml   Net          -4235.4 ml             Physical Exam:  Gen- well-developed, well-nourished, chronically-ill appearing, NAD  CVS- S1 and S2 present, tachycardic, regular rhythm, no murmurs  Resp- CTA b/l, no work of breathing  Abd- BS+, soft, NT, +distensions with fluid wave  Ext- no clubbing, cyanosis, or edema    Laboratory:  CBC/Anemia Labs: Coags:      Recent Labs  Lab 02/07/18 0318 02/08/18  0307 02/08/18  0451   WBC 9.06 7.60 7.00   HGB 9.1* 8.5* 8.5*   HCT 30.9* 28.8* 28.7*   PLT 62* SEE COMMENT 72*   MCV 95 94 94   RDW 19.7* 19.6* 19.6*      Recent Labs  Lab 02/02/18  0518 02/02/18  1651   INR 1.0 1.1   APTT  --  23.5        Chemistries:     Recent Labs  Lab 02/06/18  0318  02/07/18  0318  02/07/18  1617 02/08/18  0307 02/08/18  0936     < > 145  < > 145 144  144 144   K 3.6  < > 4.3  < > 4.2 4.0  4.0 3.8   CL 98  < > 101  < > 101 101  101 101   CO2 32*  < > 33*  < > 35* 35*  35* 36*   BUN 60*  < > 54*  < > 48* 46*  46* 43*   CREATININE 3.4*  < > 2.8*  < > 2.6* 2.4*  2.4* 2.3*   CALCIUM 8.5*  < > 9.1  < > 9.0 8.8  8.8 9.2   PROT 6.1  --  6.6  --   --  6.1  --    BILITOT 0.3  --  0.3  --   --  0.2  --    ALKPHOS 288*  --  278*  --   --  263*  --    ALT 52*  --  39  --   --  29  --    AST 23  --  14  --   --  11  --    MG 2.1  < >  2.0  --  1.6 1.7  --    PHOS 4.9*  < > 5.4*  < > 4.3 4.0  4.0 3.5   < > = values in this interval not displayed.     Cardiac Enzymes: Ejection Fractions:    No results for input(s): CPK, CPKMB, MB, TROPONINI in the last 72 hours. EF   Date Value Ref Range Status   02/02/2018 40 (A) 55 - 65    08/29/2017 60 55 - 65          Medications:  Scheduled Meds:   cefTRIAXone (ROCEPHIN) IVPB  1 g Intravenous Q24H    metoprolol tartrate  50 mg Oral QID    pantoprazole  40 mg Oral Daily    sodium chloride 0.9%  3 mL Intravenous Q8H                             Continuous Infusions:   heparin (porcine) in D5W Stopped (02/08/18 0830)     PRN Meds:.acetaminophen, dextrose 50%, dextrose 50%, diphenoxylate-atropine 2.5-0.025 mg, glucagon (human recombinant), glucose, glucose, heparin (PORCINE), heparin (PORCINE), insulin aspart, ondansetron, ondansetron, oxyCODONE, oxyCODONE, varibar nectar Ba Sulfate, varibar pudding Ba Sulfate, varibar thin liquid ba sulfate     ASSESSMENT/PLAN:     Atrial Flutter with RVR  -No significant control with oral agents (Verapamil and Toprol), which were also causing hypotension  -Currently on Lopressor 50mg QID  -for ISAIAS/RFA today; pt may not be able to tolerate long-term anticoagulation, but if possible would recommend for 1 month after procedure  -resume Heparin gtt when okay with Cardiology (held 2h prior to paracentesis today)    Acute on Chronic Systolic Heart Failure  -per ECHO 2/2/18: WMA, biatrial abnormalities, PA pressure 54, mod MR, mod TR, EF 40%  -auto-diuresing well after post-obstructive renal failure  -continue beta-blocker, add ACE-I as tolerated by bp after renal failure improves    Ascites  -pt routinely gets paracentesis [id number] for recurrent ascites  -para today removed 2.9L  -on Rocephin for possible SBP (6th day of antibiotics)    UTI without hematuria  -per UA 2/3; urine gram stain and culture negative  -on day 6/10 of treatment; empiric given shock at time    RE on CKD  III  -Appreciate Nephrology recs  -2/2 post-obstructive ATN; pt should self-diurese without diuretics  -creatinine continues to improve; baseline creatinine 1.6-1.8    Hydronephrosis  -right ureteral stricture with right hydronephrosis managed with ureteral stent exchanges q3 months  -last stent exchange ~1.5 weeks ago  -US retroperitoneal shows improving right-sided hydronephrosis  -Per Urology, hydronephrosis likely 2/2 XRT for rectal ca; if pt decompensates, recommend right nephrostomy tube    DM-II with CKD III, without long-term use of insulin  -FS fairly well-controlled  -continue SSI    Rectal Cancer  -Per Hem-Onc note 1/16/2018, pt had a significant decline in performance status and no longer tolerating chemo; however not psychologically ready for hospice  -plan on Regorafenib if performance status improves  -prognosis guarded    DVT ppx- Heparin  CODE Status- FULL code, but not CVL or HD    Dispo- home pending clinical progress; consult PT/OT when pt more stable after procedures today    Cassidy Collazo MD  Hospital Medicine Staff

## 2018-02-08 NOTE — H&P
Inpatient Radiology Pre-procedure Note    History of Present Illness:  Karthik Medrano is a 67 y.o. male who presents for ultrasound guided paracentesis.  Admission H&P reviewed.  Past Medical History:   Diagnosis Date    CHF (congestive heart failure)     Diabetes mellitus     Encounter for blood transfusion     GERD (gastroesophageal reflux disease)     Hypertension     Rectal cancer metastasized to lung 01/11/2010     Past Surgical History:   Procedure Laterality Date    ABDOMINAL SURGERY      COLON SURGERY      COLONOSCOPY N/A 10/3/2017    Procedure: COLONOSCOPY;  Surgeon: Sandoval Bowling MD;  Location: [address]);  Service: Endoscopy;  Laterality: N/A;    CYSTOSCOPY W/ URETERAL STENT PLACEMENT      FRACTURE SURGERY      SKIN BIOPSY         Review of Systems:   As documented in primary team H&P    Home Meds:   Prior to Admission medications    Medication Sig Start Date End Date Taking? Authorizing Provider   ascorbic acid (VITAMIN C) 1000 MG tablet Take 1,000 mg by mouth once daily.   Yes Historical Provider, MD   cholecalciferol, vitamin D3, (VITAMIN D3) 2,000 unit Cap Take 1 capsule by mouth once daily.   Yes Historical Provider, MD   cholestyramine, bulk, Powd 4 g by Misc.(Non-Drug; Combo Route) route once daily at 6am. 7/3/17  Yes Pinky Thornton MD   co-enzyme Q-10 30 mg capsule Take 30 mg by mouth 3 (three) times daily.   Yes Historical Provider, MD   dronabinol (MARINOL) 10 MG capsule Take 1 capsule (10 mg total) by mouth 2 (two) times daily before meals. 1/21/18  Yes Pinky Thornton MD   esomeprazole (NEXIUM) 40 MG capsule TAKE 1 CAPSULE(40 MG) BY MOUTH EVERY DAY 12/14/17  Yes Nevaeh Dc NP   furosemide (LASIX) 20 MG tablet Take 2 tablets (40 mg total) by mouth daily as needed. 9/19/17  Yes Lisa Abernathy MD   glipiZIDE (GLUCOTROL) 5 MG tablet TAKE 1 TABLET BY MOUTH THREE TIMES DAILY 1/3/18  Yes Lisa Abernathy MD   metoprolol succinate (TOPROL-XL) 100 MG 24  hr tablet Take 1 tablet (100 mg total) by mouth 2 (two) times daily. 10/25/17  Yes Lisa Abernathy MD   multivitamin with iron-mineral TbSR Take 1 tablet by mouth once daily. 8/31/14  Yes Pinky Thornton MD   nut.tx.imp.renal fxn,lac-reduc 0.08 gram-1.8 kcal/mL Liqd Take 237 mLs by mouth 2 (two) times daily. 1/21/18  Yes Pinky Thornton MD   opium tincture 10 mg/mL (morphine) Tinc Take 0.6 mLs (6 mg total) by mouth 4 (four) times daily. 6/13/17  Yes Lisa Abernathy MD   oxycodone-acetaminophen (PERCOCET) 5-325 mg per tablet TK 1 T PO Q 6 H PRN P 8/31/16  Yes Pedro Dumont MD   paregoric 2 mg/5 mL Liqd TAKE 5 ML BY MOUTH THREE TIMES DAILY 1/3/18  Yes Lisa Abernathy MD   regorafenib (STIVARGA) 40 mg Tab Take 120 mg by mouth once daily. Take for 21 days and then 1 week off and then repeat 1/19/18  Yes Lisa Abernathy MD   tamsulosin (FLOMAX) 0.4 mg Cp24 Take 1 capsule (0.4 mg total) by mouth once daily. 9/19/17  Yes Lisa Abernathy MD   diphenoxylate-atropine 2.5-0.025 mg (LOMOTIL) 2.5-0.025 mg per tablet TAKE 1 TABLET BY MOUTH FOUR TIMES DAILY AS NEEDED FOR DIARRHEA 1/3/18   Pinky Thornton MD   NYSTATIN ([employer]KE'S SOLUTION) Mix equal amounts of benadryl, maalox, 2% viscous lidocaine and nystatin    Swish and swallow 10 ml 4 times a day 12/7/17   Lisa Abernathy MD   ondansetron (ZOFRAN-ODT) 8 MG TbDL Take 1 tablet (8 mg total) by mouth every 8 (eight) hours as needed (Nausea). 12/7/17   Lisa Abernathy MD     Scheduled Meds:    cefTRIAXone (ROCEPHIN) IVPB  1 g Intravenous Q24H    metoprolol tartrate  50 mg Oral QID    pantoprazole  40 mg Oral Daily    sodium chloride 0.9%  3 mL Intravenous Q8H     Continuous Infusions:    heparin (porcine) in D5W Stopped (02/08/18 0830)     PRN Meds:acetaminophen, dextrose 50%, dextrose 50%, diphenoxylate-atropine 2.5-0.025 mg, glucagon (human recombinant), glucose, glucose, heparin (PORCINE), heparin (PORCINE), insulin aspart, ondansetron,  ondansetron, oxyCODONE, oxyCODONE, varibar nectar Ba Sulfate, varibar pudding Ba Sulfate, varibar thin liquid ba sulfate  Anticoagulants/Antiplatelets: Heparin    Allergies: Review of patient's allergies indicates:  No Known Allergies  Sedation Hx: have not been any systemic reactions    Labs:    Recent Labs  Lab 02/02/18  1651   INR 1.1       Recent Labs  Lab 02/08/18  0451   WBC 7.00   HGB 8.5*   HCT 28.7*   MCV 94   PLT 72*      Recent Labs  Lab 02/08/18  0307 02/08/18  0936   *  166* 155*     144 144   K 4.0  4.0 3.8     101 101   CO2 35*  35* 36*   BUN 46*  46* 43*   CREATININE 2.4*  2.4* 2.3*   CALCIUM 8.8  8.8 9.2   MG 1.7  --    ALT 29  --    AST 11  --    ALBUMIN 2.5*  2.5* 2.6*   BILITOT 0.2  --          Vitals:  Temp: 97.3 °F (36.3 °C) (02/08/18 0728)  Pulse: (!) 155 (02/08/18 1055)  Resp: 18 (02/08/18 1055)  BP: 119/81 (02/08/18 1055)  SpO2: 100 % (02/08/18 1055)     Reviewed HR with Dr. Rodney. Patient HR 150s since yesterday. Ok to proceed with paracentesis.    Physical Exam:  ASA: 3  Mallampati: n/a    General: no acute distress  Mental Status: alert and oriented to person, place and time  HEENT: normocephalic, atraumatic  Chest: unlabored breathing  Heart: regular heart rate  Abdomen: distended  Extremity: moves all extremities    Plan: ultrasound guided paracentesis  Sedation Plan: local    JABARI Mortensen, ALIYAHP  Interventional Radiology  [phone] [facility]

## 2018-02-09 LAB
ALBUMIN SERPL BCP-MCNC: 2.4 G/DL
ALBUMIN SERPL BCP-MCNC: 2.6 G/DL
ALP SERPL-CCNC: 236 U/L
ALT SERPL W/O P-5'-P-CCNC: 20 U/L
ANION GAP SERPL CALC-SCNC: 10 MMOL/L
ANION GAP SERPL CALC-SCNC: 11 MMOL/L
ANION GAP SERPL CALC-SCNC: 5 MMOL/L
ANION GAP SERPL CALC-SCNC: 8 MMOL/L
AST SERPL-CCNC: 15 U/L
BASOPHILS # BLD AUTO: 0.05 K/UL
BASOPHILS NFR BLD: 0.7 %
BILIRUB SERPL-MCNC: 0.2 MG/DL
BUN SERPL-MCNC: 40 MG/DL
BUN SERPL-MCNC: 41 MG/DL
BUN SERPL-MCNC: 41 MG/DL
BUN SERPL-MCNC: 42 MG/DL
CALCIUM SERPL-MCNC: 8.6 MG/DL
CALCIUM SERPL-MCNC: 8.9 MG/DL
CALCIUM SERPL-MCNC: 9 MG/DL
CALCIUM SERPL-MCNC: 9.1 MG/DL
CHLORIDE SERPL-SCNC: 100 MMOL/L
CHLORIDE SERPL-SCNC: 100 MMOL/L
CHLORIDE SERPL-SCNC: 101 MMOL/L
CHLORIDE SERPL-SCNC: 102 MMOL/L
CO2 SERPL-SCNC: 30 MMOL/L
CO2 SERPL-SCNC: 32 MMOL/L
CO2 SERPL-SCNC: 33 MMOL/L
CO2 SERPL-SCNC: 36 MMOL/L
CREAT SERPL-MCNC: 2.1 MG/DL
CREAT SERPL-MCNC: 2.2 MG/DL
CREAT SERPL-MCNC: 2.3 MG/DL
CREAT SERPL-MCNC: 2.3 MG/DL
DIFFERENTIAL METHOD: ABNORMAL
EOSINOPHIL # BLD AUTO: 0.2 K/UL
EOSINOPHIL NFR BLD: 2.7 %
ERYTHROCYTE [DISTWIDTH] IN BLOOD BY AUTOMATED COUNT: 19.9 %
EST. GFR  (AFRICAN AMERICAN): 32.3 ML/MIN/1.73 M^2
EST. GFR  (AFRICAN AMERICAN): 32.3 ML/MIN/1.73 M^2
EST. GFR  (AFRICAN AMERICAN): 34.1 ML/MIN/1.73 M^2
EST. GFR  (AFRICAN AMERICAN): 36 ML/MIN/1.73 M^2
EST. GFR  (NON AFRICAN AMERICAN): 27.9 ML/MIN/1.73 M^2
EST. GFR  (NON AFRICAN AMERICAN): 27.9 ML/MIN/1.73 M^2
EST. GFR  (NON AFRICAN AMERICAN): 29.5 ML/MIN/1.73 M^2
EST. GFR  (NON AFRICAN AMERICAN): 31.2 ML/MIN/1.73 M^2
FACT X PPP CHRO-ACNC: 0.2 IU/ML
FACT X PPP CHRO-ACNC: 0.2 IU/ML
FACT X PPP CHRO-ACNC: 0.22 IU/ML
GLUCOSE SERPL-MCNC: 137 MG/DL
GLUCOSE SERPL-MCNC: 137 MG/DL
GLUCOSE SERPL-MCNC: 188 MG/DL
GLUCOSE SERPL-MCNC: 222 MG/DL
HCT VFR BLD AUTO: 30.2 %
HGB BLD-MCNC: 8.7 G/DL
IMM GRANULOCYTES # BLD AUTO: 0.03 K/UL
IMM GRANULOCYTES NFR BLD AUTO: 0.4 %
LYMPHOCYTES # BLD AUTO: 1.6 K/UL
LYMPHOCYTES NFR BLD: 22.3 %
MAGNESIUM SERPL-MCNC: 1.5 MG/DL
MAGNESIUM SERPL-MCNC: 1.6 MG/DL
MCH RBC QN AUTO: 27.6 PG
MCHC RBC AUTO-ENTMCNC: 28.8 G/DL
MCV RBC AUTO: 96 FL
MONOCYTES # BLD AUTO: 0.5 K/UL
MONOCYTES NFR BLD: 6.7 %
NEUTROPHILS # BLD AUTO: 4.9 K/UL
NEUTROPHILS NFR BLD: 67.2 %
NRBC BLD-RTO: 0 /100 WBC
PHOSPHATE SERPL-MCNC: 3.4 MG/DL
PLATELET # BLD AUTO: ABNORMAL K/UL
PMV BLD AUTO: 12.2 FL
POCT GLUCOSE: 142 MG/DL (ref 70–110)
POCT GLUCOSE: 192 MG/DL (ref 70–110)
POCT GLUCOSE: 222 MG/DL (ref 70–110)
POCT GLUCOSE: 224 MG/DL (ref 70–110)
POTASSIUM SERPL-SCNC: 3.9 MMOL/L
POTASSIUM SERPL-SCNC: 4 MMOL/L
POTASSIUM SERPL-SCNC: 4.1 MMOL/L
POTASSIUM SERPL-SCNC: 4.2 MMOL/L
PROT SERPL-MCNC: 6.1 G/DL
RBC # BLD AUTO: 3.15 M/UL
SODIUM SERPL-SCNC: 140 MMOL/L
SODIUM SERPL-SCNC: 142 MMOL/L
SODIUM SERPL-SCNC: 142 MMOL/L
SODIUM SERPL-SCNC: 144 MMOL/L
WBC # BLD AUTO: 7.35 K/UL

## 2018-02-09 PROCEDURE — 80053 COMPREHEN METABOLIC PANEL: CPT

## 2018-02-09 PROCEDURE — 85520 HEPARIN ASSAY: CPT

## 2018-02-09 PROCEDURE — 83735 ASSAY OF MAGNESIUM: CPT

## 2018-02-09 PROCEDURE — 11000001 HC ACUTE MED/SURG PRIVATE ROOM

## 2018-02-09 PROCEDURE — 97535 SELF CARE MNGMENT TRAINING: CPT

## 2018-02-09 PROCEDURE — 36415 COLL VENOUS BLD VENIPUNCTURE: CPT

## 2018-02-09 PROCEDURE — 80069 RENAL FUNCTION PANEL: CPT

## 2018-02-09 PROCEDURE — 83735 ASSAY OF MAGNESIUM: CPT | Mod: 91

## 2018-02-09 PROCEDURE — 84100 ASSAY OF PHOSPHORUS: CPT

## 2018-02-09 PROCEDURE — 97530 THERAPEUTIC ACTIVITIES: CPT

## 2018-02-09 PROCEDURE — 25000003 PHARM REV CODE 250: Performed by: STUDENT IN AN ORGANIZED HEALTH CARE EDUCATION/TRAINING PROGRAM

## 2018-02-09 PROCEDURE — 85520 HEPARIN ASSAY: CPT | Mod: 91

## 2018-02-09 PROCEDURE — 63600175 PHARM REV CODE 636 W HCPCS: Performed by: INTERNAL MEDICINE

## 2018-02-09 PROCEDURE — 63600175 PHARM REV CODE 636 W HCPCS: Performed by: HOSPITALIST

## 2018-02-09 PROCEDURE — 99232 SBSQ HOSP IP/OBS MODERATE 35: CPT | Mod: ,,, | Performed by: INTERNAL MEDICINE

## 2018-02-09 PROCEDURE — 99233 SBSQ HOSP IP/OBS HIGH 50: CPT | Mod: ,,, | Performed by: HOSPITALIST

## 2018-02-09 PROCEDURE — 25000003 PHARM REV CODE 250: Performed by: NURSE PRACTITIONER

## 2018-02-09 PROCEDURE — 25000003 PHARM REV CODE 250: Performed by: HOSPITALIST

## 2018-02-09 PROCEDURE — 85025 COMPLETE CBC W/AUTO DIFF WBC: CPT

## 2018-02-09 PROCEDURE — A4216 STERILE WATER/SALINE, 10 ML: HCPCS | Performed by: NURSE PRACTITIONER

## 2018-02-09 PROCEDURE — 97165 OT EVAL LOW COMPLEX 30 MIN: CPT

## 2018-02-09 PROCEDURE — 92526 ORAL FUNCTION THERAPY: CPT

## 2018-02-09 PROCEDURE — 80069 RENAL FUNCTION PANEL: CPT | Mod: 91

## 2018-02-09 PROCEDURE — 63600175 PHARM REV CODE 636 W HCPCS: Performed by: STUDENT IN AN ORGANIZED HEALTH CARE EDUCATION/TRAINING PROGRAM

## 2018-02-09 PROCEDURE — 97161 PT EVAL LOW COMPLEX 20 MIN: CPT

## 2018-02-09 RX ORDER — MAGNESIUM SULFATE HEPTAHYDRATE 40 MG/ML
2 INJECTION, SOLUTION INTRAVENOUS ONCE
Status: COMPLETED | OUTPATIENT
Start: 2018-02-09 | End: 2018-02-09

## 2018-02-09 RX ADMIN — CEFTRIAXONE SODIUM 1 G: 1 INJECTION, POWDER, FOR SOLUTION INTRAMUSCULAR; INTRAVENOUS at 01:02

## 2018-02-09 RX ADMIN — DIPHENOXYLATE HYDROCHLORIDE AND ATROPINE SULFATE 1 TABLET: 2.5; .025 TABLET ORAL at 03:02

## 2018-02-09 RX ADMIN — METOPROLOL TARTRATE 50 MG: 50 TABLET ORAL at 06:02

## 2018-02-09 RX ADMIN — ACETAMINOPHEN 650 MG: 325 TABLET, FILM COATED ORAL at 11:02

## 2018-02-09 RX ADMIN — METOPROLOL TARTRATE 50 MG: 50 TABLET ORAL at 05:02

## 2018-02-09 RX ADMIN — ACETAMINOPHEN 650 MG: 325 TABLET, FILM COATED ORAL at 10:02

## 2018-02-09 RX ADMIN — HEPARIN SODIUM 19 UNITS/KG/HR: 10000 INJECTION, SOLUTION INTRAVENOUS at 08:02

## 2018-02-09 RX ADMIN — PANTOPRAZOLE SODIUM 40 MG: 40 TABLET, DELAYED RELEASE ORAL at 08:02

## 2018-02-09 RX ADMIN — METOPROLOL TARTRATE 50 MG: 50 TABLET ORAL at 11:02

## 2018-02-09 RX ADMIN — Medication 3 ML: at 08:02

## 2018-02-09 RX ADMIN — ACETAMINOPHEN 650 MG: 325 TABLET, FILM COATED ORAL at 12:02

## 2018-02-09 RX ADMIN — ACETAMINOPHEN 650 MG: 325 TABLET, FILM COATED ORAL at 06:02

## 2018-02-09 RX ADMIN — APIXABAN 5 MG: 5 TABLET, FILM COATED ORAL at 01:02

## 2018-02-09 RX ADMIN — INSULIN ASPART 2 UNITS: 100 INJECTION, SOLUTION INTRAVENOUS; SUBCUTANEOUS at 05:02

## 2018-02-09 RX ADMIN — DIPHENOXYLATE HYDROCHLORIDE AND ATROPINE SULFATE 1 TABLET: 2.5; .025 TABLET ORAL at 10:02

## 2018-02-09 RX ADMIN — APIXABAN 5 MG: 5 TABLET, FILM COATED ORAL at 08:02

## 2018-02-09 RX ADMIN — MAGNESIUM SULFATE IN WATER 2 G: 40 INJECTION, SOLUTION INTRAVENOUS at 06:02

## 2018-02-09 RX ADMIN — INSULIN ASPART 1 UNITS: 100 INJECTION, SOLUTION INTRAVENOUS; SUBCUTANEOUS at 09:02

## 2018-02-09 RX ADMIN — Medication 3 ML: at 01:02

## 2018-02-09 RX ADMIN — OXYCODONE HYDROCHLORIDE 5 MG: 5 TABLET ORAL at 06:02

## 2018-02-09 NOTE — PT/OT/SLP PROGRESS
Speech Language Pathology Treatment    Patient Name:  Karthik Medrano   MRN:  [id number]  Admitting Diagnosis: Atrial flutter with rapid ventricular response  The primary encounter diagnosis was Atrial flutter with rapid ventricular response. Diagnoses of Tachycardia, Chest pain, Arrhythmia, Anemia in neoplastic disease, Other ascites, CKD (chronic kidney disease), stage III, Rectal cancer, Renovascular hypertension, Type 2 diabetes mellitus with stage 3 chronic kidney disease, without long-term current use of insulin, Atrial flutter, Atrial fibrillation, Shock, Acute kidney failure with lesion of tubular necrosis, Acute diastolic congestive heart failure, Hypotension, unspecified hypotension type, Hypovolemic shock, and Heart failure were also pertinent to this visit.    Recommendations:                 General Recommendations:  Dysphagia therapy  Diet recommendations:  Regular, Liquid Diet Level: Nectar Thick   Aspiration Precautions:   · 1 bite/sip at a time,   · Double swallow with each bite/sip,   · Eliminate distractions,   · Frequent oral care,   · HOB to 90 degrees,   · Meds whole 1 at a time,   · No straws,   · Remain upright 30 minutes post meal,   · Small bites/sips,   · Strict aspiration precautions and   · Wear oxygen during intake   General Precautions: Standard, aspiration, fall  Communication strategies:  none    Subjective     Pt awake and seated at EOB upon SLP entry. Wife present in room.    Pain/Comfort:  Pain Rating 1: 0/10    Objective:     Has the patient been evaluated by SLP for swallowing?   Yes  Keep patient NPO? No   Current Respiratory Status: nasal cannula      Pt seen to implement dysphagia therapy exercises and provide further education. Pt reported he has not been using thickener in liquids. He appeared agitated about thickener. SLP provided extensive patient and family (wife) education on MBSS results, higher risk of aspiration with thin liquids, safe swallowing precautions and  "strategies, dysphagia therapy exercises, s/s and risks of aspiration, thickener use, and POC. Pt nodding head in understanding of all discussed. SLP recommended if pt is to continue to drink thin liquids, to swallow a minimum x3 per sip. Pt's wife stated, "He will follow what he has to do from now on." Patient appeared agitated with wife.   SLP provided handout listing all swallowing exercises, what each exercise targets, and how to complete each exercise. SLP demonstrated all exercises. Pt did not look at SLP, however, was nodding head in understanding. He completed 2 exercises when prompted to demonstrate. Handouts on thickener use and listing swallowing precautions also provided. White board current. No further questions.     Assessment:     Karthik Medrano is a 67 y.o. male with an SLP diagnosis of Pharyngeal Dysphagia.  He would benefit from ongoing assistance with thickening of liquids and ongoing Dysphagia therapy. Strict aspiration precautions advised.     Goals:    SLP Goals        Problem: SLP Goal    Goal Priority Disciplines Outcome   SLP Goal     SLP Ongoing (interventions implemented as appropriate)   Description:  Speech Therapy Short Term Goals  Goal expected to be met by 2/20  1. Pt will tolerate regular diet with nectar-thickened liquids w/o overt S/S aspiration, Supervision   2. Pt will tolerate small sips of thin liquid with 3 swallows and no overt s/s of aspiration, supervision.   3. Pt will completed BOT retraction and pharyngeal wall constriction exercises x10 each, supervision.    Speech Language Pathology Goals  Goals expected to be met by 2/12/18  1. Pt will tolerate regular diet with nectar-thickened liquids w/o overt S/S aspiration, Supervision -ongoing  2. Pt will participate in Modified Barium Swallow Study to rule out risk of aspiration and determine safest, least restrictive means of hydration/nutrition -met 2/6                        Plan:     · Patient to be seen:  4 x/week   · Plan " of Care expires:  03/07/18  · Plan of Care reviewed with:  patient   · SLP Follow-Up:  Yes       Discharge recommendations:  home health speech therapy   Barriers to Discharge:  none    Time Tracking:     SLP Treatment Date:   02/09/18  Speech Start Time:  1116  Speech Stop Time:  1131     Speech Total Time (min):  15 min    Billable Minutes: Treatment Swallowing Dysfunction 15    MOLINA Toledo, CCC-SLP  Pager: [phone]  02/09/2018

## 2018-02-09 NOTE — PROGRESS NOTES
Progress Note  Hospital Medicine    Primary Team: [facility] MED C  Admit Date: 2/1/2018   Length of Stay:  LOS: 8 days   SUBJECTIVE:   Reason for Admission:  Atrial flutter with rapid ventricular response    HPI:  68 yo male PMhx metastatic rectal cancer Dx 2010, s/p resection w/ pulmonary mets 2013 on FOLFIRI chemotherapy, recurrent ascites s/p regular paracentesis, ureteral obstructive s/p urostomy tubes, recently diagnosed atrial flutter with variable block, DMHTN, diabetes, and CKD III who presented to the Emergency Department  from Heme/Onc office for elevated HR and symptoms of volume overload including worsening shortness of breath, abdominal distention, and LE edema for which he takes lasix PRN.  He gets periodic paracentesis as well for malignant ascites. He developed hypotension on the floor s/p paracentesis this admission w/ removal ~4.5L as well as aggressive rate control for Aflutter. He was subsequently transferred to the CCU.    Hospital Course:  Patient transferred from the floor to CCU w/ labs notable for lactate 5, and leukocytosis to 18 and started on pressors as well as broad spectrum antibiotics. Antihypertensives were held. UA was positive but urine cx negative, paracentesis fluid was not sent for analysis. Patient was weaned off pressors and antibiotics were de-escalated to rocephin. Patient developed ATN likely 2/2 hypotension. He started on lasix gtt and diuril w/ improvement of urine output. Renal US demonstrated severe R hydronephrosis which improved on repeat US w/ diuresis. Heparin drip started for Aflutter and lopressor has been uptitrated w/out much improvement in rate control. Plan for IR paracentesis w/ possible ISAIAS/RFA ablation 2/8.    Interval history:    No acute events overnight.  Pt feels better today after paracentesis and RFA yesterday; remains in NSR.      Review of Systems:  Constitutional: no fever or chills  Respiratory: no cough or shortness of breath  Cardiovascular: no  chest pain or palpitations  Gastrointestinal: no nausea or vomiting, no abdominal pain or change in bowel habits  Musculoskeletal: no arthralgias or myalgias     OBJECTIVE:     Temp:  [97.2 °F (36.2 °C)-98.7 °F (37.1 °C)]   Pulse:  [76-92]   Resp:  [16-18]   BP: ()/(52-66)   SpO2:  [90 %-99 %]  Body mass index is 26.86 kg/m².  Intake/Outake:  This Shift:  I/O this shift:  In: 510 [P.O.:360; I.V.:150]  Out: 300 [Urine:300]    Net I/O past 24h:     Intake/Output Summary (Last 24 hours) at 02/09/18 1728  Last data filed at 02/09/18 1300   Gross per 24 hour   Intake              810 ml   Output             1450 ml   Net             -640 ml             Physical Exam:  Gen- well-developed, well-nourished, chronically-ill appearing, NAD  CVS- S1 and S2 present, RRR, no murmurs  Resp- CTA b/l, no work of breathing  Abd- BS+, soft, NT, +distension  Ext- no clubbing, cyanosis, or edema    Laboratory:  CBC/Anemia Labs: Coags:      Recent Labs  Lab 02/08/18  1823 02/08/18  2020 02/09/18  0350   WBC 8.80 9.24 7.35   HGB 9.5* 9.3* 8.7*   HCT 32.1* 31.4* 30.2*   PLT SEE COMMENT SEE COMMENT SEE COMMENT   MCV 96 97 96   RDW 19.6* 19.9* 19.9*    No results for input(s): PT, INR, APTT in the last 168 hours.     Chemistries:     Recent Labs  Lab 02/07/18  0318  02/08/18  0307  02/08/18  1929  02/09/18  0351 02/09/18  0849 02/09/18  1541     < > 144  144  < > 146*  < > 144 142 140   K 4.3  < > 4.0  4.0  < > 4.2  < > 3.9 4.1 4.0     < > 101  101  < > 103  < > 100 102 100   CO2 33*  < > 35*  35*  < > 37*  < > 33* 30* 32*   BUN 54*  < > 46*  46*  < > 40*  < > 42* 41* 41*   CREATININE 2.8*  < > 2.4*  2.4*  < > 2.3*  < > 2.2* 2.1* 2.3*   CALCIUM 9.1  < > 8.8  8.8  < > 9.2  < > 9.1 9.0 8.6*   PROT 6.6  --  6.1  --   --   --  6.1  --   --    BILITOT 0.3  --  0.2  --   --   --  0.2  --   --    ALKPHOS 278*  --  263*  --   --   --  236*  --   --    ALT 39  --  29  --   --   --  20  --   --    AST 14  --  11  --   --   --   15  --   --    MG 2.0  < > 1.7  --  1.7  --  1.6  --  1.5*   PHOS 5.4*  < > 4.0  4.0  < > 3.6  < > 3.4 3.4 3.4   < > = values in this interval not displayed.     Cardiac Enzymes: Ejection Fractions:    No results for input(s): CPK, CPKMB, MB, TROPONINI in the last 72 hours. EF   Date Value Ref Range Status   02/08/2018 45 55 - 65    02/02/2018 40 (A) 55 - 65          Medications:  Scheduled Meds:   apixaban  5 mg Oral BID    cefTRIAXone (ROCEPHIN) IVPB  1 g Intravenous Q24H    metoprolol tartrate  50 mg Oral QID    pantoprazole  40 mg Oral Daily    sodium chloride 0.9%  3 mL Intravenous Q8H                             Continuous Infusions:    PRN Meds:.acetaminophen, dextrose 50%, dextrose 50%, diphenoxylate-atropine 2.5-0.025 mg, glucagon (human recombinant), glucose, glucose, heparin (PORCINE), heparin (PORCINE), insulin aspart, ondansetron, ondansetron, oxyCODONE, oxyCODONE, sodium chloride 0.9%, varibar nectar Ba Sulfate, varibar pudding Ba Sulfate, varibar thin liquid ba sulfate     ASSESSMENT/PLAN:     Atrial Flutter with RVR  -No significant control with oral agents (Verapamil and Toprol), which were also causing hypotension  -Currently on Lopressor 50mg QID  -s/p ISAIAS/RFA 2/8; recommending anticoagulation for 1 month after procedure  -transitioned from Heparin gtt to Eliquis; wife to pick this up from pharmacy today  -will try to continue uninterrupted anticoagulation for as long as possible before holding for procedures, and resume ASAP after procedure  -pt to f/u with Dr. Arreaga in 3 months     Acute on Chronic Systolic Heart Failure  -per ECHO 2/2/18: WMA, biatrial abnormalities, PA pressure 54, mod MR, mod TR, EF 40%  -auto-diuresing well after post-obstructive renal failure  -may need to resume Lasix tomorrow  -continue beta-blocker, add ACE-I as tolerated by bp after renal failure improves    Ascites  -pt routinely gets paracentesis [id number] for recurrent ascites  -para 2/8 removed 2.9L  -on  Rocephin for possible SBP (7th day of antibiotics)    UTI without hematuria  -per UA 2/3; urine gram stain and culture negative  -on day 7/10 of treatment; empiric given shock at time    RE on CKD III  -Appreciate Nephrology recs  -2/2 post-obstructive ATN; pt should self-diurese without diuretics  -creatinine continues to improve; baseline creatinine 1.6-1.8    Hydronephrosis  -right ureteral stricture with right hydronephrosis managed with ureteral stent exchanges q3 months  -last stent exchange ~1.5 weeks ago  -US retroperitoneal shows improving right-sided hydronephrosis  -Per Urology, hydronephrosis likely 2/2 XRT for rectal ca; if pt decompensates, recommend right nephrostomy tube    DM-II with CKD III, without long-term use of insulin  -FS fairly well-controlled  -continue SSI  -on oral medication at home; will assess if safe with renal function prior to discharge    Rectal Cancer  -Per Hem-Onc note 1/16/2018, pt had a significant decline in performance status and no longer tolerating chemo; however not psychologically ready for hospice  -plan on Regorafenib if performance status improves  -prognosis guarded    DVT ppx- Heparin  CODE Status- FULL code, but not CVL or HD    Dispo- home with HH tomorrow if medically stable; requesting air mattress for home hospital bed due to pressure sores    Cassidy Collazo MD  Hospital Medicine Staff

## 2018-02-09 NOTE — SUBJECTIVE & OBJECTIVE
Interval History: States that he feels better shortness of breath improved.     Review of Systems   All other systems reviewed and are negative.    Objective:     Vital Signs (Most Recent):  Temp: 97.6 °F (36.4 °C) (02/09/18 1151)  Pulse: 84 (02/09/18 1151)  Resp: 18 (02/09/18 1151)  BP: 111/66 (02/09/18 1151)  SpO2: (!) 90 % (02/09/18 1151) Vital Signs (24h Range):  Temp:  [97.2 °F (36.2 °C)-98.7 °F (37.1 °C)] 97.6 °F (36.4 °C)  Pulse:  [] 84  Resp:  [16-18] 18  SpO2:  [90 %-100 %] 90 %  BP: ()/(52-66) 111/66     Weight: 84.9 kg (187 lb 2.7 oz)  Body mass index is 26.86 kg/m².     SpO2: (!) 90 %  O2 Device (Oxygen Therapy): room air    Physical Exam  GEN: Alert and oriented in NAD  NECK: no JVD appreciated   CVS: RRR, s1/s2  PULM: CTAB no rales  ABD: NT/ND BS +  Extremities: warm and dry, palpable pulses, no edema  NEURO: Alert and oriented x 3  PSYCH: appropriate affect.         Significant Labs: reviewed    Significant Imaging: reviewed

## 2018-02-09 NOTE — ANESTHESIA RELEASE NOTE
"Anesthesia Release from PACU Note    Patient: Karthik Medrano    Procedure(s) Performed: Procedure(s) (LRB):  TRANSESOPHAGEAL ECHOCARDIOGRAM (ISAIAS) (N/A)    Anesthesia type: general    Post pain: Adequate analgesia    Post assessment: no apparent anesthetic complications    Last Vitals:   Visit Vitals  /60   Pulse 81   Temp 36.2 °C (97.2 °F) (Temporal)   Resp 16   Ht 5' 10" (1.778 m)   Wt 84.9 kg (187 lb 2.7 oz)   SpO2 96%   BMI 26.86 kg/m²       Post vital signs: stable    Level of consciousness: awake and alert     Nausea/Vomiting: no nausea/no vomiting    Complications: none    Airway Patency: patent    Respiratory: unassisted, spontaneous ventilation, nasal cannula    Cardiovascular: stable and blood pressure at baseline    Hydration: euvolemic  "

## 2018-02-09 NOTE — PROGRESS NOTES
[facility]  Cardiac Electrophysiology  Progress Note    Admission Date: 2/1/2018  Code Status: Full Code   Attending Physician: Csasidy Collazo MD   Expected Discharge Date: 2/11/2018  Principal Problem:Atrial flutter with rapid ventricular response    Subjective:     Interval History: States that he feels better shortness of breath improved.     Review of Systems   All other systems reviewed and are negative.    Objective:     Vital Signs (Most Recent):  Temp: 97.6 °F (36.4 °C) (02/09/18 1151)  Pulse: 84 (02/09/18 1151)  Resp: 18 (02/09/18 1151)  BP: 111/66 (02/09/18 1151)  SpO2: (!) 90 % (02/09/18 1151) Vital Signs (24h Range):  Temp:  [97.2 °F (36.2 °C)-98.7 °F (37.1 °C)] 97.6 °F (36.4 °C)  Pulse:  [] 84  Resp:  [16-18] 18  SpO2:  [90 %-100 %] 90 %  BP: ()/(52-66) 111/66     Weight: 84.9 kg (187 lb 2.7 oz)  Body mass index is 26.86 kg/m².     SpO2: (!) 90 %  O2 Device (Oxygen Therapy): room air    Physical Exam  GEN: Alert and oriented in NAD  NECK: no JVD appreciated   CVS: RRR, s1/s2  PULM: CTAB no rales  ABD: NT/ND BS +  Extremities: warm and dry, palpable pulses, no edema  NEURO: Alert and oriented x 3  PSYCH: appropriate affect.         Significant Labs: reviewed    Significant Imaging: reviewed    Assessment and Plan:     * Atrial flutter with rapid ventricular response    Mr. Medrano is a 67 y.o. Man with metastatic rectal cancer Dx 2010, s/p resection, then with pulmonary mets 2013 on FOLFIRI chemotherapy, recurrent ascites s/p regular paracentesis, ureteral obstructive s/p urostomy tubes, and recently diagnosed atrial flutter with variable block, DM - who presents as a transfer from clinic with shortness of breath and tachycardia.     S/p AFL ablation on 2/8. As the risk of clot formation is high in the 1-month period following ablation/termination:  Recommend noninterrupted anticoagulation for as long as possible from this point forward. That is, try to extend as long as  possible the time to next paracentesis (at which time a/c is typically held), and minimize the amount of time nonanticoagulated. Continue eliquis. Follow up with Dr. Arreaga in 3 months. Will sign off.             Leland Trujillo MD  Cardiac Electrophysiology  [facility]

## 2018-02-09 NOTE — PLAN OF CARE
Problem: Occupational Therapy Goal  Goal: Occupational Therapy Goal  Goals to be met by: 7 days     Patient will increase functional independence with ADLs by performing:    UE Dressing with Supervision.  LE Dressing with Minimal Assistance.  Grooming while standing with Set-up Assistance.  Toileting from toilet with Moderate Assistance for hygiene and clothing management.   Stand pivot transfers with Stand-by Assistance.  Toilet transfer to toilet with Stand-by Assistance.    Outcome: Ongoing (interventions implemented as appropriate)  Pt goals established today based on his presenting functional level     Comments: Cont OT POC

## 2018-02-09 NOTE — PLAN OF CARE
Problem: Physical Therapy Goal  Goal: Physical Therapy Goal  Goals to be met by: 18     Patient will increase functional independence with mobility by performin. Supine to sit with Supervision  2. Sit to supine with Supervision  3. Sit to stand transfer with Stand-by Assistance  4. Gait  x 150 feet with Stand-by Assistance using Rolling Walker.   5. Lower extremity exercise program x 30 reps per handout, with independence    Outcome: Ongoing (interventions implemented as appropriate)  PT eval complete and POC initiated.

## 2018-02-09 NOTE — PT/OT/SLP EVAL
Physical Therapy Evaluation    Patient Name:  Karthik Medrano   MRN:  [id number]    Recommendations:     Discharge Recommendations:  home with home health   Discharge Equipment Recommendations: bath bench   Barriers to discharge: None    Assessment:     Karthik Medrano is a 67 y.o. male admitted with a medical diagnosis of Atrial flutter with rapid ventricular response.  He presents with the below impairments/functional limitations.  Pt tolerated evaluation well, but was limited by nausea and fecal incontinence today.  Pt is able to perform all mobility with CGA/Min A and is likely to progress quickly to save discharge to home with home health.  Pt is a good candidate for skilled PT services to address the below deficits and to increase functional independence.      Rehab Prognosis: good; patient would benefit from acute skilled PT services to address these deficits and reach maximum level of function.      Rehab Identified impairments/functional limitations:   weakness, impaired endurance, gait instability, impaired balance, impaired functional mobilty, decreased lower extremity function    Recent Surgery: Procedure(s) (LRB):  TRANSESOPHAGEAL ECHOCARDIOGRAM (ISAIAS) (N/A) 1 Day Post-Op    Plan:     During this hospitalization, patient to be seen 4 x/week to address the above listed problems via therapeutic activities, gait training, therapeutic exercises, neuromuscular re-education  · Plan of Care Expires:  03/11/18   Plan of Care Reviewed with: patient    Subjective     Communicated with RN prior to session.  Patient found supine in bed upon PT entry to room, agreeable to evaluation.      Chief Complaint: nausea  Patient comments/goals: to return home safely    Pain/Comfort:  · Pain Rating 1: 0/10  · Pain Rating Post-Intervention 1: 0/10    Patients cultural, spiritual, Jainism conflicts given the current situation: none noted    Living Environment:  Pt lives with his wife and sister in a raised house with elevator  entry. Pt uses a tub/shower combo.   Previous level of function: PTA, pt reports being mod I for functional mobility and requires assist with bathing and at times, LB dressing  Equipment Owned:  walker, rolling, wheelchair (owns but does not use w/c)  Assistance upon Discharge: Pt reports he will have assist from his wife and son (who lives 2 doors down) upon d/c from hospital.     Objective:     Patient found with: peripheral IV, oxygen     General Precautions: Standard, aspiration, fall   Orthopedic Precautions:N/A   Braces: N/A     Exams:    Cognitive/Psychosocial Skills:     -       Oriented to: Person, Place, Time and Situation   -       Follows Commands/attention:Follows multistep  commands  -       Communication: clear/fluent  -       Safety awareness/insight to disability: intact     Physical Exams:  · Gross Motor Coordination:  WFL  · Postural Exam:  Patient presented with the following abnormalities:    · -       Rounded shoulders  · Skin Integrity/Edema:      · -       Skin integrity: Thin and Dry  · -       Edema: Mild BLE  · RLE ROM: WFL  · RLE Strength: WFL  · LLE ROM: WFL  · LLE Strength: WFL    Functional Mobility:    Bed Mobility:    · Patient completed Rolling/Turning to Left with  contact guard assistance  · Patient completed Rolling/Turning to Right with contact guard assistance  · Patient completed Scooting/Bridging with contact guard assistance  · Patient completed Supine to Sit with contact guard assistance     Transfers:  · Patient completed Sit <> Stand Transfer with contact guard assistance  with  no assistive device   · Patient completed Bed <> Chair Transfer using Stand Pivot technique with minimum assistance with no assistive device    Gait:  Did not occur 2/2 patient reporting N/V and requesting to sit down.    AM-PAC 6 CLICK MOBILITY  Total Score:18     Therapeutic Activities and Exercises:  · PT evaluation performed.  · Pt educated on role of PT, safety with functional mobility.    · PT/OT performed pericare in bed for patient.      Pt safe to transfer with RN staff    Patient left up in chair with all lines intact, call button in reach and RN notified.    GOALS:    Physical Therapy Goals        Problem: Physical Therapy Goal    Goal Priority Disciplines Outcome Goal Variances Interventions   Physical Therapy Goal     PT/OT, PT Ongoing (interventions implemented as appropriate)     Description:  Goals to be met by: 18     Patient will increase functional independence with mobility by performin. Supine to sit with Supervision  2. Sit to supine with Supervision  3. Sit to stand transfer with Stand-by Assistance  4. Gait  x 150 feet with Stand-by Assistance using Rolling Walker.   5. Lower extremity exercise program x 30 reps per handout, with independence                      History:     Past Medical History:   Diagnosis Date    CHF (congestive heart failure)     Diabetes mellitus     Encounter for blood transfusion     GERD (gastroesophageal reflux disease)     Hypertension     Rectal cancer metastasized to lung 2010       Past Surgical History:   Procedure Laterality Date    ABDOMINAL SURGERY      COLON SURGERY      COLONOSCOPY N/A 10/3/2017    Procedure: COLONOSCOPY;  Surgeon: Sandoval Bowling MD;  Location: [address];  Service: Endoscopy;  Laterality: N/A;    CYSTOSCOPY W/ URETERAL STENT PLACEMENT      FRACTURE SURGERY      SKIN BIOPSY         Time Tracking:     PT Received On: 18  PT Start Time: 1422     PT Stop Time: 1445  PT Total Time (min): 23 min     Billable Minutes: Evaluation 13 and Therapeutic Activity 10      Everett Dickson, PT, DPT  2018   [phone]

## 2018-02-09 NOTE — PHYSICIAN QUERY
PT Name: Karthik Medrano  MR #: [id number]    Physician Query Form - Atrial Fibrillation Specificity     CDS/: Glen Nicole Jr, RN               Contact information:[email or web address]     This form is a permanent document in the medical record.     Query Date: February 9, 2018    By submitting this query, we are merely seeking further clarification of documentation. Please utilize your independent clinical judgment when addressing the question(s) below.    The medical record contains the following:   Indicators     Supporting Clinical Findings Location in Medical Record   x Atrial Fibrillation Of note patient was in Afib with rates in the 60's during this time.  2/2 Cardiology Progress Note   x EKG results EKG - STEMI Decision  Initial Reading: No STEMI present (Afib RVR).    Atrial flutter with 2:1 A-V conduction 2/1 ED Progress Note      2/1 EKG Report   x Medication diltiaZEM 125 mg  infusion  2/1-2/2 MAR    Treatment      Other         Provider, please further specify the Atrial Fibrillation diagnosis.    [  ] Paroxysmal  [ X ] Persistent  [  ] Other (please specify): ____________________________  [  ] Clinically Undetermined    Please document in your progress notes daily for the duration of treatment until resolved, and include in your discharge summary.

## 2018-02-09 NOTE — NURSING TRANSFER
Nursing Transfer Note      2/8/2018     Transfer To: [address]    Transfer via stretcher    Transfer with cardiac monitoring, 2L NC O2    Transported by pct    Medicines sent: none    Chart send with patient: Yes    Notified: na    Patient reassessed at: 2/8/18 4772

## 2018-02-09 NOTE — PT/OT/SLP EVAL
Occupational Therapy   Evaluation/Treatment    Name: Karthik Medrano  MRN: [id number]  Admitting Diagnosis:  Atrial flutter with rapid ventricular response 1 Day Post-Op    Recommendations:     Discharge Recommendations: home with home health  Discharge Equipment Recommendations:  bath bench  Barriers to discharge:  None    History:     Occupational Profile:  Living Environment: Pt lives with his wife and sister in a raised house with elevator entry. Pt uses a tub/shower combo.   Previous level of function: PTA, pt reports being mod I for functional mobility and requires assist with bathing and at times, LB dressing  Equipment Owned:  walker, rolling, wheelchair (owns but does not use w/c)  Assistance upon Discharge: Pt reports he will have assist from his wife and son (who lives 2 doors down) upon d/c from hospital.     Past Medical History:   Diagnosis Date    CHF (congestive heart failure)     Diabetes mellitus     Encounter for blood transfusion     GERD (gastroesophageal reflux disease)     Hypertension     Rectal cancer metastasized to lung 01/11/2010       Past Surgical History:   Procedure Laterality Date    ABDOMINAL SURGERY      COLON SURGERY      COLONOSCOPY N/A 10/3/2017    Procedure: COLONOSCOPY;  Surgeon: Sandoval Bowling MD;  Location: [address];  Service: Endoscopy;  Laterality: N/A;    CYSTOSCOPY W/ URETERAL STENT PLACEMENT      FRACTURE SURGERY      SKIN BIOPSY         Subjective     Chief Complaint: weakness and nausea  Patient/Family stated goals: to go home   Communicated with: RN prior to session.  Pain/Comfort:  · Pain Rating 1: 0/10  · Pain Rating Post-Intervention 1: 0/10    Patients cultural, spiritual, Mormon conflicts given the current situation: none stated    Objective:     Patient found with: peripheral IV, oxygen    General Precautions: Standard, aspiration, fall   Orthopedic Precautions:N/A   Braces: N/A     Occupational Performance:    Bed Mobility:    · Patient  completed Rolling/Turning to Left with  minimum assistance  · Patient completed Rolling/Turning to Right with minimum assistance  · Patient completed Scooting/Bridging with stand by assistance  · Patient completed Supine to Sit with stand by assistance    Functional Mobility/Transfers:  · Patient completed Sit <> Stand Transfer 2 trials once with CGA and second with minimum assistance  with  no assistive device   · Patient completed Bed <> Chair Transfer using Step Transfer technique with HHA with no assistive device  · Functional Mobility: Pt ambulated ~5 steps to bedside chair with HHA and no signs of LOB or SOB    Activities of Daily Living:  · UB Dressing: minimum assistance to jessica back gown   · LB Dressing: moderate assistance to jessica socks  · Toileting: total assistance for yelena-hygiene following BM in bed     Cognitive/Visual Perceptual:  Cognitive/Psychosocial Skills:     -       Oriented to: Person, Place and Time   -       Follows Commands/attention:Follows multistep  commands    Physical Exam:  Balance:    -       pt demonstrates good sitting balance and fair standing balance - he required HHA to ambulate from bed to chair this date and uses RW to ambulate safely  Upper Extremity Range of Motion:     -       Right Upper Extremity: WFL  -       Left Upper Extremity: WFL  Upper Extremity Strength:    -       Right Upper Extremity: WFL  -       Left Upper Extremity: WFL   Strength:    -       Right Upper Extremity: WFL  -       Left Upper Extremity: WFL  Fine Motor Coordination:    -       Intact    Patient left up in chair with all lines intact, call button in reach and wife present    [facility] 6 Click: Self-care  [facility] Total Score: 15    Treatment & Education:  OT breana complete. Pt educated on:   - Importance of OOB activity with staff assist to maximize recovery and decrease dizziness   - Safety with functional mobility  - Pt required Mod A for LB 2/2 to pt unable to perform enough forward trunk flexion  "as well as too fatigued and weak from doffing socks.   - Pt donned back gown with Min A in standing for balance and stability to complete task   - all questions and concerns answered   Education:    Assessment:     Karthik Medrano is a 67 y.o. male with a medical diagnosis of Atrial flutter with rapid ventricular response.  He presents with the following performance deficits affecting function:  weakness, impaired endurance, impaired self care skills, impaired balance, impaired functional mobilty, gait instability. Pt tolerated session well this date. He demonstrates difficulty with self-care and functional mobility tasks 2/2 easy onset of fatigue upon movement, dizziness and generalized body weakness. He continues to benefit from acute OT at this time to address the above listed impairments. OT rec HH services upon d/c from hospital to further therapy, maximize functional independence, and enhance safety with pt's home environment.     Rehab Prognosis:  Good; patient would benefit from acute skilled OT services to address these deficits and reach maximum level of function.         Clinical Decision Makin.  OT Low:  "Pt evaluation falls under low complexity for evaluation coding due to performance deficits noted in 1-3 areas as stated above and 0 co-morbities affecting current functional status. Data obtained from problem focused assessments. No modifications or assistance was required for completion of evaluation. Only brief occupational profile and history review completed."     Plan:     Patient to be seen 4 x/week to address the above listed problems via self-care/home management, therapeutic activities, therapeutic exercises  · Plan of Care Reviewed with: patient, spouse    This Plan of care has been discussed with the patient who was involved in its development and understands and is in agreement with the identified goals and treatment plan    GOALS:    Occupational Therapy Goals        Problem: " Occupational Therapy Goal    Goal Priority Disciplines Outcome Interventions   Occupational Therapy Goal     OT, PT/OT Ongoing (interventions implemented as appropriate)    Description:  Goals to be met by: 7 days     Patient will increase functional independence with ADLs by performing:    UE Dressing with Supervision.  LE Dressing with Minimal Assistance.  Grooming while standing with Set-up Assistance.  Toileting from toilet with Moderate Assistance for hygiene and clothing management.   Stand pivot transfers with Stand-by Assistance.  Toilet transfer to toilet with Stand-by Assistance.                      Time Tracking:     OT Date of Treatment: 02/09/18  OT Start Time: 1422  OT Stop Time: 1445  OT Total Time (min): 23 min    Billable Minutes:Evaluation 8  Self Care/Home Management 15    Deedee Collazo OT  2/9/2018

## 2018-02-09 NOTE — ANESTHESIA POSTPROCEDURE EVALUATION
"Anesthesia Post Evaluation    Patient: Karthik Medrano    Procedure(s) Performed: Procedure(s) (LRB):  TRANSESOPHAGEAL ECHOCARDIOGRAM (ISAIAS) (N/A)    Final Anesthesia Type: general  Patient location during evaluation: PACU  Patient participation: Yes- Able to Participate  Level of consciousness: awake and alert  Post-procedure vital signs: reviewed and stable  Pain management: adequate  Airway patency: patent  PONV status at discharge: No PONV  Anesthetic complications: no      Cardiovascular status: blood pressure returned to baseline  Respiratory status: unassisted  Hydration status: euvolemic  Follow-up not needed.        Visit Vitals  /60   Pulse 81   Temp 36.2 °C (97.2 °F) (Temporal)   Resp 16   Ht 5' 10" (1.778 m)   Wt 84.9 kg (187 lb 2.7 oz)   SpO2 96%   BMI 26.86 kg/m²       Pain/Virgilio Score: Pain Assessment Performed: Yes (2/8/2018  6:49 PM)  Presence of Pain: denies (2/8/2018  6:49 PM)  Pain Rating Prior to Med Admin: 2 (2/8/2018  3:52 AM)  Pain Rating Post Med Admin: 1 (2/7/2018  8:28 PM)  Virgilio Score: 9 (2/8/2018  6:49 PM)      "

## 2018-02-10 VITALS
TEMPERATURE: 99 F | BODY MASS INDEX: 26.8 KG/M2 | HEART RATE: 76 BPM | SYSTOLIC BLOOD PRESSURE: 127 MMHG | RESPIRATION RATE: 18 BRPM | HEIGHT: 70 IN | WEIGHT: 187.19 LBS | DIASTOLIC BLOOD PRESSURE: 56 MMHG | OXYGEN SATURATION: 95 %

## 2018-02-10 LAB
ALBUMIN SERPL BCP-MCNC: 2.5 G/DL
ALP SERPL-CCNC: 228 U/L
ALT SERPL W/O P-5'-P-CCNC: 15 U/L
ANION GAP SERPL CALC-SCNC: 9 MMOL/L
ANISOCYTOSIS BLD QL SMEAR: SLIGHT
AST SERPL-CCNC: 15 U/L
BASOPHILS # BLD AUTO: 0.04 K/UL
BASOPHILS NFR BLD: 0.5 %
BILIRUB SERPL-MCNC: 0.2 MG/DL
BUN SERPL-MCNC: 41 MG/DL
C DIFF GDH STL QL: NEGATIVE
C DIFF TOX A+B STL QL IA: NEGATIVE
CALCIUM SERPL-MCNC: 8.7 MG/DL
CHLORIDE SERPL-SCNC: 102 MMOL/L
CO2 SERPL-SCNC: 29 MMOL/L
CREAT SERPL-MCNC: 2.2 MG/DL
DIFFERENTIAL METHOD: ABNORMAL
EOSINOPHIL # BLD AUTO: 0.3 K/UL
EOSINOPHIL NFR BLD: 3.4 %
ERYTHROCYTE [DISTWIDTH] IN BLOOD BY AUTOMATED COUNT: 19.6 %
EST. GFR  (AFRICAN AMERICAN): 34.1 ML/MIN/1.73 M^2
EST. GFR  (NON AFRICAN AMERICAN): 29.5 ML/MIN/1.73 M^2
GLUCOSE SERPL-MCNC: 175 MG/DL
HCT VFR BLD AUTO: 28.5 %
HGB BLD-MCNC: 8.4 G/DL
HYPOCHROMIA BLD QL SMEAR: ABNORMAL
IMM GRANULOCYTES # BLD AUTO: 0.04 K/UL
IMM GRANULOCYTES NFR BLD AUTO: 0.5 %
LYMPHOCYTES # BLD AUTO: 1.6 K/UL
LYMPHOCYTES NFR BLD: 20 %
MAGNESIUM SERPL-MCNC: 1.9 MG/DL
MCH RBC QN AUTO: 28.8 PG
MCHC RBC AUTO-ENTMCNC: 29.5 G/DL
MCV RBC AUTO: 98 FL
MONOCYTES # BLD AUTO: 0.6 K/UL
MONOCYTES NFR BLD: 7.7 %
NEUTROPHILS # BLD AUTO: 5.5 K/UL
NEUTROPHILS NFR BLD: 67.9 %
NRBC BLD-RTO: 0 /100 WBC
OVALOCYTES BLD QL SMEAR: ABNORMAL
PHOSPHATE SERPL-MCNC: 3.9 MG/DL
PLATELET # BLD AUTO: ABNORMAL K/UL
PMV BLD AUTO: 11.2 FL
POCT GLUCOSE: 176 MG/DL (ref 70–110)
POCT GLUCOSE: 198 MG/DL (ref 70–110)
POIKILOCYTOSIS BLD QL SMEAR: SLIGHT
POLYCHROMASIA BLD QL SMEAR: ABNORMAL
POTASSIUM SERPL-SCNC: 4.2 MMOL/L
PROT SERPL-MCNC: 6 G/DL
RBC # BLD AUTO: 2.92 M/UL
SODIUM SERPL-SCNC: 140 MMOL/L
WBC # BLD AUTO: 8.04 K/UL

## 2018-02-10 PROCEDURE — 63600175 PHARM REV CODE 636 W HCPCS: Performed by: HOSPITALIST

## 2018-02-10 PROCEDURE — 85025 COMPLETE CBC W/AUTO DIFF WBC: CPT

## 2018-02-10 PROCEDURE — 84100 ASSAY OF PHOSPHORUS: CPT

## 2018-02-10 PROCEDURE — 25000003 PHARM REV CODE 250: Performed by: NURSE PRACTITIONER

## 2018-02-10 PROCEDURE — 87449 NOS EACH ORGANISM AG IA: CPT

## 2018-02-10 PROCEDURE — 83735 ASSAY OF MAGNESIUM: CPT

## 2018-02-10 PROCEDURE — 36415 COLL VENOUS BLD VENIPUNCTURE: CPT

## 2018-02-10 PROCEDURE — A4216 STERILE WATER/SALINE, 10 ML: HCPCS | Performed by: NURSE PRACTITIONER

## 2018-02-10 PROCEDURE — 63600175 PHARM REV CODE 636 W HCPCS: Performed by: STUDENT IN AN ORGANIZED HEALTH CARE EDUCATION/TRAINING PROGRAM

## 2018-02-10 PROCEDURE — 99239 HOSP IP/OBS DSCHRG MGMT >30: CPT | Mod: ,,, | Performed by: HOSPITALIST

## 2018-02-10 PROCEDURE — 25000003 PHARM REV CODE 250: Performed by: HOSPITALIST

## 2018-02-10 PROCEDURE — 25000003 PHARM REV CODE 250: Performed by: STUDENT IN AN ORGANIZED HEALTH CARE EDUCATION/TRAINING PROGRAM

## 2018-02-10 PROCEDURE — 80053 COMPREHEN METABOLIC PANEL: CPT

## 2018-02-10 RX ORDER — METOPROLOL TARTRATE 50 MG/1
50 TABLET ORAL 4 TIMES DAILY
Qty: 120 TABLET | Refills: 2 | Status: SHIPPED | OUTPATIENT
Start: 2018-02-10 | End: 2019-02-10

## 2018-02-10 RX ORDER — HEPARIN 100 UNIT/ML
5 SYRINGE INTRAVENOUS ONCE
Status: COMPLETED | OUTPATIENT
Start: 2018-02-10 | End: 2018-02-10

## 2018-02-10 RX ADMIN — CEFTRIAXONE SODIUM 1 G: 1 INJECTION, POWDER, FOR SOLUTION INTRAMUSCULAR; INTRAVENOUS at 01:02

## 2018-02-10 RX ADMIN — ACETAMINOPHEN 650 MG: 325 TABLET, FILM COATED ORAL at 04:02

## 2018-02-10 RX ADMIN — Medication 3 ML: at 05:02

## 2018-02-10 RX ADMIN — HEPARIN 500 UNITS: 100 SYRINGE at 08:02

## 2018-02-10 RX ADMIN — APIXABAN 5 MG: 5 TABLET, FILM COATED ORAL at 08:02

## 2018-02-10 RX ADMIN — DIPHENOXYLATE HYDROCHLORIDE AND ATROPINE SULFATE 1 TABLET: 2.5; .025 TABLET ORAL at 04:02

## 2018-02-10 RX ADMIN — METOPROLOL TARTRATE 50 MG: 50 TABLET ORAL at 05:02

## 2018-02-10 RX ADMIN — METOPROLOL TARTRATE 50 MG: 50 TABLET ORAL at 06:02

## 2018-02-10 RX ADMIN — PANTOPRAZOLE SODIUM 40 MG: 40 TABLET, DELAYED RELEASE ORAL at 08:02

## 2018-02-10 RX ADMIN — METOPROLOL TARTRATE 50 MG: 50 TABLET ORAL at 11:02

## 2018-02-10 RX ADMIN — ONDANSETRON 8 MG: 4 TABLET, ORALLY DISINTEGRATING ORAL at 08:02

## 2018-02-10 NOTE — PLAN OF CARE
[facility]    HOME HEALTH ORDERS  FACE TO FACE ENCOUNTER    Patient Name: Karthik Medrano  YOB: 1950    PCP: Pinky Thornton MD   PCP Address: [address] [place] [address] [facility] [address] / [facility] [address]  PCP Phone Number: [phone]  PCP Fax: [phone]    Encounter Date: 02/10/2018    Admit to Home Health    Diagnoses:  Active Hospital Problems    Diagnosis  POA    *Atrial flutter with rapid ventricular response [I48.92]  Yes    Hyperphosphatemia [E83.39]  Unknown    Acute kidney failure with lesion of tubular necrosis [N17.0]  No    Acute diastolic congestive heart failure [I50.31]  No    Hypotension [I95.9]  No    Anasarca [R60.1]  Yes    Ascites [R18.8]  Yes    Urinary tract infection without hematuria [N39.0]  Yes    CKD (chronic kidney disease), stage III [N18.3]  Yes    Acute renal failure superimposed on stage 3 chronic kidney disease [N17.9, N18.3]  Yes    Anemia in neoplastic disease [D63.0]  Yes    Chemotherapy induced neutropenia [D70.1, T45.1X5A]  Yes    Hydronephrosis [N13.30]  Yes    Type 2 diabetes mellitus with stage 3 chronic kidney disease, without long-term current use of insulin [E11.22, N18.3]  Yes    Rectal cancer [C20]  Yes    Acute congestive heart failure [I50.9]  Yes    Renovascular hypertension [I15.0]  Yes      Resolved Hospital Problems    Diagnosis Date Resolved POA    Shock [R57.9] 02/06/2018 Yes       No future appointments.  Follow-up Information     Pravin Arreaga MD In 3 months.    Specialties:  Cardiology, Electrophysiology  Contact information:  [address] [facility] [id number] [phone]                     I have seen and examined this patient face to face today. My clinical findings that support the need for the home health skilled services and home bound status are the following:  Weakness/numbness causing balance and gait disturbance due to Heart Failure, Weakness/Debility and Malignancy/Cancer  making it taxing to leave home.    Allergies:Review of patient's allergies indicates:  No Known Allergies    Diet: cardiac diet, fluid restriction: 1.5L and 2 gram sodium diet    Activities: activity as tolerated    Nursing:   SN to complete comprehensive assessment including routine vital signs. Instruct on disease process and s/s of complications to report to MD. Review/verify medication list sent home with the patient at time of discharge  and instruct patient/caregiver as needed. Frequency may be adjusted depending on start of care date.    Notify MD if SBP > 160 or < 90; DBP > 90 or < 50; HR > 120 or < 50; Temp > 101      CONSULTS:    Physical Therapy to evaluate and treat. Evaluate for home safety and equipment needs; Establish/upgrade home exercise program. Perform / instruct on therapeutic exercises, gait training, transfer training, and Range of Motion.  Occupational Therapy to evaluate and treat. Evaluate home environment for safety and equipment needs. Perform/Instruct on transfers, ADL training, ROM, and therapeutic exercises.  Aide to provide assistance with personal care, ADLs, and vital signs.    Skilled nursing to draw BMP 2/14/18 and fax results to PCP    Voiding trial if Michael still in place.    Medications: Review discharge medications with patient and family and provide education.      Current Discharge Medication List      START taking these medications    Details   apixaban 5 mg Tab Take 1 tablet (5 mg total) by mouth 2 (two) times daily.  Qty: 60 tablet, Refills: 0      metoprolol tartrate (LOPRESSOR) 50 MG tablet Take 1 tablet (50 mg total) by mouth 4 (four) times daily.  Qty: 120 tablet, Refills: 2         CONTINUE these medications which have NOT CHANGED    Details   ascorbic acid (VITAMIN C) 1000 MG tablet Take 1,000 mg by mouth once daily.      cholecalciferol, vitamin D3, (VITAMIN D3) 2,000 unit Cap Take 1 capsule by mouth once daily.      cholestyramine, bulk, Powd 4 g by  Misc.(Non-Drug; Combo Route) route once daily at 6am.  Qty: 500 g, Refills: 3    Associated Diagnoses: Diarrhea, unspecified type      co-enzyme Q-10 30 mg capsule Take 30 mg by mouth 3 (three) times daily.      dronabinol (MARINOL) 10 MG capsule Take 1 capsule (10 mg total) by mouth 2 (two) times daily before meals.  Qty: 60 capsule, Refills: 2    Associated Diagnoses: Malignant cachexia      esomeprazole (NEXIUM) 40 MG capsule TAKE 1 CAPSULE(40 MG) BY MOUTH EVERY DAY  Qty: 90 capsule, Refills: 1    Comments: **Patient requests 90 days supply**  Associated Diagnoses: Gastroesophageal reflux disease, esophagitis presence not specified      furosemide (LASIX) 20 MG tablet Take 2 tablets (40 mg total) by mouth daily as needed.  Qty: 90 tablet, Refills: 6    Comments: **Patient requests 90 days supply**  Associated Diagnoses: Edema, unspecified type      glipiZIDE (GLUCOTROL) 5 MG tablet TAKE 1 TABLET BY MOUTH THREE TIMES DAILY  Qty: 270 tablet, Refills: 0    Associated Diagnoses: Type 2 diabetes mellitus with hyperglycemia, without long-term current use of insulin      multivitamin with iron-mineral TbSR Take 1 tablet by mouth once daily.  Qty: 100 tablet, Refills: 2    Associated Diagnoses: Anemia      nut.tx.imp.renal fxn,lac-reduc 0.08 gram-1.8 kcal/mL Liqd Take 237 mLs by mouth 2 (two) times daily.  Qty: 24 Can, Refills: 12      opium tincture 10 mg/mL (morphine) Tinc Take 0.6 mLs (6 mg total) by mouth 4 (four) times daily.  Qty: 473 mL, Refills: 0    Associated Diagnoses: Diarrhea, unspecified type      oxycodone-acetaminophen (PERCOCET) 5-325 mg per tablet TK 1 T PO Q 6 H PRN P  Refills: 0      paregoric 2 mg/5 mL Liqd TAKE 5 ML BY MOUTH THREE TIMES DAILY  Qty: 473 mL, Refills: 0    Associated Diagnoses: Diarrhea, unspecified type      regorafenib (STIVARGA) 40 mg Tab Take 120 mg by mouth once daily. Take for 21 days and then 1 week off and then repeat  Qty: 84 tablet, Refills: 6    Associated Diagnoses: Rectal  cancer; Rectal cancer metastasized to lung      tamsulosin (FLOMAX) 0.4 mg Cp24 Take 1 capsule (0.4 mg total) by mouth once daily.  Qty: 90 capsule, Refills: 6    Associated Diagnoses: Prostate hyperplasia without urinary obstruction      diphenoxylate-atropine 2.5-0.025 mg (LOMOTIL) 2.5-0.025 mg per tablet TAKE 1 TABLET BY MOUTH FOUR TIMES DAILY AS NEEDED FOR DIARRHEA  Qty: 100 tablet, Refills: 0    Associated Diagnoses: Diarrhea, unspecified type      NYSTATIN (DUKE'S SOLUTION) Mix equal amounts of benadryl, maalox, 2% viscous lidocaine and nystatin    Swish and swallow 10 ml 4 times a day  Qty: 500 mL, Refills: 5    Associated Diagnoses: Mucositis      ondansetron (ZOFRAN-ODT) 8 MG TbDL Take 1 tablet (8 mg total) by mouth every 8 (eight) hours as needed (Nausea).  Qty: 60 tablet, Refills: 6    Associated Diagnoses: Malignant neoplasm of colon, unspecified part of colon         STOP taking these medications       metoprolol succinate (TOPROL-XL) 100 MG 24 hr tablet Comments:   Reason for Stopping:               I certify that this patient is confined to his home and needs intermittent skilled nursing care, physical therapy and occupational therapy.

## 2018-02-10 NOTE — PLAN OF CARE
CM met with patient at bedside. Patient is ready for discharge but would benefit from home health. Patient has no preference. [facility]  unable to staff. Message left for on call at [facility]. Waiting for call back. Will follow.    4:07pm Referral received and accepted by LIA Mello. Face sheet, H&P, therapy evals, and orders sent via .

## 2018-02-10 NOTE — PLAN OF CARE
Problem: Patient Care Overview  Goal: Plan of Care Review  Pt resting quietly in bed. Pt had a couple of episodes of loose stools. C-Diff sample sent to lab. Pt obsessed with his bowels. Pt also having episodes of confusion. VS stable. Will continue  to monitor pt.

## 2018-02-11 NOTE — NURSING
Patient still has not voided.  If patient has not voided by 2030, patient will be discharged with gallo catheter.  Report given to oncoming nurse and family fully understands plan of care.

## 2018-02-11 NOTE — PT/OT/SLP DISCHARGE
Occupational Therapy Discharge Summary    Karthik Medrano  MRN: [id number]   Principal Problem: Atrial flutter with rapid ventricular response      Patient Discharged from acute Occupational Therapy on 2/10/2018.  Please refer to prior OT note dated 2/9/2018 for functional status.    Assessment:      Patient was discharged unexpectedly.  Information required to complete an accurate discharge summary is unknown.  Refer to therapy initial evaluation and last progress note for initial and most recent functional status and goal achievement.  Recommendations made may be found in medical record.    Objective:     GOALS:    Occupational Therapy Goals        Problem: Occupational Therapy Goal    Goal Priority Disciplines Outcome Interventions   Occupational Therapy Goal     OT, PT/OT Ongoing (interventions implemented as appropriate)    Description:  Goals to be met by: 7 days     Patient will increase functional independence with ADLs by performing:    UE Dressing with Supervision.  LE Dressing with Minimal Assistance.  Grooming while standing with Set-up Assistance.  Toileting from toilet with Moderate Assistance for hygiene and clothing management.   Stand pivot transfers with Stand-by Assistance.  Toilet transfer to toilet with Stand-by Assistance.                      Reasons for Discontinuation of Therapy Services  Transfer to alternate level of care.      Plan:     Patient Discharged to: Home with Home Health Service    Deedee Collazo, OT  2/11/2018

## 2018-02-11 NOTE — DISCHARGE SUMMARY
DISCHARGE SUMMARY  Hospital Medicine    Team: [facility] HOSP MED C    Patient Name: Karthik Medrano  YOB: 1950    Admit Date: 2/1/2018    Discharge Date: 2/10/2018    Discharge Attending Physician: Cassidy Collazo MD    Principal Diagnoses:  Active Hospital Problems    Diagnosis  POA    *Atrial flutter with rapid ventricular response [I48.92]  Yes    Hyperphosphatemia [E83.39]  Unknown    Acute kidney failure with lesion of tubular necrosis [N17.0]  No    Acute diastolic congestive heart failure [I50.31]  No    Hypotension [I95.9]  No    Anasarca [R60.1]  Yes    Ascites [R18.8]  Yes    Urinary tract infection without hematuria [N39.0]  Yes    CKD (chronic kidney disease), stage III [N18.3]  Yes    Acute renal failure superimposed on stage 3 chronic kidney disease [N17.9, N18.3]  Yes    Anemia in neoplastic disease [D63.0]  Yes    Chemotherapy induced neutropenia [D70.1, T45.1X5A]  Yes    Hydronephrosis [N13.30]  Yes    Type 2 diabetes mellitus with stage 3 chronic kidney disease, without long-term current use of insulin [E11.22, N18.3]  Yes    Rectal cancer [C20]  Yes    Acute congestive heart failure [I50.9]  Yes    Renovascular hypertension [I15.0]  Yes      Resolved Hospital Problems    Diagnosis Date Resolved POA    Shock [R57.9] 02/06/2018 Yes       Discharged Condition: stable    HOSPITAL COURSE:      Initial Presentation:    68 yo male PMhx metastatic rectal cancer Dx 2010, s/p resection w/ pulmonary mets 2013 on FOLFIRI chemotherapy, recurrent ascites s/p regular paracentesis, ureteral obstructive s/p urostomy tubes, recently diagnosed atrial flutter with variable block, DMHTN, diabetes, and CKD III who presented to the Emergency Department  from Heme/Onc office for elevated HR and symptoms of volume overload including worsening shortness of breath, abdominal distention, and LE edema for which he takes lasix PRN.  He gets periodic paracentesis as well for malignant ascites. He  developed hypotension on the floor s/p paracentesis this admission w/ removal ~4.5L as well as aggressive rate control for Aflutter. He was subsequently transferred to the CCU.    Course of Principle Problem for Admission:    Patient transferred from the floor to CCU w/ labs notable for lactate 5, and leukocytosis to 18 and started on pressors as well as broad spectrum antibiotics. Antihypertensives were held. UA was positive but urine cx negative, paracentesis fluid was not sent for analysis. Patient was weaned off pressors and antibiotics were de-escalated to rocephin. Patient developed ATN likely 2/2 hypotension. He started on lasix gtt and diuril w/ improvement of urine output. Renal US demonstrated severe R hydronephrosis which improved on repeat US w/ diuresis. Heparin drip started for Aflutter and lopressor has been uptitrated w/out much improvement in rate control. He underwent IR paracentesis and ISAIAS/RFA ablation 2/8.  He was discharge home in stable condition with  2/10.    Other Medical Problems Addressed in the Hospital:    Atrial Flutter with RVR  -No significant control with oral agents (Verapamil and Toprol), which were also causing hypotension  -Currently on Lopressor 50mg QID  -s/p ISAIAS/RFA 2/8; recommending anticoagulation for 1 month after procedure  -transitioned from Heparin gtt to Eliquis  -will try to continue uninterrupted anticoagulation for as long as possible before holding for procedures, and resume ASAP after procedure  -pt to f/u with Dr. Arreaga in 3 months      Acute on Chronic Systolic Heart Failure  -per ECHO 2/2/18: WMA, biatrial abnormalities, PA pressure 54, mod MR, mod TR, EF 40%  -auto-diuresing well after post-obstructive renal failure  -resume Lasix tomorrow  -continue beta-blocker, add ACE-I as tolerated by bp after renal failure improves     Ascites  -pt routinely gets paracentesis [id number] for recurrent ascites  -para 2/8 removed 2.9L  -on Rocephin for possible SBP (7th day  of antibiotics); course completed     UTI without hematuria  -per UA 2/3; urine gram stain and culture negative  -on day 7 of treatment; empiric given shock at time; completed course     RE on CKD III  -Appreciate Nephrology recs  -2/2 post-obstructive ATN; pt should self-diurese without diuretics  -creatinine continues to improve; baseline creatinine 1.6-1.8  -BMP to be checked with HH     Hydronephrosis  -right ureteral stricture with right hydronephrosis managed with ureteral stent exchanges q3 months  -last stent exchange ~1.5 weeks ago  -US retroperitoneal shows improving right-sided hydronephrosis  -Per Urology, hydronephrosis likely 2/2 XRT for rectal ca; if pt decompensates, recommend right nephrostomy tube  -voiding trial today; if pt fails, will replace Michael, perform voiding trial with HH, and referral placed to Urology     DM-II with CKD III, without long-term use of insulin  -FS fairly well-controlled  -continue SSI  -on oral medication at home; resume on discharge     Rectal Cancer  -Per Hem-Onc note 1/16/2018, pt had a significant decline in performance status and no longer tolerating chemo; however not psychologically ready for hospice  -plan on Regorafenib if performance status improves  -prognosis guarded      Consults: Cardiology, Nephrology and Urology    Last CBC/BMP:    CBC/Anemia Labs: Coags:      Recent Labs  Lab 02/08/18 2020 02/09/18  0350 02/10/18  0426   WBC 9.24 7.35 8.04   HGB 9.3* 8.7* 8.4*   HCT 31.4* 30.2* 28.5*   PLT SEE COMMENT SEE COMMENT SEE COMMENT   MCV 97 96 98   RDW 19.9* 19.9* 19.6*    No results for input(s): PT, INR, APTT in the last 168 hours.     Chemistries:     Recent Labs  Lab 02/08/18  0307  02/09/18  0351 02/09/18  0849 02/09/18  1541 02/10/18  0426     144  < > 144 142 140 140   K 4.0  4.0  < > 3.9 4.1 4.0 4.2     101  < > 100 102 100 102   CO2 35*  35*  < > 33* 30* 32* 29   BUN 46*  46*  < > 42* 41* 41* 41*   CREATININE 2.4*  2.4*  < > 2.2*  2.1* 2.3* 2.2*   CALCIUM 8.8  8.8  < > 9.1 9.0 8.6* 8.7   PROT 6.1  --  6.1  --   --  6.0   BILITOT 0.2  --  0.2  --   --  0.2   ALKPHOS 263*  --  236*  --   --  228*   ALT 29  --  20  --   --  15   AST 11  --  15  --   --  15   MG 1.7  < > 1.6  --  1.5* 1.9   PHOS 4.0  4.0  < > 3.4 3.4 3.4 3.9   < > = values in this interval not displayed.       Significant Diagnostic Studies: as above    Special Treatments/Procedures:   Procedure(s) (LRB):  TRANSESOPHAGEAL ECHOCARDIOGRAM (ISAIAS) (N/A)     Disposition: Home-Health Care [facility]      Future Scheduled Appointments:  No future appointments.    Discharge Medication List:       Karthik Medrano   Home Medication Instructions VÍCTOR:[id number]    Printed on:02/11/18 9420   Medication Information                      apixaban 5 mg Tab  Take 1 tablet (5 mg total) by mouth 2 (two) times daily.             ascorbic acid (VITAMIN C) 1000 MG tablet  Take 1,000 mg by mouth once daily.             cholecalciferol, vitamin D3, (VITAMIN D3) 2,000 unit Cap  Take 1 capsule by mouth once daily.             cholestyramine, bulk, Powd  4 g by Misc.(Non-Drug; Combo Route) route once daily at 6am.             co-enzyme Q-10 30 mg capsule  Take 30 mg by mouth 3 (three) times daily.             diphenoxylate-atropine 2.5-0.025 mg (LOMOTIL) 2.5-0.025 mg per tablet  TAKE 1 TABLET BY MOUTH FOUR TIMES DAILY AS NEEDED FOR DIARRHEA             dronabinol (MARINOL) 10 MG capsule  Take 1 capsule (10 mg total) by mouth 2 (two) times daily before meals.             esomeprazole (NEXIUM) 40 MG capsule  TAKE 1 CAPSULE(40 MG) BY MOUTH EVERY DAY             furosemide (LASIX) 20 MG tablet  Take 2 tablets (40 mg total) by mouth daily as needed.             glipiZIDE (GLUCOTROL) 5 MG tablet  TAKE 1 TABLET BY MOUTH THREE TIMES DAILY             metoprolol tartrate (LOPRESSOR) 50 MG tablet  Take 1 tablet (50 mg total) by mouth 4 (four) times daily.             multivitamin with iron-mineral TbSR  Take 1 tablet by  mouth once daily.             nut.tx.imp.renal fxn,lac-reduc 0.08 gram-1.8 kcal/mL Liqd  Take 237 mLs by mouth 2 (two) times daily.             NYSTATIN (DUKE'S SOLUTION)  Mix equal amounts of benadryl, maalox, 2% viscous lidocaine and nystatin    Swish and swallow 10 ml 4 times a day             ondansetron (ZOFRAN-ODT) 8 MG TbDL  Take 1 tablet (8 mg total) by mouth every 8 (eight) hours as needed (Nausea).             opium tincture 10 mg/mL (morphine) Tinc  Take 0.6 mLs (6 mg total) by mouth 4 (four) times daily.             oxycodone-acetaminophen (PERCOCET) 5-325 mg per tablet  TK 1 T PO Q 6 H PRN P             paregoric 2 mg/5 mL Liqd  TAKE 5 ML BY MOUTH THREE TIMES DAILY             regorafenib (STIVARGA) 40 mg Tab  Take 120 mg by mouth once daily. Take for 21 days and then 1 week off and then repeat             tamsulosin (FLOMAX) 0.4 mg Cp24  Take 1 capsule (0.4 mg total) by mouth once daily.                 Patient Instructions:    Discharge Procedure Orders  Ambulatory Referral to Electrophysiology   Referral Priority: Routine Referral Type: Consultation   Referral Reason: Specialty Services Required    Referred to Provider: BREN WARD Requested Specialty: Electrophysiology   Number of Visits Requested: 1      Ambulatory Referral to Urology   Referral Priority: Routine Referral Type: Consultation   Referral Reason: Specialty Services Required    Requested Specialty: Urology    Number of Visits Requested: 1      Activity as tolerated     Notify your health care provider if you experience any of the following:  severe uncontrolled pain     Notify your health care provider if you experience any of the following:  persistent dizziness, light-headedness, or visual disturbances         At the time of discharge patient was told to take all medications as prescribed, to keep all followup appointments, and to call their primary care physician or return to the emergency room if they have any worsening or  concerning symptoms.    Signing Physician:  Cassidy Collazo MD

## 2018-02-11 NOTE — PROGRESS NOTES
Progress Note  Hospital Medicine    Primary Team: [facility] MED C  Admit Date: 2/1/2018   Length of Stay:  LOS: 9 days   SUBJECTIVE:   Reason for Admission:  Atrial flutter with rapid ventricular response    HPI:  68 yo male PMhx metastatic rectal cancer Dx 2010, s/p resection w/ pulmonary mets 2013 on FOLFIRI chemotherapy, recurrent ascites s/p regular paracentesis, ureteral obstructive s/p urostomy tubes, recently diagnosed atrial flutter with variable block, DMHTN, diabetes, and CKD III who presented to the Emergency Department  from Heme/Onc office for elevated HR and symptoms of volume overload including worsening shortness of breath, abdominal distention, and LE edema for which he takes lasix PRN.  He gets periodic paracentesis as well for malignant ascites. He developed hypotension on the floor s/p paracentesis this admission w/ removal ~4.5L as well as aggressive rate control for Aflutter. He was subsequently transferred to the CCU.    Hospital Course:  Patient transferred from the floor to CCU w/ labs notable for lactate 5, and leukocytosis to 18 and started on pressors as well as broad spectrum antibiotics. Antihypertensives were held. UA was positive but urine cx negative, paracentesis fluid was not sent for analysis. Patient was weaned off pressors and antibiotics were de-escalated to rocephin. Patient developed ATN likely 2/2 hypotension. He started on lasix gtt and diuril w/ improvement of urine output. Renal US demonstrated severe R hydronephrosis which improved on repeat US w/ diuresis. Heparin drip started for Aflutter and lopressor has been uptitrated w/out much improvement in rate control. Plan for IR paracentesis w/ possible ISAIAS/RFA ablation 2/8.    Interval history:    No acute events overnight.  Pt remains with controlled HR.  He notes diarrhea overnight, C.diff negative.  Wife explains diarrhea is chronic and related to chemotherapy.        Review of Systems:  Constitutional: no fever or  chills  Respiratory: no cough or shortness of breath  Cardiovascular: no chest pain or palpitations  Gastrointestinal: no nausea or vomiting, no abdominal pain or change in bowel habits  Musculoskeletal: no arthralgias or myalgias     OBJECTIVE:     Temp:  [97.7 °F (36.5 °C)-99.8 °F (37.7 °C)]   Pulse:  [66-83]   Resp:  [15-18]   BP: (110-128)/(56-71)   SpO2:  [90 %-97 %]  Body mass index is 26.86 kg/m².  Intake/Outake:  This Shift:  No intake/output data recorded.    Net I/O past 24h:     Intake/Output Summary (Last 24 hours) at 02/10/18 2103  Last data filed at 02/10/18 1800   Gross per 24 hour   Intake             1340 ml   Output              850 ml   Net              490 ml             Physical Exam:  Gen- well-developed, well-nourished, chronically-ill appearing, NAD  CVS- S1 and S2 present, RRR, no murmurs  Resp- CTA b/l, no work of breathing  Abd- BS+, soft, NT, +distension  Ext- no clubbing, cyanosis, or edema    Laboratory:  CBC/Anemia Labs: Coags:      Recent Labs  Lab 02/08/18 2020 02/09/18  0350 02/10/18  0426   WBC 9.24 7.35 8.04   HGB 9.3* 8.7* 8.4*   HCT 31.4* 30.2* 28.5*   PLT SEE COMMENT SEE COMMENT SEE COMMENT   MCV 97 96 98   RDW 19.9* 19.9* 19.6*    No results for input(s): PT, INR, APTT in the last 168 hours.     Chemistries:     Recent Labs  Lab 02/08/18  0307  02/09/18  0351 02/09/18  0849 02/09/18  1541 02/10/18  0426     144  < > 144 142 140 140   K 4.0  4.0  < > 3.9 4.1 4.0 4.2     101  < > 100 102 100 102   CO2 35*  35*  < > 33* 30* 32* 29   BUN 46*  46*  < > 42* 41* 41* 41*   CREATININE 2.4*  2.4*  < > 2.2* 2.1* 2.3* 2.2*   CALCIUM 8.8  8.8  < > 9.1 9.0 8.6* 8.7   PROT 6.1  --  6.1  --   --  6.0   BILITOT 0.2  --  0.2  --   --  0.2   ALKPHOS 263*  --  236*  --   --  228*   ALT 29  --  20  --   --  15   AST 11  --  15  --   --  15   MG 1.7  < > 1.6  --  1.5* 1.9   PHOS 4.0  4.0  < > 3.4 3.4 3.4 3.9   < > = values in this interval not displayed.     Cardiac Enzymes:  Ejection Fractions:    No results for input(s): CPK, CPKMB, MB, TROPONINI in the last 72 hours. EF   Date Value Ref Range Status   02/08/2018 45 55 - 65    02/02/2018 40 (A) 55 - 65          Medications:  Scheduled Meds:   apixaban  5 mg Oral BID    cefTRIAXone (ROCEPHIN) IVPB  1 g Intravenous Q24H    metoprolol tartrate  50 mg Oral QID    pantoprazole  40 mg Oral Daily    sodium chloride 0.9%  3 mL Intravenous Q8H                             Continuous Infusions:    PRN Meds:.acetaminophen, dextrose 50%, dextrose 50%, diphenoxylate-atropine 2.5-0.025 mg, glucagon (human recombinant), glucose, glucose, heparin (PORCINE), heparin (PORCINE), insulin aspart, ondansetron, ondansetron, oxyCODONE, oxyCODONE, sodium chloride 0.9%, varibar nectar Ba Sulfate, varibar pudding Ba Sulfate, varibar thin liquid ba sulfate     ASSESSMENT/PLAN:     Atrial Flutter with RVR  -No significant control with oral agents (Verapamil and Toprol), which were also causing hypotension  -Currently on Lopressor 50mg QID  -s/p ISAIAS/RFA 2/8; recommending anticoagulation for 1 month after procedure  -transitioned from Heparin gtt to Eliquis  -will try to continue uninterrupted anticoagulation for as long as possible before holding for procedures, and resume ASAP after procedure  -pt to f/u with Dr. Arreaga in 3 months     Acute on Chronic Systolic Heart Failure  -per ECHO 2/2/18: WMA, biatrial abnormalities, PA pressure 54, mod MR, mod TR, EF 40%  -auto-diuresing well after post-obstructive renal failure  -resume Lasix tomorrow  -continue beta-blocker, add ACE-I as tolerated by bp after renal failure improves    Ascites  -pt routinely gets paracentesis [id number] for recurrent ascites  -para 2/8 removed 2.9L  -on Rocephin for possible SBP (7th day of antibiotics); course completed    UTI without hematuria  -per UA 2/3; urine gram stain and culture negative  -on day 7 of treatment; empiric given shock at time; completed course    RE on CKD  III  -Appreciate Nephrology recs  -2/2 post-obstructive ATN; pt should self-diurese without diuretics  -creatinine continues to improve; baseline creatinine 1.6-1.8  -BMP to be checked with HH    Hydronephrosis  -right ureteral stricture with right hydronephrosis managed with ureteral stent exchanges q3 months  -last stent exchange ~1.5 weeks ago  -US retroperitoneal shows improving right-sided hydronephrosis  -Per Urology, hydronephrosis likely 2/2 XRT for rectal ca; if pt decompensates, recommend right nephrostomy tube  -voiding trial today; if pt fails, will replace Michael, perform voiding trial with HH, and referral placed to Urology    DM-II with CKD III, without long-term use of insulin  -FS fairly well-controlled  -continue SSI  -on oral medication at home; resume on discharge    Rectal Cancer  -Per Hem-Onc note 1/16/2018, pt had a significant decline in performance status and no longer tolerating chemo; however not psychologically ready for hospice  -plan on Regorafenib if performance status improves  -prognosis guarded    DVT ppx- Heparin  CODE Status- FULL code, but not CVL or HD    Dispo- stable for d/c home with HH today    Cassidy Collazo MD  Hospital Medicine Staff

## 2018-02-11 NOTE — PROGRESS NOTES
Pt urinated in brief previously, hence no need for urinary catheter to be placed. Pt's IV catheter dc'd per protocol. Pt's Left F.A. Port-a-cath de-accessed per protocol. Pt rafael well. Telemetry dc'd. VS stable. Pt alert and oriented X 4. Discharge information given to wife and questions answered. Pt left with wife and son per wheelchair then private vehicle.

## 2018-02-12 LAB — BACTERIA SPEC AEROBE CULT: NO GROWTH

## 2018-02-12 NOTE — PLAN OF CARE
Plan is to discharge home with [facility].    No future appointments.       02/12/18 0818   Final Note   Assessment Type Final Discharge Note   Discharge Disposition Home-Health   Hospital Follow Up  Appt(s) scheduled? No   Discharge plans and expectations educations in teach back method with documentation complete? Yes   Right Care Referral Info   Post Acute Recommendation Home-care   Referral Type Home Health   Facility Name [facility]

## 2018-02-15 LAB — BACTERIA SPEC ANAEROBE CULT: NORMAL

## 2018-02-15 NOTE — TRANSFER OF CARE
"Anesthesia Transfer of Care Note    Patient: Karthik Medrano    Procedure(s) Performed: Procedure(s) (LRB):  TRANSESOPHAGEAL ECHOCARDIOGRAM (ISAIAS) (N/A)    Patient location: PACU    Anesthesia Type: general    Transport from OR: Transported from OR on 6-10 L/min O2 by face mask with adequate spontaneous ventilation    Post pain: adequate analgesia    Post assessment: no apparent anesthetic complications and tolerated procedure well    Post vital signs: stable    Level of consciousness: awake    Nausea/Vomiting: no nausea/vomiting    Complications: none    Transfer of care protocol was followed      Last vitals:   Visit Vitals  BP (!) 127/56 (BP Location: Left arm, Patient Position: Lying)   Pulse 76   Temp 37.4 °C (99.4 °F) (Oral)   Resp 18   Ht 5' 10" (1.778 m)   Wt 84.9 kg (187 lb 2.7 oz)   SpO2 95%   BMI 26.86 kg/m²     "

## 2018-02-15 NOTE — PHYSICIAN QUERY
PT Name: Karthik Merdano  MR #: [id number]     Physician Query Form - Documentation Clarification      CDS/: Glen Nicole Jr RN              Contact information:ext [phone]    This form is a permanent document in the medical record.     Query Date: February 15, 2018    By submitting this query, we are merely seeking further clarification of documentation. Please utilize your independent clinical judgment when addressing the question(s) below.    The Medical record reflects the following:    Supporting Clinical Findings Location in Medical Record   Skin: Negative.      Skin: Dry and cool, healing pressure injury to sacrum. Generalized edema present.     Pressure Ulcer   Pressure Injury 02/05/18 1856 Coccyx Stage 2 less than 1 day  2/1 H&P    2/6 Nursing Care Plan Note      2/6 Cardiology Progress Note                                                                                      Doctor, Please specify diagnosis or diagnoses associated with above clinical findings.  Specify the Pressure Ulcer     Provider Use Only    (X    )Stage 2 Pressure Ulcer to the Coccyx,Present on Admit    (    )Stage 2 Pressure Ulcer to the Coccyx,Not Present on Admit    (    )Other___________________________________________________                                                                                                           [  ] Clinically undetermined

## 2018-02-16 ENCOUNTER — TELEPHONE (OUTPATIENT)
Dept: FAMILY MEDICINE | Facility: CLINIC | Age: 68
End: 2018-02-16

## 2018-02-16 NOTE — TELEPHONE ENCOUNTER
----- Message from Candie Carranza sent at 2/16/2018  3:39 PM CST -----  Contact: [facility] [phone]  Requesting a call about patient test results.    Please call and advise.    Thank You

## 2018-02-16 NOTE — TELEPHONE ENCOUNTER
Spoke with Theresa and advised her that we have not seen this patient since 2013, so I am not sure who authorized home health or whom she is going to speak with to get any type of test results.

## 2018-02-21 DIAGNOSIS — C20 RECTAL CANCER: Primary | ICD-10-CM

## 2018-02-22 ENCOUNTER — TELEPHONE (OUTPATIENT)
Dept: HEMATOLOGY/ONCOLOGY | Facility: CLINIC | Age: 68
End: 2018-02-22

## 2018-02-22 NOTE — TELEPHONE ENCOUNTER
----- Message from Ana Menendez sent at 2/22/2018 10:33 AM CST -----  Contact: Pt's Wife Mrs Medrano [phone]  Pt's wife is requesting a call back from the nurse regarding her . Pt has been throwing up since yesterday. Pt did take the medication ondansetron for nausea but he is unable to keep any down including fluids.    Mrs Medrano said he did not want to go to the ER and she is very concerned.    Mrs Medrano may [phone].    Thank you.  JONN

## 2018-02-22 NOTE — TELEPHONE ENCOUNTER
"Spoke with patient's wife, who is calling to report 3 episodes of clear emesis yesterday and one episode so far today. Patient has been constantly nauseous for the past week. He is taking zofran 3-4 times per day, with no relief. Wife states he has not had a BM for 8 days, but she again notes he has barely been eating. Is not passing gas. His abdomen is slightly distended---denies SOB at this moment. Has c/o weakness. Per wife, VSS. Wife states she knows he is not feeling well because he specifically asked her to stay home today from work. Patient is refusing to go to ED, which is where the wife feels he needs to be.  Nurse informed patient she would contact her back after speaking with dr valles---but she feels the patient would benefit greatly from hospice at this point in time. Wife states, "he is not ready. He is not there yet."    Message routed to dr valles (i have no idea what to do for him)  "

## 2018-02-23 NOTE — TELEPHONE ENCOUNTER
He is progressively getting worse, unable to do any ADL's,  Discussed hospice and he states anai that he does not want to even discuss about it as he feels that he still has fight in him. Rediscussed goals of hospice and quality of life maintenance.  He is planning to go to ED and first make sre there is nothing medical they can do to help him and then decide based on that. He is too weak to even travel by car so is going to go by 911. His wife agrees that hospice is what it sounds like but she is in a position that she has to support her husbands wishes of continuing care.

## 2018-02-26 ENCOUNTER — TELEPHONE (OUTPATIENT)
Dept: HEMATOLOGY/ONCOLOGY | Facility: CLINIC | Age: 68
End: 2018-02-26

## 2018-02-26 NOTE — TELEPHONE ENCOUNTER
----- Message from Lisa Abernathy MD sent at 2/26/2018  8:16 AM CST -----  Contact: evangelist del castillo md      ----- Message -----  From: Evangelist Thornton MD  Sent: 2/26/2018   8:11 AM  To: Lisa Abernathy MD    Pt passed away on Saturday 2-24-18

## 2018-03-02 ENCOUNTER — TELEPHONE (OUTPATIENT)
Dept: HEMATOLOGY/ONCOLOGY | Facility: CLINIC | Age: 68
End: 2018-03-02

## 2018-03-02 ENCOUNTER — TELEPHONE (OUTPATIENT)
Dept: FAMILY MEDICINE | Facility: CLINIC | Age: 68
End: 2018-03-02

## 2018-03-02 NOTE — TELEPHONE ENCOUNTER
----- Message from Huma Awan sent at 3/2/2018 10:36 AM CST -----  Contact: [place] Tia [phone]  Tia would like a call back from the nurse regarding if Dr Pitt would be willing to sign the patients death certificate.

## 2018-03-02 NOTE — TELEPHONE ENCOUNTER
----- Message from Evangelist Thornton MD sent at 2/26/2018  8:11 AM CST -----  Contact: evangelist del castillo md  Pt passed away on Saturday 2-24-18

## 2018-04-12 LAB
ACID FAST MOD KINY STN SPEC: NORMAL
MYCOBACTERIUM SPEC QL CULT: NORMAL

## 2018-10-11 NOTE — Clinical Note
Schedule 2 D ECHO ASAP  Schedule CBC,CMP, CEA, CT chest, abdomen/pelvis and see me in 2 weeks and for FOLFIRI and Cyramza. DC pump on day 3. Neulasta on day 3  Satisfactory

## 2021-01-28 NOTE — PLAN OF CARE
Problem: SLP Goal  Goal: SLP Goal  Speech Therapy Short Term Goals  Goal expected to be met by 2/20  1. Pt will tolerate regular diet with nectar-thickened liquids w/o overt S/S aspiration, Supervision   2. Pt will tolerate small sips of thin liquid with 3 swallows and no overt s/s of aspiration, supervision.   3. Pt will completed BOT retraction and pharyngeal wall constriction exercises x10 each, supervision.    Speech Language Pathology Goals  Goals expected to be met by 2/12/18  1. Pt will tolerate regular diet with nectar-thickened liquids w/o overt S/S aspiration, Supervision -ongoing  2. Pt will participate in Modified Barium Swallow Study to rule out risk of aspiration and determine safest, least restrictive means of hydration/nutrition -met 2/6       Outcome: Ongoing (interventions implemented as appropriate)  Goals remain appropriate. ST will continue to follow.   DARIN Toledo., CCC-SLP  Pager: [phone]  02/09/2018         Pt to ER with c/o Lower abd pain radiating to back starting tonight. States no change with urination, currently treating bladder infection with OTC meds. Denies fevers. Lungs cl, breathing easy nonlabored.

## 2022-05-27 NOTE — Clinical Note
RTC to do labs on 5/1/17 - CBC, CMP, urine protein/creatinine ratio,  see Dr. Abernathy on 5/2/17,  and next cycle of chemo on 5/3/17 with  FOLFIRI and Cyramza. D/c pump/IVF day 3, Neulasta OBI on day 3. 
MD aware, no new interventions at this time.

## 2022-07-30 NOTE — Clinical Note
Schedule CBC,CMP, Urine protein/creatinine ratio and see me in 2 weeks on a Tuesday late PM as late as possible Schedule FOLFIRI and Cyramza the next day (wednesday) afternoon. DC pump on day 3. neulasta on day 3 Statement Selected

## 2022-09-28 NOTE — TELEPHONE ENCOUNTER
----- Message from Perez Swan sent at 6/1/2017  9:16 AM CDT -----  Contact: pt wife   Pt wife is calling to discuss all scheduled appointments, pt is not able to take off from work.   Contact number [phone] or [phone]   Other (Free Text): 2 sutures.\\nconsider ilk, hydroxychloroquine Detail Level: Zone Render Risk Assessment In Note?: no Note Text (......Xxx Chief Complaint.): This diagnosis correlates with the
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
## 2024-01-02 NOTE — MR AVS SNAPSHOT
"Patient Information     Patient Name Sex Karthik Lopez Male 1950      Visit Information        Provider Department [facility] Phone Center    2017 5:30 PM [facility], [facility] [phone] Ru Suárez      Patient Instructions      How to Control Nausea and Vomiting     Taken before meals, medicines can help ease nausea.    Nausea is feeling that you need to throw up. Throwing up occurs when your body forces food that is in your stomach out through your mouth. Nausea and vomiting are symptoms that are caused by many things. They can happen when a condition or disease, medicine, medical treatment, or a poisonous substance affects the area in your brain that controls vomiting. Some conditions or diseases can cause nausea, abdominal pain or cramps, and vomiting. The symptoms can be mild and go away by themselves. Other symptoms can be serious. You will need to see your healthcare provider for these.  Nausea and vomiting are common. They can be caused by many things. These include:  · "Stomach flu" (gastroenteritis)  · Food poisoning  · Stomach pain (gastritis)  · Blockages  They can also be caused by a head injury, an infection in the brain or inside the ear, or migraines. Other common causes of nausea and vomiting include:  · Brain tumor  · Brain bruise  · Motion sickness  · Drugs. These include alcohol, pain medicines such as morphine, and cancer medicines.  · Toxins. These are poisonous things like plants or liquids that are swallowed by accident.  · Advanced types of cancer  · Movement problems (psychogenic problems)  · Extra pressure in the fluid that surrounds the brain and spinal cord (elevated intracranial pressure)     Nausea and vomiting are also common side effects of chemotherapy and radiation therapy. Side effects happen when treatment changes some normal cells as well as cancer cells. In this case, the cells lining your stomach and the part of your brain that controls " vomiting are affected. Other more serious causes of vomiting may be hard to find early in the illness.     When to seek medical advice  Call your healthcare provider right away if you have the following:  · Nausea or vomiting that lasts 24 hours or more  · Trouble keeping fluids down   Medicines can help  Nausea or vomiting can often be prevented or controlled with medicines (antiemetics). Your doctor may give you antiemetics before or after treatment if you are getting chemotherapy or other medical treatments that cause nausea or vomiting.  Eating tips  · If you have medicines to control nausea, take them before meals as directed.  · Avoid fatty or greasy foods while nauseated.  · Eat small meals slowly throughout the day.  · Ask someone to sit with you while you eat to keep you from thinking about feeling nauseated.  · Eat foods at room temperature or colder to avoid strong smells.  · Eat dry foods, such as toast, crackers, or pretzels. Also eat cool, light foods, such as applesauce, and bland foods, such as oatmeal or skinned chicken.   Other ways to feel better  · Get a little fresh air. Take a short walk.  · Talk to a friend, listen to music, or watch TV.  · Take a few deep, slow breaths.  · Eat by candlelight or in surroundings that you find relaxing.  · Use a technique, such as guided imagery, to help you relax. Imagine yourself in a beautiful, restful scene. Or daydream about the place youd most like to be.  Date Last Reviewed: 1/6/2016  © 4578-0370 [employer]. [address] [place] [address], [place] [address]. All rights reserved. This information is not intended as a substitute for professional medical care. Always follow your healthcare professional's instructions.             Your Current Medications Are     amlodipine (NORVASC) 5 MG tablet    ascorbic acid (VITAMIN C) 1000 MG tablet    atorvastatin (LIPITOR) 10 MG tablet    cholecalciferol, vitamin D3, (VITAMIN D3) 2,000 unit Cap    co-enzyme  Q-10 30 mg capsule    diphenoxylate-atropine 2.5-0.025 mg (LOMOTIL) 2.5-0.025 mg per tablet    ferrous gluconate (FERGON) 324 MG tablet    furosemide (LASIX) 20 MG tablet    gabapentin (NEURONTIN) 300 MG capsule    GARLIC EXTRACT ORAL    glipiZIDE (GLUCOTROL) 5 MG tablet    lisinopril (PRINIVIL,ZESTRIL) 40 MG tablet    metoprolol succinate (TOPROL-XL) 100 MG 24 hr tablet    multivitamin with iron-mineral TbSR    NYSTATIN (DUKE'S SOLUTION)    ondansetron (ZOFRAN) 8 MG tablet    oxycodone-acetaminophen (PERCOCET) 5-325 mg per tablet    paregoric 2 mg/5 mL Liqd    tamsulosin (FLOMAX) 0.4 mg Cp24      Facility-Administered Medications     heparin, porcine (PF) 100 unit/mL injection flush 500 Units    pegfilgrastim (NEULASTA (ON BODY INJECTOR)) injection 6 mg    sodium chloride 0.9% bolus 1,000 mL    sodium chloride 0.9% flush 10 mL      Appointments for Next Year     5/1/2017  4:00 PM NON FASTING LAB (10 min.) [facility] LAB, [facility] CANCER BLDG    Arrive at check-in approximately 15 minutes before your scheduled appointment time. Bring all outside medical records and imaging, along with a list of your current medications and insurance card.    [facility] 3rd Floor    5/1/2017  4:10 PM URINE (15 min.) [facility] SPECIMEN, [facility] CANCER BLDG    Arrive at check-in approximately 15 minutes before your scheduled appointment time. Bring all outside medical records and imaging, along with a list of your current medications and insurance card.    5/2/2017  3:30 PM ESTABLISHED PATIENT (30 min.) [facility] Hematology Oncology Lisa Abernathy MD    Arrive at check-in approximately 15 minutes before your scheduled appointment time. Bring all outside medical records and imaging, along with a list of your current medications and insurance card.    [facility] - 3rd Floor    5/3/2017  2:30 PM INFUSION 210 MIN (210 min.) [facility], CHEMO    Arrive at  no changes check-in approximately 15 minutes before your scheduled appointment time. Bring all outside medical records and imaging, along with a list of your current medications and insurance card.    [facility], 5th Floor    5/5/2017  5:00 PM INFUSION 060 MIN (60 min.) [facility], CHEMO    Arrive at check-in approximately 15 minutes before your scheduled appointment time. Bring all outside medical records and imaging, along with a list of your current medications and insurance card.    [facility], 5th Floor      Allergies     No Known Allergies      Medications You Received from 04/12/2017 1757 to 04/13/2017 1757        Date/Time Order Dose Route Action     04/13/2017 1748 heparin, porcine (PF) 100 unit/mL injection flush 500 Units 500 Units Intravenous Given     04/13/2017 1715 pegfilgrastim (NEULASTA (ON BODY INJECTOR)) injection 6 mg 6 mg Subcutaneous Given     04/13/2017 1648 sodium chloride 0.9% bolus 1,000 mL 1,000 mL Intravenous New Bag     04/13/2017 1748 sodium chloride 0.9% flush 10 mL 10 mL Intravenous Given      Current Discharge Medication List     Cannot display discharge medications since this is not an admission.

## 2024-10-24 NOTE — ASSESSMENT & PLAN NOTE
UA positive  Initially on vanc and zosyn, have de-escalated to rocephin  Urine cx: no growth, gram stain (-)   Quality 431: Preventive Care And Screening: Unhealthy Alcohol Use - Screening: Patient not identified as an unhealthy alcohol user when screened for unhealthy alcohol use using a systematic screening method Detail Level: Detailed Quality 226: Preventive Care And Screening: Tobacco Use: Screening And Cessation Intervention: Patient screened for tobacco use and is an ex/non-smoker Quality Measures: Hospice Services Provided To Patient Any Time During The Measurement Period.: Hospice services provided to patient any time during the measurement period.

## 2024-12-13 NOTE — NURSING
[facility]  INPATIENT PHYSICAL THERAPY  DAILY NOTE  [facility] - [id number]      Discharge Recommendations: Continue to assess pending progress and Patient would benefit from continued PT at discharge  Equipment Recommendations: No (Continue to assess pending progress (Patient has RW and cane may require manual wheelchair))               Time In: 0900  Time Out: 1030  Timed Code Treatment Minutes: 90 Minutes  Minutes: 90          Date: 2024  Patient Name: Gonzales Sheehan,  Gender:  male        MRN: [id number]  : 1940  (84 y.o.)     Referring Practitioner: Monserrat Hanson APRN - CNP (ordering); Priti Arreola DO (attending)  Diagnosis: Fall at home, initial encounter  Additional Pertinent Hx: Gonzales Sheehan is a 84 y.o. male admitted to [facility] on 2024. Patient  has a past medical history of AMD (age-related macular degeneration), wet (HCC), Bradycardia, Enlarged prostate, H/O cardiovascular stress test, H/O echocardiogram, History of Holter monitoring, Hyperlipidemia, Other and unspecified hyperlipidemia, PAF (paroxysmal atrial fibrillation) ([facility]), Psychosexual dysfunction with inhibited sexual excitement, and Unspecified hyperplasia of prostate without urinary obstruction and other lower urinary tract symptoms (LUTS).  Patient presented to [facility] from [facility] after suffering a fall downstairs.  Patient reported no loss of consciousness.  Patient reported getting up to use the restroom around 1:30 in the morning, he lost his balance, and fell down the stairway.  Denied head strike.  Wife was able to assist him and reportedly was moving him around the house his sheets.  Son arrived the following day and patient was unable to ambulate, he was taken to the ER for evaluation and found to have multiple pelvic fractures with associated hemorrhage and spinal fractures.  Patient on Eliquis for A-fib.  Patient's main complaint  "Pt. Here today for ambulatory pump d/c. Infusion complete. Port flushed, hep-locked and de-accessed. Neulasta OBI placed on back of R. Arm. Engaged appropriately. Pt. Complains of 8-9 vomiting episodes yesterday. States he has not vomited today but still feels queasy. Reports he has continued to hydrate himself well. Request no fluids due to abdominal ascites. Called MD on-call for anti-emetic script. Pt. Reports no nausea in 4 years of chemo. States today is better and thinks he may have "eaten something" irritating the day before that triggered the vomiting. Will  script on way home tonight. Will notify MD if nausea is not relieve after taking zofran 2-3 times and will request something else to take with it. No questions at this time.   "